# Patient Record
Sex: MALE | Race: WHITE | Employment: FULL TIME | URBAN - METROPOLITAN AREA
[De-identification: names, ages, dates, MRNs, and addresses within clinical notes are randomized per-mention and may not be internally consistent; named-entity substitution may affect disease eponyms.]

---

## 2017-01-25 ENCOUNTER — OFFICE VISIT (OUTPATIENT)
Dept: LAB | Facility: SCHOOL | Age: 47
End: 2017-01-25
Payer: COMMERCIAL

## 2017-01-25 VITALS
TEMPERATURE: 97.7 F | SYSTOLIC BLOOD PRESSURE: 122 MMHG | OXYGEN SATURATION: 98 % | HEIGHT: 71 IN | WEIGHT: 202.8 LBS | BODY MASS INDEX: 28.39 KG/M2 | RESPIRATION RATE: 16 BRPM | HEART RATE: 95 BPM | DIASTOLIC BLOOD PRESSURE: 86 MMHG

## 2017-01-25 DIAGNOSIS — J01.10 ACUTE FRONTAL SINUSITIS, RECURRENCE NOT SPECIFIED: Primary | ICD-10-CM

## 2017-01-25 PROCEDURE — 99213 OFFICE O/P EST LOW 20 MIN: CPT | Performed by: NURSE PRACTITIONER

## 2017-01-25 NOTE — MR AVS SNAPSHOT
After Visit Summary   1/25/2017    Emery Barkley    MRN: [id number]           Patient Information     Date Of Birth          1970        Visit Information        Provider Department      1/25/2017 10:00 AM Angelique Simmons APRN [facility]         Today's Diagnoses     Acute frontal sinusitis, recurrence not specified    -  1       Care Instructions      Sinusitis (Antibiotic Treatment)    The sinuses are air-filled spaces within the bones of the face. They connect to the inside of the nose. Sinusitis is an inflammation of the tissue lining the sinus cavity. Sinus inflammation can occur during a cold. It can also be due to allergies to pollens and other particles in the air. Sinusitis can cause symptoms of sinus congestion and fullness. A sinus infection causes fever, headache and facial pain. There is often green or yellow drainage from the nose or into the back of the throat (post-nasal drip). You have been given antibiotics to treat this condition.  Home care:    Take the full course of antibiotics as instructed. Do not stop taking them, even if you feel better.    Drink plenty of water, hot tea, and other liquids. This may help thin mucus. It also may promote sinus drainage.    Heat may help soothe painful areas of the face. Use a towel soaked in hot water. Or,  the shower and direct the hot spray onto your face. Using a vaporizer along with a menthol rub at night may also help.     An expectorant containing guaifenesin may help thin the mucus and promote drainage from the sinuses.    Over-the-counter decongestants may be used unless a similar medicine was prescribed. Nasal sprays work the fastest. Use one that contains phenylephrine or oxymetazoline. First blow the nose gently. Then use the spray. Do not use these medicines more often than directed on the label or symptoms may get worse. You may also use tablets containing pseudoephedrine. Avoid  products that combine ingredients, because side effects may be increased. Read labels. You can also ask the pharmacist for help. (NOTE: Persons with high blood pressure should not use decongestants. They can raise blood pressure.)    Over-the-counter antihistamines may help if allergies contributed to your sinusitis.      Do not use nasal rinses or irrigation during an acute sinus infection, unless told to by your health care provider. Rinsing may spread the infection to other sinuses.    Use acetaminophen or ibuprofen to control pain, unless another pain medicine was prescribed. (If you have chronic liver or kidney disease or ever had a stomach ulcer, talk with your doctor before using these medicines. Aspirin should never be used in anyone under 18 years of age who is ill with a fever. It may cause severe liver damage.)    Don't smoke. This can worsen symptoms.  Follow-up care  Follow up with your healthcare provider or our staff if you are not improving within the next week.  When to seek medical advice  Call your healthcare provider if any of these occur:    Facial pain or headache becoming more severe    Stiff neck    Unusual drowsiness or confusion    Swelling of the forehead or eyelids    Vision problems, including blurred or double vision    Fever of 100.4 F (38 C) or higher, or as directed by your healthcare provider    Seizure    Breathing problems    Symptoms not resolving within 10 days    2773-7314 The [employer]. [address] [place] [address]. All rights reserved. This information is not intended as a substitute for professional medical care. Always follow your healthcare professional's instructions.              Follow-ups after your visit        Your next 10 appointments already scheduled     Jan 25, 2017 10:00 AM   School Visit with MICHAEL Branch CNP   [facility]  ([facility] )    [address]  [address]  [facility] [id number] [phone]              Who to contact     If you have questions or need follow up information about today's clinic visit or your schedule please contact [facility]  directly at [phone].  Normal or non-critical lab and imaging results will be communicated to you by MyChart, letter or phone within 4 business days after the clinic has received the results. If you do not hear from us within 7 days, please contact the clinic through [employer] or phone. If you have a critical or abnormal lab result, we will notify you by phone as soon as possible.  Submit refill requests through [employer] or call your pharmacy and they will forward the refill request to us. Please allow 3 business days for your refill to be completed.          Additional Information About Your Visit        [employer] Information     [employer] gives you secure access to your electronic health record. If you see a primary care provider, you can also send messages to your care team and make appointments. If you have questions, please call your primary care clinic.  If you do not have a primary care provider, please call [phone] and they will assist you.        Care EveryWhere ID     This is your Care EveryWhere ID. This could be used by other organizations to access your [place] medical records  [id number]        Your Vitals Were     Pulse Temperature Respirations Height BMI (Body Mass Index) Pulse Oximetry    95 97.7  F (36.5  C) (Tympanic) 16 5' 10.5\" (1.791 m) 28.68 kg/m2 98%       Blood Pressure from Last 3 Encounters:   01/25/17 122/86   11/23/16 128/78   08/24/15 118/78    Weight from Last 3 Encounters:   01/25/17 202 lb 12.8 oz (91.989 kg)   11/23/16 203 lb (92.08 kg)   08/24/15 199 lb (90.266 kg)              Today, you had the following     No orders found for display         Today's Medication Changes          These changes are accurate as of: 1/25/17  9:35 AM.  If you have any " questions, ask your nurse or doctor.               Start taking these medicines.        Dose/Directions    amoxicillin-clavulanate 875-125 MG per tablet   Commonly known as:  AUGMENTIN   Used for:  Acute frontal sinusitis, recurrence not specified   Started by:  Angelique Simmons APRN CNP        Dose:  1 tablet   Take 1 tablet by mouth 2 times daily   Quantity:  20 tablet   Refills:  0            Where to get your medicines      Some of these will need a paper prescription and others can be bought over the counter.  Ask your nurse if you have questions.     Bring a paper prescription for each of these medications    - amoxicillin-clavulanate 875-125 MG per tablet             Primary Care Provider Office Phone # Fax #    Devyn Cox [phone]       [facility] [address] [facility] [facility] [address]        Thank you!     Thank you for choosing [place] [address]  for your care. Our goal is always to provide you with excellent care. Hearing back from our patients is one way we can continue to improve our services. Please take a few minutes to complete the written survey that you may receive in the mail after your visit with us. Thank you!             Your Updated Medication List - Protect others around you: Learn how to safely use, store and throw away your medicines at www.disposemymeds.org.          This list is accurate as of: 1/25/17  9:35 AM.  Always use your most recent med list.                   Brand Name Dispense Instructions for use    amoxicillin-clavulanate 875-125 MG per tablet    AUGMENTIN    20 tablet    Take 1 tablet by mouth 2 times daily       atorvastatin 20 MG tablet    LIPITOR    90 tablet    Take 1 tablet (20 mg) by mouth daily       loratadine 10 MG tablet    CLARITIN     Take 10 mg by mouth daily as needed for allergies

## 2017-01-25 NOTE — PROGRESS NOTES
SUBJECTIVE:                                                    Emery Barkley is a 46 year old male who presents to clinic today for the following health issues:      Acute Illness   Acute illness concerns: sinus infection  Onset: 6 days     Fever: no     Chills/Sweats: no     Headache (location?): no     Sinus Pressure:YES- post-nasal drainage and facial pain    Conjunctivitis:  YES: bilateral, redness and discharge    Ear Pain: YES: pressure when lying on ears. bilaterally    Rhinorrhea: YES- clear to yellow    Congestion: YES- nasal    Sore Throat: no      Cough: YES-once in awhile, non-productive    Wheeze: no     Decreased Appetite: YES    Nausea: no     Vomiting: no     Diarrhea:  no     Dysuria/Freq.: no     Fatigue/Achiness: no     Sick/Strep Exposure: YES- strep at school, coworker has same symptoms     Therapies Tried and outcome: sudafed, neti pot, nyquil-temporary relief, nyquil helps to sleep.    Problem list and histories reviewed & adjusted, as indicated.  Additional history: as documented    Patient Active Problem List   Diagnosis     Hyperlipidemia LDL goal <100     Family history of coronary artery disease     External hemorrhoid     Past Surgical History   Procedure Laterality Date     Vasectomy         Social History   Substance Use Topics     Smoking status: Never Smoker      Smokeless tobacco: Never Used     Alcohol Use: Yes      Comment: 2 drinks per week     Family History   Problem Relation Age of Onset     C.A.D. Father      MI, chf, first mi in 50s.     CANCER Sister      brain     Lipids Mother      Coronary Artery Disease Father      Hyperlipidemia Mother      Hyperlipidemia Father      Other Cancer Sister      Brain Cancer     Depression Mother            ROS:  Constitutional, HEENT, cardiovascular, pulmonary, gi and gu systems are negative, except as otherwise noted.    OBJECTIVE:                                                    /86 mmHg  Pulse 95  Temp(Src) 97.7  F (36.5  C)  "(Tympanic)  Resp 16  Ht 5' 10.5\" (1.791 m)  Wt 202 lb 12.8 oz (91.989 kg)  BMI 28.68 kg/m2  SpO2 98%  Body mass index is 28.68 kg/(m^2).  GENERAL: healthy, alert and no distress  EYES: Eyes grossly normal to inspection, PERRL and conjunctivae and sclerae normal  HENT: normal cephalic/atraumatic, both ears: mucopurulent effusion, nose and mouth without ulcers or lesions, nasal mucosa edematous , oropharynx clear, oral mucous membranes moist and sinuses: frontal tenderness on both  NECK: no adenopathy, no asymmetry, masses, or scars and thyroid normal to palpation  RESP: lungs clear to auscultation - no rales, rhonchi or wheezes  CV: regular rates and rhythm and normal S1 S2, no S3 or S4  SKIN: no suspicious lesions or rashes  NEURO: Normal strength and tone, mentation intact and speech normal    Diagnostic Test Results:  none      ASSESSMENT/PLAN:                                                        ICD-10-CM    1. Acute frontal sinusitis, recurrence not specified J01.10 amoxicillin-clavulanate (AUGMENTIN) 875-125 MG per tablet       FUTURE APPOINTMENTS:       - Follow-up visit in 1 week with PCP for unresolved sx, sooner for new or worsening sx.      Patient Instructions     Sinusitis (Antibiotic Treatment)    The sinuses are air-filled spaces within the bones of the face. They connect to the inside of the nose. Sinusitis is an inflammation of the tissue lining the sinus cavity. Sinus inflammation can occur during a cold. It can also be due to allergies to pollens and other particles in the air. Sinusitis can cause symptoms of sinus congestion and fullness. A sinus infection causes fever, headache and facial pain. There is often green or yellow drainage from the nose or into the back of the throat (post-nasal drip). You have been given antibiotics to treat this condition.  Home care:    Take the full course of antibiotics as instructed. Do not stop taking them, even if you feel better.    Drink plenty of " water, hot tea, and other liquids. This may help thin mucus. It also may promote sinus drainage.    Heat may help soothe painful areas of the face. Use a towel soaked in hot water. Or,  the shower and direct the hot spray onto your face. Using a vaporizer along with a menthol rub at night may also help.     An expectorant containing guaifenesin may help thin the mucus and promote drainage from the sinuses.    Over-the-counter decongestants may be used unless a similar medicine was prescribed. Nasal sprays work the fastest. Use one that contains phenylephrine or oxymetazoline. First blow the nose gently. Then use the spray. Do not use these medicines more often than directed on the label or symptoms may get worse. You may also use tablets containing pseudoephedrine. Avoid products that combine ingredients, because side effects may be increased. Read labels. You can also ask the pharmacist for help. (NOTE: Persons with high blood pressure should not use decongestants. They can raise blood pressure.)    Over-the-counter antihistamines may help if allergies contributed to your sinusitis.      Do not use nasal rinses or irrigation during an acute sinus infection, unless told to by your health care provider. Rinsing may spread the infection to other sinuses.    Use acetaminophen or ibuprofen to control pain, unless another pain medicine was prescribed. (If you have chronic liver or kidney disease or ever had a stomach ulcer, talk with your doctor before using these medicines. Aspirin should never be used in anyone under 18 years of age who is ill with a fever. It may cause severe liver damage.)    Don't smoke. This can worsen symptoms.  Follow-up care  Follow up with your healthcare provider or our staff if you are not improving within the next week.  When to seek medical advice  Call your healthcare provider if any of these occur:    Facial pain or headache becoming more severe    Stiff neck    Unusual drowsiness  or confusion    Swelling of the forehead or eyelids    Vision problems, including blurred or double vision    Fever of 100.4 F (38 C) or higher, or as directed by your healthcare provider    Seizure    Breathing problems    Symptoms not resolving within 10 days    2917-6661 [employer]. [address] [place] [address]. All rights reserved. This information is not intended as a substitute for professional medical care. Always follow your healthcare professional's instructions.              MICHAEL Branch [facility]

## 2017-01-25 NOTE — NURSING NOTE
"Chief Complaint   Patient presents with     Sinus Problem       Initial /86 mmHg  Pulse 95  Temp(Src) 97.7  F (36.5  C) (Tympanic)  Resp 16  Ht 5' 10.5\" (1.791 m)  Wt 202 lb 12.8 oz (91.989 kg)  BMI 28.68 kg/m2  SpO2 98% Estimated body mass index is 28.68 kg/(m^2) as calculated from the following:    Height as of this encounter: 5' 10.5\" (1.791 m).    Weight as of this encounter: 202 lb 12.8 oz (91.989 kg).  BP completed using cuff size: large  "

## 2017-01-25 NOTE — PATIENT INSTRUCTIONS
Sinusitis (Antibiotic Treatment)    The sinuses are air-filled spaces within the bones of the face. They connect to the inside of the nose. Sinusitis is an inflammation of the tissue lining the sinus cavity. Sinus inflammation can occur during a cold. It can also be due to allergies to pollens and other particles in the air. Sinusitis can cause symptoms of sinus congestion and fullness. A sinus infection causes fever, headache and facial pain. There is often green or yellow drainage from the nose or into the back of the throat (post-nasal drip). You have been given antibiotics to treat this condition.  Home care:    Take the full course of antibiotics as instructed. Do not stop taking them, even if you feel better.    Drink plenty of water, hot tea, and other liquids. This may help thin mucus. It also may promote sinus drainage.    Heat may help soothe painful areas of the face. Use a towel soaked in hot water. Or,  the shower and direct the hot spray onto your face. Using a vaporizer along with a menthol rub at night may also help.     An expectorant containing guaifenesin may help thin the mucus and promote drainage from the sinuses.    Over-the-counter decongestants may be used unless a similar medicine was prescribed. Nasal sprays work the fastest. Use one that contains phenylephrine or oxymetazoline. First blow the nose gently. Then use the spray. Do not use these medicines more often than directed on the label or symptoms may get worse. You may also use tablets containing pseudoephedrine. Avoid products that combine ingredients, because side effects may be increased. Read labels. You can also ask the pharmacist for help. (NOTE: Persons with high blood pressure should not use decongestants. They can raise blood pressure.)    Over-the-counter antihistamines may help if allergies contributed to your sinusitis.      Do not use nasal rinses or irrigation during an acute sinus infection, unless told to by  your health care provider. Rinsing may spread the infection to other sinuses.    Use acetaminophen or ibuprofen to control pain, unless another pain medicine was prescribed. (If you have chronic liver or kidney disease or ever had a stomach ulcer, talk with your doctor before using these medicines. Aspirin should never be used in anyone under 18 years of age who is ill with a fever. It may cause severe liver damage.)    Don't smoke. This can worsen symptoms.  Follow-up care  Follow up with your healthcare provider or our staff if you are not improving within the next week.  When to seek medical advice  Call your healthcare provider if any of these occur:    Facial pain or headache becoming more severe    Stiff neck    Unusual drowsiness or confusion    Swelling of the forehead or eyelids    Vision problems, including blurred or double vision    Fever of 100.4 F (38 C) or higher, or as directed by your healthcare provider    Seizure    Breathing problems    Symptoms not resolving within 10 days    8225-2406 [employer]. [address] [place] [address], [place] [address]. All rights reserved. This information is not intended as a substitute for professional medical care. Always follow your healthcare professional's instructions.

## 2017-02-20 DIAGNOSIS — E78.5 HYPERLIPIDEMIA LDL GOAL <100: ICD-10-CM

## 2017-02-20 LAB
CHOLEST SERPL-MCNC: 147 MG/DL
HDLC SERPL-MCNC: 39 MG/DL
LDLC SERPL CALC-MCNC: 70 MG/DL
NONHDLC SERPL-MCNC: 108 MG/DL
TRIGL SERPL-MCNC: 190 MG/DL

## 2017-02-20 PROCEDURE — 36415 COLL VENOUS BLD VENIPUNCTURE: CPT | Performed by: FAMILY MEDICINE

## 2017-02-20 PROCEDURE — 80061 LIPID PANEL: CPT | Performed by: FAMILY MEDICINE

## 2017-07-14 ENCOUNTER — OFFICE VISIT (OUTPATIENT)
Dept: FAMILY MEDICINE | Facility: CLINIC | Age: 47
End: 2017-07-14
Payer: COMMERCIAL

## 2017-07-14 VITALS
HEART RATE: 73 BPM | WEIGHT: 211 LBS | TEMPERATURE: 97.1 F | SYSTOLIC BLOOD PRESSURE: 127 MMHG | DIASTOLIC BLOOD PRESSURE: 80 MMHG | BODY MASS INDEX: 29.54 KG/M2 | HEIGHT: 71 IN

## 2017-07-14 DIAGNOSIS — L82.0 INFLAMED SEBORRHEIC KERATOSIS: Primary | ICD-10-CM

## 2017-07-14 DIAGNOSIS — L91.8 SKIN TAG: ICD-10-CM

## 2017-07-14 PROCEDURE — 11200 RMVL SKIN TAGS UP TO&INC 15: CPT | Mod: 59 | Performed by: FAMILY MEDICINE

## 2017-07-14 PROCEDURE — 17110 DESTRUCTION B9 LES UP TO 14: CPT | Performed by: FAMILY MEDICINE

## 2017-07-14 NOTE — NURSING NOTE
"No chief complaint on file.      Initial /80  Pulse 73  Temp 97.1  F (36.2  C) (Tympanic)  Ht 5' 10.5\" (1.791 m)  Wt 211 lb (95.7 kg)  BMI 29.85 kg/m2 Estimated body mass index is 29.85 kg/(m^2) as calculated from the following:    Height as of this encounter: 5' 10.5\" (1.791 m).    Weight as of this encounter: 211 lb (95.7 kg).  Medication Reconciliation: complete     Edwina Nagel MA       "

## 2017-07-14 NOTE — PROGRESS NOTES
"  SUBJECTIVE:                                                    Emery Barkley is a 46 year old male who presents to clinic today for the following health issues:    Patient would like to get his skin tag removed from under his chin and neck.      Patient has h/o SK and skin tags. Would like them treated.  About 8 on neck and one on right upper chest.         Problem list and histories reviewed & adjusted, as indicated.  Additional history: as documented        Reviewed and updated as needed this visit by clinical staff  Tobacco  Allergies  Meds  Med Hx  Surg Hx  Fam Hx  Soc Hx      Reviewed and updated as needed this visit by Provider         ROS:  CONSTITUTIONAL:NEGATIVE for fever, chills, change in weight  INTEGUMENTARY/SKIN: as above    OBJECTIVE:                                                    /80  Pulse 73  Temp 97.1  F (36.2  C) (Tympanic)  Ht 5' 10.5\" (1.791 m)  Wt 211 lb (95.7 kg)  BMI 29.85 kg/m2  Body mass index is 29.85 kg/(m^2).  GENERAL APPEARANCE: healthy, alert and no distress  SKIN: has one irritated SK on right upper chest, and 8 small skin tags on neck each about 0.5 mm diameter. 3 freeze thaw cycles done without complications.           ASSESSMENT/PLAN:                                                    1. Inflamed seborrheic keratosis  Treated due to irritation around neck line    2. Skin tag  Treated on his neck      See Patient Instructions    Devyn Cox MD  [facility]    "

## 2017-07-14 NOTE — PATIENT INSTRUCTIONS
I treated about 8 small skin tags on your neck and one seborrheic keratosis on your right chest.    If they are not gone in 4 weeks please return to clinic for re-evaluation.          Thank you for choosing [facility].  You may be receiving a survey in the mail from Neeraj Baez regarding your visit today.  Please take a few minutes to complete and return the survey to let us know how we are doing.      If you have questions or concerns, please contact us via [employer] or you can contact your care team at [phone].    Our Clinic hours are:  Monday 6:40 am  to 7:00 pm  Tuesday -Friday 6:40 am to 5:00 pm    The [place] outpatient lab hours are:  Monday - Friday 6:10 am to 4:45 pm  Saturdays 7:00 am to 11:00 am  Appointments are required, call [phone]    If you have clinical questions after hours or would like to schedule an appointment,  call the clinic at [phone].

## 2017-07-14 NOTE — MR AVS SNAPSHOT
After Visit Summary   7/14/2017    Emery Barkley    MRN: [id number]           Patient Information     Date Of Birth          1970        Visit Information        Provider Department      7/14/2017 9:20 AM Devyn Cox MD [facility]        Today's Diagnoses     Inflamed seborrheic keratosis    -  1    Skin tag          Care Instructions    I treated about 8 small skin tags on your neck and one seborrheic keratosis on your right chest.    If they are not gone in 4 weeks please return to clinic for re-evaluation.          Thank you for choosing [facility].  You may be receiving a survey in the mail from [employer] regarding your visit today.  Please take a few minutes to complete and return the survey to let us know how we are doing.      If you have questions or concerns, please contact us via [employer] or you can contact your care team at [phone].    Our Clinic hours are:  Monday 6:40 am  to 7:00 pm  Tuesday -Friday 6:40 am to 5:00 pm    The [place] outpatient lab hours are:  Monday - Friday 6:10 am to 4:45 pm  Saturdays 7:00 am to 11:00 am  Appointments are required, call [phone]    If you have clinical questions after hours or would like to schedule an appointment,  call the clinic at [phone].            Follow-ups after your visit        Who to contact     If you have questions or need follow up information about today's clinic visit or your schedule please contact [facility] directly at [phone].  Normal or non-critical lab and imaging results will be communicated to you by [employer], letter or phone within 4 business days after the clinic has received the results. If you do not hear from us within 7 days, please contact the clinic through [employer] or phone. If you have a critical or abnormal lab result, we will notify you by phone as soon as possible.  Submit refill requests through [employer] or call your pharmacy and they will forward the  "refill request to us. Please allow 3 business days for your refill to be completed.          Additional Information About Your Visit        [employer] Information     [employer] gives you secure access to your electronic health record. If you see a primary care provider, you can also send messages to your care team and make appointments. If you have questions, please call your primary care clinic.  If you do not have a primary care provider, please call [phone] and they will assist you.        Care EveryWhere ID     This is your Care EveryWhere ID. This could be used by other organizations to access your [place] medical records  [id number]        Your Vitals Were     Pulse Temperature Height BMI (Body Mass Index)          73 97.1  F (36.2  C) (Tympanic) 5' 10.5\" (1.791 m) 29.85 kg/m2         Blood Pressure from Last 3 Encounters:   07/14/17 127/80   01/25/17 122/86   11/23/16 128/78    Weight from Last 3 Encounters:   07/14/17 211 lb (95.7 kg)   01/25/17 202 lb 12.8 oz (92 kg)   11/23/16 203 lb (92.1 kg)              Today, you had the following     No orders found for display       Primary Care Provider Office Phone # Fax #    Devyn Cox [phone]       [facility] [address] [facility] [address]        Equal Access to Services     SHARIFA NIELSON : Hadii lissette ku hadasho Soomaali, waaxda luqadaha, qaybta kaalmada adeegyada, brandon oliva. So [facility] [phone].    ATENCIÓN: Si habla español, tiene a moctezuma disposición servicios gratuitos de asistencia lingüística. Rosibel ritchie [phone].    We comply with applicable federal civil rights laws and [place] laws. We do not discriminate on the basis of race, color, national origin, age, disability sex, sexual orientation or gender identity.            Thank you!     Thank you for choosing [facility]  for your care. Our goal is always to provide you with excellent care. Hearing back from our " patients is one way we can continue to improve our services. Please take a few minutes to complete the written survey that you may receive in the mail after your visit with us. Thank you!             Your Updated Medication List - Protect others around you: Learn how to safely use, store and throw away your medicines at www.disposemymeds.org.          This list is accurate as of: 7/14/17  9:51 AM.  Always use your most recent med list.                   Brand Name Dispense Instructions for use Diagnosis    atorvastatin 20 MG tablet    LIPITOR    90 tablet    Take 1 tablet (20 mg) by mouth daily    Hyperlipidemia LDL goal <100, Family history of coronary artery disease       loratadine 10 MG tablet    CLARITIN     Take 10 mg by mouth daily as needed for allergies

## 2017-10-30 ENCOUNTER — OFFICE VISIT (OUTPATIENT)
Dept: LAB | Facility: SCHOOL | Age: 47
End: 2017-10-30
Payer: COMMERCIAL

## 2017-10-30 VITALS
BODY MASS INDEX: 28.14 KG/M2 | HEART RATE: 80 BPM | DIASTOLIC BLOOD PRESSURE: 84 MMHG | WEIGHT: 201 LBS | HEIGHT: 71 IN | SYSTOLIC BLOOD PRESSURE: 126 MMHG | TEMPERATURE: 97 F

## 2017-10-30 DIAGNOSIS — J32.9 SINUSITIS, UNSPECIFIED CHRONICITY, UNSPECIFIED LOCATION: ICD-10-CM

## 2017-10-30 DIAGNOSIS — J01.90 ACUTE SINUSITIS WITH SYMPTOMS > 10 DAYS: Primary | ICD-10-CM

## 2017-10-30 PROCEDURE — 99213 OFFICE O/P EST LOW 20 MIN: CPT

## 2017-10-30 NOTE — MR AVS SNAPSHOT
After Visit Summary   10/30/2017    Emery Barkley    MRN: [id number]           Patient Information     Date Of Birth          1970        Visit Information        Provider Department      10/30/2017 12:30 PM Provider, [facility]        Today's Diagnoses     Sinusitis, unspecified chronicity, unspecified location    -  1      Care Instructions    I am putting you on an antibiotic call Augmentin (amoxicillin/clavulanate potassium 875/125)  Take one tab by mouth twice a day with a meal for 10 days.  Drink water/fluids.    Thank you for using the [place] [address] [facility].  If there is no improvement of your condition, please call and schedule an appointment with your primary care provider.      The medication (s), dosing, route and duration was discussed with the patient.  In addition the drug monograph was reviewed and given to the patient for the medication (s).             Follow-ups after your visit        Who to contact     If you have questions or need follow up information about today's clinic visit or your schedule please contact [place] [address] directly at [phone].  Normal or non-critical lab and imaging results will be communicated to you by [employer], letter or phone within 4 business days after the clinic has received the results. If you do not hear from us within 7 days, please contact the clinic through [employer] or phone. If you have a critical or abnormal lab result, we will notify you by phone as soon as possible.  Submit refill requests through [employer] or call your pharmacy and they will forward the refill request to us. Please allow 3 business days for your refill to be completed.          Additional Information About Your Visit        [employer] Information     [employer] gives you secure access to your electronic health record. If you see a primary care provider, you can also send messages to your care team and make  "appointments. If you have questions, please call your primary care clinic.  If you do not have a primary care provider, please call [phone] and they will assist you.        Care EveryWhere ID     This is your Care EveryWhere ID. This could be used by other organizations to access your [place] medical records  [id number]        Your Vitals Were     Pulse Temperature Height BMI (Body Mass Index)          80 97  F (36.1  C) (Tympanic) 5' 10.5\" (1.791 m) 28.43 kg/m2         Blood Pressure from Last 3 Encounters:   10/30/17 126/84   07/14/17 127/80   01/25/17 122/86    Weight from Last 3 Encounters:   10/30/17 201 lb (91.2 kg)   07/14/17 211 lb (95.7 kg)   01/25/17 202 lb 12.8 oz (92 kg)              Today, you had the following     No orders found for display         Today's Medication Changes          These changes are accurate as of: 10/30/17 12:52 PM.  If you have any questions, ask your nurse or doctor.               Start taking these medicines.        Dose/Directions    amoxicillin-clavulanate 875-125 MG per tablet   Commonly known as:  AUGMENTIN   Used for:  Sinusitis, unspecified chronicity, unspecified location   Started by:  Provider, [employer]        Dose:  1 tablet   Take 1 tablet by mouth 2 times daily Take with food   Quantity:  20 tablet   Refills:  0            Where to get your medicines      Some of these will need a paper prescription and others can be bought over the counter.  Ask your nurse if you have questions.     Bring a paper prescription for each of these medications     amoxicillin-clavulanate 875-125 MG per tablet                Primary Care Provider Office Phone # Fax #    Devyn Cox [phone] [address] [facility] [address]        Equal Access to Services     SHARIFA NIELSON AH: Izabela Espinal, dione valle, kina powell, brandon oliva. So [facility] [phone].    ATENCIÓN: Si kings knutson, " tiene a moctezuma disposición servicios gratuitos de asistencia lingüística. Rosibel ritchie [phone].    We comply with applicable federal civil rights laws and [place] laws. We do not discriminate on the basis of race, color, national origin, age, disability, sex, sexual orientation, or gender identity.            Thank you!     Thank you for choosing [place] [address]  for your care. Our goal is always to provide you with excellent care. Hearing back from our patients is one way we can continue to improve our services. Please take a few minutes to complete the written survey that you may receive in the mail after your visit with us. Thank you!             Your Updated Medication List - Protect others around you: Learn how to safely use, store and throw away your medicines at www.disposemymeds.org.          This list is accurate as of: 10/30/17 12:52 PM.  Always use your most recent med list.                   Brand Name Dispense Instructions for use Diagnosis    amoxicillin-clavulanate 875-125 MG per tablet    AUGMENTIN    20 tablet    Take 1 tablet by mouth 2 times daily Take with food    Sinusitis, unspecified chronicity, unspecified location       atorvastatin 20 MG tablet    LIPITOR    90 tablet    Take 1 tablet (20 mg) by mouth daily    Hyperlipidemia LDL goal <100, Family history of coronary artery disease       loratadine 10 MG tablet    CLARITIN     Take 10 mg by mouth daily as needed for allergies

## 2017-10-30 NOTE — PATIENT INSTRUCTIONS
I am putting you on an antibiotic call Augmentin (amoxicillin/clavulanate potassium 875/125)  Take one tab by mouth twice a day with a meal for 10 days.  Drink water/fluids.    Thank you for using the [place] [address] [facility].  If there is no improvement of your condition, please call and schedule an appointment with your primary care provider.      The medication (s), dosing, route and duration was discussed with the patient.  In addition the drug monograph was reviewed and given to the patient for the medication (s).

## 2017-10-30 NOTE — PROGRESS NOTES
"  SUBJECTIVE:   Emery Barkley is a 47 year old male who presents to clinic today for the following health issues:  Chief Complaint   Patient presents with     Sinus Problem     one week of cold symptoms. The past 2 days the left eye has been swollen and puffy upon waking. No fever. Cough with phlegm. Thick green nasal mucous. No ear pain. Has been using Sudafed and Nyquil           Patient has had symptoms for over 8 days or more.  Green yellow discharge, pnd, puffy eye lids.  Not getting better.  Drinking fluids.  No fever or chills.  No gi symptoms.    Problem list and histories reviewed & adjusted, as indicated.  Additional history: as documented        Reviewed and updated as needed this visit by clinical staff  Tobacco  Allergies  Med Hx  Surg Hx  Fam Hx  Soc Hx      Reviewed and updated as needed this visit by Provider         ROS:  CONSTITUTIONAL:NEGATIVE for fever, chills, change in weight  INTEGUMENTARY/SKIN: NEGATIVE for worrisome rashes, moles or lesions  ENT/MOUTH: as above  RESP:has some cough due to pnd  CV: NEGATIVE for chest pain, palpitations or peripheral edema    OBJECTIVE:                                                    /84 (Cuff Size: Adult Large)  Pulse 80  Temp 97  F (36.1  C) (Tympanic)  Ht 5' 10.5\" (1.791 m)  Wt 201 lb (91.2 kg)  BMI 28.43 kg/m2  Body mass index is 28.43 kg/(m^2).  Patient is pleasant, cooperative and in no acute distress.  HEENT: Head normocephalic, atraumatic. Ears clear bilaterally.  Eyes clear.  Nose clear coryza.  Oral pharynx:  moderate cobblestoning seen with yellow postnasal drip.    Neck: Supple, no adenopathy, no other masses or thyromegaly.  Lungs: CTA  Heart: Regular rate, rhythm without murmur, gallop or rub.  Skin: Warm and dry.           ASSESSMENT/PLAN:                                                    1. Acute sinusitis with symptoms > 10 days  Treat with abx, Symptomatic cares were discussed in details.      2. Sinusitis, unspecified " chronicity, unspecified location    - amoxicillin-clavulanate (AUGMENTIN) 875-125 MG per tablet; Take 1 tablet by mouth 2 times daily Take with food  Dispense: 20 tablet; Refill: 0      See Patient Instructions    [facility] PROVIDER  [facility]

## 2017-10-30 NOTE — NURSING NOTE
"Chief Complaint   Patient presents with     Sinus Problem     one week of cold symptoms. The past 2 days the left eye has been swollen and puffy upon waking. No fever. Cough with phlegm. Thick green nasal mucous.       Initial /84 (Cuff Size: Adult Large)  Pulse 80  Temp 97  F (36.1  C) (Tympanic)  Ht 5' 10.5\" (1.791 m)  Wt 201 lb (91.2 kg)  BMI 28.43 kg/m2 Estimated body mass index is 28.43 kg/(m^2) as calculated from the following:    Height as of this encounter: 5' 10.5\" (1.791 m).    Weight as of this encounter: 201 lb (91.2 kg).  Medication Reconciliation: complete  "

## 2017-11-17 ENCOUNTER — TELEPHONE (OUTPATIENT)
Dept: FAMILY MEDICINE | Facility: CLINIC | Age: 47
End: 2017-11-17

## 2017-11-17 DIAGNOSIS — E78.5 HYPERLIPIDEMIA LDL GOAL <100: Primary | ICD-10-CM

## 2017-11-17 DIAGNOSIS — Z13.1 SCREENING FOR DIABETES MELLITUS: ICD-10-CM

## 2017-11-18 DIAGNOSIS — Z13.1 SCREENING FOR DIABETES MELLITUS: ICD-10-CM

## 2017-11-18 DIAGNOSIS — E78.5 HYPERLIPIDEMIA LDL GOAL <100: ICD-10-CM

## 2017-11-18 LAB
CHOLEST SERPL-MCNC: 148 MG/DL
GLUCOSE SERPL-MCNC: 100 MG/DL (ref 70–99)
HDLC SERPL-MCNC: 47 MG/DL
LDLC SERPL CALC-MCNC: 68 MG/DL
NONHDLC SERPL-MCNC: 101 MG/DL
TRIGL SERPL-MCNC: 167 MG/DL

## 2017-11-18 PROCEDURE — 80061 LIPID PANEL: CPT | Performed by: FAMILY MEDICINE

## 2017-11-18 PROCEDURE — 82947 ASSAY GLUCOSE BLOOD QUANT: CPT | Performed by: FAMILY MEDICINE

## 2017-11-18 PROCEDURE — 36415 COLL VENOUS BLD VENIPUNCTURE: CPT | Performed by: FAMILY MEDICINE

## 2017-12-07 ENCOUNTER — MYC MEDICAL ADVICE (OUTPATIENT)
Dept: FAMILY MEDICINE | Facility: CLINIC | Age: 47
End: 2017-12-07

## 2017-12-07 DIAGNOSIS — E78.5 HYPERLIPIDEMIA LDL GOAL <100: ICD-10-CM

## 2017-12-07 DIAGNOSIS — Z82.49 FAMILY HISTORY OF CORONARY ARTERY DISEASE: ICD-10-CM

## 2017-12-08 RX ORDER — ATORVASTATIN CALCIUM 20 MG/1
20 TABLET, FILM COATED ORAL DAILY
Qty: 90 TABLET | Refills: 2 | Status: SHIPPED | OUTPATIENT
Start: 2017-12-08 | End: 2018-09-12

## 2017-12-08 NOTE — TELEPHONE ENCOUNTER
atorvastatin     Last Written Prescription Date: 12-1-16  Last Fill Quantity: 90, # refills: 3  Last Office Visit with [facility], [facility] or [facility] prescribing provider: 10-30-17       Lab Results   Component Value Date    CHOL 148 11/18/2017     Lab Results   Component Value Date    HDL 47 11/18/2017     Lab Results   Component Value Date    LDL 68 11/18/2017     Lab Results   Component Value Date    TRIG 167 11/18/2017     Lab Results   Component Value Date    CHOLHDLRATIO 3.3 08/08/2015     Prescription approved per [facility] Refill Protocol.  Julieta FRY RN

## 2018-04-24 ENCOUNTER — OFFICE VISIT (OUTPATIENT)
Dept: LAB | Facility: SCHOOL | Age: 48
End: 2018-04-24
Payer: COMMERCIAL

## 2018-04-24 VITALS
BODY MASS INDEX: 28.92 KG/M2 | DIASTOLIC BLOOD PRESSURE: 80 MMHG | HEART RATE: 83 BPM | HEIGHT: 70 IN | OXYGEN SATURATION: 97 % | SYSTOLIC BLOOD PRESSURE: 130 MMHG | WEIGHT: 202 LBS | TEMPERATURE: 97 F | RESPIRATION RATE: 16 BRPM

## 2018-04-24 DIAGNOSIS — Z00.00 ROUTINE GENERAL MEDICAL EXAMINATION AT A HEALTH CARE FACILITY: Primary | ICD-10-CM

## 2018-04-24 PROCEDURE — 99213 OFFICE O/P EST LOW 20 MIN: CPT | Performed by: NURSE PRACTITIONER

## 2018-04-24 NOTE — PATIENT INSTRUCTIONS
"                Emery Barkley has completed a Wellness Check at the [facility] on 4/24/2018.      ____________________________________________  Noreen Torres, APRN CNP                                                                               Wellness Visit Recommendations:      See your regular primary care health provider every year in order to help stay healthy.    Review health changes.     Review your medicines if your doctor has prescribed any.    Talk to your provider about how often to have your cholesterol checked.    If you are at risk for diabetes, you should have a diabetes test (fasting glucose).    Shots: Get a flu shot each year. Get a tetanus shot every 10 years.     Review with your primary care provider other immunizations that you may need based on your age and/or medical/surgical history    Nutrition:     Eat at least 5 servings of fruits and vegetables each day.    Eat whole-grain bread, whole-wheat pasta and brown rice instead of white grains and rice.    Preventive Care to be reviewed by your primary care provider:    Females:        Cervical Cancer Screening                          Breast Cancer Screening                          Colon Cancer Screening  Males:             Prostrate Cancer Screening                          Colon Cancer Screening      Lifestyle:    Exercise at least 150 minutes a week (30 minutes a day, 5 days of the week). This will help you control your weight and help prevent disease or manage disease.    Limit alcohol to one drink per day or less depending on your past medical history.    No smoking.     Wear sunscreen to prevent skin cancer.    See your dentist every six months for an exam and cleaning.    Today's Vital Signs:  /80 (BP Location: Right arm, Patient Position: Chair, Cuff Size: Adult Large)  Pulse 83  Temp 97  F (36.1  C) (Tympanic)  Resp 16  Ht 5' 9.5\" (1.765 m)  Wt 202 lb (91.6 kg)  SpO2 97%  BMI 29.4 kg/m2  "

## 2018-04-24 NOTE — PROGRESS NOTES
"  SUBJECTIVE:  CC: Emery Barkley is an 47 year old woman who presents for Wellness Check at [facility].     Review of Healthy Lifestyle:    Do you get at least three servings of calcium containing foods daily (dairy, green leafy vegetables, etc.)? yes     Do you have a high-fiber diet? maybe     Amount of exercise or daily activities, outside of work: 2 day(s) per week    Do you wear sunscreen on a regular basis? Yes . Not in the winter     Are you taking your medications regularly Yes     Have you had an eye exam in the past two years? no    Do you see a dentist twice per year? yes    Do you have sleep apnea, excessive snoring or excessive daytime drowsiness? no    Do you use tobacco in any form? no       OBJECTIVE:    Vitals: /80 (BP Location: Right arm, Patient Position: Chair, Cuff Size: Adult Large)  Pulse 83  Temp 97  F (36.1  C) (Tympanic)  Resp 16  Ht 5' 9.5\" (1.765 m)  Wt 202 lb (91.6 kg)  SpO2 97%  BMI 29.4 kg/m2  BMI= Body mass index is 29.4 kg/(m^2).    HEARING: Right Ear:        500Hz:  RESPONSE- on Level:   20 db    1000 Hz: RESPONSE- on Level:   20 db    2000 Hz: RESPONSE- on Level:   20 db    4000 Hz: RESPONSE- on Level:   20 db     Left Ear:       500Hz:  RESPONSE- on Level:   20 db    1000 Hz: RESPONSE- on Level:   20 db    2000 Hz: RESPONSE- on Level:   20 db    4000 Hz: RESPONSE- on Level:   20 db     VISION:  Right eye:  20/16  Left eye:     20/16  Both eyes: 20/16  Corrective lenses?  No    Medication Reconciliation: complete    ASSESSMENT/PLAN:    COUNSELING:      reports that he has never smoked. He has never used smokeless tobacco.    Estimated body mass index is 29.4 kg/(m^2) as calculated from the following:    Height as of this encounter: 5' 9.5\" (1.765 m).    Weight as of this encounter: 202 lb (91.6 kg).   Weight management plan: Discussed healthy diet and exercise guidelines and patient will follow up in 12 months in clinic to " re-evaluate.    Counseling Resources  [employer]  https://www.quitplan.com/    MICHAEL Gutierrez CNP  [facility]

## 2018-04-24 NOTE — MR AVS SNAPSHOT
After Visit Summary   4/24/2018    Emery Barkley    MRN: [id number]           Patient Information     Date Of Birth          1970        Visit Information        Provider Department      4/24/2018 3:00 PM Noreen Torres APRN CNP [place] [address]        Today's Diagnoses     Routine general medical examination at a health care facility    -  1      Care Instructions                    Emery Barkley has completed a Wellness Check at the [place] [address] [facility] on 4/24/2018.      ____________________________________________  MICHAEL Gutierrez CNP                                                                               Wellness Visit Recommendations:      See your regular primary care health provider every year in order to help stay healthy.    Review health changes.     Review your medicines if your doctor has prescribed any.    Talk to your provider about how often to have your cholesterol checked.    If you are at risk for diabetes, you should have a diabetes test (fasting glucose).    Shots: Get a flu shot each year. Get a tetanus shot every 10 years.     Review with your primary care provider other immunizations that you may need based on your age and/or medical/surgical history    Nutrition:     Eat at least 5 servings of fruits and vegetables each day.    Eat whole-grain bread, whole-wheat pasta and brown rice instead of white grains and rice.    Preventive Care to be reviewed by your primary care provider:    Females:        Cervical Cancer Screening                          Breast Cancer Screening                          Colon Cancer Screening  Males:             Prostrate Cancer Screening                          Colon Cancer Screening      Lifestyle:    Exercise at least 150 minutes a week (30 minutes a day, 5 days of the week). This will help you control your weight and help prevent disease or manage disease.    Limit alcohol to one drink  "per day or less depending on your past medical history.    No smoking.     Wear sunscreen to prevent skin cancer.    See your dentist every six months for an exam and cleaning.    Today's Vital Signs:  /80 (BP Location: Right arm, Patient Position: Chair, Cuff Size: Adult Large)  Pulse 83  Temp 97  F (36.1  C) (Tympanic)  Resp 16  Ht 5' 9.5\" (1.765 m)  Wt 202 lb (91.6 kg)  SpO2 97%  BMI 29.4 kg/m2          Follow-ups after your visit        Follow-up notes from your care team     Return if symptoms worsen or fail to improve.      Your next 10 appointments already scheduled     May 29, 2018  9:00 AM CDT   [employer] Skin Evaluation with Devyn Cox MD   [facility]    [address] [address] [phone]              Who to contact     If you have questions or need follow up information about today's clinic visit or your schedule please contact [facility] directly at [phone].  Normal or non-critical lab and imaging results will be communicated to you by SPOhart, letter or phone within 4 business days after the clinic has received the results. If you do not hear from us within 7 days, please contact the clinic through [email or web address] or phone. If you have a critical or abnormal lab result, we will notify you by phone as soon as possible.  Submit refill requests through [employer] or call your pharmacy and they will forward the refill request to us. Please allow 3 business days for your refill to be completed.          Additional Information About Your Visit        SPOhart Information     [employer] gives you secure access to your electronic health record. If you see a primary care provider, you can also send messages to your care team and make appointments. If you have questions, please call your primary care clinic.  If you do not have a primary care provider, please call [phone] and they will assist you.      " "  Care EveryWhere ID     This is your Care EveryWhere ID. This could be used by other organizations to access your [place] medical records  [id number]        Your Vitals Were     Pulse Temperature Respirations Height Pulse Oximetry BMI (Body Mass Index)    83 97  F (36.1  C) (Tympanic) 16 5' 9.5\" (1.765 m) 97% 29.4 kg/m2       Blood Pressure from Last 3 Encounters:   04/24/18 130/80   10/30/17 126/84   07/14/17 127/80    Weight from Last 3 Encounters:   04/24/18 202 lb (91.6 kg)   10/30/17 201 lb (91.2 kg)   07/14/17 211 lb (95.7 kg)              Today, you had the following     No orders found for display       Primary Care Provider Office Phone # Fax #    Devyn Cox [phone] [address] [address]        Equal Access to Services     SHARIFA NIELSON : Hadii aad ku hadasho Soomaali, waaxda luqadaha, qaybta kaalmada adeegyada, waxay alecin haymontserratn feliberto june . So [facility] [phone].    ATENCIÓN: Si habla español, tiene a moctezuma disposición servicios gratuitos de asistencia lingüística. Llame al [phone].    We comply with applicable federal civil rights laws and [place] laws. We do not discriminate on the basis of race, color, national origin, age, disability, sex, sexual orientation, or gender identity.            Thank you!     Thank you for choosing [place] [address]  for your care. Our goal is always to provide you with excellent care. Hearing back from our patients is one way we can continue to improve our services. Please take a few minutes to complete the written survey that you may receive in the mail after your visit with us. Thank you!             Your Updated Medication List - Protect others around you: Learn how to safely use, store and throw away your medicines at www.disposemymeds.org.          This list is accurate as of 4/24/18  3:04 PM.  Always use your most recent med list.                   Brand Name Dispense Instructions " for use Diagnosis    atorvastatin 20 MG tablet    LIPITOR    90 tablet    Take 1 tablet (20 mg) by mouth daily    Hyperlipidemia LDL goal <100, Family history of coronary artery disease       loratadine 10 MG tablet    CLARITIN     Take 10 mg by mouth daily as needed for allergies

## 2018-04-24 NOTE — NURSING NOTE
"Chief Complaint   Patient presents with     Wellness Visit       Initial /80 (BP Location: Right arm, Patient Position: Chair, Cuff Size: Adult Large)  Pulse 83  Temp 97  F (36.1  C) (Tympanic)  Resp 16  Ht 5' 9.5\" (1.765 m)  Wt 202 lb (91.6 kg)  SpO2 97%  BMI 29.4 kg/m2 Estimated body mass index is 29.4 kg/(m^2) as calculated from the following:    Height as of this encounter: 5' 9.5\" (1.765 m).    Weight as of this encounter: 202 lb (91.6 kg).  Medication Reconciliation: complete  "

## 2018-05-29 ENCOUNTER — OFFICE VISIT (OUTPATIENT)
Dept: FAMILY MEDICINE | Facility: CLINIC | Age: 48
End: 2018-05-29
Payer: COMMERCIAL

## 2018-05-29 VITALS
OXYGEN SATURATION: 95 % | SYSTOLIC BLOOD PRESSURE: 123 MMHG | DIASTOLIC BLOOD PRESSURE: 86 MMHG | TEMPERATURE: 97.4 F | WEIGHT: 203 LBS | HEART RATE: 82 BPM | BODY MASS INDEX: 29.55 KG/M2

## 2018-05-29 DIAGNOSIS — B07.8 COMMON WART: Primary | ICD-10-CM

## 2018-05-29 PROCEDURE — 17110 DESTRUCTION B9 LES UP TO 14: CPT | Performed by: FAMILY MEDICINE

## 2018-05-29 NOTE — PATIENT INSTRUCTIONS
Follow up in 3 weeks to recheck warts on neck.    Please do not use a razor at this time.    When you do start shaving please change your razor.    You can trim your beard at this time.          Thank you for choosing [facility].  You may be receiving a survey in the mail from [employer]ae[employer] regarding your visit today.  Please take a few minutes to complete and return the survey to let us know how we are doing.      If you have questions or concerns, please contact us via K[employer]ink or you can contact your care team at [phone].    Our Clinic hours are:  Monday 6:40 am  to 7:00 pm  Tuesday -Friday 6:40 am to 5:00 pm    The [place] outpatient lab hours are:  Monday - Friday 6:10 am to 4:45 pm  Saturdays 7:00 am to 11:00 am  Appointments are required, call [phone]    If you have clinical questions after hours or would like to schedule an appointment,  call the clinic at [phone].

## 2018-05-29 NOTE — PROGRESS NOTES
SUBJECTIVE:   Emery Barkley is a 47 year old male who presents to clinic today for the following health issues:      Concern - Derm problem  Onset: couple months ago    Description:   Skin tag removal on the neck. Pt verbalize that he has done this removal before with the PCP, but the tags came back couple months ago.  There are about 8 tags on the pt neck (frontal)    Intensity: moderate    Progression of Symptoms:  worsening and constant    Accompanying Signs & Symptoms:  no    Previous history of similar problem:   na    Precipitating factors:   Worsened by: na    Alleviating factors:  Improved by: na    Therapies Tried and outcome: na      The skin tags seem to be multiplying.  Started with one and getting more.  They are elevated.  He states they are spreading and also in a cluster where he shaves.  Never had warts before.  He has been shaving these and they have been bleeding at times.    Problem list and histories reviewed & adjusted, as indicated.  Additional history: as documented        Reviewed and updated as needed this visit by clinical staff       Reviewed and updated as needed this visit by Provider         ROS:  CONSTITUTIONAL:NEGATIVE for fever, chills, change in weight  INTEGUMENTARY/SKIN: h/o skin tags    OBJECTIVE:                                                    /86 (BP Location: Right arm, Patient Position: Sitting, Cuff Size: Adult Large)  Pulse 82  Temp 97.4  F (36.3  C) (Tympanic)  Wt 203 lb (92.1 kg)  SpO2 95%  BMI 29.55 kg/m2  Body mass index is 29.55 kg/(m^2).  GENERAL APPEARANCE: healthy, alert and no distress  SKIN: he has numerous skin lesions.  They are not skin tags.  They are common warts with a verrucous type of appearance.  There are 14 of them.  Also clustered at neck in his beard area where he normally clean shaves.      Informed of procedure with liquid nitrogen, had this before with no complications.  3 freeze thaw cycles done without complications.        ASSESSMENT/PLAN:                                                    1. Common wart  Follow up in 3 weeks, discussed red and skin damage.  Recommend no shaving at this time.  When he does start shaving use a new razor.  - DESTRUCT BENIGN LESION, UP TO 14      See Patient Instructions    Devyn Cox MD  [facility]

## 2018-05-29 NOTE — MR AVS SNAPSHOT
After Visit Summary   5/29/2018    Emery Barkley    MRN: [id number]           Patient Information     Date Of Birth          1970        Visit Information        Provider Department      5/29/2018 9:00 AM Devyn Cox MD [facility]        Today's Diagnoses     Common wart    -  1      Care Instructions    Follow up in 3 weeks to recheck warts on neck.    Please do not use a razor at this time.    When you do start shaving please change your razor.    You can trim your beard at this time.          Thank you for choosing [facility].  You may be receiving a survey in the mail from [facility] [facility] regarding your visit today.  Please take a few minutes to complete and return the survey to let us know how we are doing.      If you have questions or concerns, please contact us via HealthUnlocked or you can contact your care team at [phone].    Our Clinic hours are:  Monday 6:40 am  to 7:00 pm  Tuesday -Friday 6:40 am to 5:00 pm    The [place] outpatient lab hours are:  Monday - Friday 6:10 am to 4:45 pm  Saturdays 7:00 am to 11:00 am  Appointments are required, call [phone]    If you have clinical questions after hours or would like to schedule an appointment,  call the clinic at [phone].            Follow-ups after your visit        Follow-up notes from your care team     Return in about 3 weeks (around 6/19/2018).      Your next 10 appointments already scheduled     Jun 19, 2018  8:40 AM CDT   [facility]   [facility]    [address] [address] [phone]           Bring a current list of meds and any records pertaining to this visit. For Physicals, please bring immunization records and any forms needing to be filled out. Please arrive 10 minutes early to complete paperwork.              Who to contact     If you have questions or need follow up information about today's clinic  visit or your schedule please contact [facility] directly at [phone].  Normal or non-critical lab and imaging results will be communicated to you by MyChart, letter or phone within 4 business days after the clinic has received the results. If you do not hear from us within 7 days, please contact the clinic through HERCAMOSHOPhart or phone. If you have a critical or abnormal lab result, we will notify you by phone as soon as possible.  Submit refill requests through [employer] or call your pharmacy and they will forward the refill request to us. Please allow 3 business days for your refill to be completed.          Additional Information About Your Visit        HERCAMOSHOPharLyft Information     [employer] gives you secure access to your electronic health record. If you see a primary care provider, you can also send messages to your care team and make appointments. If you have questions, please call your primary care clinic.  If you do not have a primary care provider, please call [phone] and they will assist you.        Care EveryWhere ID     This is your Care EveryWhere ID. This could be used by other organizations to access your [facility] medical records  [id number]        Your Vitals Were     Pulse Temperature Pulse Oximetry BMI (Body Mass Index)          82 97.4  F (36.3  C) (Tympanic) 95% 29.55 kg/m2         Blood Pressure from Last 3 Encounters:   05/29/18 123/86   04/24/18 130/80   10/30/17 126/84    Weight from Last 3 Encounters:   05/29/18 203 lb (92.1 kg)   04/24/18 202 lb (91.6 kg)   10/30/17 201 lb (91.2 kg)              We Performed the Following     DESTRUCT BENIGN LESION, UP TO 14        Primary Care Provider Office Phone # Fax #    Devyn Cox [phone] [address] [address]        Equal Access to Services     SHARIFA NIELSON : zIabela Espinal, wajacquesda lukaliadaha, qaybta kaalmacamilo powell, brandon oliva. So [facility]  [phone].    ATENCIÓN: Si kings knutson, tiene a moctezuma disposición servicios gratuitos de asistencia lingüística. Rosibel ritchie [phone].    We comply with applicable federal civil rights laws and [place] laws. We do not discriminate on the basis of race, color, national origin, age, disability, sex, sexual orientation, or gender identity.            Thank you!     Thank you for choosing [facility]  for your care. Our goal is always to provide you with excellent care. Hearing back from our patients is one way we can continue to improve our services. Please take a few minutes to complete the written survey that you may receive in the mail after your visit with us. Thank you!             Your Updated Medication List - Protect others around you: Learn how to safely use, store and throw away your medicines at www.disposemymeds.org.          This list is accurate as of 5/29/18  9:17 AM.  Always use your most recent med list.                   Brand Name Dispense Instructions for use Diagnosis    atorvastatin 20 MG tablet    LIPITOR    90 tablet    Take 1 tablet (20 mg) by mouth daily    Hyperlipidemia LDL goal <100, Family history of coronary artery disease       loratadine 10 MG tablet    CLARITIN     Take 10 mg by mouth daily as needed for allergies

## 2018-06-19 ENCOUNTER — OFFICE VISIT (OUTPATIENT)
Dept: FAMILY MEDICINE | Facility: CLINIC | Age: 48
End: 2018-06-19
Payer: COMMERCIAL

## 2018-06-19 VITALS
WEIGHT: 206 LBS | BODY MASS INDEX: 29.49 KG/M2 | HEART RATE: 76 BPM | HEIGHT: 70 IN | TEMPERATURE: 97.3 F | SYSTOLIC BLOOD PRESSURE: 124 MMHG | DIASTOLIC BLOOD PRESSURE: 82 MMHG

## 2018-06-19 DIAGNOSIS — B07.8 COMMON WART: Primary | ICD-10-CM

## 2018-06-19 PROCEDURE — 17110 DESTRUCTION B9 LES UP TO 14: CPT | Performed by: FAMILY MEDICINE

## 2018-06-19 NOTE — MR AVS SNAPSHOT
After Visit Summary   6/19/2018    Emery Barkley    MRN: [id number]           Patient Information     Date Of Birth          1970        Visit Information        Provider Department      6/19/2018 8:40 AM Devyn Cox MD [facility]        Today's Diagnoses     Common wart    -  1      Care Instructions    Follow up in 3 weeks to retreat warts.          Thank you for choosing [facility].  You may be receiving a survey in the mail from [facility] regarding your visit today.  Please take a few minutes to complete and return the survey to let us know how we are doing.      If you have questions or concerns, please contact us via [employer] or you can contact your care team at [phone].    Our Clinic hours are:  Monday 6:40 am  to 7:00 pm  Tuesday -Friday 6:40 am to 5:00 pm    The [place] outpatient lab hours are:  Monday - Friday 6:10 am to 4:45 pm  Saturdays 7:00 am to 11:00 am  Appointments are required, call [phone]    If you have clinical questions after hours or would like to schedule an appointment,  call the clinic at [phone].            Follow-ups after your visit        Follow-up notes from your care team     Return in about 3 weeks (around 7/10/2018) for Routine Visit.      Your next 10 appointments already scheduled     Jul 10, 2018  8:40 AM CDT   SHORT with Devyn Cox MD   [facility]    [address] [id number] [phone]              Who to contact     If you have questions or need follow up information about today's clinic visit or your schedule please contact [facility] directly at [phone].  Normal or non-critical lab and imaging results will be communicated to you by MyChart, letter or phone within 4 business days after the clinic has received the results. If you do not hear from us within 7 days, please contact the clinic through Moathart or phone. If you  "have a critical or abnormal lab result, we will notify you by phone as soon as possible.  Submit refill requests through [employer] or call your pharmacy and they will forward the refill request to us. Please allow 3 business days for your refill to be completed.          Additional Information About Your Visit        Moobiahart Information     [employer] gives you secure access to your electronic health record. If you see a primary care provider, you can also send messages to your care team and make appointments. If you have questions, please call your primary care clinic.  If you do not have a primary care provider, please call [phone] and they will assist you.        Care EveryWhere ID     This is your Care EveryWhere ID. This could be used by other organizations to access your [place] medical records  [id number]        Your Vitals Were     Pulse Temperature Height BMI (Body Mass Index)          76 97.3  F (36.3  C) (Tympanic) 5' 9.5\" (1.765 m) 29.98 kg/m2         Blood Pressure from Last 3 Encounters:   06/19/18 124/82   05/29/18 123/86   04/24/18 130/80    Weight from Last 3 Encounters:   06/19/18 206 lb (93.4 kg)   05/29/18 203 lb (92.1 kg)   04/24/18 202 lb (91.6 kg)              We Performed the Following     DESTRUCT BENIGN LESION, UP TO 14        Primary Care Provider Office Phone # Fax #    Devyn Cox [phone] [address] [address]        Equal Access to Services     [facility]ON [facility] AH: Hadii aad ku hadasho Soomaali, waaxda luqadaha, qaybta kaalmada adeegyada, brandon june . So [facility] [phone].    ATENCIÓN: Si habla español, tiene a moctezuma disposición servicios gratuitos de asistencia lingüística. Llame al [phone].    We comply with applicable federal civil rights laws and [place] laws. We do not discriminate on the basis of race, color, national origin, age, disability, sex, sexual orientation, or gender identity.          "   Thank you!     Thank you for choosing [facility]  for your care. Our goal is always to provide you with excellent care. Hearing back from our patients is one way we can continue to improve our services. Please take a few minutes to complete the written survey that you may receive in the mail after your visit with us. Thank you!             Your Updated Medication List - Protect others around you: Learn how to safely use, store and throw away your medicines at www.disposemymeds.org.          This list is accurate as of 6/19/18  8:40 AM.  Always use your most recent med list.                   Brand Name Dispense Instructions for use Diagnosis    atorvastatin 20 MG tablet    LIPITOR    90 tablet    Take 1 tablet (20 mg) by mouth daily    Hyperlipidemia LDL goal <100, Family history of coronary artery disease       loratadine 10 MG tablet    CLARITIN     Take 10 mg by mouth daily as needed for allergies

## 2018-06-19 NOTE — PATIENT INSTRUCTIONS
Follow up in 3 weeks to retreat warts.          Thank you for choosing [facility].  You may be receiving a survey in the mail from [employer] regarding your visit today.  Please take a few minutes to complete and return the survey to let us know how we are doing.      If you have questions or concerns, please contact us via MapHazardly or you can contact your care team at [phone].    Our Clinic hours are:  Monday 6:40 am  to 7:00 pm  Tuesday -Friday 6:40 am to 5:00 pm    The [place] outpatient lab hours are:  Monday - Friday 6:10 am to 4:45 pm  Saturdays 7:00 am to 11:00 am  Appointments are required, call [phone]    If you have clinical questions after hours or would like to schedule an appointment,  call the clinic at [phone].

## 2018-07-10 ENCOUNTER — OFFICE VISIT (OUTPATIENT)
Dept: FAMILY MEDICINE | Facility: CLINIC | Age: 48
End: 2018-07-10
Payer: COMMERCIAL

## 2018-07-10 VITALS
BODY MASS INDEX: 29.49 KG/M2 | HEART RATE: 64 BPM | TEMPERATURE: 97.5 F | DIASTOLIC BLOOD PRESSURE: 84 MMHG | SYSTOLIC BLOOD PRESSURE: 110 MMHG | WEIGHT: 206 LBS | HEIGHT: 70 IN

## 2018-07-10 DIAGNOSIS — B07.8 COMMON WART: Primary | ICD-10-CM

## 2018-07-10 PROCEDURE — 17110 DESTRUCTION B9 LES UP TO 14: CPT | Performed by: FAMILY MEDICINE

## 2018-07-10 NOTE — PATIENT INSTRUCTIONS
I treated 6 warts today.    Follow up in 3 weeks.          Thank you for choosing [facility].  You may be receiving a survey in the mail from Neeraj Baez regarding your visit today.  Please take a few minutes to complete and return the survey to let us know how we are doing.      If you have questions or concerns, please contact us via [employer] or you can contact your care team at [phone].    Our Clinic hours are:  Monday 6:40 am  to 7:00 pm  Tuesday -Friday 6:40 am to 5:00 pm    The [place] outpatient lab hours are:  Monday - Friday 6:10 am to 4:45 pm  Saturdays 7:00 am to 11:00 am  Appointments are required, call [phone]    If you have clinical questions after hours or would like to schedule an appointment,  call the clinic at [phone].

## 2018-07-10 NOTE — MR AVS SNAPSHOT
After Visit Summary   7/10/2018    Emery Barkley    MRN: [id number]           Patient Information     Date Of Birth          1970        Visit Information        Provider Department      7/10/2018 8:40 AM Devyn Cox MD [facility]        Care Instructions    I treated 6 warts today.    Follow up in 3 weeks.          Thank you for choosing [facility].  You may be receiving a survey in the mail from [facility] regarding your visit today.  Please take a few minutes to complete and return the survey to let us know how we are doing.      If you have questions or concerns, please contact us via [employer] easily [employer] or you can contact your care team at [phone].    Our Clinic hours are:  Monday 6:40 am  to 7:00 pm  Tuesday -Friday 6:40 am to 5:00 pm    The [place] outpatient lab hours are:  Monday - Friday 6:10 am to 4:45 pm  Saturdays 7:00 am to 11:00 am  Appointments are required, call [phone]    If you have clinical questions after hours or would like to schedule an appointment,  call the clinic at [phone].            Follow-ups after your visit        Follow-up notes from your care team     Return in about 3 weeks (around 7/31/2018) for retreat warts on neck.      Your next 10 appointments already scheduled     Aug 01, 2018  2:40 PM CDT   Office Visit with Devyn Cox MD   [facility] ([facility]    [address] [id number] [phone]           Bring a current list of meds and any records pertaining to this visit. For Physicals, please bring immunization records and any forms needing to be filled out. Please arrive 10 minutes early to complete paperwork.              Who to contact     If you have questions or need follow up information about today's clinic visit or your schedule please contact [facility] directly at [phone].  Normal or non-critical lab and imaging results will be communicated  "to you by MyChart, letter or phone within 4 business days after the clinic has received the results. If you do not hear from us within 7 days, please contact the clinic through [email or web address] or phone. If you have a critical or abnormal lab result, we will notify you by phone as soon as possible.  Submit refill requests through [email or web address] or call your pharmacy and they will forward the refill request to us. Please allow 3 business days for your refill to be completed.          Additional Information About Your Visit        [employer] Information     [email or web address] gives you secure access to your electronic health record. If you see a primary care provider, you can also send messages to your care team and make appointments. If you have questions, please call your primary care clinic.  If you do not have a primary care provider, please call [phone] and they will assist you.        Care EveryWhere ID     This is your Care EveryWhere ID. This could be used by other organizations to access your [place] medical records  [id number]        Your Vitals Were     Pulse Temperature Height BMI (Body Mass Index)          64 97.5  F (36.4  C) (Tympanic) 5' 9.5\" (1.765 m) 29.98 kg/m2         Blood Pressure from Last 3 Encounters:   07/10/18 110/84   06/19/18 124/82   05/29/18 123/86    Weight from Last 3 Encounters:   07/10/18 206 lb (93.4 kg)   06/19/18 206 lb (93.4 kg)   05/29/18 203 lb (92.1 kg)              Today, you had the following     No orders found for display       Primary Care Provider Office Phone # Fax #    Devyn Cox [phone] [address] [facility] [address] [address]        Equal Access to Services     [facility] CenterERLINDA : Hadii lissette Espinal, wajacquesda luqadaha, qajaimeeta kaalmabrandon mora. So [facility] [phone].    ATENCIÓN: Si habla español, tiene a moctezuma disposición servicios gratuitos de asistencia lingüística. Llame al [phone].    We comply with " applicable federal civil rights laws and [place] laws. We do not discriminate on the basis of race, color, national origin, age, disability, sex, sexual orientation, or gender identity.            Thank you!     Thank you for choosing [facility]  for your care. Our goal is always to provide you with excellent care. Hearing back from our patients is one way we can continue to improve our services. Please take a few minutes to complete the written survey that you may receive in the mail after your visit with us. Thank you!             Your Updated Medication List - Protect others around you: Learn how to safely use, store and throw away your medicines at www.disposemymeds.org.          This list is accurate as of 7/10/18  9:15 AM.  Always use your most recent med list.                   Brand Name Dispense Instructions for use Diagnosis    atorvastatin 20 MG tablet    LIPITOR    90 tablet    Take 1 tablet (20 mg) by mouth daily    Hyperlipidemia LDL goal <100, Family history of coronary artery disease       loratadine 10 MG tablet    CLARITIN     Take 10 mg by mouth daily as needed for allergies

## 2018-07-10 NOTE — PROGRESS NOTES
"  SUBJECTIVE:   Emery Barkley is a 47 year old male who presents to clinic today for the following health issues:    Chief Complaint   Patient presents with     Wart     retreat, follow up of 6/9/2018 treatment of warts around his neck     Has warts on his neck.. They are decreasing in size and number.  No complications from the last treatment.             Problem list and histories reviewed & adjusted, as indicated.  Additional history: as documented        Reviewed and updated as needed this visit by clinical staff       Reviewed and updated as needed this visit by Provider         ROS:  CONSTITUTIONAL:NEGATIVE for fever, chills, change in weight  INTEGUMENTARY/SKIN: as above    OBJECTIVE:                                                    /84 (Cuff Size: Adult Large)  Pulse 64  Temp 97.5  F (36.4  C) (Tympanic)  Ht 5' 9.5\" (1.765 m)  Wt 206 lb (93.4 kg)  BMI 29.98 kg/m2  Body mass index is 29.98 kg/(m^2).  GENERAL APPEARANCE: healthy, alert and no distress  SKIN: has 6 warts on his neck size is from 1 mm to 3 mm.  Number of warts has diminished.  3 freeze thaw cycles done without complications.           ASSESSMENT/PLAN:                                                    (B07.8) Common wart  (primary encounter diagnosis)  Comment: treated with liquid nitrogen, no complications  Plan: improving and follow up in 3 weeks        See Patient Instructions    Devyn Cox MD  [facility]    "

## 2018-08-01 ENCOUNTER — OFFICE VISIT (OUTPATIENT)
Dept: FAMILY MEDICINE | Facility: CLINIC | Age: 48
End: 2018-08-01
Payer: COMMERCIAL

## 2018-08-01 VITALS
HEART RATE: 80 BPM | TEMPERATURE: 97.8 F | WEIGHT: 205 LBS | DIASTOLIC BLOOD PRESSURE: 82 MMHG | BODY MASS INDEX: 29.84 KG/M2 | SYSTOLIC BLOOD PRESSURE: 126 MMHG

## 2018-08-01 DIAGNOSIS — B07.8 COMMON WART: Primary | ICD-10-CM

## 2018-08-01 PROCEDURE — 17110 DESTRUCTION B9 LES UP TO 14: CPT | Performed by: FAMILY MEDICINE

## 2018-08-01 NOTE — PROGRESS NOTES
SUBJECTIVE:   Emery Barkley is a 47 year old male who presents to clinic today for the following health issues:  Chief Complaint   Patient presents with     Wart     follow up of wart treatment done 7/10/2018. Multiple warts around his neck.     Warts are going away.  One had a blister last time.         Problem list and histories reviewed & adjusted, as indicated.  Additional history: as documented        Reviewed and updated as needed this visit by clinical staff  Allergies       Reviewed and updated as needed this visit by Provider         ROS:  CONSTITUTIONAL:NEGATIVE for fever, chills, change in weight  INTEGUMENTARY/SKIN: warts on neck which are going away with treatments    OBJECTIVE:                                                    /82 (Cuff Size: Adult Large)  Pulse 80  Temp 97.8  F (36.6  C) (Tympanic)  Wt 205 lb (93 kg)  BMI 29.84 kg/m2  Body mass index is 29.84 kg/(m^2).  GENERAL APPEARANCE: healthy, alert and no distress  SKIN: noted one larger wart about 3 mm and other 8 warts 1-2 mm diameter    3 freeze thaw cycles done without complications.       ASSESSMENT/PLAN:                                                    1. Common wart  Treated the neck area again      See Patient Instructions    Devyn Cox MD  [facility]

## 2018-08-01 NOTE — MR AVS SNAPSHOT
After Visit Summary   8/1/2018    Emery Barkley    MRN: [id number]           Patient Information     Date Of Birth          1970        Visit Information        Provider Department      8/1/2018 2:40 PM Devyn Cox MD [facility]        Today's Diagnoses     Common wart    -  1      Care Instructions    I treated 1 larger wart and possible 8 smaller warts.    Follow up in 3 weeks.          Thank you for choosing [facility].  You may be receiving a survey in the mail from [facility] regarding your visit today.  Please take a few minutes to complete and return the survey to let us know how we are doing.      If you have questions or concerns, please contact us via [employer] or you can contact your care team at [phone].    Our Clinic hours are:  Monday 6:40 am  to 7:00 pm  Tuesday -Friday 6:40 am to 5:00 pm    The [place] outpatient lab hours are:  Monday - Friday 6:10 am to 4:45 pm  Saturdays 7:00 am to 11:00 am  Appointments are required, call [phone]    If you have clinical questions after hours or would like to schedule an appointment,  call the clinic at [phone].            Follow-ups after your visit        Follow-up notes from your care team     Return in about 3 weeks (around 8/22/2018) for retreat warts on neck.      Your next 10 appointments already scheduled     Aug 01, 2018  2:40 PM CDT   Office Visit with Devyn Cox MD   [facility] ([facility]    [address] [id number] [phone]           Bring a current list of meds and any records pertaining to this visit. For Physicals, please bring immunization records and any forms needing to be filled out. Please arrive 10 minutes early to complete paperwork.            Aug 22, 2018  9:00 AM CDT   SHORT with Deyvn Cox MD   [facility]    [address] [address]    [phone]              Who to contact     If you have questions or need follow up information about today's clinic visit or your schedule please contact [facility] directly at [phone].  Normal or non-critical lab and imaging results will be communicated to you by MyChart, letter or phone within 4 business days after the clinic has received the results. If you do not hear from us within 7 days, please contact the clinic through MyChart or phone. If you have a critical or abnormal lab result, we will notify you by phone as soon as possible.  Submit refill requests through [employer] or call your pharmacy and they will forward the refill request to us. Please allow 3 business days for your refill to be completed.          Additional Information About Your Visit        [employer] Information     [employer] gives you secure access to your electronic health record. If you see a primary care provider, you can also send messages to your care team and make appointments. If you have questions, please call your primary care clinic.  If you do not have a primary care provider, please call [phone] and they will assist you.        Care EveryWhere ID     This is your Care EveryWhere ID. This could be used by other organizations to access your [place] medical records  [id number]        Your Vitals Were     Pulse Temperature BMI (Body Mass Index)             80 97.8  F (36.6  C) (Tympanic) 29.84 kg/m2          Blood Pressure from Last 3 Encounters:   08/01/18 126/82   07/10/18 110/84   06/19/18 124/82    Weight from Last 3 Encounters:   08/01/18 205 lb (93 kg)   07/10/18 206 lb (93.4 kg)   06/19/18 206 lb (93.4 kg)              Today, you had the following     No orders found for display       Primary Care Provider Office Phone # Fax #    Devyn Cox [phone] [address] [facility] [facility] [address]        Equal Access to Services     SHARIFA NIELSON AH: Izabela Espinal  dione valle, qaybta kajacque emanibharati, brandon alecin hayaan emanisridhar nicholcollette laRossiaajanie ah. So [facility] [phone].    ATENCIÓN: Si kings knutson, tiene a moctezuma disposición servicios gratuitos de asistencia lingüística. Rosibel al [phone].    We comply with applicable federal civil rights laws and [place] laws. We do not discriminate on the basis of race, color, national origin, age, disability, sex, sexual orientation, or gender identity.            Thank you!     Thank you for choosing [facility]  for your care. Our goal is always to provide you with excellent care. Hearing back from our patients is one way we can continue to improve our services. Please take a few minutes to complete the written survey that you may receive in the mail after your visit with us. Thank you!             Your Updated Medication List - Protect others around you: Learn how to safely use, store and throw away your medicines at www.disposemymeds.org.          This list is accurate as of 8/1/18  2:21 PM.  Always use your most recent med list.                   Brand Name Dispense Instructions for use Diagnosis    atorvastatin 20 MG tablet    LIPITOR    90 tablet    Take 1 tablet (20 mg) by mouth daily    Hyperlipidemia LDL goal <100, Family history of coronary artery disease       loratadine 10 MG tablet    CLARITIN     Take 10 mg by mouth daily as needed for allergies

## 2018-08-01 NOTE — PATIENT INSTRUCTIONS
I treated 1 larger wart and possible 8 smaller warts.    Follow up in 3 weeks.          Thank you for choosing [facility].  You may be receiving a survey in the mail from [employer] regarding your visit today.  Please take a few minutes to complete and return the survey to let us know how we are doing.      If you have questions or concerns, please contact us via [employer] or you can contact your care team at [phone].    Our Clinic hours are:  Monday 6:40 am  to 7:00 pm  Tuesday -Friday 6:40 am to 5:00 pm    The [place] outpatient lab hours are:  Monday - Friday 6:10 am to 4:45 pm  Saturdays 7:00 am to 11:00 am  Appointments are required, call [phone]    If you have clinical questions after hours or would like to schedule an appointment,  call the clinic at [phone].

## 2018-08-22 ENCOUNTER — OFFICE VISIT (OUTPATIENT)
Dept: FAMILY MEDICINE | Facility: CLINIC | Age: 48
End: 2018-08-22
Payer: COMMERCIAL

## 2018-08-22 ENCOUNTER — OFFICE VISIT (OUTPATIENT)
Dept: LAB | Facility: SCHOOL | Age: 48
End: 2018-08-22
Payer: COMMERCIAL

## 2018-08-22 VITALS
DIASTOLIC BLOOD PRESSURE: 82 MMHG | HEIGHT: 70 IN | HEART RATE: 90 BPM | BODY MASS INDEX: 28.92 KG/M2 | SYSTOLIC BLOOD PRESSURE: 126 MMHG | TEMPERATURE: 97.5 F | WEIGHT: 202 LBS | OXYGEN SATURATION: 95 %

## 2018-08-22 VITALS
HEIGHT: 70 IN | DIASTOLIC BLOOD PRESSURE: 84 MMHG | SYSTOLIC BLOOD PRESSURE: 126 MMHG | BODY MASS INDEX: 28.92 KG/M2 | TEMPERATURE: 97.6 F | HEART RATE: 64 BPM | WEIGHT: 202 LBS

## 2018-08-22 DIAGNOSIS — Z00.00 ENCOUNTER FOR WELLNESS EXAMINATION IN ADULT: Primary | ICD-10-CM

## 2018-08-22 DIAGNOSIS — B07.8 COMMON WART: Primary | ICD-10-CM

## 2018-08-22 PROCEDURE — 17110 DESTRUCTION B9 LES UP TO 14: CPT | Performed by: FAMILY MEDICINE

## 2018-08-22 PROCEDURE — 99396 PREV VISIT EST AGE 40-64: CPT

## 2018-08-22 NOTE — NURSING NOTE
"Initial /82 (BP Location: Left arm, Patient Position: Chair, Cuff Size: Adult Regular)  Pulse 90  Temp 97.5  F (36.4  C) (Tympanic)  Ht 5' 9.5\" (1.765 m)  Wt 202 lb (91.6 kg)  SpO2 95%  BMI 29.4 kg/m2 Estimated body mass index is 29.4 kg/(m^2) as calculated from the following:    Height as of this encounter: 5' 9.5\" (1.765 m).    Weight as of this encounter: 202 lb (91.6 kg). .    Sarah Madrigal CMA ([facility])  "

## 2018-08-22 NOTE — PATIENT INSTRUCTIONS
"                Emery Barkley has completed a Wellness Check at the [facility] on 8/22/2018.      ____________________________________________  [place] [employer]CHO[employer] PROVIDER                                                                               Wellness Visit Recommendations:      See your regular primary care health provider every year in order to help stay healthy.    Review health changes.     Review your medicines if your doctor has prescribed any.    Talk to your provider about how often to have your cholesterol checked.    If you are at risk for diabetes, you should have a diabetes test (fasting glucose).    Shots: Get a flu shot each year. Get a tetanus shot every 10 years.     Review with your primary care provider other immunizations that you may need based on your age and/or medical/surgical history    Nutrition:     Eat at least 5 servings of fruits and vegetables each day.    Eat whole-grain bread, whole-wheat pasta and brown rice instead of white grains and rice.    Preventive Care to be reviewed by your primary care provider:    Females:        Cervical Cancer Screening                          Breast Cancer Screening                          Colon Cancer Screening  Males:             Prostrate Cancer Screening                          Colon Cancer Screening      Lifestyle:    Exercise at least 150 minutes a week (30 minutes a day, 5 days of the week). This will help you control your weight and help prevent disease or manage disease.    Limit alcohol to one drink per day or less depending on your past medical history.    No smoking.     Wear sunscreen to prevent skin cancer.    See your dentist every six months for an exam and cleaning.    Today's Vital Signs:  /82 (BP Location: Left arm, Patient Position: Chair, Cuff Size: Adult Regular)  Pulse 90  Temp 97.5  F (36.4  C) (Tympanic)  Ht 5' 9.5\" (1.765 m)  Wt 202 lb (91.6 kg)  SpO2 95%  BMI 29.4 kg/m2  "

## 2018-08-22 NOTE — PROGRESS NOTES
"    SUBJECTIVE:  CC: Emery Barkley is an 47 year old woman who presents for Wellness Check at [facility] [id number].     Review of Healthy Lifestyle:    Do you get at least three servings of calcium containing foods daily (dairy, green leafy vegetables, etc.)? yes     Do you have a high-fiber diet? yes     Amount of exercise or daily activities, outside of work: 2-3 day(s) per week    Do you wear sunscreen on a regular basis? Yes - if he is going to be outside for a long period of time.      Are you taking your medications regularly Yes     Have you had an eye exam in the past two years? yes    Do you see a dentist twice per year? yes    Do you have sleep apnea, excessive snoring or excessive daytime drowsiness? no    Do you use tobacco in any form? no       OBJECTIVE:    Vitals: /82 (BP Location: Left arm, Patient Position: Chair, Cuff Size: Adult Regular)  Pulse 90  Temp 97.5  F (36.4  C) (Tympanic)  Ht 5' 9.5\" (1.765 m)  Wt 202 lb (91.6 kg)  SpO2 95%  BMI 29.4 kg/m2  BMI= Body mass index is 29.4 kg/(m^2).    HEARING: Right Ear:        500Hz:  RESPONSE- on Level: 40 db   1000 Hz: RESPONSE- on Level: 40 db   2000 Hz: RESPONSE- on Level:   20 db    4000 Hz: RESPONSE- on Level:   20 db     Left Ear:       500Hz:  RESPONSE- on Level: 40 db   1000 Hz: RESPONSE- on Level: 40 db   2000 Hz: RESPONSE- on Level:   20 db    4000 Hz: RESPONSE- on Level:   20 db     VISION:  Right eye:  20/20  Left eye:     20/20  Both eyes: 20/20  Corrective lenses?  No    Medication Reconciliation: complete    ASSESSMENT/PLAN:     (Z00.00) Encounter for wellness examination in adult  (primary encounter diagnosis)  Comment:    Plan: health maintenance discussed        COUNSELING:      reports that he has never smoked. He has never used smokeless tobacco.    Estimated body mass index is 29.4 kg/(m^2) as calculated from the following:    Height as of this encounter: 5' 9.5\" (1.765 m).    Weight as of this " encounter: 202 lb (91.6 kg).   Weight management plan: Discussed healthy diet and exercise guidelines and patient will follow up in 12 months in clinic to re-evaluate.    Counseling Resources  [employer]  [email or web address]    Missy Cancino M.D.    [facility] PROVIDER  [facility]

## 2018-08-22 NOTE — MR AVS SNAPSHOT
After Visit Summary   8/22/2018    Emery Barkley    MRN: [id number]           Patient Information     Date Of Birth          1970        Visit Information        Provider Department      8/22/2018 10:30 AM Provider, [facility]        Care Instructions                    Emery Barkley has completed a Wellness Check at the [facility] on 8/22/2018.      ____________________________________________  [facility] PROVIDER                                                                               Wellness Visit Recommendations:      See your regular primary care health provider every year in order to help stay healthy.    Review health changes.     Review your medicines if your doctor has prescribed any.    Talk to your provider about how often to have your cholesterol checked.    If you are at risk for diabetes, you should have a diabetes test (fasting glucose).    Shots: Get a flu shot each year. Get a tetanus shot every 10 years.     Review with your primary care provider other immunizations that you may need based on your age and/or medical/surgical history    Nutrition:     Eat at least 5 servings of fruits and vegetables each day.    Eat whole-grain bread, whole-wheat pasta and brown rice instead of white grains and rice.    Preventive Care to be reviewed by your primary care provider:    Females:        Cervical Cancer Screening                          Breast Cancer Screening                          Colon Cancer Screening  Males:             Prostrate Cancer Screening                          Colon Cancer Screening      Lifestyle:    Exercise at least 150 minutes a week (30 minutes a day, 5 days of the week). This will help you control your weight and help prevent disease or manage disease.    Limit alcohol to one drink per day or less depending on your past medical history.    No smoking.     Wear sunscreen to prevent skin  "cancer.    See your dentist every six months for an exam and cleaning.    Today's Vital Signs:  /82 (BP Location: Left arm, Patient Position: Chair, Cuff Size: Adult Regular)  Pulse 90  Temp 97.5  F (36.4  C) (Tympanic)  Ht 5' 9.5\" (1.765 m)  Wt 202 lb (91.6 kg)  SpO2 95%  BMI 29.4 kg/m2          Follow-ups after your visit        Who to contact     If you have questions or need follow up information about today's clinic visit or your schedule please contact [facility] directly at [phone].  Normal or non-critical lab and imaging results will be communicated to you by Quviumhart, letter or phone within 4 business days after the clinic has received the results. If you do not hear from us within 7 days, please contact the clinic through [employer] or phone. If you have a critical or abnormal lab result, we will notify you by phone as soon as possible.  Submit refill requests through [employer] or call your pharmacy and they will forward the refill request to us. Please allow 3 business days for your refill to be completed.          Additional Information About Your Visit        MyChart Information     [employer] gives you secure access to your electronic health record. If you see a primary care provider, you can also send messages to your care team and make appointments. If you have questions, please call your primary care clinic.  If you do not have a primary care provider, please call [phone] and they will assist you.        Care EveryWhere ID     This is your Care EveryWhere ID. This could be used by other organizations to access your [place] medical records  [id number]        Your Vitals Were     Pulse Temperature Height Pulse Oximetry BMI (Body Mass Index)       90 97.5  F (36.4  C) (Tympanic) 5' 9.5\" (1.765 m) 95% 29.4 kg/m2        Blood Pressure from Last 3 Encounters:   08/22/18 126/82   08/22/18 126/84   08/01/18 126/82    Weight from Last 3 Encounters:   08/22/18 202 lb " (91.6 kg)   08/22/18 202 lb (91.6 kg)   08/01/18 205 lb (93 kg)              Today, you had the following     No orders found for display       Primary Care Provider Office Phone # Fax #    Devyn Cox [phone] [address] [facility] [address]        Equal Access to Services     [facility]: Hadii aad ku hadasho Soomaali, waaxda luqadaha, qaybta kaalmada adeegyada, waxay idiin hayaan adeeg kharash la'aan . So [facility] [phone].    ATENCIÓN: Si habla español, tiene a moctezuma disposición servicios gratuitos de asistencia lingüística. Llame al [phone].    We comply with applicable federal civil rights laws and [place] laws. We do not discriminate on the basis of race, color, national origin, age, disability, sex, sexual orientation, or gender identity.            Thank you!     Thank you for choosing [place] [address]  for your care. Our goal is always to provide you with excellent care. Hearing back from our patients is one way we can continue to improve our services. Please take a few minutes to complete the written survey that you may receive in the mail after your visit with us. Thank you!             Your Updated Medication List - Protect others around you: Learn how to safely use, store and throw away your medicines at www.disposemymeds.org.          This list is accurate as of 8/22/18 10:38 AM.  Always use your most recent med list.                   Brand Name Dispense Instructions for use Diagnosis    atorvastatin 20 MG tablet    LIPITOR    90 tablet    Take 1 tablet (20 mg) by mouth daily    Hyperlipidemia LDL goal <100, Family history of coronary artery disease       loratadine 10 MG tablet    CLARITIN     Take 10 mg by mouth daily as needed for allergies

## 2018-08-22 NOTE — PROGRESS NOTES
"  SUBJECTIVE:   Emery Barkley is a 47 year old male who presents to clinic today for the following health issues:  Chief Complaint   Patient presents with     Wart     retreat warts on neck       Improving and going away.  They did blister the last time.  He is feeling they are resolving.          Problem list and histories reviewed & adjusted, as indicated.  Additional history:         Reviewed and updated as needed this visit by clinical staff       Reviewed and updated as needed this visit by Provider         ROS:  CONSTITUTIONAL:NEGATIVE for fever, chills, change in weight  INTEGUMENTARY/SKIN: warts    OBJECTIVE:                                                    /84 (Cuff Size: Adult Large)  Pulse 64  Temp 97.6  F (36.4  C) (Tympanic)  Ht 5' 9.5\" (1.765 m)  Wt 202 lb (91.6 kg)  BMI 29.4 kg/m2  Body mass index is 29.4 kg/(m^2).  GENERAL APPEARANCE: healthy, alert and no distress  SKIN: three areas where the warts were. There may be small area of skin papules and will retreat one more time and then monitor.  3 freeze thaw cycles done without complications.  Each area on the neck was about 2-3 mm.          ASSESSMENT/PLAN:                                                    (B07.8) Common wart  (primary encounter diagnosis)  Comment: Symptomatic cares were discussed in details.    Plan:     \    See Patient Instructions    Devyn Cox MD  [facility]    "

## 2018-08-22 NOTE — MR AVS SNAPSHOT
After Visit Summary   8/22/2018    Emery Barkley    MRN: [id number]           Patient Information     Date Of Birth          1970        Visit Information        Provider Department      8/22/2018 9:00 AM Devyn Cox MD [facility]        Care Instructions    I retreated 3 areas where the warts were on your neck.    Follow up as needed.          Thank you for choosing [facility].  You may be receiving a survey in the mail from [facility] regarding your visit today.  Please take a few minutes to complete and return the survey to let us know how we are doing.      If you have questions or concerns, please contact us via myGreek or you can contact your care team at [phone].    Our Clinic hours are:  Monday 6:40 am  to 7:00 pm  Tuesday -Friday 6:40 am to 5:00 pm    The [place] outpatient lab hours are:  Monday - Friday 6:10 am to 4:45 pm  Saturdays 7:00 am to 11:00 am  Appointments are required, call [phone]    If you have clinical questions after hours or would like to schedule an appointment,  call the clinic at [phone].            Follow-ups after your visit        Who to contact     If you have questions or need follow up information about today's clinic visit or your schedule please contact [facility] directly at [phone].  Normal or non-critical lab and imaging results will be communicated to you by Counterceptshart, letter or phone within 4 business days after the clinic has received the results. If you do not hear from us within 7 days, please contact the clinic through [employer] or phone. If you have a critical or abnormal lab result, we will notify you by phone as soon as possible.  Submit refill requests through [employer]Greek or call your pharmacy and they will forward the refill request to us. Please allow 3 business days for your refill to be completed.          Additional Information About Your Visit        MyChart Information     [employer] gives  "you secure access to your electronic health record. If you see a primary care provider, you can also send messages to your care team and make appointments. If you have questions, please call your primary care clinic.  If you do not have a primary care provider, please call [phone] and they will assist you.        Care EveryWhere ID     This is your Care EveryWhere ID. This could be used by other organizations to access your [place] medical records  [id number]        Your Vitals Were     Pulse Temperature Height BMI (Body Mass Index)          64 97.6  F (36.4  C) (Tympanic) 5' 9.5\" (1.765 m) 29.4 kg/m2         Blood Pressure from Last 3 Encounters:   08/22/18 126/84   08/01/18 126/82   07/10/18 110/84    Weight from Last 3 Encounters:   08/22/18 202 lb (91.6 kg)   08/01/18 205 lb (93 kg)   07/10/18 206 lb (93.4 kg)              Today, you had the following     No orders found for display       Primary Care Provider Office Phone # Fax #    Devyn Cox [phone] [address] [facility] [address]        Equal Access to Services     [facility]ARIF[facility] NIELSON [facility]: Hadii lissette howe hadasho Soomaali, waaxda luqadaha, qaybta kaalmada adeegyada, brandon oliva. So [facility] [phone].    ATENCIÓN: Si habla español, tiene a moctezuma disposición servicios gratuitos de asistencia lingüística. Llame al [phone].    We comply with applicable federal civil rights laws and [place] laws. We do not discriminate on the basis of race, color, national origin, age, disability, sex, sexual orientation, or gender identity.            Thank you!     Thank you for choosing [facility]  for your care. Our goal is always to provide you with excellent care. Hearing back from our patients is one way we can continue to improve our services. Please take a few minutes to complete the written survey that you may receive in the mail after your visit with us. Thank you!             Your " Updated Medication List - Protect others around you: Learn how to safely use, store and throw away your medicines at www.disposemymeds.org.          This list is accurate as of 8/22/18  9:29 AM.  Always use your most recent med list.                   Brand Name Dispense Instructions for use Diagnosis    atorvastatin 20 MG tablet    LIPITOR    90 tablet    Take 1 tablet (20 mg) by mouth daily    Hyperlipidemia LDL goal <100, Family history of coronary artery disease       loratadine 10 MG tablet    CLARITIN     Take 10 mg by mouth daily as needed for allergies

## 2018-08-22 NOTE — PATIENT INSTRUCTIONS
I retreated 3 areas where the warts were on your neck.    Follow up as needed.          Thank you for choosing [facility].  You may be receiving a survey in the mail from Neer[employer] Baez regarding your visit today.  Please take a few minutes to complete and return the survey to let us know how we are doing.      If you have questions or concerns, please contact us via [employer] or you can contact your care team at [phone].    Our Clinic hours are:  Monday 6:40 am  to 7:00 pm  Tuesday -Friday 6:40 am to 5:00 pm    The [place] outpatient lab hours are:  Monday - Friday 6:10 am to 4:45 pm  Saturdays 7:00 am to 11:00 am  Appointments are required, call [phone]    If you have clinical questions after hours or would like to schedule an appointment,  call the clinic at [phone].

## 2018-09-12 DIAGNOSIS — E78.5 HYPERLIPIDEMIA LDL GOAL <100: ICD-10-CM

## 2018-09-12 DIAGNOSIS — Z82.49 FAMILY HISTORY OF CORONARY ARTERY DISEASE: ICD-10-CM

## 2018-09-13 RX ORDER — ATORVASTATIN CALCIUM 20 MG/1
TABLET, FILM COATED ORAL
Qty: 90 TABLET | Refills: 0 | Status: SHIPPED | OUTPATIENT
Start: 2018-09-13 | End: 2018-12-18

## 2018-09-13 NOTE — TELEPHONE ENCOUNTER
"Requested Prescriptions   Pending Prescriptions Disp Refills     atorvastatin (LIPITOR) 20 MG tablet [Pharmacy Med Name: ATORVASTATIN 20MG   TAB] 90 tablet 2     Sig: TAKE ONE TABLET BY MOUTH ONCE DAILY    Statins Protocol Passed    9/12/2018  6:11 PM       Passed - LDL on file in past 12 months    Recent Labs   Lab Test  11/18/17   0726   LDL  68            Passed - No abnormal creatine kinase in past 12 months    No lab results found.            Passed - Recent (12 mo) or future (30 days) visit within the authorizing provider's specialty    Patient had office visit in the last 12 months or has a visit in the next 30 days with authorizing provider or within the authorizing provider's specialty.  See \"Patient Info\" tab in inbasket, or \"Choose Columns\" in Meds & Orders section of the refill encounter.           Passed - Patient is age 18 or older          "

## 2018-09-13 NOTE — TELEPHONE ENCOUNTER
Patient will be due for annual physical with PCP and labs November 2018  Doses he have enough Atorvastatin until then?    Left message for patient to return call to clinic.    Yuni FRY Rn

## 2018-09-13 NOTE — TELEPHONE ENCOUNTER
Prescription approved per [facility] Refill Protocol.    Spoke with pt, says he only has a couple weeks left of atorvastatin, to last until the end of Sept.   He will schedule end of Oct or early Nov for a Px with blood work.  Megan HICKEY RN

## 2018-09-26 ENCOUNTER — OFFICE VISIT (OUTPATIENT)
Dept: FAMILY MEDICINE | Facility: CLINIC | Age: 48
End: 2018-09-26

## 2018-09-26 VITALS
SYSTOLIC BLOOD PRESSURE: 112 MMHG | WEIGHT: 207 LBS | TEMPERATURE: 97.9 F | HEIGHT: 70 IN | HEART RATE: 83 BPM | BODY MASS INDEX: 29.63 KG/M2 | OXYGEN SATURATION: 94 % | DIASTOLIC BLOOD PRESSURE: 76 MMHG | RESPIRATION RATE: 16 BRPM

## 2018-09-26 DIAGNOSIS — B07.8 COMMON WART: Primary | ICD-10-CM

## 2018-09-26 PROCEDURE — 17110 DESTRUCTION B9 LES UP TO 14: CPT | Performed by: FAMILY MEDICINE

## 2018-09-26 NOTE — PATIENT INSTRUCTIONS
I treated two small warts.    If you need to be seen and I am full, please have my team send me a message and I am sure we can find a spot to squeeze you in either that day or the next day.      Thank you for choosing [facility].  You may be receiving a survey in the mail from Neeraj Baez regarding your visit today.  Please take a few minutes to complete and return the survey to let us know how we are doing.      If you have questions or concerns, please contact us via [employer]Alley or you can contact your care team at [phone].    Our Clinic hours are:  Monday 6:40 am  to 7:00 pm  Tuesday -Friday 6:40 am to 5:00 pm    The [place] outpatient lab hours are:  Monday - Friday 6:10 am to 4:45 pm  Saturdays 7:00 am to 11:00 am  Appointments are required, call [phone]    If you have clinical questions after hours or would like to schedule an appointment,  call the clinic at [phone].

## 2018-09-26 NOTE — PROGRESS NOTES
"  SUBJECTIVE:   Emery Barkley is a 48 year old male who presents to clinic today for the following health issues:      Chief Complaint   Patient presents with     Wart     [place] warts     Noted two warts on neck coming back very small.   Wants to have them re treated.            Problem list and histories reviewed & adjusted, as indicated.  Additional history: as documented        Reviewed and updated as needed this visit by clinical staff  Tobacco  Allergies  Meds  Med Hx  Surg Hx  Fam Hx  Soc Hx      Reviewed and updated as needed this visit by Provider         ROS:  CONSTITUTIONAL:NEGATIVE for fever, chills, change in weight  INTEGUMENTARY/SKIN: as above    OBJECTIVE:                                                    /76  Pulse 83  Temp 97.9  F (36.6  C) (Tympanic)  Resp 16  Ht 5' 9.5\" (1.765 m)  Wt 207 lb (93.9 kg)  SpO2 94%  BMI 30.13 kg/m2  Body mass index is 30.13 kg/(m^2).  GENERAL APPEARANCE: healthy, alert and no distress  SKIN: two small warts noted each about 2 mm diameter on neck.  He verbally consented to the treatment.  3 freeze thaw cycles done without complications.           ASSESSMENT/PLAN:                                                    (B07.8) Common wart  (primary encounter diagnosis)  Comment: follow up if needed  Plan: DESTRUCT BENIGN LESION, UP TO 14                See Patient Instructions    Devyn Cox MD  [facility]    "

## 2018-09-26 NOTE — MR AVS SNAPSHOT
After Visit Summary   9/26/2018    Emery Barkley    MRN: [id number]           Patient Information     Date Of Birth          1970        Visit Information        Provider Department      9/26/2018 3:20 PM Devyn Cox MD [facility]        Care Instructions    I treated two small warts.    If you need to be seen and I am full, please have my team send me a message and I am sure we can find a spot to squeeze you in either that day or the next day.      Thank you for choosing [facility].  You may be receiving a survey in the mail from [facility] [facility] regarding your visit today.  Please take a few minutes to complete and return the survey to let us know how we are doing.      If you have questions or concerns, please contact us via [employer] or you can contact your care team at [phone].    Our Clinic hours are:  Monday 6:40 am  to 7:00 pm  Tuesday -Friday 6:40 am to 5:00 pm    The [place] outpatient lab hours are:  Monday - Friday 6:10 am to 4:45 pm  Saturdays 7:00 am to 11:00 am  Appointments are required, call [phone]    If you have clinical questions after hours or would like to schedule an appointment,  call the clinic at [phone].            Follow-ups after your visit        Who to contact     If you have questions or need follow up information about today's clinic visit or your schedule please contact [facility] directly at [phone].  Normal or non-critical lab and imaging results will be communicated to you by [employer], letter or phone within 4 business days after the clinic has received the results. If you do not hear from us within 7 days, please contact the clinic through [employer] or phone. If you have a critical or abnormal lab result, we will notify you by phone as soon as possible.  Submit refill requests through [employer] or call your pharmacy and they will forward the refill request to us. Please allow 3 business days for your refill to  "be completed.          Additional Information About Your Visit        saambaahart Information     [employer] gives you secure access to your electronic health record. If you see a primary care provider, you can also send messages to your care team and make appointments. If you have questions, please call your primary care clinic.  If you do not have a primary care provider, please call [phone] and they will assist you.        Care EveryWhere ID     This is your Care EveryWhere ID. This could be used by other organizations to access your [place] medical records  [id number]        Your Vitals Were     Pulse Temperature Respirations Height Pulse Oximetry BMI (Body Mass Index)    83 97.9  F (36.6  C) (Tympanic) 16 5' 9.5\" (1.765 m) 94% 30.13 kg/m2       Blood Pressure from Last 3 Encounters:   09/26/18 112/76   08/22/18 126/82   08/22/18 126/84    Weight from Last 3 Encounters:   09/26/18 207 lb (93.9 kg)   08/22/18 202 lb (91.6 kg)   08/22/18 202 lb (91.6 kg)              Today, you had the following     No orders found for display       Primary Care Provider Office Phone # Fax #    Devyn Cox [phone] [address] [facility] [address]        Equal Access to Services     SHARIFA NIELSON : Hadii lissette ku hadasho Soomaali, waaxda luqadaha, qaybta kaalmada adeegyada, waxay idiin hayaan feliberto june . So [facility] [phone].    ATENCIÓN: Si habla español, tiene a moctezuma disposición servicios gratuitos de asistencia lingüística. Llame al [phone].    We comply with applicable federal civil rights laws and [place] laws. We do not discriminate on the basis of race, color, national origin, age, disability, sex, sexual orientation, or gender identity.            Thank you!     Thank you for choosing [facility]  for your care. Our goal is always to provide you with excellent care. Hearing back from our patients is one way we can continue to improve our services. Please take " a few minutes to complete the written survey that you may receive in the mail after your visit with us. Thank you!             Your Updated Medication List - Protect others around you: Learn how to safely use, store and throw away your medicines at www.disposemymeds.org.          This list is accurate as of 9/26/18  3:30 PM.  Always use your most recent med list.                   Brand Name Dispense Instructions for use Diagnosis    atorvastatin 20 MG tablet    LIPITOR    90 tablet    TAKE ONE TABLET BY MOUTH ONCE DAILY    Hyperlipidemia LDL goal <100, Family history of coronary artery disease       loratadine 10 MG tablet    CLARITIN     Take 10 mg by mouth daily as needed for allergies

## 2018-10-31 ENCOUNTER — MYC MEDICAL ADVICE (OUTPATIENT)
Dept: FAMILY MEDICINE | Facility: CLINIC | Age: 48
End: 2018-10-31

## 2018-10-31 DIAGNOSIS — E78.5 HYPERLIPIDEMIA LDL GOAL <100: Primary | ICD-10-CM

## 2018-11-03 DIAGNOSIS — E78.5 HYPERLIPIDEMIA LDL GOAL <100: ICD-10-CM

## 2018-11-03 LAB
CHOLEST SERPL-MCNC: 153 MG/DL
HDLC SERPL-MCNC: 39 MG/DL
LDLC SERPL CALC-MCNC: 78 MG/DL
NONHDLC SERPL-MCNC: 114 MG/DL
TRIGL SERPL-MCNC: 181 MG/DL

## 2018-11-03 PROCEDURE — 80061 LIPID PANEL: CPT | Performed by: FAMILY MEDICINE

## 2018-11-03 PROCEDURE — 36415 COLL VENOUS BLD VENIPUNCTURE: CPT | Performed by: FAMILY MEDICINE

## 2018-11-11 ENCOUNTER — MYC MEDICAL ADVICE (OUTPATIENT)
Dept: FAMILY MEDICINE | Facility: CLINIC | Age: 48
End: 2018-11-11

## 2018-11-12 ENCOUNTER — MYC MEDICAL ADVICE (OUTPATIENT)
Dept: FAMILY MEDICINE | Facility: CLINIC | Age: 48
End: 2018-11-12

## 2018-11-12 NOTE — TELEPHONE ENCOUNTER
"Dr Cox, please see his mychart note re: warts/ being worked in this week,     Per your last note:   \"If you need to be seen and I am full, please have my team send me a message and I am sure we can find a spot to squeeze you in either that day or the next day.\"    Thanks,   Alejandra Collado RNC    "

## 2018-11-12 NOTE — TELEPHONE ENCOUNTER
Left message to call back. Does Wed at 4 work for him? Will my chart the patient to ask if as well. CSS if patient returns call please schedule the patient in on Wednesday if it works.    YANIQUE Davis

## 2018-11-14 ENCOUNTER — OFFICE VISIT (OUTPATIENT)
Dept: FAMILY MEDICINE | Facility: CLINIC | Age: 48
End: 2018-11-14
Payer: COMMERCIAL

## 2018-11-14 VITALS
SYSTOLIC BLOOD PRESSURE: 117 MMHG | HEART RATE: 90 BPM | TEMPERATURE: 97.8 F | RESPIRATION RATE: 16 BRPM | BODY MASS INDEX: 29.1 KG/M2 | WEIGHT: 203.25 LBS | DIASTOLIC BLOOD PRESSURE: 73 MMHG | OXYGEN SATURATION: 97 % | HEIGHT: 70 IN

## 2018-11-14 DIAGNOSIS — B07.8 COMMON WART: Primary | ICD-10-CM

## 2018-11-14 PROCEDURE — 17110 DESTRUCTION B9 LES UP TO 14: CPT | Performed by: FAMILY MEDICINE

## 2018-11-14 NOTE — PATIENT INSTRUCTIONS
I treated 5 warts on your neck.    If this does not work we can retreat again or consider ordering out WartPeel. You would need to call me and I would send a prescription to the company, they would call you for payment and then ship the medication out to you.          Thank you for choosing [facility].  You may be receiving a survey in the mail from [facility] [facility] regarding your visit today.  Please take a few minutes to complete and return the survey to let us know how we are doing.      If you have questions or concerns, please contact us via [employer] or you can contact your care team at [phone].    Our Clinic hours are:  Monday 6:40 am  to 7:00 pm  Tuesday -Friday 6:40 am to 5:00 pm    The [place] outpatient lab hours are:  Monday - Friday 6:10 am to 4:45 pm  Saturdays 7:00 am to 11:00 am  Appointments are required, call [phone]    If you have clinical questions after hours or would like to schedule an appointment,  call the clinic at [phone].

## 2018-11-14 NOTE — PROGRESS NOTES
"  SUBJECTIVE:   Emery Barkley is a 48 year old male who presents to clinic today for the following health issues:      Chief Complaint   Patient presents with     Wart     Here today for common wart treatment in neck area.  Patient states he sees an improvement.  Last treatment was 9/26/18.     Noted 5 small warts on neck.  Two clusters, one with 3 and one with 2.  Each 1 mm diameter.         Problem list and histories reviewed & adjusted, as indicated.  Additional history: as documented        Reviewed and updated as needed this visit by clinical staff  Tobacco  Allergies  Meds  Med Hx  Surg Hx  Fam Hx  Soc Hx      Reviewed and updated as needed this visit by Provider         ROS:  CONSTITUTIONAL:NEGATIVE for fever, chills, change in weight  INTEGUMENTARY/SKIN: as above    OBJECTIVE:                                                    /73  Pulse 90  Temp 97.8  F (36.6  C) (Tympanic)  Resp 16  Ht 5' 9.5\" (1.765 m)  Wt 203 lb 4 oz (92.2 kg)  SpO2 97%  BMI 29.58 kg/m2  Body mass index is 29.58 kg/(m^2).  GENERAL APPEARANCE: healthy, alert and no distress  SKIN: noted 5 small warts, base of neck, cluster of 3 and cluster of 2. He consented to treatment.  3 freeze thaw cycles done without complications.           ASSESSMENT/PLAN:                                                    (B07.8) Common wart  (primary encounter diagnosis)  Comment: treated and if they recur to call for Liquid Nitrogen Treatment, another option would be for me to send in a prescription for WartPeel to the company pharmacy.  He will think about this and he has the information from the website.  This treatment recommended by a [facility] Dermatologist.  Plan: DESTRUCT BENIGN LESION, UP TO 14                See Patient Instructions    Devyn Cox MD  [facility]    "

## 2018-11-14 NOTE — MR AVS SNAPSHOT
After Visit Summary   11/14/2018    Emery Barkley    MRN: [id number]           Patient Information     Date Of Birth          1970        Visit Information        Provider Department      11/14/2018 4:00 PM Devyn Cox MD [facility]        Care Instructions    I treated 5 warts on your neck.    If this does not work we can retreat again or consider ordering out WartPeel. You would need to call me and I would send a prescription to the company, they would call you for payment and then ship the medication out to you.          Thank you for choosing [facility].  You may be receiving a survey in the mail from [employer] regarding your visit today.  Please take a few minutes to complete and return the survey to let us know how we are doing.      If you have questions or concerns, please contact us via [employer] or you can contact your care team at [phone].    Our Clinic hours are:  Monday 6:40 am  to 7:00 pm  Tuesday -Friday 6:40 am to 5:00 pm    The [place] outpatient lab hours are:  Monday - Friday 6:10 am to 4:45 pm  Saturdays 7:00 am to 11:00 am  Appointments are required, call [phone]    If you have clinical questions after hours or would like to schedule an appointment,  call the clinic at [phone].            Follow-ups after your visit        Your next 10 appointments already scheduled     Nov 14, 2018  4:00 PM CST   SHORT with Devyn Cox MD   [facility]    [address] [id number] [phone]              Who to contact     If you have questions or need follow up information about today's clinic visit or your schedule please contact [facility] directly at [phone].  Normal or non-critical lab and imaging results will be communicated to you by MyChart, letter or phone within 4 business days after the clinic has received the results. If you do not hear from us  "within 7 days, please contact the clinic through [employer] or phone. If you have a critical or abnormal lab result, we will notify you by phone as soon as possible.  Submit refill requests through [employer] or call your pharmacy and they will forward the refill request to us. Please allow 3 business days for your refill to be completed.          Additional Information About Your Visit        [employer] Information     [employer] gives you secure access to your electronic health record. If you see a primary care provider, you can also send messages to your care team and make appointments. If you have questions, please call your primary care clinic.  If you do not have a primary care provider, please call [phone] and they will assist you.        Care EveryWhere ID     This is your Care EveryWhere ID. This could be used by other organizations to access your [facility] medical records  [id number]        Your Vitals Were     Pulse Temperature Respirations Height Pulse Oximetry BMI (Body Mass Index)    90 97.8  F (36.6  C) (Tympanic) 16 5' 9.5\" (1.765 m) 97% 29.58 kg/m2       Blood Pressure from Last 3 Encounters:   11/14/18 117/73   09/26/18 112/76   08/22/18 126/82    Weight from Last 3 Encounters:   11/14/18 203 lb 4 oz (92.2 kg)   09/26/18 207 lb (93.9 kg)   08/22/18 202 lb (91.6 kg)              Today, you had the following     No orders found for display       Primary Care Provider Office Phone # Fax #    Devyn Cox [phone] [address] [address]        Equal Access to Services     [facility] : Hadii lissette howe hadasho Soomaali, waaxda luqadaha, qaybta kaalmada adeeglauren, brandon oliva. So [facility] [place] [phone].    ATENCIÓN: Si habla español, tiene a moctezuma disposición servicios gratuitos de asistencia lingüística. Llame al [phone].    We comply with applicable federal civil rights laws and [place] laws. We do not discriminate on the basis of race, " color, national origin, age, disability, sex, sexual orientation, or gender identity.            Thank you!     Thank you for choosing [facility]  for your care. Our goal is always to provide you with excellent care. Hearing back from our patients is one way we can continue to improve our services. Please take a few minutes to complete the written survey that you may receive in the mail after your visit with us. Thank you!             Your Updated Medication List - Protect others around you: Learn how to safely use, store and throw away your medicines at www.disposemymeds.org.          This list is accurate as of 11/14/18  3:59 PM.  Always use your most recent med list.                   Brand Name Dispense Instructions for use Diagnosis    atorvastatin 20 MG tablet    LIPITOR    90 tablet    TAKE ONE TABLET BY MOUTH ONCE DAILY    Hyperlipidemia LDL goal <100, Family history of coronary artery disease       loratadine 10 MG tablet    CLARITIN     Take 10 mg by mouth daily as needed for allergies

## 2018-12-18 DIAGNOSIS — Z82.49 FAMILY HISTORY OF CORONARY ARTERY DISEASE: ICD-10-CM

## 2018-12-18 DIAGNOSIS — E78.5 HYPERLIPIDEMIA LDL GOAL <100: ICD-10-CM

## 2018-12-19 RX ORDER — ATORVASTATIN CALCIUM 20 MG/1
TABLET, FILM COATED ORAL
Qty: 90 TABLET | Refills: 2 | Status: SHIPPED | OUTPATIENT
Start: 2018-12-19 | End: 2019-09-29

## 2018-12-19 NOTE — TELEPHONE ENCOUNTER
"Requested Prescriptions   Pending Prescriptions Disp Refills     atorvastatin (LIPITOR) 20 MG tablet [Pharmacy Med Name: ATORVASTATIN 20MG   TAB]  Last Written Prescription Date:  09/13/2018  Last Fill Quantity: 90,  # refills: 0   Last office visit: 11/14/2018 with prescribing provider:  faiza   Future Office Visit:   Next 5 appointments (look out 90 days)    Jan 09, 2019  4:00 PM CST  Miley Ivey with Devyn Cox MD  [facility] [address] [address]  [phone]          90 tablet 0     Sig: TAKE 1 TABLET BY MOUTH ONCE DAILY    Statins Protocol Passed - 12/18/2018  4:57 PM       Passed - LDL on file in past 12 months    Recent Labs   Lab Test 11/03/18  0656   LDL 78            Passed - No abnormal creatine kinase in past 12 months    No lab results found.            Passed - Recent (12 mo) or future (30 days) visit within the authorizing provider's specialty    Patient had office visit in the last 12 months or has a visit in the next 30 days with authorizing provider or within the authorizing provider's specialty.  See \"Patient Info\" tab in inbasket, or \"Choose Columns\" in Meds & Orders section of the refill encounter.             Passed - Patient is age 18 or older        Fred ROCHA (R)    "

## 2018-12-19 NOTE — TELEPHONE ENCOUNTER
Prescription approved per [facility] Refill Protocol.    Megan HICKEY RN

## 2019-01-09 ENCOUNTER — OFFICE VISIT (OUTPATIENT)
Dept: FAMILY MEDICINE | Facility: CLINIC | Age: 49
End: 2019-01-09
Payer: COMMERCIAL

## 2019-01-09 VITALS
HEART RATE: 82 BPM | BODY MASS INDEX: 29.78 KG/M2 | OXYGEN SATURATION: 95 % | WEIGHT: 208 LBS | HEIGHT: 70 IN | SYSTOLIC BLOOD PRESSURE: 126 MMHG | DIASTOLIC BLOOD PRESSURE: 84 MMHG | TEMPERATURE: 98 F

## 2019-01-09 DIAGNOSIS — K42.9 UMBILICAL HERNIA WITHOUT OBSTRUCTION AND WITHOUT GANGRENE: Primary | ICD-10-CM

## 2019-01-09 PROCEDURE — 99213 OFFICE O/P EST LOW 20 MIN: CPT | Performed by: FAMILY MEDICINE

## 2019-01-09 ASSESSMENT — MIFFLIN-ST. JEOR: SCORE: 1816.79

## 2019-01-09 NOTE — PROGRESS NOTES
"  SUBJECTIVE:   Emery Barkley is a 48 year old male who presents to clinic today for the following health issues:  Chief Complaint   Patient presents with     Hernia     thinking he has an umbilical hernia. Occ pain.      Has noted a small grape mass that comes and goes in the umbilicus.  No known trauma.  No problems with stooling.  Some intermittent discomfort.          Problem list and histories reviewed & adjusted, as indicated.  Additional history: as documented        Reviewed and updated as needed this visit by clinical staff  Tobacco  Allergies  Meds  Med Hx  Surg Hx  Fam Hx  Soc Hx      Reviewed and updated as needed this visit by Provider         ROS:  CONSTITUTIONAL:NEGATIVE for fever, chills, change in weight  INTEGUMENTARY/SKIN: NEGATIVE for worrisome rashes, moles or lesions  GI: as above    OBJECTIVE:                                                    /84   Pulse 82   Temp 98  F (36.7  C) (Tympanic)   Ht 1.765 m (5' 9.5\")   Wt 94.3 kg (208 lb)   SpO2 95%   BMI 30.28 kg/m    Body mass index is 30.28 kg/m .  GENERAL APPEARANCE: healthy, alert and no distress  ABDOMEN: noted small umbilical hernia, reducable         ASSESSMENT/PLAN:                                                    1. Umbilical hernia without obstruction and without gangrene  Referral is done to get a consult, handout is printed for Emery.  - GENERAL SURG ADULT REFERRAL      See Patient Instructions    Devyn Cox MD  [facility]  "

## 2019-01-09 NOTE — PATIENT INSTRUCTIONS
Please see general surgery regarding the start of your umbilical hernia.          Thank you for choosing [facility].  You may be receiving a survey in the mail from Neer[employer] Baez regarding your visit today.  Please take a few minutes to complete and return the survey to let us know how we are doing.      If you have questions or concerns, please contact us via [employer] or you can contact your care team at [phone].    Our Clinic hours are:  Monday 6:40 am  to 7:00 pm  Tuesday -Friday 6:40 am to 5:00 pm    The [place] outpatient lab hours are:  Monday - Friday 6:10 am to 4:45 pm  Saturdays 7:00 am to 11:00 am  Appointments are required, call [phone]    If you have clinical questions after hours or would like to schedule an appointment,  call the clinic at [phone].

## 2019-01-15 ENCOUNTER — OFFICE VISIT (OUTPATIENT)
Dept: SURGERY | Facility: CLINIC | Age: 49
End: 2019-01-15
Payer: COMMERCIAL

## 2019-01-15 VITALS
DIASTOLIC BLOOD PRESSURE: 96 MMHG | BODY MASS INDEX: 29.78 KG/M2 | HEART RATE: 96 BPM | SYSTOLIC BLOOD PRESSURE: 136 MMHG | TEMPERATURE: 98.1 F | HEIGHT: 70 IN | WEIGHT: 208 LBS

## 2019-01-15 DIAGNOSIS — K42.0 INCARCERATED UMBILICAL HERNIA: Primary | ICD-10-CM

## 2019-01-15 PROCEDURE — 99204 OFFICE O/P NEW MOD 45 MIN: CPT | Performed by: SURGERY

## 2019-01-15 ASSESSMENT — MIFFLIN-ST. JEOR: SCORE: 1816.79

## 2019-01-15 NOTE — NURSING NOTE
"Initial BP (!) 136/96 (BP Location: Right arm, Patient Position: Sitting, Cuff Size: Adult Regular)   Pulse 96   Temp 98.1  F (36.7  C) (Oral)   Ht 1.765 m (5' 9.5\")   Wt 94.3 kg (208 lb)   BMI 30.28 kg/m   Estimated body mass index is 30.28 kg/m  as calculated from the following:    Height as of this encounter: 1.765 m (5' 9.5\").    Weight as of this encounter: 94.3 kg (208 lb). .    Missy Carrasco MA    "

## 2019-01-15 NOTE — PROGRESS NOTES
Surgical Consultation/History and Physical  [facility] General Surgery    Emery is seen in consultation for hernia, at the request of Devyn Cox MD.    Chief Complaint:  Hernia    History of Present Illness: Emery Barkley is a 48 year old male presents with umbilical hernia for 3 years duration.  It has gradually increased in size and is causing him discomfort.  Denies any changes in bowel habits, overlying skin changes.  Denies previous history of hernias.  No fevers or chills.  He is quite active as a .  He is presently getting over a cold with rhinorrhea, congestion, no cough.      Able to obtain 4 METS without difficulty.      Patient Active Problem List   Diagnosis     Hyperlipidemia LDL goal <100     Family history of coronary artery disease     External hemorrhoid     Common wart       Past Medical History:   Diagnosis Date     NO ACTIVE PROBLEMS (aka NONE)        Past Surgical History:   Procedure Laterality Date     VASECTOMY         Family History   Problem Relation Age of Onset     C.A.D. Father         MI, chf, first mi in 50s.     Coronary Artery Disease Father      Hyperlipidemia Father      Cancer Sister         brain     Other Cancer Sister         Brain Cancer     Lipids Mother      Hyperlipidemia Mother      Depression Mother        Social History     Tobacco Use     Smoking status: Never Smoker     Smokeless tobacco: Never Used   Substance Use Topics     Alcohol use: Yes     Comment: 2 drinks per week        History   Drug Use No       Current Outpatient Medications   Medication Sig Dispense Refill     atorvastatin (LIPITOR) 20 MG tablet TAKE 1 TABLET BY MOUTH ONCE DAILY 90 tablet 2     loratadine (CLARITIN) 10 MG tablet Take 10 mg by mouth daily as needed for allergies         No Known Allergies    Review of Systems:   Constitutional - Denies  weight loss, lethargy; + fevers, rhinorrhea, malaise  Neuro - Denies tremors or seizures  Pulmon - Denies SOB, dyspnea,  "hemoptysis, chronic cough or use of an inhaler  CV - Denies CP, SOB, lower extremity edema, difficulty w/ stairs, has never used NTG  GI - Denies hematemesis, BRBPR, melena, chronic diarrhea or epigastric pain; + hernia   - Denies hematuria, difficulty voiding  Hematology - Denies blood clotting disorders, chronic anemias  Dermatology - No melanomas or skin cancers  Rheumatology - No h/o RA  Pysch - Denies depression, bipolar d/o or schizophrenia    Physical Exam:  BP (!) 136/96 (BP Location: Right arm, Patient Position: Sitting, Cuff Size: Adult Regular)   Pulse 96   Temp 98.1  F (36.7  C) (Oral)   Ht 1.765 m (5' 9.5\")   Wt 94.3 kg (208 lb)   BMI 30.28 kg/m      Constitutional- No acute distress, well nourished, non-toxic  Eyes: Anicteric, no injection.    ENT:  Normocephalic, atraumatic, Nose midline, moist mucus membranes; rhinorrhea  Neck - supple, no LAD  Respiratory- Clear to auscultation bilaterally, good inspiratory effort  Cardiovascular - Heart RRR, no lift's, thrills, murmurs, rubs, or gallop.  No peripheral edema.  No clubbing.  Abdomen - Soft, non-tender, +BS, no hepatosplenomegaly, incarcerated umbilical hernia.  No overlying skin changes.  Neuro - No focal neuro deficits, Alert and oriented x 3  Psych: Appropriate mood and affect  Musculoskeletal: Normal gait, symmetric strength.  FROM upper and lower extremities.  Skin: Warm, Dry    Assessment:  1. Incarcerated umbilical hernia      Plan:   Emery Barkley presents with an incarcerated umbilical hernia. Symptoms are progressively worsening recently. The patient may benefit from hernia repair, and the indications, risks, benefits and alternatives to surgery were discussed in detail, and the patient understood the counseling offered and wishes to proceed as planned and outlined. Risks specifically discussed include bleeding, infection, seroma, need for additional treatment, nontherapeutic intervention, recurrent hernia, potential need for mesh, " potential need for temporary surgical drain, wound complication (such as dehiscence), and rare complications related to surgery and/or anesthesia such as venous thromboembolism and cardiorespiratory complications.    He is able to have 4 METs without difficulty, SOB or chest pain, cleared for surgery.  His surgery will be scheduled after his cold symptoms have resolved.  We discussed weight lifting restriction discussed of 6 weeks of 20 lbs.      Tej Beaulieu, DO on 1/15/2019 at 2:32 PM

## 2019-01-15 NOTE — H&P (VIEW-ONLY)
Surgical Consultation/History and Physical  [facility] General Surgery    Emery is seen in consultation for hernia, at the request of Devyn Cox MD.    Chief Complaint:  Hernia    History of Present Illness: Emery Barkley is a 48 year old male presents with umbilical hernia for 3 years duration.  It has gradually increased in size and is causing him discomfort.  Denies any changes in bowel habits, overlying skin changes.  Denies previous history of hernias.  No fevers or chills.  He is quite active as a .  He is presently getting over a cold with rhinorrhea, congestion, no cough.      Able to obtain 4 METS without difficulty.      Patient Active Problem List   Diagnosis     Hyperlipidemia LDL goal <100     Family history of coronary artery disease     External hemorrhoid     Common wart       Past Medical History:   Diagnosis Date     NO ACTIVE PROBLEMS (aka NONE)        Past Surgical History:   Procedure Laterality Date     VASECTOMY         Family History   Problem Relation Age of Onset     C.A.D. Father         MI, chf, first mi in 50s.     Coronary Artery Disease Father      Hyperlipidemia Father      Cancer Sister         brain     Other Cancer Sister         Brain Cancer     Lipids Mother      Hyperlipidemia Mother      Depression Mother        Social History     Tobacco Use     Smoking status: Never Smoker     Smokeless tobacco: Never Used   Substance Use Topics     Alcohol use: Yes     Comment: 2 drinks per week        History   Drug Use No       Current Outpatient Medications   Medication Sig Dispense Refill     atorvastatin (LIPITOR) 20 MG tablet TAKE 1 TABLET BY MOUTH ONCE DAILY 90 tablet 2     loratadine (CLARITIN) 10 MG tablet Take 10 mg by mouth daily as needed for allergies         No Known Allergies    Review of Systems:   Constitutional - Denies  weight loss, lethargy; + fevers, rhinorrhea, malaise  Neuro - Denies tremors or seizures  Pulmon - Denies SOB, dyspnea,  "hemoptysis, chronic cough or use of an inhaler  CV - Denies CP, SOB, lower extremity edema, difficulty w/ stairs, has never used NTG  GI - Denies hematemesis, BRBPR, melena, chronic diarrhea or epigastric pain; + hernia   - Denies hematuria, difficulty voiding  Hematology - Denies blood clotting disorders, chronic anemias  Dermatology - No melanomas or skin cancers  Rheumatology - No h/o RA  Pysch - Denies depression, bipolar d/o or schizophrenia    Physical Exam:  BP (!) 136/96 (BP Location: Right arm, Patient Position: Sitting, Cuff Size: Adult Regular)   Pulse 96   Temp 98.1  F (36.7  C) (Oral)   Ht 1.765 m (5' 9.5\")   Wt 94.3 kg (208 lb)   BMI 30.28 kg/m      Constitutional- No acute distress, well nourished, non-toxic  Eyes: Anicteric, no injection.    ENT:  Normocephalic, atraumatic, Nose midline, moist mucus membranes; rhinorrhea  Neck - supple, no LAD  Respiratory- Clear to auscultation bilaterally, good inspiratory effort  Cardiovascular - Heart RRR, no lift's, thrills, murmurs, rubs, or gallop.  No peripheral edema.  No clubbing.  Abdomen - Soft, non-tender, +BS, no hepatosplenomegaly, incarcerated umbilical hernia.  No overlying skin changes.  Neuro - No focal neuro deficits, Alert and oriented x 3  Psych: Appropriate mood and affect  Musculoskeletal: Normal gait, symmetric strength.  FROM upper and lower extremities.  Skin: Warm, Dry    Assessment:  1. Incarcerated umbilical hernia      Plan:   Emery Barkley presents with an incarcerated umbilical hernia. Symptoms are progressively worsening recently. The patient may benefit from hernia repair, and the indications, risks, benefits and alternatives to surgery were discussed in detail, and the patient understood the counseling offered and wishes to proceed as planned and outlined. Risks specifically discussed include bleeding, infection, seroma, need for additional treatment, nontherapeutic intervention, recurrent hernia, potential need for mesh, " potential need for temporary surgical drain, wound complication (such as dehiscence), and rare complications related to surgery and/or anesthesia such as venous thromboembolism and cardiorespiratory complications.    He is able to have 4 METs without difficulty, SOB or chest pain, cleared for surgery.  His surgery will be scheduled after his cold symptoms have resolved.  We discussed weight lifting restriction discussed of 6 weeks of 20 lbs.      Tej Beaulieu, DO on 1/15/2019 at 2:32 PM

## 2019-01-31 ENCOUNTER — ANESTHESIA EVENT (OUTPATIENT)
Dept: SURGERY | Facility: CLINIC | Age: 49
End: 2019-01-31
Payer: COMMERCIAL

## 2019-02-07 ENCOUNTER — HOSPITAL ENCOUNTER (OUTPATIENT)
Facility: CLINIC | Age: 49
Discharge: HOME | End: 2019-02-07
Attending: SURGERY | Admitting: SURGERY
Payer: COMMERCIAL

## 2019-02-07 ENCOUNTER — ANESTHESIA (OUTPATIENT)
Dept: SURGERY | Facility: CLINIC | Age: 49
End: 2019-02-07
Payer: COMMERCIAL

## 2019-02-07 VITALS
TEMPERATURE: 98.3 F | WEIGHT: 208.11 LBS | SYSTOLIC BLOOD PRESSURE: 124 MMHG | OXYGEN SATURATION: 96 % | DIASTOLIC BLOOD PRESSURE: 77 MMHG | HEIGHT: 69 IN | RESPIRATION RATE: 16 BRPM | BODY MASS INDEX: 30.82 KG/M2

## 2019-02-07 DIAGNOSIS — K42.9 UMBILICAL HERNIA WITHOUT OBSTRUCTION AND WITHOUT GANGRENE: Primary | ICD-10-CM

## 2019-02-07 PROCEDURE — 36000052 ZZH SURGERY LEVEL 2 EA 15 ADDTL MIN: Performed by: SURGERY

## 2019-02-07 PROCEDURE — 49585 ZZHC REPAIR UMBILICAL HERN,5+Y/O,REDUC: CPT | Mod: AS | Performed by: PHYSICIAN ASSISTANT

## 2019-02-07 PROCEDURE — 40000306 ZZH STATISTIC PRE PROC ASSESS II: Performed by: SURGERY

## 2019-02-07 PROCEDURE — 25000125 ZZHC RX 250

## 2019-02-07 PROCEDURE — 37000008 ZZH ANESTHESIA TECHNICAL FEE, 1ST 30 MIN: Performed by: SURGERY

## 2019-02-07 PROCEDURE — 25000128 H RX IP 250 OP 636: Performed by: SURGERY

## 2019-02-07 PROCEDURE — 25000125 ZZHC RX 250: Performed by: NURSE ANESTHETIST, CERTIFIED REGISTERED

## 2019-02-07 PROCEDURE — 36000050 ZZH SURGERY LEVEL 2 1ST 30 MIN: Performed by: SURGERY

## 2019-02-07 PROCEDURE — 25000128 H RX IP 250 OP 636

## 2019-02-07 PROCEDURE — 71000027 ZZH RECOVERY PHASE 2 EACH 15 MINS: Performed by: SURGERY

## 2019-02-07 PROCEDURE — 25000128 H RX IP 250 OP 636: Performed by: NURSE ANESTHETIST, CERTIFIED REGISTERED

## 2019-02-07 PROCEDURE — 27110028 ZZH OR GENERAL SUPPLY NON-STERILE: Performed by: SURGERY

## 2019-02-07 PROCEDURE — 49585 ZZHC REPAIR UMBILICAL HERN,5+Y/O,REDUC: CPT | Performed by: SURGERY

## 2019-02-07 PROCEDURE — 25000125 ZZHC RX 250: Performed by: SURGERY

## 2019-02-07 PROCEDURE — 27210794 ZZH OR GENERAL SUPPLY STERILE: Performed by: SURGERY

## 2019-02-07 PROCEDURE — 37000009 ZZH ANESTHESIA TECHNICAL FEE, EACH ADDTL 15 MIN: Performed by: SURGERY

## 2019-02-07 RX ORDER — FENTANYL CITRATE 50 UG/ML
25-50 INJECTION, SOLUTION INTRAMUSCULAR; INTRAVENOUS
Status: CANCELLED | OUTPATIENT
Start: 2019-02-07

## 2019-02-07 RX ORDER — LIDOCAINE 40 MG/G
CREAM TOPICAL
Status: DISCONTINUED | OUTPATIENT
Start: 2019-02-07 | End: 2019-02-07 | Stop reason: HOSPADM

## 2019-02-07 RX ORDER — ONDANSETRON 2 MG/ML
4 INJECTION INTRAMUSCULAR; INTRAVENOUS EVERY 30 MIN PRN
Status: DISCONTINUED | OUTPATIENT
Start: 2019-02-07 | End: 2019-02-07 | Stop reason: HOSPADM

## 2019-02-07 RX ORDER — PROPOFOL 10 MG/ML
INJECTION, EMULSION INTRAVENOUS CONTINUOUS PRN
Status: DISCONTINUED | OUTPATIENT
Start: 2019-02-07 | End: 2019-02-07

## 2019-02-07 RX ORDER — CEFAZOLIN SODIUM 2 G/100ML
2 INJECTION, SOLUTION INTRAVENOUS
Status: COMPLETED | OUTPATIENT
Start: 2019-02-07 | End: 2019-02-07

## 2019-02-07 RX ORDER — NALOXONE HYDROCHLORIDE 0.4 MG/ML
.1-.4 INJECTION, SOLUTION INTRAMUSCULAR; INTRAVENOUS; SUBCUTANEOUS
Status: DISCONTINUED | OUTPATIENT
Start: 2019-02-07 | End: 2019-02-07 | Stop reason: HOSPADM

## 2019-02-07 RX ORDER — PROPOFOL 10 MG/ML
INJECTION, EMULSION INTRAVENOUS PRN
Status: DISCONTINUED | OUTPATIENT
Start: 2019-02-07 | End: 2019-02-07

## 2019-02-07 RX ORDER — SODIUM CHLORIDE, SODIUM LACTATE, POTASSIUM CHLORIDE, CALCIUM CHLORIDE 600; 310; 30; 20 MG/100ML; MG/100ML; MG/100ML; MG/100ML
INJECTION, SOLUTION INTRAVENOUS CONTINUOUS
Status: DISCONTINUED | OUTPATIENT
Start: 2019-02-07 | End: 2019-02-07 | Stop reason: HOSPADM

## 2019-02-07 RX ORDER — HYDROMORPHONE HYDROCHLORIDE 1 MG/ML
.3-.5 INJECTION, SOLUTION INTRAMUSCULAR; INTRAVENOUS; SUBCUTANEOUS EVERY 10 MIN PRN
Status: DISCONTINUED | OUTPATIENT
Start: 2019-02-07 | End: 2019-02-07 | Stop reason: HOSPADM

## 2019-02-07 RX ORDER — DOCUSATE SODIUM 100 MG/1
100 CAPSULE, LIQUID FILLED ORAL 2 TIMES DAILY
Qty: 20 CAPSULE | Refills: 0 | Status: SHIPPED | OUTPATIENT
Start: 2019-02-07 | End: 2019-02-21

## 2019-02-07 RX ORDER — OXYCODONE HYDROCHLORIDE 5 MG/1
5 TABLET ORAL
Status: DISCONTINUED | OUTPATIENT
Start: 2019-02-07 | End: 2019-02-07 | Stop reason: HOSPADM

## 2019-02-07 RX ORDER — ACETAMINOPHEN 325 MG/1
650 TABLET ORAL
Status: DISCONTINUED | OUTPATIENT
Start: 2019-02-07 | End: 2019-02-07 | Stop reason: HOSPADM

## 2019-02-07 RX ORDER — MEPERIDINE HYDROCHLORIDE 25 MG/ML
12.5 INJECTION INTRAMUSCULAR; INTRAVENOUS; SUBCUTANEOUS
Status: DISCONTINUED | OUTPATIENT
Start: 2019-02-07 | End: 2019-02-07 | Stop reason: HOSPADM

## 2019-02-07 RX ORDER — BUPIVACAINE HYDROCHLORIDE 5 MG/ML
INJECTION, SOLUTION PERINEURAL PRN
Status: DISCONTINUED | OUTPATIENT
Start: 2019-02-07 | End: 2019-02-07 | Stop reason: HOSPADM

## 2019-02-07 RX ORDER — FENTANYL CITRATE 50 UG/ML
INJECTION, SOLUTION INTRAMUSCULAR; INTRAVENOUS PRN
Status: DISCONTINUED | OUTPATIENT
Start: 2019-02-07 | End: 2019-02-07

## 2019-02-07 RX ORDER — ONDANSETRON 2 MG/ML
INJECTION INTRAMUSCULAR; INTRAVENOUS PRN
Status: DISCONTINUED | OUTPATIENT
Start: 2019-02-07 | End: 2019-02-07

## 2019-02-07 RX ORDER — CEFAZOLIN SODIUM 1 G/50ML
1 INJECTION, SOLUTION INTRAVENOUS SEE ADMIN INSTRUCTIONS
Status: DISCONTINUED | OUTPATIENT
Start: 2019-02-07 | End: 2019-02-07 | Stop reason: HOSPADM

## 2019-02-07 RX ORDER — KETOROLAC TROMETHAMINE 30 MG/ML
INJECTION, SOLUTION INTRAMUSCULAR; INTRAVENOUS PRN
Status: DISCONTINUED | OUTPATIENT
Start: 2019-02-07 | End: 2019-02-07

## 2019-02-07 RX ORDER — HYDROCODONE BITARTRATE AND ACETAMINOPHEN 5; 325 MG/1; MG/1
1-2 TABLET ORAL EVERY 4 HOURS PRN
Qty: 20 TABLET | Refills: 0 | Status: SHIPPED | OUTPATIENT
Start: 2019-02-07 | End: 2019-02-21

## 2019-02-07 RX ORDER — ONDANSETRON 4 MG/1
4 TABLET, ORALLY DISINTEGRATING ORAL EVERY 30 MIN PRN
Status: DISCONTINUED | OUTPATIENT
Start: 2019-02-07 | End: 2019-02-07 | Stop reason: HOSPADM

## 2019-02-07 RX ORDER — KETAMINE HYDROCHLORIDE 10 MG/ML
INJECTION, SOLUTION INTRAMUSCULAR; INTRAVENOUS PRN
Status: DISCONTINUED | OUTPATIENT
Start: 2019-02-07 | End: 2019-02-07

## 2019-02-07 RX ORDER — DEXAMETHASONE SODIUM PHOSPHATE 4 MG/ML
INJECTION, SOLUTION INTRA-ARTICULAR; INTRALESIONAL; INTRAMUSCULAR; INTRAVENOUS; SOFT TISSUE PRN
Status: DISCONTINUED | OUTPATIENT
Start: 2019-02-07 | End: 2019-02-07

## 2019-02-07 RX ORDER — DIMENHYDRINATE 50 MG/ML
25 INJECTION, SOLUTION INTRAMUSCULAR; INTRAVENOUS
Status: DISCONTINUED | OUTPATIENT
Start: 2019-02-07 | End: 2019-02-07 | Stop reason: HOSPADM

## 2019-02-07 RX ORDER — DEXAMETHASONE SODIUM PHOSPHATE 4 MG/ML
4 INJECTION, SOLUTION INTRA-ARTICULAR; INTRALESIONAL; INTRAMUSCULAR; INTRAVENOUS; SOFT TISSUE EVERY 10 MIN PRN
Status: DISCONTINUED | OUTPATIENT
Start: 2019-02-07 | End: 2019-02-07 | Stop reason: HOSPADM

## 2019-02-07 RX ADMIN — FENTANYL CITRATE 25 MCG: 50 INJECTION, SOLUTION INTRAMUSCULAR; INTRAVENOUS at 07:34

## 2019-02-07 RX ADMIN — KETOROLAC TROMETHAMINE 30 MG: 30 INJECTION, SOLUTION INTRAMUSCULAR at 07:58

## 2019-02-07 RX ADMIN — PROPOFOL 40 MG: 10 INJECTION, EMULSION INTRAVENOUS at 07:28

## 2019-02-07 RX ADMIN — KETAMINE HYDROCHLORIDE 20 MG: 10 INJECTION INTRAMUSCULAR; INTRAVENOUS at 07:26

## 2019-02-07 RX ADMIN — DEXAMETHASONE SODIUM PHOSPHATE 4 MG: 4 INJECTION, SOLUTION INTRA-ARTICULAR; INTRALESIONAL; INTRAMUSCULAR; INTRAVENOUS; SOFT TISSUE at 07:40

## 2019-02-07 RX ADMIN — CEFAZOLIN SODIUM 2 G: 2 INJECTION, SOLUTION INTRAVENOUS at 07:26

## 2019-02-07 RX ADMIN — SODIUM CHLORIDE, POTASSIUM CHLORIDE, SODIUM LACTATE AND CALCIUM CHLORIDE: 600; 310; 30; 20 INJECTION, SOLUTION INTRAVENOUS at 06:43

## 2019-02-07 RX ADMIN — PROPOFOL 125 MCG/KG/MIN: 10 INJECTION, EMULSION INTRAVENOUS at 07:40

## 2019-02-07 RX ADMIN — MIDAZOLAM 2 MG: 1 INJECTION INTRAMUSCULAR; INTRAVENOUS at 07:26

## 2019-02-07 RX ADMIN — LIDOCAINE HYDROCHLORIDE 1 ML: 10 INJECTION, SOLUTION EPIDURAL; INFILTRATION; INTRACAUDAL; PERINEURAL at 06:43

## 2019-02-07 RX ADMIN — ONDANSETRON 4 MG: 2 INJECTION INTRAMUSCULAR; INTRAVENOUS at 07:56

## 2019-02-07 RX ADMIN — PROPOFOL 150 MCG/KG/MIN: 10 INJECTION, EMULSION INTRAVENOUS at 07:30

## 2019-02-07 RX ADMIN — LIDOCAINE HYDROCHLORIDE 50 MG: 10 INJECTION, SOLUTION EPIDURAL; INFILTRATION; INTRACAUDAL; PERINEURAL at 07:28

## 2019-02-07 ASSESSMENT — MIFFLIN-ST. JEOR: SCORE: 1817.12

## 2019-02-07 NOTE — DISCHARGE INSTRUCTIONS
Same Day Surgery Discharge Instructions  Special Precautions After Surgery - Adult    1. It is not unusual to feel lightheaded or faint, up to 24 hours after surgery or while taking pain medication.  If you have these symptoms; sit for a few minutes before standing and have someone assist you when getting up.  2. You should rest and relax for the next 24 hours and must have someone stay with you for at least 24 hours after your discharge.  3. DO NOT DRIVE any vehicle or operate mechanical equipment for 24 hours following the end of your surgery.  DO NOT DRIVE while taking narcotic pain medications that have been prescribed by your physician.  If you had a limb operated on, you must be able to use it fully to drive.  4. DO NOT drink alcoholic beverages for 24 hours following surgery or while taking prescription pain medication.  5. Drink clear liquids (apple juice, ginger ale, broth, 7-Up, etc.).  Progress to your regular diet as you feel able.  6. Any questions call your physician and do not make important decisions for 24 hours.     INCISIONAL CARE  ? Be alert for signs of infection:  redness, swelling, heat, drainage of pus, and/or elevated temperature.  Contact your doctor if these occur.        Medications:  ? Hydrocodone (Norco, Vicodin):  Next dose: ***.  ? Follow the instructions on the bottle.     Additional discharge instructions: If you are unable to urinate within 8 hours of your surgery come in to be seen in ER for retention.   __________________________________________________________________________________________________________________________________  IMPORTANT NUMBERS:    [facility] Main Number:  [phone], [phone]  Pharmacy:  [phone]  Same Day Surgery:  [phone], Monday - Friday until 8:30 p.m.  Urgent Care:  [phone]  Emergency Room:  [phone]      [facility]:  [phone]                                                                              [facility]:  [phone]         HOME CARE FOLLOWING Umbilical HERNIA REPAIR      DIET:  No restrictions.  Increased fluid intake is recommended. While taking pain medications, increase dietary fiber or add a fiber supplementation like Metamucil or Citrucel to help prevent constipation - a possible side effect of pain medications.    NAUSEA:  If nauseated from the anesthetic/pain meds; rest in bed, get up cautiously with assistance, and drink clear liquids (juice, tea, broth).    ACTIVITY:  Light Activity -- you may immediately be up and about as tolerated.  Driving -- you may drive when comfortable and off narcotic pain medications.  Light Work -- resume when comfortable off pain medications.  (If you can drive, you probably can work.)  Strenuous Work/Activity -- limit lifting to 20 pounds for 3 weeks.  Active Sports (running, biking, etc.) -- cautiously resume after 4 weeks.    INCISIONAL CARE:    If you have a dressing in place, keep clean and dry for 48 hours; you may replace the gauze if it becomes soiled.    After 48 hours you may remove the dressing and shower.  Do not submerse incision in water for 1 week.    If you have a Dermabond dressing (a type of skin glue), you may shower immediately.    Sutures will absorb and need not be removed.    If present, leave the steri-strips (white paper tapes) in place for 14 days after surgery.    If present, leave Dermabond glue in place until it wears/flakes off.    Expect a variable amount of swelling/black and blue discoloration that may involve the penis/scrotum or labia.    Some numbness around the incision is common.    A lump/ridge under the incision is normal and will gradually resolve.    DISCOMFORT:  Local anesthetic placed at surgery should provide relief for 4-8 hours.  Begin taking pain pills before discomfort is severe.  Take the pain medication with some food, when possible, to minimize side effects.  Intermittent use of ice packs to the  hernia repair site may help during the first 1-3 weeks after surgery.  Expect gradual improvement.    Over-the-counter anti-inflammatory medications (i.e. Ibuprofen/Advil/Motrin or Naprosyn/Aleve) may be used per package instructions in addition to or while tapering off the narcotic pain medications to decrease swelling and sensitivity at the repair site.  DO NOT TAKE these Anti-inflammatory medications if your primary physician has advised against doing so, or if you have acid reflux, ulcer, or bleeding disorder, or take blood-thinner medications.  Call your primary physician or the surgery office if you have medication questions.    RETURN APPOINTMENT:  Schedule a follow-up visit 2-3 weeks post-op if one has not been scheduled for you already.  Office Phone:  [phone]     CONTACT US IF THE FOLLOWING DEVELOPS:   1. A fever that is above 101     2. If there is a large amount of drainage, bleeding, or swelling.   3. Severe pain that is not relieved by your prescription.   4. Drainage that is thick, cloudy, yellow, green or white.   5. Any other questions not answered by  Frequently Asked Questions  sheet.      FREQUENTLY ASKED QUESTIONS:    Q:  How should my incision look?    A:  Normally your incision will appear slightly swollen with light redness directly along the incision itself as it heals.  It may feel like a bump or ridge as the healing/scarring happens, and over time (3-4 months) this bump or ridge feeling should slowly go away.  In general, clear or pink watery drainage can be normal at first as your incision heals, but should decrease over time.    Q:  How do I know if my incision is infected?  A:  Look at your incision for signs of infection, like redness around the incision spreading to surrounding skin, or drainage of cloudy or foul-smelling drainage.  If you feel warm, check your temperature to see if you are running a fever.    **If any of these things occur, please notify the nurse at our office.   We may need you to come into the office for an incision check.      Q:  How do I take care of my incision?  A:  If you have a dressing in place - Starting the day after surgery, replace the dressing 1-2 times a day until there is no further drainage from the incision.  At that time, a dressing is no longer needed.  Try to minimize tape on the skin if irritation is occurring at the tape sites.  If you have significant irritation from tape on the skin, please call the office to discuss other method of dressing your incision.    Small pieces of tape called  steri-strips  may be present directly overlying your incision; these may be removed 10 days after surgery unless otherwise specified by your surgeon.  If these tapes start to loosen at the ends, you may trim them back until they fall off or are removed.    A:  If you had  Dermabond  tissue glue used as a dressing (this causes your incision to look shiny with a clear covering over it) - This type of dressing wears off with time and does not require more dressings over the top unless it is draining around the glue as it wears off.  Do not apply ointments or lotions over the incisions until the glue has completely worn off.    Q:  There is a piece of tape or a sticky  lead  still on my skin.  Can I remove this?  A:  Sometimes the sticky  leads  used for monitoring during surgery or for evaluation in the emergency department are not all removed while you are in the hospital.  These sometimes have a tab or metal dot on them.  You can easily remove these on your own, like taking off a band-aid.  If there is a gel substance under the  lead , simply wipe/clean it off with a washcloth or paper towel.      Q:  What can I do to minimize constipation (very hard stools, or lack of stools)?  A:  Stay well hydrated.  Increase your dietary fiber intake or take a fiber supplement -with plenty of water.  Walk around frequently.  You may consider an over-the-counter stool-softener.   Your Pharmacist can assist you with choosing one that is stocked at your pharmacy.  Constipation is also one of the most common side effects of pain medication.  If you are using pain medication, be pro-active and try to PREVENT problems with constipation by taking the steps above BEFORE constipation becomes a problem.    Q:  What do I do if I need more pain medications?  A:  Call the office to receive refills.  Be aware that certain pain meds cannot be called into a pharmacy and actually require a paper prescription.  A change may be made in your pain med as you progress thru your recovery period or if you have side effects to certain meds.    --Pain meds are NOT refilled after 5pm on weekdays, and NOT AT ALL on the weekends, so please look ahead to prevent problems.      Q:  Why am I having a hard time sleeping now that I am at home?  A:  Many medications you receive while you are in the hospital can impact your sleep for a number of days after your surgery/hospitalization.  Decreased level of activity and naps during the day may also make sleeping at night difficult.  Try to minimize day-time naps, and get up frequently during the day to walk around your home during your recovery time.  Sleep aides may be of some help, but are not recommended for long-term use.      Q:  I am having some back discomfort.  What should I do?  A:  This may be related to certain positioning that was required for your surgery, extended periods of time in bed, or other changes in your overall activity level.  You may try ice, heat, acetaminophen, or ibuprofen to treat this temporarily.  Note that many pain medications have acetaminophen in them and would state this on the prescription bottle.  Be sure not to exceed the maximum of 4000mg per day of acetaminophen.     **If the pain you are having does not resolve, is severe, or is a flare of back pain you have had on other occasions prior to surgery, please contact your primary physician  for further recommendations or for an appointment to be examined at their office.    Q:  Why am I having headaches?  A:  Headaches can be caused by many things:  caffeine withdrawal, use of pain meds, dehydration, high blood pressure, lack of sleep, over-activity/exhaustion, flare-up of usual migraine headaches.  If you feel this is related to muscle tension (a band-like feeling around the head, or a pressure at the low-back of the head) you may try ice or heat to this area.  You may need to drink more fluids (try electrolyte drink like Gatorade), rest, or take your usual migraine medications.   **If your headaches do not resolve, worsen, are accompanied by other symptoms, or if your blood pressure is high, please call your primary physician for recommendation and/or examination.    Q:  I am unable to urinate.  What do I do?  A:  A small percentage of people can have difficulty urinating initially after surgery.  This includes being able to urinate only a very small amount at a time and feeling discomfort or pressure in the very low abdomen.  This is called  urinary retention , and is actually an urgent situation.  Proceed to your nearest Emergency department for evaluation (not an Urgent Care Center).  Sometimes the bladder does not work correctly after certain medications you receive during surgery, or related to certain procedures.  You may need to have a catheter placed until your bladder recovers.  When planning to go to an Emergency department, it may help to call the ER to let them know you are coming in for this problem after a surgery.  This may help you get in quicker to be evaluated.  **If you have symptoms of a urinary tract infection, please contact your primary physician for the proper evaluation and treatment.        If you have other questions, please call the office Monday thru Friday between 8am and 5pm to discuss with the nurse.  #[phone]    There is a surgeon ON CALL on weekday evenings and  over the weekend in case of urgent need only, and may be contacted at the same number.    If you are having an emergency, call 911 or proceed to your nearest emergency department.

## 2019-02-07 NOTE — OP NOTE
Procedure Date: February 7, 2019    PREOPERATIVE DIAGNOSIS: Umbilical Hernia  POSTOPERATIVE DIAGNOSIS: Same    PROCEDURE:  1.  Umbilical hernia repair  2.  Locoregional Field block    ATTENDING SURGEON and Assistants:   Surgeon(s):  Tej Beaulieu DO Kohlhase, Mark E, PA-C (needed for retraction and suction)    ESTIMATED BLOOD LOSS: 3 mL    FINDINGS: The patient had a hernia mass protruding through the fascia near the umbilicus.  There was a fascia defect approximately 0.5 cm in diameter.     INDICATIONS: Emery Barkley is a 48 year old  male who presented to surgery clinic with an umbilical hernia.  To prevent incarceration and to minimize symptoms from this hernia, repair of the hernia was advised.  After discussion was had regarding alternatives, benefits, and risks (including, but not limited to, bleeding, infection, MI, death, recurrence, need for bowel resection, abscess, hematoma, or seroma), the patient consented to the aforementioned procedure.      PROCEDURE: The patient was taken back to the operating room suite in the supine position.  Sequential venodyne boots were placed, and all bony promineces were protected with ABD pads and sponge foam.  A time out was performed to verify the patient and procedure.  After general anesthesia was induced, the abdomen was prepped and draped in the usual sterile, surgical fashion.      The skin was incised at the  inferior edge of the umbilicus. The subcutaneous tissue was dissected away from the hernia sac and from the fascia. The sac was opened to examine its contents which appeared to be preperitoneal fat.   The fascial defect was dissected free from the preperitoneal fat and from peritoneum circumferentially. Hemostasis was achieved throughout the procedure with electrocoagulation of small bleeding points.      The fascia was then approximated with interrupted sutures of 2-0 Ethibond, suturing the inferior edge of the defect under the superior.   The wound was irrigated.  The base of the umbilicus was secured to the fascia.  The deep surface of umbilical skin was repositioned anatomically against the fascial closure with interrupted Vicryl. The subcutaneous tissue was then reapproximated with interrupted 3-0 Vicryl.  The skin was then reapproximated with interrupted 4-0 monocryl suture, and then the closure was secured with Dermabond.   Compression dressing applied The patient tolerated the procedure without difficulty and was transported back to the recovery room in stable condition.      All needle, towel, and sponge counts were correct.      Tej Beaulieu DO on 2/7/2019 at 8:10 AM

## 2019-02-07 NOTE — INTERVAL H&P NOTE
Patient has no changes other than resolution of his cold in office.  Patient is otherwise healthy, medically acceptable risk for moderate risk procedure.    Tej Beaulieu, DO on 2/7/2019 at 7:17 AM

## 2019-02-07 NOTE — ANESTHESIA PREPROCEDURE EVALUATION
Anesthesia Pre-Procedure Evaluation    Patient: Emery Barkley   MRN: [id number] : 1970          Preoperative Diagnosis: Incarcerated umbilical    Procedure(s):  HERNIORRHAPHY UMBILICAL    Past Medical History:   Diagnosis Date     NO ACTIVE PROBLEMS (aka NONE)      Past Surgical History:   Procedure Laterality Date     VASECTOMY         Anesthesia Evaluation     . Pt has had prior anesthetic.     No history of anesthetic complications          ROS/MED HX    ENT/Pulmonary:  - neg pulmonary ROS     Neurologic:  - neg neurologic ROS     Cardiovascular:     (+) Dyslipidemia, ----. : . . . :. . No previous cardiac testing       METS/Exercise Tolerance:  >4 METS   Hematologic:  - neg hematologic  ROS       Musculoskeletal:  - neg musculoskeletal ROS       GI/Hepatic:  - neg GI/hepatic ROS       Renal/Genitourinary:  - ROS Renal section negative       Endo:  - neg endo ROS       Psychiatric:  - neg psychiatric ROS       Infectious Disease:  - neg infectious disease ROS       Malignancy:      - no malignancy   Other:    (+) No chance of pregnancy C-spine cleared: N/A, no H/O Chronic Pain,no other significant disability                         Physical Exam  Normal systems: cardiovascular, pulmonary and dental    Airway   Mallampati: II  TM distance: >3 FB  Neck ROM: full    Dental     Cardiovascular   Rhythm and rate: regular and normal      Pulmonary    breath sounds clear to auscultation            Lab Results   Component Value Date     2009    POTASSIUM 4.1 2009    CHLORIDE 105 2009    CO2 29 2009    BUN 12 2009    CR 0.96 2009     (H) 2017    LON 9.3 2009    ALT 54 2014       Preop Vitals  BP Readings from Last 3 Encounters:   01/15/19 (!) 136/96   19 126/84   18 117/73    Pulse Readings from Last 3 Encounters:   01/15/19 96   19 82   18 90      Resp Readings from Last 3 Encounters:   18 16   18 16   18 16  "   SpO2 Readings from Last 3 Encounters:   01/09/19 95%   11/14/18 97%   09/26/18 94%      Temp Readings from Last 1 Encounters:   01/15/19 36.7  C (98.1  F) (Oral)    Ht Readings from Last 1 Encounters:   02/07/19 1.765 m (5' 9.49\")      Wt Readings from Last 1 Encounters:   02/07/19 94.4 kg (208 lb 1.8 oz)    Estimated body mass index is 30.3 kg/m  as calculated from the following:    Height as of this encounter: 1.765 m (5' 9.49\").    Weight as of this encounter: 94.4 kg (208 lb 1.8 oz).       Anesthesia Plan      History & Physical Review  History and physical reviewed and following examination; no interval change.    ASA Status:  2 .    NPO Status:  > 8 hours    Plan for MAC Reason for MAC:  Deep or markedly invasive procedure (G8)  PONV prophylaxis:  Ondansetron (or other 5HT-3) and Dexamethasone or Solumedrol       Postoperative Care  Postoperative pain management:  IV analgesics and Oral pain medications.      Consents  Anesthetic plan, risks, benefits and alternatives discussed with:  Patient..                 Jairon Novak  "

## 2019-02-07 NOTE — ANESTHESIA CARE TRANSFER NOTE
Patient: Emery Barkley    Procedure(s):  HERNIORRHAPHY UMBILICAL    Diagnosis: Incarcerated umbilical  Diagnosis Additional Information: No value filed.    Anesthesia Type:   MAC     Note:  Airway :Face Mask  Patient transferred to:Phase II  Handoff Report: Identifed the Patient, Identified the Reponsible Provider, Reviewed the pertinent medical history, Discussed the surgical course, Reviewed Intra-OP anesthesia mangement and issues during anesthesia, Set expectations for post-procedure period and Allowed opportunity for questions and acknowledgement of understanding      Vitals: (Last set prior to Anesthesia Care Transfer)    CRNA VITALS  2/7/2019 0740 - 2/7/2019 0819      2/7/2019             Pulse:  74    SpO2:  99 %                Electronically Signed By: Jairon Novak  February 7, 2019  8:19 AM

## 2019-02-07 NOTE — ANESTHESIA POSTPROCEDURE EVALUATION
Patient: Emery Barkley    Procedure(s):  HERNIORRHAPHY UMBILICAL    Diagnosis:Incarcerated umbilical  Diagnosis Additional Information: No value filed.    Anesthesia Type:  MAC    Note:  Anesthesia Post Evaluation    Patient location during evaluation: Bedside  Patient participation: Able to fully participate in evaluation  Level of consciousness: awake and alert  Pain management: adequate  Airway patency: patent  Cardiovascular status: acceptable  Respiratory status: acceptable  Hydration status: acceptable  PONV: none     Anesthetic complications: None          Last vitals:  Vitals:    02/07/19 0810 02/07/19 0840 02/07/19 0908   BP: 103/66 116/76 124/77   Resp: 18 16 16   Temp: 36.8  C (98.3  F)     SpO2: 96% 96% 96%         Electronically Signed By: MICHAEL James CRNA  February 7, 2019  9:19 AM

## 2019-02-07 NOTE — ANESTHESIA POSTPROCEDURE EVALUATION
Patient: Emery Barkley    Procedure(s):  HERNIORRHAPHY UMBILICAL    Diagnosis:Incarcerated umbilical  Diagnosis Additional Information: No value filed.    Anesthesia Type:  MAC    Note:  Anesthesia Post Evaluation    Patient location during evaluation: Phase 2  Patient participation: Able to fully participate in evaluation  Level of consciousness: awake and alert  Pain management: adequate  Airway patency: patent  Cardiovascular status: acceptable  Respiratory status: acceptable  Hydration status: acceptable  PONV: none     Anesthetic complications: None          Last vitals:  Vitals:    02/07/19 0630   BP: 129/89   Resp: 18   Temp: 36.7  C (98  F)   SpO2: 97%         Electronically Signed By: Jairon Novak  February 7, 2019  8:19 AM

## 2019-02-21 ENCOUNTER — OFFICE VISIT (OUTPATIENT)
Dept: SURGERY | Facility: CLINIC | Age: 49
End: 2019-02-21
Payer: COMMERCIAL

## 2019-02-21 VITALS
SYSTOLIC BLOOD PRESSURE: 133 MMHG | BODY MASS INDEX: 29.78 KG/M2 | HEIGHT: 70 IN | RESPIRATION RATE: 14 BRPM | DIASTOLIC BLOOD PRESSURE: 88 MMHG | OXYGEN SATURATION: 97 % | HEART RATE: 91 BPM | WEIGHT: 208 LBS

## 2019-02-21 DIAGNOSIS — Z87.19 HISTORY OF UMBILICAL HERNIA REPAIR: Primary | ICD-10-CM

## 2019-02-21 DIAGNOSIS — Z98.890 HISTORY OF UMBILICAL HERNIA REPAIR: Primary | ICD-10-CM

## 2019-02-21 PROCEDURE — 99024 POSTOP FOLLOW-UP VISIT: CPT | Performed by: SURGERY

## 2019-02-21 ASSESSMENT — MIFFLIN-ST. JEOR: SCORE: 1816.79

## 2019-02-21 NOTE — NURSING NOTE
"Chief Complaint   Patient presents with     Surgical Followup     Umbilical hernia DOS 2/7/19       Initial /88 (BP Location: Right arm, Patient Position: Chair, Cuff Size: Adult Regular)   Pulse 91   Resp 14   Ht 1.765 m (5' 9.5\")   Wt 94.3 kg (208 lb)   SpO2 97%   BMI 30.28 kg/m   Estimated body mass index is 30.28 kg/m  as calculated from the following:    Height as of this encounter: 1.765 m (5' 9.5\").    Weight as of this encounter: 94.3 kg (208 lb).  BP completed using cuff size: regular  Medications and allergies reviewed.      Elsa CEDILLO MA    "

## 2019-02-21 NOTE — LETTER
"    2/21/2019         RE: Emery Barkley  [address] [facility] [address]        Dear Colleague,    Thank you for referring your patient, Emery Barkley, to the [facility]. Please see a copy of my visit note below.    General Surgery Post Op    Pt returns for follow up visit s/p umbilical hernia repair on 2/7/2019.    Patient has been doing well, tolerating diet. Bowels moving well. Pain controlled. No issues with wound healing/redness/drainage. No fevers.  He has not taken any pain medications.      Physical exam: Vitals: /88 (BP Location: Right arm, Patient Position: Chair, Cuff Size: Adult Regular)   Pulse 91   Resp 14   Ht 1.765 m (5' 9.5\")   Wt 94.3 kg (208 lb)   SpO2 97%   BMI 30.28 kg/m     BMI= Body mass index is 30.28 kg/m .    Exam:  Constitutional: healthy, alert and no distress  Gastrointestinal: Abdomen soft, non-tender. BS normal. No masses, organomegaly.  Incision C/D/I.  Wound well healing without erythema.  No evidence of hernia recurrence.      Assessment:     ICD-10-CM    1. History of umbilical hernia repair Z98.890     Z87.19      Plan: Mr. Barkley presents for follow-up of umbilical hernia.  He is recovering well.  He has no signs of recurrence or wound issues.  I discussed that he can gradually start increasing his activity and to use common sense when it comes to lifting.  He can now tub bath or use hot tubs.  I remain available should any questions or concerns arise.    Tej Beaulieu, DO on 2/21/2019 at 11:54 AM        Again, thank you for allowing me to participate in the care of your patient.        Sincerely,        Tej Beaulieu, DO    "

## 2019-02-21 NOTE — PROGRESS NOTES
"General Surgery Post Op    Pt returns for follow up visit s/p umbilical hernia repair on 2/7/2019.    Patient has been doing well, tolerating diet. Bowels moving well. Pain controlled. No issues with wound healing/redness/drainage. No fevers.  He has not taken any pain medications.      Physical exam: Vitals: /88 (BP Location: Right arm, Patient Position: Chair, Cuff Size: Adult Regular)   Pulse 91   Resp 14   Ht 1.765 m (5' 9.5\")   Wt 94.3 kg (208 lb)   SpO2 97%   BMI 30.28 kg/m    BMI= Body mass index is 30.28 kg/m .    Exam:  Constitutional: healthy, alert and no distress  Gastrointestinal: Abdomen soft, non-tender. BS normal. No masses, organomegaly.  Incision C/D/I.  Wound well healing without erythema.  No evidence of hernia recurrence.      Assessment:     ICD-10-CM    1. History of umbilical hernia repair Z98.890     Z87.19      Plan: Mr. Barkley presents for follow-up of umbilical hernia.  He is recovering well.  He has no signs of recurrence or wound issues.  I discussed that he can gradually start increasing his activity and to use common sense when it comes to lifting.  He can now tub bath or use hot tubs.  I remain available should any questions or concerns arise.    Tej Beaulieu, DO on 2/21/2019 at 11:54 AM      "

## 2019-03-26 ENCOUNTER — OFFICE VISIT (OUTPATIENT)
Dept: FAMILY MEDICINE | Facility: CLINIC | Age: 49
End: 2019-03-26
Payer: COMMERCIAL

## 2019-03-26 VITALS
DIASTOLIC BLOOD PRESSURE: 80 MMHG | TEMPERATURE: 97.3 F | WEIGHT: 208 LBS | SYSTOLIC BLOOD PRESSURE: 118 MMHG | HEIGHT: 70 IN | HEART RATE: 75 BPM | BODY MASS INDEX: 29.78 KG/M2 | OXYGEN SATURATION: 95 %

## 2019-03-26 DIAGNOSIS — B07.8 COMMON WART: Primary | ICD-10-CM

## 2019-03-26 PROCEDURE — 17110 DESTRUCTION B9 LES UP TO 14: CPT | Performed by: FAMILY MEDICINE

## 2019-03-26 ASSESSMENT — MIFFLIN-ST. JEOR: SCORE: 1816.79

## 2019-03-26 NOTE — PATIENT INSTRUCTIONS
I retreated five warts.    Call if they return and we get you in.    Give it 3 weeks.      I also will send an order in for wart peel.          Thank you for choosing [facility].  You may be receiving an email and/or telephone survey request from [facility] Health Customer Experience regarding your visit today.  Please take a few minutes to respond to the survey to let us know how we are doing.      If you have questions or concerns, please contact us via [email or web address] or you can contact your care team at [phone].    Our Clinic hours are:  Monday 6:40 am  to 7:00 pm  Tuesday -Friday 6:40 am to 5:00 pm    The [place] outpatient lab hours are:  Monday - Friday 6:10 am to 4:45 pm  Saturdays 7:00 am to 11:00 am  Appointments are required, call [phone]    If you have clinical questions after hours or would like to schedule an appointment,  call the clinic at [phone].

## 2019-03-26 NOTE — PROGRESS NOTES
"  SUBJECTIVE:   Emery Barkley is a 48 year old male who presents to clinic today for the following health issues:  Chief Complaint   Patient presents with     Wart     retreat warts on neck. less warts now.      He has noted 5 warts on his neck.  They were gone in the past for some time.  He would like treatment today and then order out the wart peel medication.    Instructions were given to the patient and how to use. Instructed how the compounding pharmacy will contact him.        Problem list and histories reviewed & adjusted, as indicated.  Additional history: as documented        Reviewed and updated as needed this visit by clinical staff  Tobacco  Allergies  Meds  Med Hx  Surg Hx  Fam Hx  Soc Hx      Reviewed and updated as needed this visit by Provider         ROS:  CONSTITUTIONAL:NEGATIVE for fever, chills, change in weight  INTEGUMENTARY/SKIN: as above    OBJECTIVE:                                                    /80 (Cuff Size: Adult Regular)   Pulse 75   Temp 97.3  F (36.3  C) (Tympanic)   Ht 1.765 m (5' 9.5\")   Wt 94.3 kg (208 lb)   SpO2 95%   BMI 30.28 kg/m    Body mass index is 30.28 kg/m .  GENERAL APPEARANCE: healthy, alert and no distress  SKIN: noted on neck just left of midline cluster of 3 small warts, on solitary one below and one to his right.  3 freeze thaw cycles done without complications.           ASSESSMENT/PLAN:                                                    (B07.8) Common wart  (primary encounter diagnosis)  Comment: treated today, also given prescription for WartPeel which the [facility] is using in their dermatology department.  Instructed on use.  Plan: DESTRUCT BENIGN LESION, UP TO 14                See Patient Instructions    Devyn Cox MD  [facility]    "

## 2019-09-16 ENCOUNTER — OFFICE VISIT (OUTPATIENT)
Dept: LAB | Facility: SCHOOL | Age: 49
End: 2019-09-16
Payer: COMMERCIAL

## 2019-09-16 VITALS
DIASTOLIC BLOOD PRESSURE: 86 MMHG | HEART RATE: 96 BPM | HEIGHT: 70 IN | BODY MASS INDEX: 29.35 KG/M2 | OXYGEN SATURATION: 95 % | TEMPERATURE: 98.4 F | WEIGHT: 205 LBS | SYSTOLIC BLOOD PRESSURE: 124 MMHG | RESPIRATION RATE: 15 BRPM

## 2019-09-16 DIAGNOSIS — Z00.00 WELLNESS EXAMINATION: Primary | ICD-10-CM

## 2019-09-16 DIAGNOSIS — Z23 NEED FOR PROPHYLACTIC VACCINATION AND INOCULATION AGAINST INFLUENZA: ICD-10-CM

## 2019-09-16 PROCEDURE — 90686 IIV4 VACC NO PRSV 0.5 ML IM: CPT

## 2019-09-16 PROCEDURE — 99213 OFFICE O/P EST LOW 20 MIN: CPT | Mod: 25

## 2019-09-16 PROCEDURE — 90471 IMMUNIZATION ADMIN: CPT

## 2019-09-16 ASSESSMENT — MIFFLIN-ST. JEOR: SCORE: 1815.09

## 2019-09-16 NOTE — PROGRESS NOTES
"SUBJECTIVE:  CC: Emery Barkley is an 48 year old woman who presents for Wellness Check at [facility] [id number].     Review of Healthy Lifestyle:    Do you get at least three servings of calcium containing foods daily (dairy, green leafy vegetables, etc.)? yes     Do you have a high-fiber diet? yes     Amount of exercise or daily activities, outside of work: 1 hour(s) per day    Do you wear sunscreen on a regular basis? Yes      Are you taking your medications regularly Yes     Have you had an eye exam in the past two years? yes    Do you see a dentist twice per year? yes    Do you have sleep apnea, excessive snoring or excessive daytime drowsiness? no    Do you use tobacco in any form? no       OBJECTIVE:    Vitals: /86   Pulse 96   Temp 98.4  F (36.9  C) (Tympanic)   Resp 15   Ht 1.784 m (5' 10.25\")   Wt 93 kg (205 lb)   SpO2 95%   BMI 29.21 kg/m    BMI= Body mass index is 29.21 kg/m .    HEARING: Right Ear:        500Hz:  RESPONSE- on Level:   20 db    1000 Hz: RESPONSE- on Level:   20 db    2000 Hz: RESPONSE- on Level:   20 db    4000 Hz: RESPONSE- on Level:   20 db     Left Ear:       500Hz:  RESPONSE- on Level:   20 db    1000 Hz: RESPONSE- on Level: 25 db   2000 Hz: RESPONSE- on Level:   20 db    4000 Hz: RESPONSE- on Level: 25 db    VISION:    Corrective lenses?  No, patient has had eye exam within the past year.  Wears readers!     Medication Reconciliation: complete    ASSESSMENT/PLAN:  Patient is here today for wellness examination.  Patient was given information on recommendations for health, preventative care, diet and lifestyle changes for her health.  No referrals today.  Flublok vaccination administered today by MA.    COUNSELING:      reports that he has never smoked. He has never used smokeless tobacco.    Estimated body mass index is 29.21 kg/m  as calculated from the following:    Height as of this encounter: 1.784 m (5' 10.25\").    Weight as of this encounter: " 93 kg (205 lb).   Weight management plan: Discussed healthy diet and exercise guidelines    Fe Stevens NP on 9/16/2019 at 8:56 AM  [facility]

## 2019-09-16 NOTE — PATIENT INSTRUCTIONS
"                Emery Barkley has completed a Wellness Check at [facility] on 9/16/2019.      ____________________________________________  [place] [employer] PROVIDER                                                                               Wellness Visit Recommendations:      See your regular primary care health provider every year in order to help stay healthy.    Review health changes.     Review your medicines if your doctor has prescribed any.    Talk to your provider about how often to have your cholesterol checked.    If you are at risk for diabetes, you should have a diabetes test (fasting glucose).    Shots: Get a flu shot each year. Get a tetanus shot every 10 years.     Review with your primary care provider other immunizations that you may need based on your age and/or medical/surgical history    Nutrition:     Eat at least 5 servings of fruits and vegetables each day.    Eat whole-grain bread, whole-wheat pasta and brown rice instead of white grains and rice.    Preventive Care to be reviewed by your primary care provider:    Females:        Cervical Cancer Screening                          Breast Cancer Screening                          Colon Cancer Screening  Males:             Prostrate Cancer Screening                          Colon Cancer Screening      Lifestyle:    Exercise at least 150 minutes a week (30 minutes a day, 5 days of the week). This will help you control your weight and help prevent disease or manage disease.    Limit alcohol to one drink per day or less depending on your past medical history.    No smoking.     Wear sunscreen to prevent skin cancer.    See your dentist every six months for an exam and cleaning.    Today's Vital Signs:  /86   Pulse 96   Temp 98.4  F (36.9  C) (Tympanic)   Resp 15   Ht 1.784 m (5' 10.25\")   Wt 93 kg (205 lb)   SpO2 95%   BMI 29.21 kg/m    "

## 2019-09-29 ENCOUNTER — MYC REFILL (OUTPATIENT)
Dept: FAMILY MEDICINE | Facility: CLINIC | Age: 49
End: 2019-09-29

## 2019-09-29 DIAGNOSIS — Z82.49 FAMILY HISTORY OF CORONARY ARTERY DISEASE: ICD-10-CM

## 2019-09-29 DIAGNOSIS — E78.5 HYPERLIPIDEMIA LDL GOAL <100: ICD-10-CM

## 2019-10-01 RX ORDER — ATORVASTATIN CALCIUM 20 MG/1
20 TABLET, FILM COATED ORAL DAILY
Qty: 30 TABLET | Refills: 1 | Status: SHIPPED | OUTPATIENT
Start: 2019-10-01 | End: 2019-10-31

## 2019-10-14 ENCOUNTER — MYC MEDICAL ADVICE (OUTPATIENT)
Dept: FAMILY MEDICINE | Facility: CLINIC | Age: 49
End: 2019-10-14

## 2019-10-14 DIAGNOSIS — E78.5 HYPERLIPIDEMIA LDL GOAL <100: Primary | ICD-10-CM

## 2019-10-17 DIAGNOSIS — E78.5 HYPERLIPIDEMIA LDL GOAL <100: ICD-10-CM

## 2019-10-17 LAB
CHOLEST SERPL-MCNC: 149 MG/DL
HDLC SERPL-MCNC: 43 MG/DL
LDLC SERPL CALC-MCNC: 67 MG/DL
NONHDLC SERPL-MCNC: 106 MG/DL
TRIGL SERPL-MCNC: 196 MG/DL

## 2019-10-17 PROCEDURE — 80061 LIPID PANEL: CPT | Performed by: FAMILY MEDICINE

## 2019-10-17 PROCEDURE — 36415 COLL VENOUS BLD VENIPUNCTURE: CPT | Performed by: FAMILY MEDICINE

## 2019-10-31 ENCOUNTER — MYC REFILL (OUTPATIENT)
Dept: FAMILY MEDICINE | Facility: CLINIC | Age: 49
End: 2019-10-31

## 2019-10-31 DIAGNOSIS — Z82.49 FAMILY HISTORY OF CORONARY ARTERY DISEASE: ICD-10-CM

## 2019-10-31 DIAGNOSIS — E78.5 HYPERLIPIDEMIA LDL GOAL <100: ICD-10-CM

## 2019-11-01 RX ORDER — ATORVASTATIN CALCIUM 20 MG/1
20 TABLET, FILM COATED ORAL DAILY
Qty: 90 TABLET | Refills: 2 | Status: SHIPPED | OUTPATIENT
Start: 2019-11-01 | End: 2019-12-09

## 2019-11-01 NOTE — TELEPHONE ENCOUNTER
"Prescription approved per [facility] Refill Protocol.    Requested Prescriptions   Pending Prescriptions Disp Refills     atorvastatin (LIPITOR) 20 MG tablet 30 tablet 1     Sig: Take 1 tablet (20 mg) by mouth daily       Statins Protocol Passed - 10/31/2019  4:44 PM        Passed - LDL on file in past 12 months     Recent Labs   Lab Test 10/17/19  0706   LDL 67             Passed - No abnormal creatine kinase in past 12 months     No lab results found.             Passed - Recent (12 mo) or future (30 days) visit within the authorizing provider's specialty     Patient has had an office visit with the authorizing provider or a provider within the authorizing providers department within the previous 12 mos or has a future within next 30 days. See \"Patient Info\" tab in inbasket, or \"Choose Columns\" in Meds & Orders section of the refill encounter.              Passed - Medication is active on med list        Passed - Patient is age 18 or older        Last Written Prescription Date:  10/1/19  Last Fill Quantity: 30,  # refills: 1   Last office visit: 9/16/2019 with FIORDALIZA Stevens  Future Office Visit:      Megan HICKEY RN      "

## 2019-11-07 ENCOUNTER — HEALTH MAINTENANCE LETTER (OUTPATIENT)
Age: 49
End: 2019-11-07

## 2019-12-09 ENCOUNTER — OFFICE VISIT (OUTPATIENT)
Dept: FAMILY MEDICINE | Facility: CLINIC | Age: 49
End: 2019-12-09
Payer: COMMERCIAL

## 2019-12-09 VITALS
TEMPERATURE: 97.8 F | HEART RATE: 89 BPM | DIASTOLIC BLOOD PRESSURE: 84 MMHG | OXYGEN SATURATION: 95 % | WEIGHT: 203 LBS | BODY MASS INDEX: 29.06 KG/M2 | SYSTOLIC BLOOD PRESSURE: 126 MMHG | HEIGHT: 70 IN

## 2019-12-09 DIAGNOSIS — Z82.49 FAMILY HISTORY OF CORONARY ARTERY DISEASE: ICD-10-CM

## 2019-12-09 DIAGNOSIS — B07.8 COMMON WART: ICD-10-CM

## 2019-12-09 DIAGNOSIS — E78.5 HYPERLIPIDEMIA LDL GOAL <100: ICD-10-CM

## 2019-12-09 DIAGNOSIS — Z00.00 ROUTINE GENERAL MEDICAL EXAMINATION AT A HEALTH CARE FACILITY: Primary | ICD-10-CM

## 2019-12-09 PROCEDURE — 17110 DESTRUCTION B9 LES UP TO 14: CPT | Performed by: FAMILY MEDICINE

## 2019-12-09 PROCEDURE — 99396 PREV VISIT EST AGE 40-64: CPT | Mod: 25 | Performed by: FAMILY MEDICINE

## 2019-12-09 RX ORDER — ATORVASTATIN CALCIUM 20 MG/1
20 TABLET, FILM COATED ORAL DAILY
Qty: 90 TABLET | Refills: 3 | Status: SHIPPED | OUTPATIENT
Start: 2019-12-09 | End: 2020-03-20

## 2019-12-09 ASSESSMENT — MIFFLIN-ST. JEOR: SCORE: 1793.08

## 2019-12-09 NOTE — PROGRESS NOTES
3  SUBJECTIVE:   CC: Emery Barkley is an 49 year old male who presents for preventive health visit.     Healthy Habits:    Do you get at least three servings of calcium containing foods daily (dairy, green leafy vegetables, etc.)? yes    Amount of exercise or daily activities, outside of work: 2-3 day(s) per week    Problems taking medications regularly No    Medication side effects: No    Have you had an eye exam in the past two years? yes    Do you see a dentist twice per year? yes    Do you have sleep apnea, excessive snoring or daytime drowsiness?no      He wants the warts to be treated on his chin again.  He has had then in the past and responded to liquid nitrogen.    Today's PHQ-2 Score:   PHQ-2 ( 1999 [employer]) 12/9/2019 8/22/2018   Q1: Little interest or pleasure in doing things 0 0   Q2: Feeling down, depressed or hopeless 0 0   PHQ-2 Score 0 0   Q1: Little interest or pleasure in doing things - -   Q2: Feeling down, depressed or hopeless - -   PHQ-2 Score - -       Abuse: Current or Past(Physical, Sexual or Emotional)- No  Do you feel safe in your environment? Yes        Social History     Tobacco Use     Smoking status: Never Smoker     Smokeless tobacco: Never Used   Substance Use Topics     Alcohol use: Yes     Comment: 2 drinks per week     If you drink alcohol do you typically have >3 drinks per day or >7 drinks per week? No                      Last PSA: No results found for: PSA    Reviewed orders with patient. Reviewed health maintenance and updated orders accordingly - Yes      Reviewed and updated as needed this visit by clinical staff  Tobacco  Allergies  Med Hx  Surg Hx  Fam Hx  Soc Hx        Reviewed and updated as needed this visit by Provider            ROS:  Review Of Systems  Skin: has warts on his chin than need to be treated again.   Eyes: negative  Ears/Nose/Throat: negative  Respiratory: No shortness of breath, dyspnea on exertion, cough, or hemoptysis  Cardiovascular:  "negative  Gastrointestinal: negative  Genitourinary: negative  Musculoskeletal: negative  Neurologic: negative  Psychiatric: negative  Hematologic/Lymphatic/Immunologic: negative  Endocrine: negative      OBJECTIVE:   /84 (Cuff Size: Adult Large)   Pulse 89   Temp 97.8  F (36.6  C) (Tympanic)   Ht 1.772 m (5' 9.75\")   Wt 92.1 kg (203 lb)   SpO2 95%   BMI 29.34 kg/m    EXAM:  GENERAL: healthy, alert and no distress  EYES: Eyes grossly normal to inspection, PERRL and conjunctivae and sclerae normal  HENT: ear canals and TM's normal, nose and mouth without ulcers or lesions  NECK: no adenopathy, no asymmetry, masses, or scars and thyroid normal to palpation  RESP: lungs clear to auscultation - no rales, rhonchi or wheezes  CV: regular rate and rhythm, normal S1 S2, no S3 or S4, no murmur, click or rub, no peripheral edema and peripheral pulses strong  ABDOMEN: soft, nontender, no hepatosplenomegaly, no masses and bowel sounds normal   (male): normal male genitalia without lesions or urethral discharge, no hernia  MS: no gross musculoskeletal defects noted, no edema  SKIN: no suspicious lesions or rashes  SKIN: noted a cluster of warts about 10 about 2 cm by 0.5cm, 3 freeze thaw cycles done without complications.    NEURO: Normal strength and tone, mentation intact and speech normal  PSYCH: mentation appears normal, affect normal/bright  LYMPH: anterior cervical: no adenopathy  posterior cervical: no adenopathy        ASSESSMENT/PLAN:   (Z00.00) Routine general medical examination at a health care facility  (primary encounter diagnosis)  Comment: Discussed healthy lifestyle and preventative cares.    Plan:     (B07.8) Common wart  Comment: treated and monitor, if still present in 3-4 weeks retreat  Plan: DESTRUCT BENIGN LESION, UP TO 14            (E78.5) Hyperlipidemia LDL goal <100  Comment: refilled med  Plan: atorvastatin (LIPITOR) 20 MG tablet            (Z82.49) Family history of coronary artery " "disease  Comment:   Plan: atorvastatin (LIPITOR) 20 MG tablet              COUNSELING:  Reviewed preventive health counseling, as reflected in patient instructions       Regular exercise       Healthy diet/nutrition       Vision screening       Hearing screening    Estimated body mass index is 29.34 kg/m  as calculated from the following:    Height as of this encounter: 1.772 m (5' 9.75\").    Weight as of this encounter: 92.1 kg (203 lb).    Weight management plan: diet and activity     reports that he has never smoked. He has never used smokeless tobacco.      Counseling Resources:  ATP IV Guidelines  Pooled Cohorts Equation Calculator  FRAX Risk Assessment  ICSI Preventive Guidelines  Dietary Guidelines for Americans, 2010  USDA's MyPlate  ASA Prophylaxis  Lung CA Screening    Devyn Cox MD  [facility]  "

## 2019-12-09 NOTE — PATIENT INSTRUCTIONS
I refilled your medications for the year.    Regarding the warts I treated them again.  If they are still present in 3-4 weeks please return to have them treated again.  There was a cluster of about 10 warts.          Thank you for choosing [facility].  You may be receiving an email and/or telephone survey request from [facility] Customer Experience regarding your visit today.  Please take a few minutes to respond to the survey to let us know how we are doing.      If you have questions or concerns, please contact us via [employer] or you can contact your care team at [phone].    Our Clinic hours are:  Monday 6:40 am  to 7:00 pm  Tuesday -Friday 6:40 am to 5:00 pm    The [place] outpatient lab hours are:  Monday - Friday 6:10 am to 4:45 pm  Saturdays 7:00 am to 11:00 am  Appointments are required, call [phone]    If you have clinical questions after hours or would like to schedule an appointment,  call the clinic at [phone].    Preventive Health Recommendations  Male Ages 40 to 49    Yearly exam:             See your health care provider every year in order to  o   Review health changes.   o   Discuss preventive care.    o   Review your medicines if your doctor has prescribed any.    You should be tested each year for STDs (sexually transmitted diseases) if you re at risk.     Have a cholesterol test every 5 years.     Have a colonoscopy (test for colon cancer) if someone in your family has had colon cancer or polyps before age 50.     After age 45, have a diabetes test (fasting glucose). If you are at risk for diabetes, you should have this test every 3 years.      Talk with your health care provider about whether or not a prostate cancer screening test (PSA) is right for you.    Shots: Get a flu shot each year. Get a tetanus shot every 10 years.     Nutrition:    Eat at least 5 servings of fruits and vegetables daily.     Eat whole-grain bread, whole-wheat pasta and brown rice instead of white  grains and rice.     Get adequate Calcium and Vitamin D.     Lifestyle    Exercise for at least 150 minutes a week (30 minutes a day, 5 days a week). This will help you control your weight and prevent disease.     Limit alcohol to one drink per day.     No smoking.     Wear sunscreen to prevent skin cancer.     See your dentist every six months for an exam and cleaning.

## 2020-01-08 ENCOUNTER — OFFICE VISIT (OUTPATIENT)
Dept: FAMILY MEDICINE | Facility: CLINIC | Age: 50
End: 2020-01-08
Payer: COMMERCIAL

## 2020-01-08 VITALS
BODY MASS INDEX: 29.92 KG/M2 | HEIGHT: 70 IN | SYSTOLIC BLOOD PRESSURE: 120 MMHG | WEIGHT: 209 LBS | DIASTOLIC BLOOD PRESSURE: 86 MMHG | OXYGEN SATURATION: 95 % | TEMPERATURE: 97.3 F | HEART RATE: 89 BPM

## 2020-01-08 DIAGNOSIS — B07.8 COMMON WART: Primary | ICD-10-CM

## 2020-01-08 PROCEDURE — 99207 ZZC DROP WITH A PROCEDURE: CPT | Performed by: FAMILY MEDICINE

## 2020-01-08 PROCEDURE — 17110 DESTRUCTION B9 LES UP TO 14: CPT | Performed by: FAMILY MEDICINE

## 2020-01-08 ASSESSMENT — MIFFLIN-ST. JEOR: SCORE: 1820.3

## 2020-01-08 NOTE — PROGRESS NOTES
"Subjective     Emery Barkley is a 49 year old male who presents to clinic today for the following health issues:  Chief Complaint   Patient presents with     Wart     retreatment of warts on his neck       HPI     He has warts on his neck.  They have been treated with liquid ntg in the past and they go away but continue to return.  He has noted the cluster got smaller then had a few show up near by. He is here for treatment again.          Reviewed and updated as needed this visit by Provider         Review of Systems         Objective    /86 (Cuff Size: Adult Large)   Pulse 89   Temp 97.3  F (36.3  C) (Tympanic)   Ht 1.772 m (5' 9.75\")   Wt 94.8 kg (209 lb)   SpO2 95%   BMI 30.20 kg/m    Body mass index is 30.2 kg/m .  Physical Exam   Skin; noted on his neck, a smaller cluster of warts about 3-4 mm diameter and verrucous in quality and 3 smaller ones near by each 1-2 mm diameter.    3 freeze thaw cycles done without complications.              Assessment & Plan     (B07.8) Common wart  (primary encounter diagnosis)  Comment:   Patient Instructions   I treated 4 warts.    See how they look in 4 weeks.    If present please call and come in for a treatment.          Thank you for choosing [facility].  You may be receiving an email and/or telephone survey request from [facility] Health Customer Experience regarding your visit today.  Please take a few minutes to respond to the survey to let us know how we are doing.      If you have questions or concerns, please contact us via [employer] or you can contact your care team at [phone].    Our Clinic hours are:  Monday 6:40 am  to 7:00 pm  Tuesday -Friday 6:40 am to 5:00 pm    The [place] outpatient lab hours are:  Monday - Friday 6:10 am to 4:45 pm  Saturdays 7:00 am to 11:00 am  Appointments are required, call [phone]    If you have clinical questions after hours or would like to schedule an appointment,  call the clinic at [phone].      Plan: " DESTRUCT BENIGN LESION, UP TO 14                 See Patient Instructions    Return in about 4 weeks (around 2/5/2020) for If not better.    Devyn Cox MD  [facility]

## 2020-01-08 NOTE — PATIENT INSTRUCTIONS
I treated 4 warts.    See how they look in 4 weeks.    If present please call and come in for a treatment.          Thank you for choosing [facility].  You may be receiving an email and/or telephone survey request from [facility] Customer Experience regarding your visit today.  Please take a few minutes to respond to the survey to let us know how we are doing.      If you have questions or concerns, please contact us via [email or web address] or you can contact your care team at [phone].    Our Clinic hours are:  Monday 6:40 am  to 7:00 pm  Tuesday -Friday 6:40 am to 5:00 pm    The [place] outpatient lab hours are:  Monday - Friday 6:10 am to 4:45 pm  Saturdays 7:00 am to 11:00 am  Appointments are required, call [phone]    If you have clinical questions after hours or would like to schedule an appointment,  call the clinic at [phone].

## 2020-03-19 DIAGNOSIS — Z82.49 FAMILY HISTORY OF CORONARY ARTERY DISEASE: ICD-10-CM

## 2020-03-19 DIAGNOSIS — E78.5 HYPERLIPIDEMIA LDL GOAL <100: ICD-10-CM

## 2020-03-20 RX ORDER — ATORVASTATIN CALCIUM 20 MG/1
20 TABLET, FILM COATED ORAL DAILY
Qty: 90 TABLET | Refills: 0 | Status: SHIPPED | OUTPATIENT
Start: 2020-03-20 | End: 2020-08-24

## 2020-03-20 NOTE — TELEPHONE ENCOUNTER
"Requested Prescriptions   Pending Prescriptions Disp Refills     atorvastatin (LIPITOR) 20 MG tablet 90 tablet 3     Sig: Take 1 tablet (20 mg) by mouth daily Hold on file until needed.       Statins Protocol Passed - 3/19/2020  6:53 PM        Passed - LDL on file in past 12 months     Recent Labs   Lab Test 10/17/19  0706   LDL 67             Passed - No abnormal creatine kinase in past 12 months     No lab results found.             Passed - Recent (12 mo) or future (30 days) visit within the authorizing provider's specialty     Patient has had an office visit with the authorizing provider or a provider within the authorizing providers department within the previous 12 mos or has a future within next 30 days. See \"Patient Info\" tab in inbasket, or \"Choose Columns\" in Meds & Orders section of the refill encounter.              Passed - Medication is active on med list        Passed - Patient is age 18 or older           Last Written Prescription Date:  12/9/19  Last Fill Quantity: 90,  # refills: 3   Last office visit: 1/8/2020 with prescribing provider:     Future Office Visit:      "

## 2020-04-27 ENCOUNTER — MYC MEDICAL ADVICE (OUTPATIENT)
Dept: FAMILY MEDICINE | Facility: CLINIC | Age: 50
End: 2020-04-27

## 2020-04-27 NOTE — TELEPHONE ENCOUNTER
Routed to Dr Cox.  Please see My Chart message from pt. He requests a refill for a compounded prescription to treat his warts.  WartPeel.  Pt last see for this 3/26/19.    Thank you.    Tanika Clements RN

## 2020-04-28 NOTE — TELEPHONE ENCOUNTER
I have printed the prescription and completed.    Please fax the prescription to the pharmacy.    Please give the patient the instruction sheet that I also printed.    Thank you.    Devyn Cox MD  Family Medicine

## 2020-04-29 NOTE — TELEPHONE ENCOUNTER
Faxed prescription to [facility] pharmacy, mailed patient informational instructions. Carol Valero on 4/29/2020 at 7:50 AM

## 2020-06-11 ENCOUNTER — OFFICE VISIT (OUTPATIENT)
Dept: FAMILY MEDICINE | Facility: CLINIC | Age: 50
End: 2020-06-11
Payer: COMMERCIAL

## 2020-06-11 VITALS
OXYGEN SATURATION: 94 % | SYSTOLIC BLOOD PRESSURE: 114 MMHG | HEIGHT: 70 IN | WEIGHT: 209 LBS | HEART RATE: 98 BPM | BODY MASS INDEX: 29.92 KG/M2 | DIASTOLIC BLOOD PRESSURE: 70 MMHG | TEMPERATURE: 98.1 F

## 2020-06-11 DIAGNOSIS — Z23 NEED FOR VACCINATION: ICD-10-CM

## 2020-06-11 DIAGNOSIS — B07.8 COMMON WART: Primary | ICD-10-CM

## 2020-06-11 PROCEDURE — 17111 DESTRUCTION B9 LESIONS 15/>: CPT | Performed by: FAMILY MEDICINE

## 2020-06-11 PROCEDURE — 90715 TDAP VACCINE 7 YRS/> IM: CPT | Performed by: FAMILY MEDICINE

## 2020-06-11 PROCEDURE — 90471 IMMUNIZATION ADMIN: CPT | Performed by: FAMILY MEDICINE

## 2020-06-11 ASSESSMENT — MIFFLIN-ST. JEOR: SCORE: 1820.3

## 2020-06-11 NOTE — NURSING NOTE
Prior to immunization administration, verified patients identity using patient s name and date of birth. Please see Immunization Activity for additional information.     Screening Questionnaire for Adult Immunization    Are you sick today?   No   Do you have allergies to medications, food, a vaccine component or latex?   No   Have you ever had a serious reaction after receiving a vaccination?   No   Do you have a long-term health problem with heart, lung, kidney, or metabolic disease (e.g., diabetes), asthma, a blood disorder, no spleen, complement component deficiency, a cochlear implant, or a spinal fluid leak?  Are you on long-term aspirin therapy?   No   Do you have cancer, leukemia, HIV/AIDS, or any other immune system problem?   No   Do you have a parent, brother, or sister with an immune system problem?   No   In the past 3 months, have you taken medications that affect  your immune system, such as prednisone, other steroids, or anticancer drugs; drugs for the treatment of rheumatoid arthritis, Crohn s disease, or psoriasis; or have you had radiation treatments?   No   Have you had a seizure, or a brain or other nervous system problem?   No   During the past year, have you received a transfusion of blood or blood    products, or been given immune (gamma) globulin or antiviral drug?   No   For women: Are you pregnant or is there a chance you could become       pregnant during the next month?   No   Have you received any vaccinations in the past 4 weeks?   No     Immunization questionnaire answers were all negative.         Patient instructed to remain in clinic for 15 minutes afterwards, and to report any adverse reaction to me immediately.       Screening performed by Zay Bateman CMA on 6/11/2020 at 8:48 AM.

## 2020-06-11 NOTE — PATIENT INSTRUCTIONS
I treated the warts on your neck.  There were over 15 warts.  If they are still there in 2-3 weeks please call and return to clinic for another treatment.          Thank you for choosing [facility].  You may be receiving an email and/or telephone survey request from [facility] Customer Experience regarding your visit today.  Please take a few minutes to respond to the survey to let us know how we are doing.      If you have questions or concerns, please contact us via [employer] or you can contact your care team at [phone].    Our Clinic hours are:  Monday 6:40 am  to 7:00 pm  Tuesday -Friday 6:40 am to 5:00 pm    The [place] outpatient lab hours are:  Monday - Friday 6:10 am to 4:45 pm  Saturdays 7:00 am to 11:00 am  Appointments are required, call [phone]    If you have clinical questions after hours or would like to schedule an appointment,  call the clinic at [phone].

## 2020-06-11 NOTE — PROGRESS NOTES
"Subjective     Emery Barkley is a 49 year old male who presents to clinic today for the following health issues:    HPI   Chief Complaint   Patient presents with     Wart     Imm/Inj     Tdap - due this year, wildl do today        WART(S)  Onset: x 2 yrs     Description:   Location: Neck/throat area   Number of warts: 6 +   Painful: no    Accompanying Signs & Symptoms:  Signs of infection: no    History:   History of trauma: no  Prior warts: YES- had plantar warts as a child     Therapies Tried and outcome: liquid nitrogen  He has tried the Nucara medication but it did not resolve the situation.  After the liquid ntg treatments they usually clear up for 3 months.            Reviewed and updated as needed this visit by Provider         Review of Systems   Skin as above      Objective    /70 (BP Location: Left arm, Patient Position: Chair, Cuff Size: Adult Large)   Pulse 98   Temp 98.1  F (36.7  C) (Tympanic)   Ht 1.772 m (5' 9.75\")   Wt 94.8 kg (209 lb)   SpO2 94%   BMI 30.20 kg/m    Body mass index is 30.2 kg/m .  Physical Exam   GENERAL APPEARANCE: healthy, alert and no distress  SKIN: noted over 15 warts.  There are 4 larger ones about 3 mm diameter and another at least 12 about 1-2 mm diameter surrounding the four main lesions.  3 freeze thaw cycles done without complications.    NEURO: Normal strength and tone, mentation intact and speech normal  PSYCH: mentation appears normal and affect normal/bright            Assessment & Plan     (B07.8) Common wart  (primary encounter diagnosis)  Comment: treated the warts, follow up if not resolved and given instructions  Plan: DESTRUCT BENIGN LESION, UP TO 14, DESTRUCT         BENIGN LESION, 15 OR MORE            (Z23) Need for vaccination  Comment:   Plan: TDAP VACCINE, ADMIN 1st VACCINE               BMI:   Estimated body mass index is 30.2 kg/m  as calculated from the following:    Height as of this encounter: 1.772 m (5' 9.75\").    Weight as of this " encounter: 94.8 kg (209 lb).           See Patient Instructions    Return in about 2 weeks (around 6/25/2020) for If not better.    Devyn Cox MD  [facility]

## 2020-06-29 ENCOUNTER — OFFICE VISIT (OUTPATIENT)
Dept: FAMILY MEDICINE | Facility: CLINIC | Age: 50
End: 2020-06-29
Payer: COMMERCIAL

## 2020-06-29 VITALS
HEART RATE: 71 BPM | DIASTOLIC BLOOD PRESSURE: 86 MMHG | RESPIRATION RATE: 12 BRPM | SYSTOLIC BLOOD PRESSURE: 134 MMHG | HEIGHT: 70 IN | OXYGEN SATURATION: 97 % | BODY MASS INDEX: 29.92 KG/M2 | TEMPERATURE: 97.8 F | WEIGHT: 209 LBS

## 2020-06-29 DIAGNOSIS — B07.8 COMMON WART: Primary | ICD-10-CM

## 2020-06-29 PROCEDURE — 17110 DESTRUCTION B9 LES UP TO 14: CPT | Performed by: FAMILY MEDICINE

## 2020-06-29 ASSESSMENT — MIFFLIN-ST. JEOR: SCORE: 1820.3

## 2020-06-29 NOTE — NURSING NOTE
"Initial /86   Pulse 71   Temp 97.8  F (36.6  C) (Tympanic)   Resp 12   Ht 1.772 m (5' 9.75\")   Wt 94.8 kg (209 lb)   SpO2 97%   BMI 30.20 kg/m   Estimated body mass index is 30.2 kg/m  as calculated from the following:    Height as of this encounter: 1.772 m (5' 9.75\").    Weight as of this encounter: 94.8 kg (209 lb). .      "

## 2020-06-29 NOTE — PROGRESS NOTES
SUBJECTIVE:                                                    Emery Barkley is 49 year old male   No chief complaint on file.    Derm Issue    Pt returns after 6/11/20 treatment for neck skin lesions.  Had lesions frozen off by Dr. Cox, was told to return if still present.      Problem list and histories reviewed & adjusted, as indicated.  Additional history:  Last treatment 2 weeks ago    Patient Active Problem List   Diagnosis     Hyperlipidemia LDL goal <100     Family history of coronary artery disease     External hemorrhoid     Common wart     Past Surgical History:   Procedure Laterality Date     HERNIORRHAPHY UMBILICAL N/A 2/7/2019    Procedure: HERNIORRHAPHY UMBILICAL;  Surgeon: Tej Beaulieu DO;  Location: [facility] OR     VASECTOMY         Social History     Tobacco Use     Smoking status: Never Smoker     Smokeless tobacco: Never Used   Substance Use Topics     Alcohol use: Yes     Comment: 2 drinks per week     Family History   Problem Relation Age of Onset     C.A.D. Father         MI, chf, first mi in 50s.     Coronary Artery Disease Father      Hyperlipidemia Father      Cancer Sister         brain     Other Cancer Sister         Brain Cancer     Lipids Mother      Hyperlipidemia Mother      Depression Mother          Current Outpatient Medications   Medication Sig Dispense Refill     atorvastatin (LIPITOR) 20 MG tablet Take 1 tablet (20 mg) by mouth daily Hold on file until needed. 90 tablet 0     loratadine (CLARITIN) 10 MG tablet Take 10 mg by mouth daily as needed for allergies       No Known Allergies  Recent Labs   Lab Test 10/17/19  0706 11/03/18  0656 11/18/17  0726  04/18/14  0826  10/18/12  0820 05/12/12  0850   LDL 67 78 68   < > 94   < > 83 75   HDL 43 39* 47   < > 53   < > 49 45   TRIG 196* 181* 167*   < > 101   < > 100 155*   ALT  --   --   --   --  54  --  42 22    < > = values in this interval not displayed.      BP Readings from Last 3 Encounters:   06/11/20 114/70    01/08/20 120/86   12/09/19 126/84    Wt Readings from Last 3 Encounters:   06/11/20 94.8 kg (209 lb)   01/08/20 94.8 kg (209 lb)   12/09/19 92.1 kg (203 lb)         ROS:  Constitutional, HEENT, cardiovascular, pulmonary, gi and gu systems are negative, except as otherwise noted.    OBJECTIVE:                                                    There were no vitals taken for this visit.  GENERAL APPEARANCE ADULT: Alert, no acute distress  SKIN: warts cluster in beard on left (10 lesions), 3 on right       ASSESSMENT/PLAN:                                                    1. Common wart  Repeat treatment in 3 weeks if not resolved.    PROCEDURE NOTE:  Informed consent was obtained.  Liquid nitrogen freeze was applied to above described lesions in 3 freeze thaw cycles (freeze, allowed to thaw and refreeze).   Expected discomfort and redness, possible blister were explained to patient.  The procedure was well tolerated without complications.    - DESTRUCT BENIGN LESION, UP TO 14    Meera Miles MD  [facility]

## 2020-07-23 ENCOUNTER — MYC MEDICAL ADVICE (OUTPATIENT)
Dept: FAMILY MEDICINE | Facility: CLINIC | Age: 50
End: 2020-07-23

## 2020-07-23 ENCOUNTER — OFFICE VISIT (OUTPATIENT)
Dept: FAMILY MEDICINE | Facility: CLINIC | Age: 50
End: 2020-07-23
Payer: COMMERCIAL

## 2020-07-23 VITALS
BODY MASS INDEX: 29.58 KG/M2 | HEART RATE: 70 BPM | TEMPERATURE: 97.7 F | HEIGHT: 70 IN | WEIGHT: 206.6 LBS | RESPIRATION RATE: 16 BRPM | OXYGEN SATURATION: 98 % | SYSTOLIC BLOOD PRESSURE: 126 MMHG | DIASTOLIC BLOOD PRESSURE: 84 MMHG

## 2020-07-23 DIAGNOSIS — B07.8 COMMON WART: Primary | ICD-10-CM

## 2020-07-23 PROCEDURE — 17110 DESTRUCTION B9 LES UP TO 14: CPT | Performed by: NURSE PRACTITIONER

## 2020-07-23 ASSESSMENT — MIFFLIN-ST. JEOR: SCORE: 1809.41

## 2020-07-23 NOTE — PROGRESS NOTES
"Subjective     Emery Barkley is a 49 year old male who presents to clinic today for the following health issues:    HPI       WART(S)  Onset: 2 years     Description:   Location: neck   Number of warts: 10   Painful: no    Accompanying Signs & Symptoms:  Signs of infection: no    History:   History of trauma: no  Prior warts: no    Therapies Tried and outcome: liquid nitrogen and wart peel       Patient Active Problem List   Diagnosis     Hyperlipidemia LDL goal <100     Family history of coronary artery disease     External hemorrhoid     Common wart     Past Surgical History:   Procedure Laterality Date     ABDOMEN SURGERY  2/7/19    umbilical hernia     HERNIORRHAPHY UMBILICAL N/A 2/7/2019    Procedure: HERNIORRHAPHY UMBILICAL;  Surgeon: Tej Beaulieu DO;  Location: [facility]     VASECTOMY         Social History     Tobacco Use     Smoking status: Never Smoker     Smokeless tobacco: Never Used   Substance Use Topics     Alcohol use: Yes     Comment: 2 drinks per week     Family History   Problem Relation Age of Onset     C.A.D. Father         MI, chf, first mi in 50s.     Coronary Artery Disease Father      Hyperlipidemia Father      Cancer Sister         brain     Other Cancer Sister         Brain Cancer     Lipids Mother      Hyperlipidemia Mother      Depression Mother            Reviewed and updated as needed this visit by Provider         Review of Systems   Constitutional, HEENT, cardiovascular, pulmonary, gi and gu systems are negative, except as otherwise noted.      Objective    /84 (BP Location: Right arm, Patient Position: Sitting, Cuff Size: Adult Regular)   Pulse 70   Temp 97.7  F (36.5  C) (Tympanic)   Resp 16   Ht 1.772 m (5' 9.75\")   Wt 93.7 kg (206 lb 9.6 oz)   SpO2 98%   BMI 29.86 kg/m    Body mass index is 29.86 kg/m .  Physical Exam   GENERAL: healthy, alert and no distress  SKIN: warts- anterior neck  NEURO: Normal strength and tone, mentation intact and speech " "normal    Discussed etiology and natural course of warts as well as risk and benefit of treatment of cutaneous warts and patient desires treatment.  Treated with liquid nitrogen with 4 freeze cycles.  Paring was not done prior to liquid nitrogen.  Patient tolerated procedure well.      Diagnostic Test Results:  none         Assessment & Plan       ICD-10-CM    1. Common wart  B07.8 DESTRUCT BENIGN LESION, UP TO 14        BMI:   Estimated body mass index is 29.86 kg/m  as calculated from the following:    Height as of this encounter: 1.772 m (5' 9.75\").    Weight as of this encounter: 93.7 kg (206 lb 9.6 oz).   Weight management plan: Patient was referred to their PCP to discuss a diet and exercise plan.        FUTURE APPOINTMENTS:       - Follow-up visit in 2-4 weeks for treatment of any remaining warts.    No follow-ups on file.    MICHAEL Branch [facility]  "

## 2020-08-22 DIAGNOSIS — E78.5 HYPERLIPIDEMIA LDL GOAL <100: ICD-10-CM

## 2020-08-22 DIAGNOSIS — Z82.49 FAMILY HISTORY OF CORONARY ARTERY DISEASE: ICD-10-CM

## 2020-08-24 RX ORDER — ATORVASTATIN CALCIUM 20 MG/1
TABLET, FILM COATED ORAL
Qty: 90 TABLET | Refills: 0 | Status: SHIPPED | OUTPATIENT
Start: 2020-08-24 | End: 2020-11-11

## 2020-08-24 NOTE — TELEPHONE ENCOUNTER
"Requested Prescriptions   Pending Prescriptions Disp Refills     atorvastatin (LIPITOR) 20 MG tablet [Pharmacy Med Name: ATORVASTATIN CALCIUM 20MG TABS] 90 tablet 0     Sig: TAKE ONE TABLET BY MOUTH ONCE DAILY       Statins Protocol Passed - 8/22/2020  3:05 PM        Passed - LDL on file in past 12 months     Recent Labs   Lab Test 10/17/19  0706   LDL 67             Passed - No abnormal creatine kinase in past 12 months     No lab results found.             Passed - Recent (12 mo) or future (30 days) visit within the authorizing provider's specialty     Patient has had an office visit with the authorizing provider or a provider within the authorizing providers department within the previous 12 mos or has a future within next 30 days. See \"Patient Info\" tab in inbasket, or \"Choose Columns\" in Meds & Orders section of the refill encounter.              Passed - Medication is active on med list        Passed - Patient is age 18 or older             "

## 2020-09-09 ENCOUNTER — OFFICE VISIT (OUTPATIENT)
Dept: LAB | Facility: SCHOOL | Age: 50
End: 2020-09-09
Payer: COMMERCIAL

## 2020-09-09 VITALS
BODY MASS INDEX: 29.06 KG/M2 | SYSTOLIC BLOOD PRESSURE: 126 MMHG | DIASTOLIC BLOOD PRESSURE: 86 MMHG | HEART RATE: 94 BPM | OXYGEN SATURATION: 96 % | WEIGHT: 203 LBS | RESPIRATION RATE: 16 BRPM | HEIGHT: 70 IN | TEMPERATURE: 99 F

## 2020-09-09 DIAGNOSIS — Z00.00 WELLNESS EXAMINATION: Primary | ICD-10-CM

## 2020-09-09 PROCEDURE — 99213 OFFICE O/P EST LOW 20 MIN: CPT

## 2020-09-09 ASSESSMENT — MIFFLIN-ST. JEOR: SCORE: 1793.08

## 2020-09-09 NOTE — PATIENT INSTRUCTIONS
"                Emery Barkley has completed a Wellness Check at the [facility] on 9/9/2020.      ____________________________________________  [place] [employer] PROVIDER                                                                               Wellness Visit Recommendations:      See your regular primary care health provider every year in order to help stay healthy.    Review health changes.     Review your medicines if your doctor has prescribed any.    Talk to your provider about how often to have your cholesterol checked.    If you are at risk for diabetes, you should have a diabetes test (fasting glucose).    Shots: Get a flu shot each year. Get a tetanus shot every 10 years.     Review with your primary care provider other immunizations that you may need based on your age and/or medical/surgical history    Nutrition:     Eat at least 5 servings of fruits and vegetables each day.    Eat whole-grain bread, whole-wheat pasta and brown rice instead of white grains and rice.    Preventive Care to be reviewed by your primary care provider:    Females:        Cervical Cancer Screening                          Breast Cancer Screening                          Colon Cancer Screening  Males:             Prostrate Cancer Screening                          Colon Cancer Screening      Lifestyle:    Exercise at least 150 minutes a week (30 minutes a day, 5 days of the week). This will help you control your weight and help prevent disease or manage disease.    Limit alcohol to one drink per day or less depending on your past medical history.    No smoking.     Wear sunscreen to prevent skin cancer.    See your dentist every six months for an exam and cleaning.    Today's Vital Signs:  /86 (BP Location: Right arm, Patient Position: Sitting, Cuff Size: Adult Large)   Pulse 94   Temp 99  F (37.2  C) (Tympanic)   Resp 16   Ht 1.772 m (5' 9.75\")   Wt 92.1 kg (203 lb)   SpO2 96%   BMI 29.34 kg/m    "

## 2020-09-09 NOTE — PROGRESS NOTES
"  SUBJECTIVE:  CC: Emery Barkley is an 49 year old woman who presents for Wellness Check at [facility] [id number].     Review of Healthy Lifestyle:    Do you get at least three servings of calcium containing foods daily (dairy, green leafy vegetables, etc.)? yes     Do you have a high-fiber diet? yes     Amount of exercise or daily activities, outside of work: 6-7 day(s) per week    Do you wear sunscreen on a regular basis? Yes 30-50     Are you taking your medications regularly No    Have you had an eye exam in the past two years? Yes - July 2020    Do you see a dentist twice per year? yes    Do you have sleep apnea, excessive snoring or excessive daytime drowsiness? no    Do you use tobacco in any form? no       OBJECTIVE:    Vitals: /86 (BP Location: Right arm, Patient Position: Sitting, Cuff Size: Adult Large)   Pulse 94   Temp 99  F (37.2  C) (Tympanic)   Resp 16   Ht 1.772 m (5' 9.75\")   Wt 92.1 kg (203 lb)   SpO2 96%   BMI 29.34 kg/m    BMI= Body mass index is 29.34 kg/m .    HEARING: Right Ear:        500Hz:  RESPONSE- on Level:   20 db    1000 Hz: RESPONSE- on Level:   20 db    2000 Hz: RESPONSE- on Level:   20 db    4000 Hz: RESPONSE- on Level:   20 db     Left Ear:       500Hz:  RESPONSE- on Level:   20 db    1000 Hz: RESPONSE- on Level:   20 db    2000 Hz: RESPONSE- on Level:   20 db    4000 Hz: RESPONSE- on Level:   20 db     VISION:  Patient has had eye exam in July 2020    Medication Reconciliation: complete    ASSESSMENT/PLAN:  (Z00.00) Wellness examination  (primary encounter diagnosis)  Comment: Doing well, return in one year.  Plan:       COUNSELING:      reports that he has never smoked. He has never used smokeless tobacco.    Estimated body mass index is 29.34 kg/m  as calculated from the following:    Height as of this encounter: 1.772 m (5' 9.75\").    Weight as of this encounter: 92.1 kg (203 lb).   Weight management plan: Discussed healthy diet and exercise " guidelines    Counseling Resources  USDA's MyPlate  https://www.quitplan.com/    [place] [employer] PROVIDER  [facility]

## 2020-10-14 ENCOUNTER — DOCUMENTATION ONLY (OUTPATIENT)
Dept: LAB | Facility: CLINIC | Age: 50
End: 2020-10-14

## 2020-10-14 DIAGNOSIS — E78.5 HYPERLIPIDEMIA LDL GOAL <100: Primary | ICD-10-CM

## 2020-10-14 DIAGNOSIS — Z13.1 SCREENING FOR DIABETES MELLITUS: ICD-10-CM

## 2020-10-14 NOTE — PROGRESS NOTES
FLP ready.  Any others?    Routing to provider.  Julieta FRY RN      
I have ordered his fasting labs.  Sincerely,  Devyn Cox MD    
Patient is coming for labs, please sign pended orders due per HM.  Thanks!    
IRBBB

## 2020-10-18 DIAGNOSIS — E78.5 HYPERLIPIDEMIA LDL GOAL <100: ICD-10-CM

## 2020-10-18 DIAGNOSIS — Z13.1 SCREENING FOR DIABETES MELLITUS: ICD-10-CM

## 2020-10-18 LAB
CHOLEST SERPL-MCNC: 157 MG/DL
GLUCOSE SERPL-MCNC: 108 MG/DL (ref 70–99)
HDLC SERPL-MCNC: 49 MG/DL
LDLC SERPL CALC-MCNC: 76 MG/DL
NONHDLC SERPL-MCNC: 108 MG/DL
TRIGL SERPL-MCNC: 160 MG/DL

## 2020-10-18 PROCEDURE — 36415 COLL VENOUS BLD VENIPUNCTURE: CPT | Performed by: FAMILY MEDICINE

## 2020-10-18 PROCEDURE — 82947 ASSAY GLUCOSE BLOOD QUANT: CPT | Performed by: FAMILY MEDICINE

## 2020-10-18 PROCEDURE — 80061 LIPID PANEL: CPT | Performed by: FAMILY MEDICINE

## 2020-11-09 DIAGNOSIS — E78.5 HYPERLIPIDEMIA LDL GOAL <100: ICD-10-CM

## 2020-11-09 DIAGNOSIS — Z82.49 FAMILY HISTORY OF CORONARY ARTERY DISEASE: ICD-10-CM

## 2020-11-10 NOTE — TELEPHONE ENCOUNTER
"Requested Prescriptions   Pending Prescriptions Disp Refills     atorvastatin (LIPITOR) 20 MG tablet [Pharmacy Med Name: ATORVASTATIN CALCIUM 20MG TABS] 90 tablet 0     Sig: TAKE ONE TABLET BY MOUTH ONCE DAILY       Statins Protocol Passed - 11/9/2020  6:10 PM        Passed - LDL on file in past 12 months     Recent Labs   Lab Test 10/18/20  0901   LDL 76             Passed - No abnormal creatine kinase in past 12 months     No lab results found.             Passed - Recent (12 mo) or future (30 days) visit within the authorizing provider's specialty     Patient has had an office visit with the authorizing provider or a provider within the authorizing providers department within the previous 12 mos or has a future within next 30 days. See \"Patient Info\" tab in inbasket, or \"Choose Columns\" in Meds & Orders section of the refill encounter.              Passed - Medication is active on med list        Passed - Patient is age 18 or older             "

## 2020-11-11 RX ORDER — ATORVASTATIN CALCIUM 20 MG/1
TABLET, FILM COATED ORAL
Qty: 90 TABLET | Refills: 2 | Status: SHIPPED | OUTPATIENT
Start: 2020-11-11 | End: 2021-09-01

## 2020-11-24 ENCOUNTER — OFFICE VISIT (OUTPATIENT)
Dept: FAMILY MEDICINE | Facility: CLINIC | Age: 50
End: 2020-11-24
Payer: COMMERCIAL

## 2020-11-24 VITALS
HEART RATE: 80 BPM | TEMPERATURE: 97.3 F | BODY MASS INDEX: 29.2 KG/M2 | HEIGHT: 70 IN | WEIGHT: 204 LBS | DIASTOLIC BLOOD PRESSURE: 80 MMHG | OXYGEN SATURATION: 97 % | RESPIRATION RATE: 18 BRPM | SYSTOLIC BLOOD PRESSURE: 110 MMHG

## 2020-11-24 DIAGNOSIS — B07.8 OTHER VIRAL WARTS: Primary | ICD-10-CM

## 2020-11-24 PROCEDURE — 17110 DESTRUCTION B9 LES UP TO 14: CPT | Performed by: FAMILY MEDICINE

## 2020-11-24 ASSESSMENT — MIFFLIN-ST. JEOR: SCORE: 1796.59

## 2020-11-24 NOTE — PROGRESS NOTES
"Subjective     Emery Barkley is a 50 year old male who presents to clinic today for the following health issues:    HPI         Warts  Onset/Duration: 2 yrs   Description (location/number): front of neck   Accompanying signs and symptoms (pain, redness): no  History: prior warts: on foot but yrs ago   Therapies tried and outcome: Patient has used a wart peel from the specialty pharmacy in the past from       -Patient has been dealing with warts for numerous years.  He has already underwent cryotherapy in the past.  He is requesting cryotherapy today in clinic for warts on his anterior neck.      Review of Systems   Constitutional, HEENT, cardiovascular, pulmonary, gi and gu systems are negative, except as otherwise noted.      Objective    /80   Pulse 80   Temp 97.3  F (36.3  C) (Tympanic)   Resp 18   Ht 1.778 m (5' 10\")   Wt 92.5 kg (204 lb)   SpO2 97%   BMI 29.27 kg/m    Body mass index is 29.27 kg/m .  Physical Exam   General: Alert, cooperative no acute distress  Skin: On the anterior aspect of the neck there is 10 small warts varying in size from 1mm to 3 mm in size.           Assessment & Plan     Emery was seen today for derm problem.    Diagnoses and all orders for this visit:    Other viral warts  Destruction benign lesions  -Cryotherapy applied to 10 small wart lesions.  Completed in 3 separate cycles.  Patient tolerated the procedure well.  Discussed possible scar, blister, and irritation where cryotherapy was applied.  Advised patient if he continues to have warts to return in 4 weeks for repeat cryotherapy as needed.        Follow up in 4 weeks as needed for continued issues with warts.       August Choi, [facility]    "

## 2020-11-24 NOTE — PATIENT INSTRUCTIONS
Warts with cryotherapy     Follow up in 4 weeks as needed.       Thank you for choosing [facility].  You may be receiving an email and/or telephone survey request from [facility] Customer Experience regarding your visit today.  Please take a few minutes to respond to the survey to let us know how we are doing.      If you have questions or concerns, please contact us via [email or web address] or you can contact your care team at [phone].    Our Clinic hours are:  Monday 6:40 am  to 7:00 pm  Tuesday -Friday 6:40 am to 5:00 pm    The [place] outpatient lab hours are:  Monday - Friday 6:10 am to 4:45 pm  Saturdays 7:00 am to 11:00 am  Appointments are required, call [phone]    If you have clinical questions after hours or would like to schedule an appointment,  call the clinic at [phone].

## 2020-12-14 ENCOUNTER — OFFICE VISIT (OUTPATIENT)
Dept: FAMILY MEDICINE | Facility: CLINIC | Age: 50
End: 2020-12-14
Payer: COMMERCIAL

## 2020-12-14 VITALS
WEIGHT: 205.2 LBS | RESPIRATION RATE: 12 BRPM | SYSTOLIC BLOOD PRESSURE: 126 MMHG | DIASTOLIC BLOOD PRESSURE: 84 MMHG | HEART RATE: 86 BPM | TEMPERATURE: 97.3 F | BODY MASS INDEX: 29.44 KG/M2 | OXYGEN SATURATION: 96 %

## 2020-12-14 DIAGNOSIS — B07.8 OTHER VIRAL WARTS: Primary | ICD-10-CM

## 2020-12-14 PROCEDURE — 17110 DESTRUCTION B9 LES UP TO 14: CPT | Performed by: NURSE PRACTITIONER

## 2020-12-14 NOTE — PATIENT INSTRUCTIONS
Following treatment with liquid nitrogen your skin may get a blister. Try to keep this intact and keep the area clean and dry.  If no blister occurs, it is ok to use a pumice stone or nail file to gently file down the thickened warty tissue (do not use the same nail file that you use on your finger nails or it could spread the virus).  You may need to return for additional treatment if the wart does not completely resolve.  Plan to return after roughly 3 weeks once the area has healed, if still showing signs of persistent warty tissue.      May start using OTC treatments (Mediplast or Dr. Geronimo) if treatment ineffective.

## 2020-12-14 NOTE — PROGRESS NOTES
Subjective     Emery Barkley is a 50 year old male who presents to clinic today for the following health issues:    HPI         Derm Issue    Symptoms ongoing.     States wart on medial neck.     Has tried  In office freezing. States not helpful.     Above HPI reviewed. Additionally, was seen in clinic for treatment of viral warts on 11/24, treated with cryo. Had multiple areas treated which are now resolved, but one area of warts to the submental area persists. Has had ongoing issues for years with warts.           Review of Systems   Constitutional, HEENT, cardiovascular, pulmonary, gi and gu systems are negative, except as otherwise noted.      Objective    There were no vitals taken for this visit.  There is no height or weight on file to calculate BMI.  Physical Exam  Vitals signs and nursing note reviewed.   Constitutional:       Appearance: Normal appearance.   HENT:      Head: Normocephalic and atraumatic.      Mouth/Throat:      Mouth: Mucous membranes are moist.   Neck:      Musculoskeletal: Neck supple.   Cardiovascular:      Rate and Rhythm: Normal rate.   Pulmonary:      Effort: Pulmonary effort is normal.   Skin:     General: Skin is warm and dry.      Comments: Cluster of 3 warts from 1mm to 3mm to the right submental area   Neurological:      General: No focal deficit present.      Mental Status: He is alert.   Psychiatric:         Mood and Affect: Mood normal.         Behavior: Behavior normal.                    Assessment & Plan     Other viral warts  Discussed that we can try cryo again today if he wishes, or given the ongoing nature we could refer him to derm for consideration of cantharidin or other treatments. He would like to proceed with cryo today, and will let me know if this does not improve.      After a complete discussion of the multiple treatment options for warts including the risks and bennefits of each, the patient has decided on treatment with Liquid nitrogen.  Each wart was  frozen easily three times with liquid nitrogen. A total of 3 warts are treated today.  The etiology of common warts were discussed.  Warm soapy water soaks and sanding also recommended.            Patient Instructions   Following treatment with liquid nitrogen your skin may get a blister. Try to keep this intact and keep the area clean and dry.  If no blister occurs, it is ok to use a pumice stone or nail file to gently file down the thickened warty tissue (do not use the same nail file that you use on your finger nails or it could spread the virus).  You may need to return for additional treatment if the wart does not completely resolve.  Plan to return after roughly 3 weeks once the area has healed, if still showing signs of persistent warty tissue.      May start using OTC treatments (Mediplast or Dr. Geronimo) if treatment ineffective.        Return in about 3 weeks (around 1/4/2021) for worsening or continued symptoms.    MICHAEL Garcia [facility]

## 2020-12-14 NOTE — NURSING NOTE
"Initial /84   Pulse 86   Temp 97.3  F (36.3  C) (Tympanic)   Resp 12   Wt 93.1 kg (205 lb 3.2 oz)   SpO2 96%   BMI 29.44 kg/m   Estimated body mass index is 29.44 kg/m  as calculated from the following:    Height as of 11/24/20: 1.778 m (5' 10\").    Weight as of this encounter: 93.1 kg (205 lb 3.2 oz). .      "

## 2021-01-15 ENCOUNTER — HEALTH MAINTENANCE LETTER (OUTPATIENT)
Age: 51
End: 2021-01-15

## 2021-01-31 ENCOUNTER — MYC MEDICAL ADVICE (OUTPATIENT)
Dept: FAMILY MEDICINE | Facility: CLINIC | Age: 51
End: 2021-01-31

## 2021-01-31 DIAGNOSIS — B07.9 VIRAL WARTS, UNSPECIFIED TYPE: Primary | ICD-10-CM

## 2021-02-01 NOTE — TELEPHONE ENCOUNTER
Routed to provider.  Pt is asking for referral to dermatology for warts?  You saw him in December.  Thank you.  Tanika Clements RN

## 2021-02-05 ENCOUNTER — OFFICE VISIT (OUTPATIENT)
Dept: DERMATOLOGY | Facility: CLINIC | Age: 51
End: 2021-02-05
Payer: COMMERCIAL

## 2021-02-05 VITALS — SYSTOLIC BLOOD PRESSURE: 123 MMHG | DIASTOLIC BLOOD PRESSURE: 82 MMHG | OXYGEN SATURATION: 95 % | HEART RATE: 86 BPM

## 2021-02-05 DIAGNOSIS — D48.5 NEOPLASM OF UNCERTAIN BEHAVIOR OF SKIN: Primary | ICD-10-CM

## 2021-02-05 PROCEDURE — 88305 TISSUE EXAM BY PATHOLOGIST: CPT | Performed by: PATHOLOGY

## 2021-02-05 PROCEDURE — 99213 OFFICE O/P EST LOW 20 MIN: CPT | Mod: 25 | Performed by: PHYSICIAN ASSISTANT

## 2021-02-05 PROCEDURE — 11102 TANGNTL BX SKIN SINGLE LES: CPT | Performed by: PHYSICIAN ASSISTANT

## 2021-02-05 NOTE — LETTER
2/5/2021         RE: Emery Barkley  [address] [facility] [address]        Dear Colleague,    Thank you for referring your patient, Emery Barkley, to the [facility]. Please see a copy of my visit note below.    Emery Barkley is an extremely pleasant 50 year old year old male patient here today for wart like spot on neck. Present for 3 years. Has had cryo multiple times but continues to return. Patient has no other skin complaints today.  Remainder of the HPI, Meds, PMH, Allergies, FH, and SH was reviewed in chart.    Pertinent Hx:  No personal history of skin cancer.  Past Medical History:   Diagnosis Date     Hyperlipidemia        Past Surgical History:   Procedure Laterality Date     ABDOMEN SURGERY  2/7/19    umbilical hernia     HERNIORRHAPHY UMBILICAL N/A 2/7/2019    Procedure: HERNIORRHAPHY UMBILICAL;  Surgeon: Tej Beaulieu DO;  Location: [facility] OR     VASECTOMY          Family History   Problem Relation Age of Onset     C.A.D. Father         MI, chf, first mi in 50s.     Coronary Artery Disease Father      Hyperlipidemia Father      Cancer Sister         brain     Other Cancer Sister         Brain Cancer     Lipids Mother      Hyperlipidemia Mother      Depression Mother        Social History     Socioeconomic History     Marital status:      Spouse name: Not on file     Number of children: Not on file     Years of education: Not on file     Highest education level: Not on file   Occupational History     Not on file   Social Needs     Financial resource strain: Not on file     Food insecurity     Worry: Not on file     Inability: Not on file     Transportation needs     Medical: Not on file     Non-medical: Not on file   Tobacco Use     Smoking status: Never Smoker     Smokeless tobacco: Never Used   Substance and Sexual Activity     Alcohol use: Yes     Comment: 2 drinks per week     Drug use: No     Sexual activity: Yes     Partners: Female     Birth  control/protection: Male Surgical   Lifestyle     Physical activity     Days per week: Not on file     Minutes per session: Not on file     Stress: Not on file   Relationships     Social connections     Talks on phone: Not on file     Gets together: Not on file     Attends [employer] service: Not on file     Active member of club or organization: Not on file     Attends meetings of clubs or organizations: Not on file     Relationship status: Not on file     Intimate partner violence     Fear of current or ex partner: Not on file     Emotionally abused: Not on file     Physically abused: Not on file     Forced sexual activity: Not on file   Other Topics Concern     Parent/sibling w/ CABG, MI or angioplasty before 65F 55M? Yes   Social History Narrative     Not on file       Outpatient Encounter Medications as of 2/5/2021   Medication Sig Dispense Refill     atorvastatin (LIPITOR) 20 MG tablet TAKE ONE TABLET BY MOUTH ONCE DAILY 90 tablet 2     loratadine (CLARITIN) 10 MG tablet Take 10 mg by mouth daily as needed for allergies       No facility-administered encounter medications on file as of 2/5/2021.              Review Of Systems  Skin: As above  Eyes: negative  Ears/Nose/Throat: negative  Respiratory: No shortness of breath, dyspnea on exertion, cough      O:   NAD, WDWN, Alert & Oriented, Mood & Affect wnl, Vitals stable   Here today alone   /82   Pulse 86   SpO2 95%    General appearance normal   Vitals stable   Alert, oriented and in no acute distress   1.4 cm pink scaly plaque on right anterior neck     Eyes: Conjunctivae/lids:Normal     ENT: Lips: normal    MSK:Normal    Pulm: Breathing Normal    Neuro/Psych: Orientation:Alert and Orientedx3 ; Mood/Affect:normal   A/P:  1. R/O verrucal keratosis vs scc vs wart on right anterior neck   TANGENTIAL BIOPSY SENT OUT:  After consent, anesthesia with LEC and prep, tangential excision performed and specimen sent out for permanent section histology.  No  complications and routine wound care. Patient told to call our office in 1-2 weeks for result.      BENIGN LESIONS DISCUSSED WITH PATIENT:  I discussed the specifics of tumor, prognosis, and genetics of benign lesions.  I explained that treatment of these lesions would be purely cosmetic and not medically neccessary.  I discussed with patient different removal options including excision, cautery and /or laser.      Nature and genetics of benign skin lesions dicussed with patient.  Signs and Symptoms of skin cancer discussed with patient.  ABCDEs of melanoma reviewed with patient.  Patient encouraged to perform monthly skin exams.  UV precautions reviewed with patient.  Risks of non-melanoma skin cancer discussed with patient   Return to clinic pending biopsy results.       Again, thank you for allowing me to participate in the care of your patient.        Sincerely,        Yuki King PA-C

## 2021-02-05 NOTE — PROGRESS NOTES
Emery Barkley is an extremely pleasant 50 year old year old male patient here today for wart like spot on neck. Present for 3 years. Has had cryo multiple times but continues to return. Patient has no other skin complaints today.  Remainder of the HPI, Meds, PMH, Allergies, FH, and SH was reviewed in chart.    Pertinent Hx:  No personal history of skin cancer.  Past Medical History:   Diagnosis Date     Hyperlipidemia        Past Surgical History:   Procedure Laterality Date     ABDOMEN SURGERY  2/7/19    umbilical hernia     HERNIORRHAPHY UMBILICAL N/A 2/7/2019    Procedure: HERNIORRHAPHY UMBILICAL;  Surgeon: Tej Beaulieu DO;  Location: [facility] OR     VASECTOMY          Family History   Problem Relation Age of Onset     C.A.D. Father         MI, chf, first mi in 50s.     Coronary Artery Disease Father      Hyperlipidemia Father      Cancer Sister         brain     Other Cancer Sister         Brain Cancer     Lipids Mother      Hyperlipidemia Mother      Depression Mother        Social History     Socioeconomic History     Marital status:      Spouse name: Not on file     Number of children: Not on file     Years of education: Not on file     Highest education level: Not on file   Occupational History     Not on file   Social Needs     Financial resource strain: Not on file     Food insecurity     Worry: Not on file     Inability: Not on file     Transportation needs     Medical: Not on file     Non-medical: Not on file   Tobacco Use     Smoking status: Never Smoker     Smokeless tobacco: Never Used   Substance and Sexual Activity     Alcohol use: Yes     Comment: 2 drinks per week     Drug use: No     Sexual activity: Yes     Partners: Female     Birth control/protection: Male Surgical   Lifestyle     Physical activity     Days per week: Not on file     Minutes per session: Not on file     Stress: Not on file   Relationships     Social connections     Talks on phone: Not on file     Gets together:  Not on file     Attends Quaker service: Not on file     Active member of club or organization: Not on file     Attends meetings of clubs or organizations: Not on file     Relationship status: Not on file     Intimate partner violence     Fear of current or ex partner: Not on file     Emotionally abused: Not on file     Physically abused: Not on file     Forced sexual activity: Not on file   Other Topics Concern     Parent/sibling w/ CABG, MI or angioplasty before 65F 55M? Yes   Social History Narrative     Not on file       Outpatient Encounter Medications as of 2/5/2021   Medication Sig Dispense Refill     atorvastatin (LIPITOR) 20 MG tablet TAKE ONE TABLET BY MOUTH ONCE DAILY 90 tablet 2     loratadine (CLARITIN) 10 MG tablet Take 10 mg by mouth daily as needed for allergies       No facility-administered encounter medications on file as of 2/5/2021.              Review Of Systems  Skin: As above  Eyes: negative  Ears/Nose/Throat: negative  Respiratory: No shortness of breath, dyspnea on exertion, cough      O:   NAD, WDWN, Alert & Oriented, Mood & Affect wnl, Vitals stable   Here today alone   /82   Pulse 86   SpO2 95%    General appearance normal   Vitals stable   Alert, oriented and in no acute distress   1.4 cm pink scaly plaque on right anterior neck     Eyes: Conjunctivae/lids:Normal     ENT: Lips: normal    MSK:Normal    Pulm: Breathing Normal    Neuro/Psych: Orientation:Alert and Orientedx3 ; Mood/Affect:normal   A/P:  1. R/O verrucal keratosis vs scc vs wart on right anterior neck   TANGENTIAL BIOPSY SENT OUT:  After consent, anesthesia with LEC and prep, tangential excision performed and specimen sent out for permanent section histology.  No complications and routine wound care. Patient told to call our office in 1-2 weeks for result.      BENIGN LESIONS DISCUSSED WITH PATIENT:  I discussed the specifics of tumor, prognosis, and genetics of benign lesions.  I explained that treatment of these  lesions would be purely cosmetic and not medically neccessary.  I discussed with patient different removal options including excision, cautery and /or laser.      Nature and genetics of benign skin lesions dicussed with patient.  Signs and Symptoms of skin cancer discussed with patient.  ABCDEs of melanoma reviewed with patient.  Patient encouraged to perform monthly skin exams.  UV precautions reviewed with patient.  Risks of non-melanoma skin cancer discussed with patient   Return to clinic pending biopsy results.

## 2021-02-05 NOTE — PATIENT INSTRUCTIONS
Wound Care Instructions     FOR SUPERFICIAL WOUNDS     [facility] [phone]    [facility] [phone]                       AFTER 24 HOURS YOU SHOULD REMOVE THE BANDAGE AND BEGIN DAILY DRESSING CHANGES AS FOLLOWS:     1) Remove Dressing.     2) Clean and dry the area with tap water using a Q-tip or sterile gauze pad.     3) Apply Vaseline, Aquaphor, Polysporin ointment or Bacitracin ointment over entire wound.  Do NOT use Neosporin ointment.     4) Cover the wound with a band-aid, or a sterile non-stick gauze pad and micropore paper tape      REPEAT THESE INSTRUCTIONS AT LEAST ONCE A DAY UNTIL THE WOUND HAS COMPLETELY HEALED.    It is an old wives tale that a wound heals better when it is exposed to air and allowed to dry out. The wound will heal faster with a better cosmetic result if it is kept moist with ointment and covered with a bandage.    **Do not let the wound dry out.**      Supplies Needed:      *Cotton tipped applicators (Q-tips)    *Polysporin Ointment or Bacitracin Ointment (NOT NEOSPORIN)    *Band-aids or non-stick gauze pads and micropore paper tape.      PATIENT INFORMATION:    During the healing process you will notice a number of changes. All wounds develop a small halo of redness surrounding the wound.  This means healing is occurring. Severe itching with extensive redness usually indicates sensitivity to the ointment or bandage tape used to dress the wound.  You should call our office if this develops.      Swelling  and/or discoloration around your surgical site is common, particularly when performed around the eye.    All wounds normally drain.  The larger the wound the more drainage there will be.  After 7-10 days, you will notice the wound beginning to shrink and new skin will begin to grow.  The wound is healed when you can see skin has formed over the entire area.  A healed wound has a healthy, shiny look to the surface and is red to dark pink in color  to normalize.  Wounds may take approximately 4-6 weeks to heal.  Larger wounds may take 6-8 weeks.  After the wound is healed you may discontinue dressing changes.    You may experience a sensation of tightness as your wound heals. This is normal and will gradually subside.    Your healed wound may be sensitive to temperature changes. This sensitivity improves with time, but if you re having a lot of discomfort, try to avoid temperature extremes.    Patients frequently experience itching after their wound appears to have healed because of the continue healing under the skin.  Plain Vaseline will help relieve the itching.        POSSIBLE COMPLICATIONS    BLEEDIN. Leave the bandage in place.  2. Use tightly rolled up gauze or a cloth to apply direct pressure over the bandage for 30  minutes.  3. Reapply pressure for an additional 30 minutes if necessary  4. Use additional gauze and tape to maintain pressure once the bleeding has stopped.

## 2021-02-09 LAB — COPATH REPORT: NORMAL

## 2021-07-15 DIAGNOSIS — B08.1 MOLLUSCUM CONTAGIOSUM: ICD-10-CM

## 2021-07-15 RX ORDER — IMIQUIMOD 12.5 MG/.25G
CREAM TOPICAL
Qty: 12 PACKET | Refills: 1 | Status: SHIPPED | OUTPATIENT
Start: 2021-07-15 | End: 2021-09-22

## 2021-07-15 NOTE — TELEPHONE ENCOUNTER
Requested Prescriptions   Pending Prescriptions Disp Refills     imiquimod (ALDARA) 5 % external cream 12 packet 3     Sig: Apply a small sized amount to warts or molluscum three times weekly at bedtime.   Wash off after 8 hours.   May use for up to 16 weeks.       There is no refill protocol information for this order        Last office visit: 2/5/2021 with prescribing provider:  MAI ESTEVEZ   Future Office Visit:   Next 5 appointments (look out 90 days)    Jul 27, 2021  8:15 AM  (Arrive by 8:00 AM)  Return Visit with Mai Estevez PA-C  [facility] ([facility] ) [address] [place] [id number]  [phone]   Sep 22, 2021  4:20 PM  PHYSICAL with Devyn Cox MD  [facility] ([facility] )  Arrive at: Clinic A [address] [place] [id number]  [phone]               [facility]  Specialty [facility]

## 2021-07-15 NOTE — TELEPHONE ENCOUNTER
See 6-28-21 My chart message.     He is scheduled to be seen for follow up on 7-27-21.     Amelia Goddard RN

## 2021-07-27 ENCOUNTER — OFFICE VISIT (OUTPATIENT)
Dept: DERMATOLOGY | Facility: CLINIC | Age: 51
End: 2021-07-27
Payer: COMMERCIAL

## 2021-07-27 VITALS — HEART RATE: 67 BPM | SYSTOLIC BLOOD PRESSURE: 124 MMHG | DIASTOLIC BLOOD PRESSURE: 87 MMHG | OXYGEN SATURATION: 97 %

## 2021-07-27 DIAGNOSIS — B07.8 COMMON WART: Primary | ICD-10-CM

## 2021-07-27 PROCEDURE — 17110 DESTRUCTION B9 LES UP TO 14: CPT | Performed by: PHYSICIAN ASSISTANT

## 2021-07-27 PROCEDURE — 99207 PR DROP WITH A PROCEDURE: CPT | Performed by: PHYSICIAN ASSISTANT

## 2021-07-27 NOTE — PROGRESS NOTES
Emery Barkley is an extremely pleasant 50 year old year old male patient here today for recheck warts. He had biopsy done LOV which returned as a wart. Patient has no other skin complaints today.  Remainder of the HPI, Meds, PMH, Allergies, FH, and SH was reviewed in chart.    Past Medical History:   Diagnosis Date     Hyperlipidemia        Past Surgical History:   Procedure Laterality Date     ABDOMEN SURGERY  2/7/19    umbilical hernia     HERNIORRHAPHY UMBILICAL N/A 2/7/2019    Procedure: HERNIORRHAPHY UMBILICAL;  Surgeon: Tej Beaulieu DO;  Location: [facility] OR     VASECTOMY          Family History   Problem Relation Age of Onset     C.A.D. Father         MI, chf, first mi in 50s.     Coronary Artery Disease Father      Hyperlipidemia Father      Cancer Sister         brain     Other Cancer Sister         Brain Cancer     Lipids Mother      Hyperlipidemia Mother      Depression Mother        Social History     Socioeconomic History     Marital status:      Spouse name: Not on file     Number of children: Not on file     Years of education: Not on file     Highest education level: Not on file   Occupational History     Not on file   Tobacco Use     Smoking status: Never Smoker     Smokeless tobacco: Never Used   Substance and Sexual Activity     Alcohol use: Yes     Comment: 2 drinks per week     Drug use: No     Sexual activity: Yes     Partners: Female     Birth control/protection: Male Surgical   Other Topics Concern     Parent/sibling w/ CABG, MI or angioplasty before 65F 55M? Yes   Social History Narrative     Not on file     Social Determinants of Health     Financial Resource Strain:      Difficulty of Paying Living Expenses:    Food Insecurity:      Worried About Running Out of Food in the Last Year:      Ran Out of Food in the Last Year:    Transportation Needs:      Lack of Transportation (Medical):      Lack of Transportation (Non-Medical):    Physical Activity:      Days of Exercise per  Week:      Minutes of Exercise per Session:    Stress:      Feeling of Stress :    Social Connections:      Frequency of Communication with Friends and Family:      Frequency of Social Gatherings with Friends and Family:      Attends Religion Services:      Active Member of Clubs or Organizations:      Attends Club or Organization Meetings:      Marital Status:    Intimate Partner Violence:      Fear of Current or Ex-Partner:      Emotionally Abused:      Physically Abused:      Sexually Abused:        Outpatient Encounter Medications as of 7/27/2021   Medication Sig Dispense Refill     atorvastatin (LIPITOR) 20 MG tablet TAKE ONE TABLET BY MOUTH ONCE DAILY 90 tablet 2     imiquimod (ALDARA) 5 % external cream Apply a small sized amount to warts or molluscum three times weekly at bedtime.   Wash off after 8 hours.   May use for up to 16 weeks. 12 packet 1     loratadine (CLARITIN) 10 MG tablet Take 10 mg by mouth daily as needed for allergies       No facility-administered encounter medications on file as of 7/27/2021.             O:   NAD, WDWN, Alert & Oriented, Mood & Affect wnl, Vitals stable   Here today alone   /87 (BP Location: Left arm, Patient Position: Sitting, Cuff Size: Adult Regular)   Pulse 67   SpO2 97%    General appearance normal   Vitals stable   Alert, oriented and in no acute distress     Verrucous papules on neck (2 larger, few smaller) and right hand       Eyes: Conjunctivae/lids:Normal     ENT: Lips: normal    MSK:Normal    Pulm: Breathing Normal    Neuro/Psych: Orientation:Alert and Orientedx3 ; Mood/Affect:normal     A/P:  1. Common warts on neck and right hand x 8  LN2:  Treated with LN2 for 5s for 1-2 cycles. Warned risks of blistering, pain, pigment change, scarring, and incomplete resolution.  Advised patient to return if lesions do not completely resolve.  Wound care sheet given.  IL Candin: PGACAC discussed.  Risks including but not limited to injection site reaction,  bruising, no resolution.  All questions answered and entertained to patient s satisfaction.  Informed consent obtained.  IL Candin in concentration of 1 unit/ 0.1 ml was injected ID to wart.  Total injected was  2 units.  Patient tolerated without complications and given wound care instructions, including not to move product around.  Return in 4 weeks for follow-up and possible additional IL Candin.    Lot   Exp: 4/16/2022

## 2021-07-27 NOTE — NURSING NOTE
Chief Complaint   Patient presents with     Wart     f/u        Vitals:    07/27/21 0735   BP: 124/87   BP Location: Left arm   Patient Position: Sitting   Cuff Size: Adult Regular   Pulse: 67   SpO2: 97%     Wt Readings from Last 1 Encounters:   12/14/20 93.1 kg (205 lb 3.2 oz)       Elsa Tan LPN .................7/27/2021

## 2021-07-27 NOTE — LETTER
7/27/2021         RE: Emery Barkley  [address] [facility]  [place] [address]        Dear Colleague,    Thank you for referring your patient, Emery Barkley, to the [facility]. Please see a copy of my visit note below.    Emery Barkley is an extremely pleasant 50 year old year old male patient here today for recheck warts. He had biopsy done LOV which returned as a wart. Patient has no other skin complaints today.  Remainder of the HPI, Meds, PMH, Allergies, FH, and SH was reviewed in chart.    Past Medical History:   Diagnosis Date     Hyperlipidemia        Past Surgical History:   Procedure Laterality Date     ABDOMEN SURGERY  2/7/19    umbilical hernia     HERNIORRHAPHY UMBILICAL N/A 2/7/2019    Procedure: HERNIORRHAPHY UMBILICAL;  Surgeon: Tej Beaulieu DO;  Location: [facility] OR     VASECTOMY          Family History   Problem Relation Age of Onset     C.A.D. Father         MI, chf, first mi in 50s.     Coronary Artery Disease Father      Hyperlipidemia Father      Cancer Sister         brain     Other Cancer Sister         Brain Cancer     Lipids Mother      Hyperlipidemia Mother      Depression Mother        Social History     Socioeconomic History     Marital status:      Spouse name: Not on file     Number of children: Not on file     Years of education: Not on file     Highest education level: Not on file   Occupational History     Not on file   Tobacco Use     Smoking status: Never Smoker     Smokeless tobacco: Never Used   Substance and Sexual Activity     Alcohol use: Yes     Comment: 2 drinks per week     Drug use: No     Sexual activity: Yes     Partners: Female     Birth control/protection: Male Surgical   Other Topics Concern     Parent/sibling w/ CABG, MI or angioplasty before 65F 55M? Yes   Social History Narrative     Not on file     Social Determinants of Health     Financial Resource Strain:      Difficulty of Paying Living Expenses:    Food Insecurity:       Worried About Running Out of Food in the Last Year:      Ran Out of Food in the Last Year:    Transportation Needs:      Lack of Transportation (Medical):      Lack of Transportation (Non-Medical):    Physical Activity:      Days of Exercise per Week:      Minutes of Exercise per Session:    Stress:      Feeling of Stress :    Social Connections:      Frequency of Communication with Friends and Family:      Frequency of Social Gatherings with Friends and Family:      Attends Catholic Services:      Active Member of Clubs or Organizations:      Attends Club or Organization Meetings:      Marital Status:    Intimate Partner Violence:      Fear of Current or Ex-Partner:      Emotionally Abused:      Physically Abused:      Sexually Abused:        Outpatient Encounter Medications as of 7/27/2021   Medication Sig Dispense Refill     atorvastatin (LIPITOR) 20 MG tablet TAKE ONE TABLET BY MOUTH ONCE DAILY 90 tablet 2     imiquimod (ALDARA) 5 % external cream Apply a small sized amount to warts or molluscum three times weekly at bedtime.   Wash off after 8 hours.   May use for up to 16 weeks. 12 packet 1     loratadine (CLARITIN) 10 MG tablet Take 10 mg by mouth daily as needed for allergies       No facility-administered encounter medications on file as of 7/27/2021.             O:   NAD, WDWN, Alert & Oriented, Mood & Affect wnl, Vitals stable   Here today alone   /87 (BP Location: Left arm, Patient Position: Sitting, Cuff Size: Adult Regular)   Pulse 67   SpO2 97%    General appearance normal   Vitals stable   Alert, oriented and in no acute distress     Verrucous papules on neck (2 larger, few smaller) and right hand       Eyes: Conjunctivae/lids:Normal     ENT: Lips: normal    MSK:Normal    Pulm: Breathing Normal    Neuro/Psych: Orientation:Alert and Orientedx3 ; Mood/Affect:normal     A/P:  1. Common warts on neck and right hand x 8  LN2:  Treated with LN2 for 5s for 1-2 cycles. Warned risks of  blistering, pain, pigment change, scarring, and incomplete resolution.  Advised patient to return if lesions do not completely resolve.  Wound care sheet given.  IL Candin: PGACAC discussed.  Risks including but not limited to injection site reaction, bruising, no resolution.  All questions answered and entertained to patient s satisfaction.  Informed consent obtained.  IL Candin in concentration of 1 unit/ 0.1 ml was injected ID to wart.  Total injected was  2 units.  Patient tolerated without complications and given wound care instructions, including not to move product around.  Return in 4 weeks for follow-up and possible additional [place] Candin.    Lot   Exp: 4/16/2022          Again, thank you for allowing me to participate in the care of your patient.        Sincerely,        Yuki King PA-C

## 2021-08-24 ENCOUNTER — OFFICE VISIT (OUTPATIENT)
Dept: DERMATOLOGY | Facility: CLINIC | Age: 51
End: 2021-08-24
Payer: COMMERCIAL

## 2021-08-24 VITALS — RESPIRATION RATE: 20 BRPM | SYSTOLIC BLOOD PRESSURE: 121 MMHG | HEART RATE: 70 BPM | DIASTOLIC BLOOD PRESSURE: 85 MMHG

## 2021-08-24 DIAGNOSIS — B07.8 COMMON WART: Primary | ICD-10-CM

## 2021-08-24 PROCEDURE — 17110 DESTRUCTION B9 LES UP TO 14: CPT | Performed by: PHYSICIAN ASSISTANT

## 2021-08-24 PROCEDURE — 99207 PR DROP WITH A PROCEDURE: CPT | Performed by: PHYSICIAN ASSISTANT

## 2021-08-24 NOTE — LETTER
8/24/2021         RE: Emery Barkley  [address]  [place] [address]        Dear Colleague,    Thank you for referring your patient, Emery Barkley, to the [facility]. Please see a copy of my visit note below.    Chief Complaint   Patient presents with     Wart       Vitals:    08/24/21 0829   BP: 121/85   BP Location: Left arm   Patient Position: Sitting   Cuff Size: Adult Regular   Pulse: 70   Resp: 20     Wt Readings from Last 1 Encounters:   12/14/20 93.1 kg (205 lb 3.2 oz)       8/24/2021    Karissa BLAKE RN   [facility]       Emery Barkley is an extremely pleasant 50 year old year old male patient here today for recheck warts. He notes that warts have been improving, resolving.  Patient has no other skin complaints today.  Remainder of the HPI, Meds, PMH, Allergies, FH, and SH was reviewed in chart.   Past Medical History:   Diagnosis Date     Hyperlipidemia        Past Surgical History:   Procedure Laterality Date     ABDOMEN SURGERY  2/7/19    umbilical hernia     HERNIORRHAPHY UMBILICAL N/A 2/7/2019    Procedure: HERNIORRHAPHY UMBILICAL;  Surgeon: Tej Beaulieu DO;  Location: [facility] OR     VASECTOMY          Family History   Problem Relation Age of Onset     C.A.D. Father         MI, chf, first mi in 50s.     Coronary Artery Disease Father      Hyperlipidemia Father      Cancer Sister         brain     Other Cancer Sister         Brain Cancer     Lipids Mother      Hyperlipidemia Mother      Depression Mother        Social History     Socioeconomic History     Marital status:      Spouse name: Not on file     Number of children: Not on file     Years of education: Not on file     Highest education level: Not on file   Occupational History     Not on file   Tobacco Use     Smoking status: Never Smoker     Smokeless tobacco: Never Used   Substance and Sexual Activity     Alcohol use: Yes     Comment: 2 drinks per week     Drug use: No     Sexual activity:  Yes     Partners: Female     Birth control/protection: Male Surgical   Other Topics Concern     Parent/sibling w/ CABG, MI or angioplasty before 65F 55M? Yes   Social History Narrative     Not on file     Social Determinants of Health     Financial Resource Strain:      Difficulty of Paying Living Expenses:    Food Insecurity:      Worried About Running Out of Food in the Last Year:      Ran Out of Food in the Last Year:    Transportation Needs:      Lack of Transportation (Medical):      Lack of Transportation (Non-Medical):    Physical Activity:      Days of Exercise per Week:      Minutes of Exercise per Session:    Stress:      Feeling of Stress :    Social Connections:      Frequency of Communication with Friends and Family:      Frequency of Social Gatherings with Friends and Family:      Attends Restorationist Services:      Active Member of Clubs or Organizations:      Attends Club or Organization Meetings:      Marital Status:    Intimate Partner Violence:      Fear of Current or Ex-Partner:      Emotionally Abused:      Physically Abused:      Sexually Abused:        Outpatient Encounter Medications as of 8/24/2021   Medication Sig Dispense Refill     atorvastatin (LIPITOR) 20 MG tablet TAKE ONE TABLET BY MOUTH ONCE DAILY 90 tablet 2     imiquimod (ALDARA) 5 % external cream Apply a small sized amount to warts or molluscum three times weekly at bedtime.   Wash off after 8 hours.   May use for up to 16 weeks. 12 packet 1     loratadine (CLARITIN) 10 MG tablet Take 10 mg by mouth daily as needed for allergies       No facility-administered encounter medications on file as of 8/24/2021.             O:   NAD, WDWN, Alert & Oriented, Mood & Affect wnl, Vitals stable   Here today alone   /85 (BP Location: Left arm, Patient Position: Sitting, Cuff Size: Adult Regular)   Pulse 70   Resp 20    General appearance normal   Vitals stable   Alert, oriented and in no acute distress   Verrucous papules on neck and  right hand flat topped papule       Eyes: Conjunctivae/lids:Normal     ENT: Lips: normal    MSK:Normal    Pulm: Breathing Normal    Neuro/Psych: Orientation:Alert and Orientedx3 ; Mood/Affect:normal   A/P:  1. Common warts x 2 on neck and right hand x 1  LN2:  Treated with LN2 for 5s for 1-2 cycles. Warned risks of blistering, pain, pigment change, scarring, and incomplete resolution.  Advised patient to return if lesions do not completely resolve.  Wound care sheet given.  IL Candin: PGACAC discussed.  Risks including but not limited to injection site reaction, bruising, no resolution.  All questions answered and entertained to patient s satisfaction.  Informed consent obtained.  IL Candin in concentration of 1 unit/ 0.1 ml was injected ID to wart.  Total injected was  2 units.  Patient tolerated without complications and given wound care instructions, including not to move product around.  Return in 4 weeks for follow-up and possible additional IL Candin.    Lot   Exp: 8/06/2022        Again, thank you for allowing me to participate in the care of your patient.        Sincerely,        Yuki King PA-C

## 2021-08-24 NOTE — PROGRESS NOTES
Emery Barkley is an extremely pleasant 50 year old year old male patient here today for recheck warts. He notes that warts have been improving, resolving.  Patient has no other skin complaints today.  Remainder of the HPI, Meds, PMH, Allergies, FH, and SH was reviewed in chart.   Past Medical History:   Diagnosis Date     Hyperlipidemia        Past Surgical History:   Procedure Laterality Date     ABDOMEN SURGERY  2/7/19    umbilical hernia     HERNIORRHAPHY UMBILICAL N/A 2/7/2019    Procedure: HERNIORRHAPHY UMBILICAL;  Surgeon: Tej Beaulieu DO;  Location: [facility] OR     VASECTOMY          Family History   Problem Relation Age of Onset     C.A.D. Father         MI, chf, first mi in 50s.     Coronary Artery Disease Father      Hyperlipidemia Father      Cancer Sister         brain     Other Cancer Sister         Brain Cancer     Lipids Mother      Hyperlipidemia Mother      Depression Mother        Social History     Socioeconomic History     Marital status:      Spouse name: Not on file     Number of children: Not on file     Years of education: Not on file     Highest education level: Not on file   Occupational History     Not on file   Tobacco Use     Smoking status: Never Smoker     Smokeless tobacco: Never Used   Substance and Sexual Activity     Alcohol use: Yes     Comment: 2 drinks per week     Drug use: No     Sexual activity: Yes     Partners: Female     Birth control/protection: Male Surgical   Other Topics Concern     Parent/sibling w/ CABG, MI or angioplasty before 65F 55M? Yes   Social History Narrative     Not on file     Social Determinants of Health     Financial Resource Strain:      Difficulty of Paying Living Expenses:    Food Insecurity:      Worried About Running Out of Food in the Last Year:      Ran Out of Food in the Last Year:    Transportation Needs:      Lack of Transportation (Medical):      Lack of Transportation (Non-Medical):    Physical Activity:      Days of Exercise  per Week:      Minutes of Exercise per Session:    Stress:      Feeling of Stress :    Social Connections:      Frequency of Communication with Friends and Family:      Frequency of Social Gatherings with Friends and Family:      Attends Orthodox Services:      Active Member of Clubs or Organizations:      Attends Club or Organization Meetings:      Marital Status:    Intimate Partner Violence:      Fear of Current or Ex-Partner:      Emotionally Abused:      Physically Abused:      Sexually Abused:        Outpatient Encounter Medications as of 8/24/2021   Medication Sig Dispense Refill     atorvastatin (LIPITOR) 20 MG tablet TAKE ONE TABLET BY MOUTH ONCE DAILY 90 tablet 2     imiquimod (ALDARA) 5 % external cream Apply a small sized amount to warts or molluscum three times weekly at bedtime.   Wash off after 8 hours.   May use for up to 16 weeks. 12 packet 1     loratadine (CLARITIN) 10 MG tablet Take 10 mg by mouth daily as needed for allergies       No facility-administered encounter medications on file as of 8/24/2021.             O:   NAD, WDWN, Alert & Oriented, Mood & Affect wnl, Vitals stable   Here today alone   /85 (BP Location: Left arm, Patient Position: Sitting, Cuff Size: Adult Regular)   Pulse 70   Resp 20    General appearance normal   Vitals stable   Alert, oriented and in no acute distress   Verrucous papules on neck and right hand flat topped papule       Eyes: Conjunctivae/lids:Normal     ENT: Lips: normal    MSK:Normal    Pulm: Breathing Normal    Neuro/Psych: Orientation:Alert and Orientedx3 ; Mood/Affect:normal   A/P:  1. Common warts x 2 on neck and right hand x 1  LN2:  Treated with LN2 for 5s for 1-2 cycles. Warned risks of blistering, pain, pigment change, scarring, and incomplete resolution.  Advised patient to return if lesions do not completely resolve.  Wound care sheet given.  IL Candin: PGACAC discussed.  Risks including but not limited to injection site reaction, bruising,  no resolution.  All questions answered and entertained to patient s satisfaction.  Informed consent obtained.  IL Candin in concentration of 1 unit/ 0.1 ml was injected ID to wart.  Total injected was  2 units.  Patient tolerated without complications and given wound care instructions, including not to move product around.  Return in 4 weeks for follow-up and possible additional IL Candin.    Lot   Exp: 8/06/2022

## 2021-08-24 NOTE — PROGRESS NOTES
Chief Complaint   Patient presents with     Wart       Vitals:    08/24/21 0829   BP: 121/85   BP Location: Left arm   Patient Position: Sitting   Cuff Size: Adult Regular   Pulse: 70   Resp: 20     Wt Readings from Last 1 Encounters:   12/14/20 93.1 kg (205 lb 3.2 oz)       8/24/2021    Karissa BLAKE RN   Specialty Clinics

## 2021-08-25 ENCOUNTER — DOCUMENTATION ONLY (OUTPATIENT)
Dept: LAB | Facility: CLINIC | Age: 51
End: 2021-08-25

## 2021-08-25 DIAGNOSIS — E78.5 HYPERLIPIDEMIA LDL GOAL <100: Primary | ICD-10-CM

## 2021-08-25 NOTE — PROGRESS NOTES
Patient has lab appointment on 09/03/2021 for fasting lipids. Please place orders for lab appointment.  Thank you lab

## 2021-09-01 DIAGNOSIS — E78.5 HYPERLIPIDEMIA LDL GOAL <100: ICD-10-CM

## 2021-09-01 DIAGNOSIS — Z82.49 FAMILY HISTORY OF CORONARY ARTERY DISEASE: ICD-10-CM

## 2021-09-01 RX ORDER — ATORVASTATIN CALCIUM 20 MG/1
TABLET, FILM COATED ORAL
Qty: 90 TABLET | Refills: 0 | Status: SHIPPED | OUTPATIENT
Start: 2021-09-01 | End: 2021-09-22

## 2021-09-03 ENCOUNTER — LAB (OUTPATIENT)
Dept: LAB | Facility: CLINIC | Age: 51
End: 2021-09-03
Payer: COMMERCIAL

## 2021-09-03 DIAGNOSIS — E78.5 HYPERLIPIDEMIA LDL GOAL <100: ICD-10-CM

## 2021-09-03 LAB
CHOLEST SERPL-MCNC: 110 MG/DL
FASTING STATUS PATIENT QL REPORTED: YES
HDLC SERPL-MCNC: 47 MG/DL
LDLC SERPL CALC-MCNC: 48 MG/DL
NONHDLC SERPL-MCNC: 63 MG/DL
TRIGL SERPL-MCNC: 73 MG/DL

## 2021-09-03 PROCEDURE — 36415 COLL VENOUS BLD VENIPUNCTURE: CPT

## 2021-09-03 PROCEDURE — 80061 LIPID PANEL: CPT

## 2021-09-19 ASSESSMENT — ENCOUNTER SYMPTOMS
PALPITATIONS: 0
PARESTHESIAS: 0
DIZZINESS: 0
SORE THROAT: 0
HEMATURIA: 0
CHILLS: 0
NERVOUS/ANXIOUS: 0
WEAKNESS: 0
HEARTBURN: 0
ABDOMINAL PAIN: 0
ARTHRALGIAS: 0
HEADACHES: 0
SHORTNESS OF BREATH: 0
DYSURIA: 0
FEVER: 0
FREQUENCY: 0
DIARRHEA: 0
MYALGIAS: 0
JOINT SWELLING: 0
COUGH: 0
CONSTIPATION: 0
HEMATOCHEZIA: 0
NAUSEA: 0
EYE PAIN: 0

## 2021-09-22 ENCOUNTER — TELEPHONE (OUTPATIENT)
Dept: FAMILY MEDICINE | Facility: CLINIC | Age: 51
End: 2021-09-22

## 2021-09-22 ENCOUNTER — OFFICE VISIT (OUTPATIENT)
Dept: FAMILY MEDICINE | Facility: CLINIC | Age: 51
End: 2021-09-22
Payer: COMMERCIAL

## 2021-09-22 VITALS
HEART RATE: 82 BPM | SYSTOLIC BLOOD PRESSURE: 124 MMHG | RESPIRATION RATE: 14 BRPM | WEIGHT: 182 LBS | DIASTOLIC BLOOD PRESSURE: 86 MMHG | BODY MASS INDEX: 26.05 KG/M2 | OXYGEN SATURATION: 98 % | HEIGHT: 70 IN | TEMPERATURE: 97.4 F

## 2021-09-22 DIAGNOSIS — Z12.5 SCREENING FOR PROSTATE CANCER: ICD-10-CM

## 2021-09-22 DIAGNOSIS — Z12.11 SCREEN FOR COLON CANCER: ICD-10-CM

## 2021-09-22 DIAGNOSIS — Z00.00 ROUTINE GENERAL MEDICAL EXAMINATION AT A HEALTH CARE FACILITY: Primary | ICD-10-CM

## 2021-09-22 DIAGNOSIS — E78.5 HYPERLIPIDEMIA LDL GOAL <100: ICD-10-CM

## 2021-09-22 DIAGNOSIS — Z82.49 FAMILY HISTORY OF CORONARY ARTERY DISEASE: ICD-10-CM

## 2021-09-22 DIAGNOSIS — Z13.1 SCREENING FOR DIABETES MELLITUS: ICD-10-CM

## 2021-09-22 LAB
FASTING STATUS PATIENT QL REPORTED: NO
GLUCOSE BLD-MCNC: 85 MG/DL (ref 70–99)
PSA SERPL-MCNC: 0.63 UG/L (ref 0–4)

## 2021-09-22 PROCEDURE — 36415 COLL VENOUS BLD VENIPUNCTURE: CPT | Performed by: FAMILY MEDICINE

## 2021-09-22 PROCEDURE — 82947 ASSAY GLUCOSE BLOOD QUANT: CPT | Performed by: FAMILY MEDICINE

## 2021-09-22 PROCEDURE — 99213 OFFICE O/P EST LOW 20 MIN: CPT | Mod: 25 | Performed by: FAMILY MEDICINE

## 2021-09-22 PROCEDURE — 99396 PREV VISIT EST AGE 40-64: CPT | Performed by: FAMILY MEDICINE

## 2021-09-22 PROCEDURE — G0103 PSA SCREENING: HCPCS | Performed by: FAMILY MEDICINE

## 2021-09-22 RX ORDER — ATORVASTATIN CALCIUM 20 MG/1
20 TABLET, FILM COATED ORAL DAILY
Qty: 90 TABLET | Refills: 3 | Status: SHIPPED | OUTPATIENT
Start: 2021-09-22 | End: 2021-12-06

## 2021-09-22 ASSESSMENT — ENCOUNTER SYMPTOMS
HEADACHES: 0
NAUSEA: 0
SORE THROAT: 0
HEMATURIA: 0
HEMATOCHEZIA: 0
CONSTIPATION: 0
PARESTHESIAS: 0
WEAKNESS: 0
COUGH: 0
MYALGIAS: 0
EYE PAIN: 0
JOINT SWELLING: 0
NERVOUS/ANXIOUS: 0
DIARRHEA: 0
CHILLS: 0
FREQUENCY: 0
ARTHRALGIAS: 0
DIZZINESS: 0
PALPITATIONS: 0
SHORTNESS OF BREATH: 0
ABDOMINAL PAIN: 0
FEVER: 0
HEARTBURN: 0
DYSURIA: 0

## 2021-09-22 ASSESSMENT — MIFFLIN-ST. JEOR: SCORE: 1691.8

## 2021-09-22 NOTE — PROGRESS NOTES
SUBJECTIVE:   CC: Emery Barkley is an 51 year old male who presents for preventative health visit.     Patient has been advised of split billing requirements and indicates understanding: Yes  Healthy Habits:     Getting at least 3 servings of Calcium per day:  Yes    Bi-annual eye exam:  Yes    Dental care twice a year:  Yes    Sleep apnea or symptoms of sleep apnea:  None    Diet:  Regular (no restrictions)    Frequency of exercise:  2-3 days/week    Duration of exercise:  15-30 minutes    Taking medications regularly:  No    Barriers to taking medications:  None    Medication side effects:  None    PHQ-2 Total Score: 0    Additional concerns today:  Yes (need form filled for biometric. Need atorvastatin refilled. )  Discuss colonoscopy.   He also will need to have psa checked.  Turned age 51.        Hyperlipidemia Follow-Up      Are you regularly taking any medication or supplement to lower your cholesterol?   Yes- atorvastatin     Are you having muscle aches or other side effects that you think could be caused by your cholesterol lowering medication?  No      Today's PHQ-2 Score:   PHQ-2 ( 1999 Pfizer) 9/19/2021   Q1: Little interest or pleasure in doing things 0   Q2: Feeling down, depressed or hopeless 0   PHQ-2 Score 0   Q1: Little interest or pleasure in doing things Not at all   Q2: Feeling down, depressed or hopeless Not at all   PHQ-2 Score 0       Abuse: Current or Past(Physical, Sexual or Emotional)- No  Do you feel safe in your environment? Yes    Have you ever done Advance Care Planning? (For example, a Health Directive, POLST, or a discussion with a medical provider or your loved ones about your wishes): No, advance care planning information given to patient to review.  Patient declined advance care planning discussion at this time.    Social History     Tobacco Use     Smoking status: Never Smoker     Smokeless tobacco: Never Used   Substance Use Topics     Alcohol use: Yes     Comment: 2 drinks per  "week     If you drink alcohol do you typically have >3 drinks per day or >7 drinks per week? No    Alcohol Use 9/22/2021   Prescreen: >3 drinks/day or >7 drinks/week? -   Prescreen: >3 drinks/day or >7 drinks/week? No       Last PSA: No results found for: PSA    Reviewed orders with patient. Reviewed health maintenance and updated orders accordingly - Yes      Reviewed and updated as needed this visit by clinical staff  Tobacco  Allergies  Meds   Med Hx  Surg Hx  Fam Hx  Soc Hx        Reviewed and updated as needed this visit by Provider                    Review of Systems   Constitutional: Negative for chills and fever.   HENT: Negative for congestion, ear pain, hearing loss and sore throat.    Eyes: Negative for pain and visual disturbance.   Respiratory: Negative for cough and shortness of breath.    Cardiovascular: Negative for chest pain, palpitations and peripheral edema.   Gastrointestinal: Negative for abdominal pain, constipation, diarrhea, heartburn, hematochezia and nausea.   Genitourinary: Negative for discharge, dysuria, frequency, genital sores, hematuria, impotence and urgency.   Musculoskeletal: Negative for arthralgias, joint swelling and myalgias.   Skin: Negative for rash.   Neurological: Negative for dizziness, weakness, headaches and paresthesias.   Psychiatric/Behavioral: Negative for mood changes. The patient is not nervous/anxious.          OBJECTIVE:   /86   Pulse 82   Temp 97.4  F (36.3  C) (Tympanic)   Resp 14   Ht 1.778 m (5' 10\")   Wt 82.6 kg (182 lb)   SpO2 98%   BMI 26.11 kg/m      Physical Exam  GENERAL: healthy, alert and no distress  EYES: Eyes grossly normal to inspection, PERRL and conjunctivae and sclerae normal  HENT: ear canals and TM's normal, nose and mouth without ulcers or lesions  NECK: no adenopathy, no asymmetry, masses, or scars and thyroid normal to palpation  RESP: lungs clear to auscultation - no rales, rhonchi or wheezes  CV: regular rate and " rhythm, normal S1 S2, no S3 or S4, no murmur, click or rub, no peripheral edema and peripheral pulses strong  ABDOMEN: soft, nontender, no hepatosplenomegaly, no masses and bowel sounds normal   (male): normal male genitalia without lesions or urethral discharge, no hernia  MS: no gross musculoskeletal defects noted, no edema  SKIN: no suspicious lesions or rashes  NEURO: Normal strength and tone, mentation intact and speech normal  PSYCH: mentation appears normal, affect normal/bright        ASSESSMENT/PLAN:       ICD-10-CM    1. Routine general medical examination at a health care facility  Z00.00    2. Hyperlipidemia LDL goal <100  E78.5 atorvastatin (LIPITOR) 20 MG tablet   3. Screen for colon cancer  Z12.11 Adult Gastro Ref - Procedure Only   4. Family history of coronary artery disease  Z82.49 atorvastatin (LIPITOR) 20 MG tablet   5. Screening for prostate cancer  Z12.5 Prostate Specific Antigen Screen     Prostate Specific Antigen Screen   6. Screening for diabetes mellitus  Z13.1 Glucose     Glucose     (Z00.00) Routine general medical examination at a health care facility  (primary encounter diagnosis)  Comment: Discussed healthy lifestyle and preventative cares.    Plan:     (E78.5) Hyperlipidemia LDL goal <100  Comment: controlled, reviewed lab, no side effects, refilled med  Plan: atorvastatin (LIPITOR) 20 MG tablet,         OFFICE/OUTPT VISIT,ESTLEVL III            (Z12.11) Screen for colon cancer  Comment:   Plan: Adult Gastro Ref - Procedure Only            (Z82.49) Family history of coronary artery disease  Comment:   Plan: atorvastatin (LIPITOR) 20 MG tablet            (Z12.5) Screening for prostate cancer  Comment:   Plan: Prostate Specific Antigen Screen            (Z13.1) Screening for diabetes mellitus  Comment:   Plan: Glucose              Patient has been advised of split billing requirements and indicates understanding: written in avs  COUNSELING:   Reviewed preventive health counseling,  "as reflected in patient instructions       Regular exercise       Healthy diet/nutrition       Colon cancer screening       Prostate cancer screening    Estimated body mass index is 26.11 kg/m  as calculated from the following:    Height as of this encounter: 1.778 m (5' 10\").    Weight as of this encounter: 82.6 kg (182 lb).     Weight management plan: diet    He reports that he has never smoked. He has never used smokeless tobacco.      Counseling Resources:  ATP IV Guidelines  Pooled Cohorts Equation Calculator  FRAX Risk Assessment  ICSI Preventive Guidelines  Dietary Guidelines for Americans, 2010  USDA's MyPlate  ASA Prophylaxis  Lung CA Screening    Devyn Cox MD  [facility]  "

## 2021-09-22 NOTE — PATIENT INSTRUCTIONS
Please go to lab.    I refilled your medication for the year.    I will complete the form and mail it back to you.    Please be aware that there will be an additional charge during your preventative visit due to either a new diagnosis and/or chronic disease management.    Preventative visits screen for diseases prior to they occur.  They do not cover for any new diagnosis or chronic disease management which would include medication refills, labs etc.    If you have questions regarding your coverage please check with your insurance provider.  At [facility] we need to code correctly to be in compliance with all insurance companies.          Thank you for choosing [facility].  You may be receiving an email and/or telephone survey request from [facility] Customer Experience regarding your visit today.  Please take a few minutes to respond to the survey to let us know how we are doing.      If you have questions or concerns, please contact us via [employer] or you can contact your care team at [phone] option [phone].    Our Clinic hours are:  Monday - Thursday 7am-6pm  Friday 7am-5pm    The [place] outpatient lab hours are:  Monday - Friday 7am-4:30pm    Appointments are required, call [phone]    If you have clinical questions after hours or would like to schedule an appointment,  call the clinic at [phone].    Preventive Health Recommendations  Male Ages 50 - 64    Yearly exam:             See your health care provider every year in order to  o   Review health changes.   o   Discuss preventive care.    o   Review your medicines if your doctor has prescribed any.     Have a cholesterol test every 5 years, or more frequently if you are at risk for high cholesterol/heart disease.     Have a diabetes test (fasting glucose) every three years. If you are at risk for diabetes, you should have this test more often.     Have a colonoscopy at age 50, or have a yearly FIT test (stool test). These exams will check for  colon cancer.      Talk with your health care provider about whether or not a prostate cancer screening test (PSA) is right for you.    You should be tested each year for STDs (sexually transmitted diseases), if you re at risk.     Shots: Get a flu shot each year. Get a tetanus shot every 10 years.     Nutrition:    Eat at least 5 servings of fruits and vegetables daily.     Eat whole-grain bread, whole-wheat pasta and brown rice instead of white grains and rice.     Get adequate Calcium and Vitamin D.     Lifestyle    Exercise for at least 150 minutes a week (30 minutes a day, 5 days a week). This will help you control your weight and prevent disease.     Limit alcohol to one drink per day.     No smoking.     Wear sunscreen to prevent skin cancer.     See your dentist every six months for an exam and cleaning.     See your eye doctor every 1 to 2 years.

## 2021-09-23 NOTE — TELEPHONE ENCOUNTER
[employer] biometric form completed and signed at appointment.  Original mailed to patient. Copy sent to scan and copy placed in basket.

## 2021-09-28 ENCOUNTER — ALLIED HEALTH/NURSE VISIT (OUTPATIENT)
Dept: FAMILY MEDICINE | Facility: CLINIC | Age: 51
End: 2021-09-28
Payer: COMMERCIAL

## 2021-09-28 DIAGNOSIS — Z23 ENCOUNTER FOR IMMUNIZATION: Primary | ICD-10-CM

## 2021-09-28 PROCEDURE — 99207 PR NO CHARGE NURSE ONLY: CPT

## 2021-09-28 PROCEDURE — 90471 IMMUNIZATION ADMIN: CPT

## 2021-09-28 PROCEDURE — 90750 HZV VACC RECOMBINANT IM: CPT

## 2021-09-28 NOTE — PROGRESS NOTES
Prior to immunization administration, verified patients identity using patient s name and date of birth. Please see Immunization Activity for additional information.     Screening Questionnaire for Adult Immunization    Are you sick today?   No   Do you have allergies to medications, food, a vaccine component or latex?   No   Have you ever had a serious reaction after receiving a vaccination?   No   Do you have a long-term health problem with heart, lung, kidney, or metabolic disease (e.g., diabetes), asthma, a blood disorder, no spleen, complement component deficiency, a cochlear implant, or a spinal fluid leak?  Are you on long-term aspirin therapy?   No   Do you have cancer, leukemia, HIV/AIDS, or any other immune system problem?   No   Do you have a parent, brother, or sister with an immune system problem?   No   In the past 3 months, have you taken medications that affect  your immune system, such as prednisone, other steroids, or anticancer drugs; drugs for the treatment of rheumatoid arthritis, Crohn s disease, or psoriasis; or have you had radiation treatments?   No   Have you had a seizure, or a brain or other nervous system problem?   No   During the past year, have you received a transfusion of blood or blood    products, or been given immune (gamma) globulin or antiviral drug?   No   For women: Are you pregnant or is there a chance you could become       pregnant during the next month?   No   Have you received any vaccinations in the past 4 weeks?   No     Immunization questionnaire answers were all negative.        Per orders of Dr. Choi, injection of Shingrix given by Mila Sinha CMA. Patient instructed to remain in clinic for 15 minutes afterwards, and to report any adverse reaction to me immediately.       Screening performed by Mila Sinha CMA on 9/28/2021 at 3:37 PM.

## 2021-11-18 ENCOUNTER — NURSE TRIAGE (OUTPATIENT)
Dept: NURSING | Facility: CLINIC | Age: 51
End: 2021-11-18
Payer: COMMERCIAL

## 2021-11-18 NOTE — TELEPHONE ENCOUNTER
Patient is calling with questions on covid vaccine and Shingles vaccine.    COVID 19 Nurse Triage Plan/Patient Instructions    Please be aware that novel coronavirus (COVID-19) may be circulating in the community. If you develop symptoms such as fever, cough, or SOB or if you have concerns about the presence of another infection including coronavirus (COVID-19), please contact your health care provider or visit [email or web address].     Disposition/Instructions    Home care recommended. Follow home care protocol based instructions.    Thank you for taking steps to prevent the spread of this virus.  o Limit your contact with others.  o Wear a simple mask to cover your cough.  o Wash your hands well and often.    Resources    [facility]: About COVID-19: [email or web address]    CDC: What to Do If You're Sick: www.cdc.gov/coronavirus/2019-ncov/about/steps-when-sick.html    CDC: Ending Home Isolation: www.cdc.gov/coronavirus/2019-ncov/hcp/disposition-in-home-patients.html     CDC: Caring for Someone: www.cdc.gov/coronavirus/2019-ncov/if-you-are-sick/care-for-someone.html     [facility]: Interim Guidance for Hospital Discharge to Home: www.[facility].[place]/diseases/coronavirus/hcp/hospdischarge.pdf    [facility] clinical trials (COVID-19 research studies): [email or web address].[facility]-clinical-trials     Below are the COVID-19 hotlines at the [facility] ([facility]). Interpreters are available.   o For health questions: Call 740-107-6270 or 1-608.173.5111 (7 a.m. to 7 p.m.)  o For questions about schools and childcare: Call 032-067-0787 or 1-296.624.1531 (7 a.m. to 7 p.m.)

## 2021-11-22 ENCOUNTER — MYC MEDICAL ADVICE (OUTPATIENT)
Dept: FAMILY MEDICINE | Facility: CLINIC | Age: 51
End: 2021-11-22
Payer: COMMERCIAL

## 2021-11-29 ENCOUNTER — ALLIED HEALTH/NURSE VISIT (OUTPATIENT)
Dept: FAMILY MEDICINE | Facility: CLINIC | Age: 51
End: 2021-11-29
Payer: COMMERCIAL

## 2021-11-29 DIAGNOSIS — Z23 NEED FOR VACCINATION: Primary | ICD-10-CM

## 2021-11-29 PROCEDURE — 99207 PR NO CHARGE NURSE ONLY: CPT

## 2021-11-29 PROCEDURE — 90471 IMMUNIZATION ADMIN: CPT

## 2021-11-29 PROCEDURE — 90750 HZV VACC RECOMBINANT IM: CPT

## 2021-11-29 NOTE — NURSING NOTE
Chief Complaint   Patient presents with     Imm/Inj     shingles - patient stated insurance states it is covered in clinic .      Prior to immunization administration, verified patients identity using patient s name and date of birth. Please see Immunization Activity for additional information.     Screening Questionnaire for Adult Immunization    Are you sick today?   No   Do you have allergies to medications, food, a vaccine component or latex?   No   Have you ever had a serious reaction after receiving a vaccination?   No   Do you have a long-term health problem with heart, lung, kidney, or metabolic disease (e.g., diabetes), asthma, a blood disorder, no spleen, complement component deficiency, a cochlear implant, or a spinal fluid leak?  Are you on long-term aspirin therapy?   No   Do you have cancer, leukemia, HIV/AIDS, or any other immune system problem?   No   Do you have a parent, brother, or sister with an immune system problem?   No   In the past 3 months, have you taken medications that affect  your immune system, such as prednisone, other steroids, or anticancer drugs; drugs for the treatment of rheumatoid arthritis, Crohn s disease, or psoriasis; or have you had radiation treatments?   No   Have you had a seizure, or a brain or other nervous system problem?   No   During the past year, have you received a transfusion of blood or blood    products, or been given immune (gamma) globulin or antiviral drug?   No   For women: Are you pregnant or is there a chance you could become       pregnant during the next month?   No   Have you received any vaccinations in the past 4 weeks?   Yes     Immunization questionnaire was positive for at least one answer.  Ok per guidelines for shingrix .         Patient instructed to remain in clinic for 15 minutes afterwards, and to report any adverse reaction to me immediately.       Screening performed by Zay Bateman CMA on 11/29/2021 at 3:28 PM.

## 2021-12-01 DIAGNOSIS — Z82.49 FAMILY HISTORY OF CORONARY ARTERY DISEASE: ICD-10-CM

## 2021-12-01 DIAGNOSIS — E78.5 HYPERLIPIDEMIA LDL GOAL <100: ICD-10-CM

## 2021-12-06 RX ORDER — ATORVASTATIN CALCIUM 20 MG/1
TABLET, FILM COATED ORAL
Qty: 90 TABLET | Refills: 2 | Status: SHIPPED | OUTPATIENT
Start: 2021-12-06 | End: 2022-09-02

## 2021-12-17 ENCOUNTER — TRANSFERRED RECORDS (OUTPATIENT)
Dept: FAMILY MEDICINE | Facility: CLINIC | Age: 51
End: 2021-12-17

## 2021-12-17 PROCEDURE — 88305 TISSUE EXAM BY PATHOLOGIST: CPT | Performed by: PATHOLOGY

## 2021-12-20 ENCOUNTER — LAB REQUISITION (OUTPATIENT)
Dept: LAB | Facility: CLINIC | Age: 51
End: 2021-12-20

## 2021-12-20 DIAGNOSIS — K57.30 DIVERTICULOSIS OF LARGE INTESTINE WITHOUT PERFORATION OR ABSCESS WITHOUT BLEEDING: ICD-10-CM

## 2021-12-20 DIAGNOSIS — Z12.11 ENCOUNTER FOR SCREENING FOR MALIGNANT NEOPLASM OF COLON: ICD-10-CM

## 2021-12-20 DIAGNOSIS — K62.1 RECTAL POLYP: ICD-10-CM

## 2021-12-21 LAB
PATH REPORT.COMMENTS IMP SPEC: NORMAL
PATH REPORT.COMMENTS IMP SPEC: NORMAL
PATH REPORT.FINAL DX SPEC: NORMAL
PATH REPORT.GROSS SPEC: NORMAL
PATH REPORT.MICROSCOPIC SPEC OTHER STN: NORMAL
PATH REPORT.RELEVANT HX SPEC: NORMAL
PHOTO IMAGE: NORMAL

## 2021-12-23 ENCOUNTER — OFFICE VISIT (OUTPATIENT)
Dept: DERMATOLOGY | Facility: CLINIC | Age: 51
End: 2021-12-23
Payer: COMMERCIAL

## 2021-12-23 VITALS — SYSTOLIC BLOOD PRESSURE: 153 MMHG | HEART RATE: 72 BPM | OXYGEN SATURATION: 99 % | DIASTOLIC BLOOD PRESSURE: 91 MMHG

## 2021-12-23 DIAGNOSIS — B07.8 COMMON WART: Primary | ICD-10-CM

## 2021-12-23 PROCEDURE — 17110 DESTRUCTION B9 LES UP TO 14: CPT | Performed by: PHYSICIAN ASSISTANT

## 2021-12-23 PROCEDURE — 99207 PR DROP WITH A PROCEDURE: CPT | Performed by: PHYSICIAN ASSISTANT

## 2021-12-23 NOTE — LETTER
12/23/2021         RE: Emery Barkley  [address]  [place] [address]        Dear Colleague,    Thank you for referring your patient, Emery Barkley, to the [facility]. Please see a copy of my visit note below.    Emery Barkley is an extremely pleasant 51 year old year old male patient here today for recheck warts. He notes it was clear for a while but then returned. He would like to do another treatment.  Patient has no other skin complaints today.  Remainder of the HPI, Meds, PMH, Allergies, FH, and SH was reviewed in chart.    Past Medical History:   Diagnosis Date     Hyperlipidemia        Past Surgical History:   Procedure Laterality Date     ABDOMEN SURGERY  2/7/19    umbilical hernia     HERNIORRHAPHY UMBILICAL N/A 2/7/2019    Procedure: HERNIORRHAPHY UMBILICAL;  Surgeon: Tej Beaulieu DO;  Location: [facility]     VASECTOMY          Family History   Problem Relation Age of Onset     C.A.D. Father         MI, chf, first mi in 50s.     Coronary Artery Disease Father      Hyperlipidemia Father      Cancer Sister         brain     Other Cancer Sister         Brain Cancer     Lipids Mother      Hyperlipidemia Mother      Depression Mother        Social History     Socioeconomic History     Marital status:      Spouse name: Not on file     Number of children: Not on file     Years of education: Not on file     Highest education level: Not on file   Occupational History     Not on file   Tobacco Use     Smoking status: Never Smoker     Smokeless tobacco: Never Used   Vaping Use     Vaping Use: Never used   Substance and Sexual Activity     Alcohol use: Yes     Comment: 2 drinks per week     Drug use: No     Sexual activity: Yes     Partners: Female     Birth control/protection: Male Surgical   Other Topics Concern     Parent/sibling w/ CABG, MI or angioplasty before 65F 55M? Yes   Social History Narrative     Not on file     Social Determinants of Health      Financial Resource Strain: Not on file   Food Insecurity: Not on file   Transportation Needs: Not on file   Physical Activity: Not on file   Stress: Not on file   Social Connections: Not on file   Intimate Partner Violence: Not on file   Housing Stability: Not on file       Outpatient Encounter Medications as of 12/23/2021   Medication Sig Dispense Refill     atorvastatin (LIPITOR) 20 MG tablet TAKE ONE TABLET BY MOUTH ONCE DAILY 90 tablet 2     loratadine (CLARITIN) 10 MG tablet Take 10 mg by mouth daily as needed for allergies       No facility-administered encounter medications on file as of 12/23/2021.             O:   NAD, WDWN, Alert & Oriented, Mood & Affect wnl, Vitals stable   Here today alone   BP (!) 153/91 (BP Location: Right arm, Patient Position: Sitting, Cuff Size: Adult Regular)   Pulse 72   SpO2 99%    General appearance normal   Vitals stable   Alert, oriented and in no acute distress     Verrucous papules on neck and left nostril     Eyes: Conjunctivae/lids:Normal     ENT: Lips: normal    MSK:Normal    Pulm: Breathing Normal     Neuro/Psych: Orientation:Alert and Orientedx3 ; Mood/Affect:normal     A/P:  1. Common warts x 10 on neck and left nostril x 2   LN2:  Treated with LN2 for 5s for 1-2 cycles. Warned risks of blistering, pain, pigment change, scarring, and incomplete resolution.  Advised patient to return if lesions do not completely resolve.  Wound care sheet given.  IL Candin: PGACAC discussed.  Risks including but not limited to injection site reaction, bruising, no resolution.  All questions answered and entertained to patient s satisfaction.  Informed consent obtained.  IL Candin in concentration of 1 unit/ 0.1 ml was injected ID to wart.  Total injected was  4 units.  Patient tolerated without complications and given wound care instructions, including not to move product around.  Return in 4 weeks for follow-up and possible additional IL Candin.    Lot   Exp:  5/13/2024  Emery to follow up with Primary Care provider regarding elevated blood pressure.        Again, thank you for allowing me to participate in the care of your patient.        Sincerely,        Yuki King PA-C

## 2021-12-23 NOTE — NURSING NOTE
Chief Complaint   Patient presents with     Wart     f/u        Vitals:    12/23/21 1432   BP: (!) 153/91   BP Location: Right arm   Patient Position: Sitting   Cuff Size: Adult Regular   Pulse: 72   SpO2: 99%     Wt Readings from Last 1 Encounters:   09/22/21 82.6 kg (182 lb)       Elsa Tan LPN .................12/23/2021

## 2021-12-24 NOTE — PROGRESS NOTES
Emery Barkley is an extremely pleasant 51 year old year old male patient here today for recheck warts. He notes it was clear for a while but then returned. He would like to do another treatment.  Patient has no other skin complaints today.  Remainder of the HPI, Meds, PMH, Allergies, FH, and SH was reviewed in chart.    Past Medical History:   Diagnosis Date     Hyperlipidemia        Past Surgical History:   Procedure Laterality Date     ABDOMEN SURGERY  2/7/19    umbilical hernia     HERNIORRHAPHY UMBILICAL N/A 2/7/2019    Procedure: HERNIORRHAPHY UMBILICAL;  Surgeon: Tej Beaulieu DO;  Location: [facility]     VASECTOMY          Family History   Problem Relation Age of Onset     C.A.D. Father         MI, chf, first mi in 50s.     Coronary Artery Disease Father      Hyperlipidemia Father      Cancer Sister         brain     Other Cancer Sister         Brain Cancer     Lipids Mother      Hyperlipidemia Mother      Depression Mother        Social History     Socioeconomic History     Marital status:      Spouse name: Not on file     Number of children: Not on file     Years of education: Not on file     Highest education level: Not on file   Occupational History     Not on file   Tobacco Use     Smoking status: Never Smoker     Smokeless tobacco: Never Used   Vaping Use     Vaping Use: Never used   Substance and Sexual Activity     Alcohol use: Yes     Comment: 2 drinks per week     Drug use: No     Sexual activity: Yes     Partners: Female     Birth control/protection: Male Surgical   Other Topics Concern     Parent/sibling w/ CABG, MI or angioplasty before 65F 55M? Yes   Social History Narrative     Not on file     Social Determinants of Health     Financial Resource Strain: Not on file   Food Insecurity: Not on file   Transportation Needs: Not on file   Physical Activity: Not on file   Stress: Not on file   Social Connections: Not on file   Intimate Partner Violence: Not on file   Housing Stability:  Not on file       Outpatient Encounter Medications as of 12/23/2021   Medication Sig Dispense Refill     atorvastatin (LIPITOR) 20 MG tablet TAKE ONE TABLET BY MOUTH ONCE DAILY 90 tablet 2     loratadine (CLARITIN) 10 MG tablet Take 10 mg by mouth daily as needed for allergies       No facility-administered encounter medications on file as of 12/23/2021.             O:   NAD, WDWN, Alert & Oriented, Mood & Affect wnl, Vitals stable   Here today alone   BP (!) 153/91 (BP Location: Right arm, Patient Position: Sitting, Cuff Size: Adult Regular)   Pulse 72   SpO2 99%    General appearance normal   Vitals stable   Alert, oriented and in no acute distress     Verrucous papules on neck and left nostril     Eyes: Conjunctivae/lids:Normal     ENT: Lips: normal    MSK:Normal    Pulm: Breathing Normal     Neuro/Psych: Orientation:Alert and Orientedx3 ; Mood/Affect:normal     A/P:  1. Common warts x 10 on neck and left nostril x 2   LN2:  Treated with LN2 for 5s for 1-2 cycles. Warned risks of blistering, pain, pigment change, scarring, and incomplete resolution.  Advised patient to return if lesions do not completely resolve.  Wound care sheet given.  IL Candin: PGACAC discussed.  Risks including but not limited to injection site reaction, bruising, no resolution.  All questions answered and entertained to patient s satisfaction.  Informed consent obtained.  IL Candin in concentration of 1 unit/ 0.1 ml was injected ID to wart.  Total injected was  4 units.  Patient tolerated without complications and given wound care instructions, including not to move product around.  Return in 4 weeks for follow-up and possible additional IL Candin.    Lot   Exp: 5/13/2024  Emery to follow up with Primary Care provider regarding elevated blood pressure.

## 2022-01-20 ENCOUNTER — OFFICE VISIT (OUTPATIENT)
Dept: DERMATOLOGY | Facility: CLINIC | Age: 52
End: 2022-01-20
Payer: COMMERCIAL

## 2022-01-20 VITALS — DIASTOLIC BLOOD PRESSURE: 90 MMHG | OXYGEN SATURATION: 99 % | HEART RATE: 89 BPM | SYSTOLIC BLOOD PRESSURE: 134 MMHG

## 2022-01-20 DIAGNOSIS — B07.8 COMMON WART: Primary | ICD-10-CM

## 2022-01-20 PROCEDURE — 17110 DESTRUCTION B9 LES UP TO 14: CPT | Performed by: PHYSICIAN ASSISTANT

## 2022-01-20 PROCEDURE — 99207 PR DROP WITH A PROCEDURE: CPT | Performed by: PHYSICIAN ASSISTANT

## 2022-01-20 NOTE — PROGRESS NOTES
Emery Barkley is an extremely pleasant 51 year old year old male patient here today for recheck warts. He notes warts are slowly improving. No pain or bleeding.  Patient has no other skin complaints today.  Remainder of the HPI, Meds, PMH, Allergies, FH, and SH was reviewed in chart.    Past Medical History:   Diagnosis Date     Hyperlipidemia        Past Surgical History:   Procedure Laterality Date     ABDOMEN SURGERY  2/7/19    umbilical hernia     HERNIORRHAPHY UMBILICAL N/A 2/7/2019    Procedure: HERNIORRHAPHY UMBILICAL;  Surgeon: Tej Beaulieu DO;  Location: [facility]     VASECTOMY          Family History   Problem Relation Age of Onset     C.A.D. Father         MI, chf, first mi in 50s.     Coronary Artery Disease Father      Hyperlipidemia Father      Cancer Sister         brain     Other Cancer Sister         Brain Cancer     Lipids Mother      Hyperlipidemia Mother      Depression Mother        Social History     Socioeconomic History     Marital status:      Spouse name: Not on file     Number of children: Not on file     Years of education: Not on file     Highest education level: Not on file   Occupational History     Not on file   Tobacco Use     Smoking status: Never Smoker     Smokeless tobacco: Never Used   Vaping Use     Vaping Use: Never used   Substance and Sexual Activity     Alcohol use: Yes     Comment: 2 drinks per week     Drug use: No     Sexual activity: Yes     Partners: Female     Birth control/protection: Male Surgical   Other Topics Concern     Parent/sibling w/ CABG, MI or angioplasty before 65F 55M? Yes   Social History Narrative     Not on file     Social Determinants of Health     Financial Resource Strain: Not on file   Food Insecurity: Not on file   Transportation Needs: Not on file   Physical Activity: Not on file   Stress: Not on file   Social Connections: Not on file   Intimate Partner Violence: Not on file   Housing Stability: Not on file       Outpatient  Encounter Medications as of 1/20/2022   Medication Sig Dispense Refill     atorvastatin (LIPITOR) 20 MG tablet TAKE ONE TABLET BY MOUTH ONCE DAILY 90 tablet 2     loratadine (CLARITIN) 10 MG tablet Take 10 mg by mouth daily as needed for allergies       No facility-administered encounter medications on file as of 1/20/2022.             O:   NAD, WDWN, Alert & Oriented, Mood & Affect wnl, Vitals stable   Here today alone   BP (!) 134/90   Pulse 89   SpO2 99%    General appearance normal   Vitals stable   Alert, oriented and in no acute distress    Verrucous papules on neck and left nostril       Eyes: Conjunctivae/lids:Normal     ENT: Lips: normal    MSK:Normal    Pulm: Breathing Normal    Neuro/Psych: Orientation:Alert and Orientedx3 ; Mood/Affect:normal   A/P:  1. Common warts x 10 on neck and left nostril x 2   LN2:  Treated with LN2 for 5s for 1-2 cycles. Warned risks of blistering, pain, pigment change, scarring, and incomplete resolution.  Advised patient to return if lesions do not completely resolve.  Wound care sheet given.  IL Candin: PGACAC discussed.  Risks including but not limited to injection site reaction, bruising, no resolution.  All questions answered and entertained to patient s satisfaction.  Informed consent obtained.  IL Candin in concentration of 1 unit/ 0.1 ml was injected ID to wart.  Total injected was  4 units.  Patient tolerated without complications and given wound care instructions, including not to move product around.  Return in 4 weeks for follow-up and possible additional IL Candin.    Lot   Exp: 5/13/2024

## 2022-01-20 NOTE — LETTER
1/20/2022         RE: Emery Barkley  [address]  [place] [address]        Dear Colleague,    Thank you for referring your patient, Emery Barkley, to the [facility]. Please see a copy of my visit note below.    Emery Barkley is an extremely pleasant 51 year old year old male patient here today for recheck warts. He notes warts are slowly improving. No pain or bleeding.  Patient has no other skin complaints today.  Remainder of the HPI, Meds, PMH, Allergies, FH, and SH was reviewed in chart.    Past Medical History:   Diagnosis Date     Hyperlipidemia        Past Surgical History:   Procedure Laterality Date     ABDOMEN SURGERY  2/7/19    umbilical hernia     HERNIORRHAPHY UMBILICAL N/A 2/7/2019    Procedure: HERNIORRHAPHY UMBILICAL;  Surgeon: Tej Beaulieu DO;  Location: [facility]     VASECTOMY          Family History   Problem Relation Age of Onset     C.A.D. Father         MI, chf, first mi in 50s.     Coronary Artery Disease Father      Hyperlipidemia Father      Cancer Sister         brain     Other Cancer Sister         Brain Cancer     Lipids Mother      Hyperlipidemia Mother      Depression Mother        Social History     Socioeconomic History     Marital status:      Spouse name: Not on file     Number of children: Not on file     Years of education: Not on file     Highest education level: Not on file   Occupational History     Not on file   Tobacco Use     Smoking status: Never Smoker     Smokeless tobacco: Never Used   Vaping Use     Vaping Use: Never used   Substance and Sexual Activity     Alcohol use: Yes     Comment: 2 drinks per week     Drug use: No     Sexual activity: Yes     Partners: Female     Birth control/protection: Male Surgical   Other Topics Concern     Parent/sibling w/ CABG, MI or angioplasty before 65F 55M? Yes   Social History Narrative     Not on file     Social Determinants of Health     Financial Resource Strain: Not on file    Food Insecurity: Not on file   Transportation Needs: Not on file   Physical Activity: Not on file   Stress: Not on file   Social Connections: Not on file   Intimate Partner Violence: Not on file   Housing Stability: Not on file       Outpatient Encounter Medications as of 1/20/2022   Medication Sig Dispense Refill     atorvastatin (LIPITOR) 20 MG tablet TAKE ONE TABLET BY MOUTH ONCE DAILY 90 tablet 2     loratadine (CLARITIN) 10 MG tablet Take 10 mg by mouth daily as needed for allergies       No facility-administered encounter medications on file as of 1/20/2022.             O:   NAD, WDWN, Alert & Oriented, Mood & Affect wnl, Vitals stable   Here today alone   BP (!) 134/90   Pulse 89   SpO2 99%    General appearance normal   Vitals stable   Alert, oriented and in no acute distress    Verrucous papules on neck and left nostril       Eyes: Conjunctivae/lids:Normal     ENT: Lips: normal    MSK:Normal    Pulm: Breathing Normal    Neuro/Psych: Orientation:Alert and Orientedx3 ; Mood/Affect:normal   A/P:  1. Common warts x 10 on neck and left nostril x 2   LN2:  Treated with LN2 for 5s for 1-2 cycles. Warned risks of blistering, pain, pigment change, scarring, and incomplete resolution.  Advised patient to return if lesions do not completely resolve.  Wound care sheet given.  IL Candin: PGACAC discussed.  Risks including but not limited to injection site reaction, bruising, no resolution.  All questions answered and entertained to patient s satisfaction.  Informed consent obtained.  IL Candin in concentration of 1 unit/ 0.1 ml was injected ID to wart.  Total injected was  4 units.  Patient tolerated without complications and given wound care instructions, including not to move product around.  Return in 4 weeks for follow-up and possible additional IL Candin.    Lot   Exp: 5/13/2024      Again, thank you for allowing me to participate in the care of your patient.        Sincerely,        Yuki King,  NABIL

## 2022-01-20 NOTE — NURSING NOTE
"Initial BP (!) 134/90   Pulse 89   SpO2 99%  Estimated body mass index is 26.11 kg/m  as calculated from the following:    Height as of 9/22/21: 1.778 m (5' 10\").    Weight as of 9/22/21: 82.6 kg (182 lb). .      "

## 2022-02-17 ENCOUNTER — OFFICE VISIT (OUTPATIENT)
Dept: DERMATOLOGY | Facility: CLINIC | Age: 52
End: 2022-02-17
Payer: COMMERCIAL

## 2022-02-17 VITALS — HEART RATE: 72 BPM | SYSTOLIC BLOOD PRESSURE: 146 MMHG | DIASTOLIC BLOOD PRESSURE: 92 MMHG | OXYGEN SATURATION: 99 %

## 2022-02-17 DIAGNOSIS — B07.8 COMMON WART: Primary | ICD-10-CM

## 2022-02-17 PROCEDURE — 99207 PR DROP WITH A PROCEDURE: CPT | Performed by: PHYSICIAN ASSISTANT

## 2022-02-17 PROCEDURE — 17110 DESTRUCTION B9 LES UP TO 14: CPT | Performed by: PHYSICIAN ASSISTANT

## 2022-02-17 NOTE — NURSING NOTE
"Initial BP (!) 146/92   Pulse 72   SpO2 99%  Estimated body mass index is 26.11 kg/m  as calculated from the following:    Height as of 9/22/21: 1.778 m (5' 10\").    Weight as of 9/22/21: 82.6 kg (182 lb). .      "

## 2022-02-18 NOTE — PROGRESS NOTES
Emery Barkley is an extremely pleasant 51 year old year old male patient here today for recheck warts. He notes wart still present by nose but believes ones on neck are fully resolved.  Patient has no other skin complaints today.  Remainder of the HPI, Meds, PMH, Allergies, FH, and SH was reviewed in chart.    Past Medical History:   Diagnosis Date     Hyperlipidemia        Past Surgical History:   Procedure Laterality Date     ABDOMEN SURGERY  2/7/19    umbilical hernia     HERNIORRHAPHY UMBILICAL N/A 2/7/2019    Procedure: HERNIORRHAPHY UMBILICAL;  Surgeon: Tej Beaulieu DO;  Location: [facility]     VASECTOMY          Family History   Problem Relation Age of Onset     C.A.D. Father         MI, chf, first mi in 50s.     Coronary Artery Disease Father      Hyperlipidemia Father      Cancer Sister         brain     Other Cancer Sister         Brain Cancer     Lipids Mother      Hyperlipidemia Mother      Depression Mother        Social History     Socioeconomic History     Marital status:      Spouse name: Not on file     Number of children: Not on file     Years of education: Not on file     Highest education level: Not on file   Occupational History     Not on file   Tobacco Use     Smoking status: Never Smoker     Smokeless tobacco: Never Used   Vaping Use     Vaping Use: Never used   Substance and Sexual Activity     Alcohol use: Yes     Comment: 2 drinks per week     Drug use: No     Sexual activity: Yes     Partners: Female     Birth control/protection: Male Surgical   Other Topics Concern     Parent/sibling w/ CABG, MI or angioplasty before 65F 55M? Yes   Social History Narrative     Not on file     Social Determinants of Health     Financial Resource Strain: Not on file   Food Insecurity: Not on file   Transportation Needs: Not on file   Physical Activity: Not on file   Stress: Not on file   Social Connections: Not on file   Intimate Partner Violence: Not on file   Housing Stability: Not on  file       Outpatient Encounter Medications as of 2/17/2022   Medication Sig Dispense Refill     atorvastatin (LIPITOR) 20 MG tablet TAKE ONE TABLET BY MOUTH ONCE DAILY 90 tablet 2     loratadine (CLARITIN) 10 MG tablet Take 10 mg by mouth daily as needed for allergies       No facility-administered encounter medications on file as of 2/17/2022.             O:   NAD, WDWN, Alert & Oriented, Mood & Affect wnl, Vitals stable   Here today alone   BP (!) 146/92   Pulse 72   SpO2 99%    General appearance normal   Vitals stable   Alert, oriented and in no acute distress     Verrucous papule on left upper cutaneous lip near entrance of nare and one on mucosa surface of ala.     Eyes: Conjunctivae/lids:Normal     ENT: Lips: normal    MSK:Normal    Pulm: Breathing Normal    Neuro/Psych: Orientation:Alert and Orientedx3 ; Mood/Affect:normal   A/P:  1. Common warts x 2  LN2:  Treated with LN2 for 5s for 1-2 cycles. Warned risks of blistering, pain, pigment change, scarring, and incomplete resolution.  Advised patient to return if lesions do not completely resolve.  Wound care sheet given.  IL Candin: PGACAC discussed.  Risks including but not limited to injection site reaction, bruising, no resolution.  All questions answered and entertained to patient s satisfaction.  Informed consent obtained.  IL Candin in concentration of 1 unit/ 0.1 ml was injected ID to both warts.  Total injected was  2 units.  Patient tolerated without complications and given wound care instructions, including not to move product around.  Return in 4 weeks for follow-up and possible additional IL Candin.

## 2022-03-15 ENCOUNTER — OFFICE VISIT (OUTPATIENT)
Dept: DERMATOLOGY | Facility: CLINIC | Age: 52
End: 2022-03-15
Payer: COMMERCIAL

## 2022-03-15 VITALS — DIASTOLIC BLOOD PRESSURE: 87 MMHG | OXYGEN SATURATION: 98 % | SYSTOLIC BLOOD PRESSURE: 122 MMHG | HEART RATE: 99 BPM

## 2022-03-15 DIAGNOSIS — B07.8 COMMON WART: Primary | ICD-10-CM

## 2022-03-15 PROCEDURE — 99207 PR DROP WITH A PROCEDURE: CPT | Performed by: PHYSICIAN ASSISTANT

## 2022-03-15 PROCEDURE — 17110 DESTRUCTION B9 LES UP TO 14: CPT | Performed by: PHYSICIAN ASSISTANT

## 2022-03-15 NOTE — NURSING NOTE
Chief Complaint   Patient presents with     Wart     f/u        Vitals:    03/15/22 1458   BP: 122/87   BP Location: Left arm   Patient Position: Sitting   Cuff Size: Adult Large   Pulse: 99   SpO2: 98%     Wt Readings from Last 1 Encounters:   09/22/21 82.6 kg (182 lb)       Elsa Tan LPN .................3/15/2022

## 2022-03-15 NOTE — LETTER
3/15/2022         RE: Emery Barkley  [id number] [facility]  [place] [address]        Dear Colleague,    Thank you for referring your patient, Emery Barkley, to the [facility]. Please see a copy of my visit note below.    Emery Barkley is an extremely pleasant 51 year old year old male patient here today for recheck warts. Wart is still present inside nare. He believes others have resolved. Patient has no other skin complaints today.  Remainder of the HPI, Meds, PMH, Allergies, FH, and SH was reviewed in chart.   Past Medical History:   Diagnosis Date     Hyperlipidemia        Past Surgical History:   Procedure Laterality Date     ABDOMEN SURGERY  2/7/19    umbilical hernia     HERNIORRHAPHY UMBILICAL N/A 2/7/2019    Procedure: HERNIORRHAPHY UMBILICAL;  Surgeon: Tej Beaulieu DO;  Location: [facility] OR     VASECTOMY          Family History   Problem Relation Age of Onset     C.A.D. Father         MI, chf, first mi in 50s.     Coronary Artery Disease Father      Hyperlipidemia Father      Cancer Sister         brain     Other Cancer Sister         Brain Cancer     Lipids Mother      Hyperlipidemia Mother      Depression Mother        Social History     Socioeconomic History     Marital status:      Spouse name: Not on file     Number of children: Not on file     Years of education: Not on file     Highest education level: Not on file   Occupational History     Not on file   Tobacco Use     Smoking status: Never Smoker     Smokeless tobacco: Never Used   Vaping Use     Vaping Use: Never used   Substance and Sexual Activity     Alcohol use: Yes     Comment: 2 drinks per week     Drug use: No     Sexual activity: Yes     Partners: Female     Birth control/protection: Male Surgical   Other Topics Concern     Parent/sibling w/ CABG, MI or angioplasty before 65F 55M? Yes   Social History Narrative     Not on file     Social Determinants of Health     Financial Resource Strain: Not on  file   Food Insecurity: Not on file   Transportation Needs: Not on file   Physical Activity: Not on file   Stress: Not on file   Social Connections: Not on file   Intimate Partner Violence: Not on file   Housing Stability: Not on file       Outpatient Encounter Medications as of 3/15/2022   Medication Sig Dispense Refill     atorvastatin (LIPITOR) 20 MG tablet TAKE ONE TABLET BY MOUTH ONCE DAILY 90 tablet 2     loratadine (CLARITIN) 10 MG tablet Take 10 mg by mouth daily as needed for allergies       No facility-administered encounter medications on file as of 3/15/2022.             O:   NAD, WDWN, Alert & Oriented, Mood & Affect wnl, Vitals stable   Here today alone   /87 (BP Location: Left arm, Patient Position: Sitting, Cuff Size: Adult Large)   Pulse 99   SpO2 98%    General appearance normal   Vitals stable   Alert, oriented and in no acute distress     1mm verrucous papule on right side of neck   Small verrucous papule on left nare       Eyes: Conjunctivae/lids:Normal     ENT: Lips: normal    MSK:Normal    Pulm: Breathing Normal    Neuro/Psych: Orientation:Alert and Orientedx3 ; Mood/Affect:normal   A/P:  1. Common warts on neck x 8 and left nare x 1  LN2:  Treated with LN2 for 5s for 1-2 cycles. Warned risks of blistering, pain, pigment change, scarring, and incomplete resolution.  Advised patient to return if lesions do not completely resolve.  Wound care sheet given.  IL Candin: PGACAC discussed.  Risks including but not limited to injection site reaction, bruising, no resolution.  All questions answered and entertained to patient s satisfaction.  Informed consent obtained.  IL Candin in concentration of 1 unit/ 0.1 ml was injected ID to wart on left nare.  Total injected was  1 units.  Patient tolerated without complications and given wound care instructions, including not to move product around.  Return in 4 weeks for follow-up and possible additional IL Candin.          Again, thank you for  allowing me to participate in the care of your patient.        Sincerely,        Yuki King PA-C

## 2022-03-17 NOTE — PROGRESS NOTES
Emery Barkley is an extremely pleasant 51 year old year old male patient here today for recheck warts. Wart is still present inside nare. He believes others have resolved. Patient has no other skin complaints today.  Remainder of the HPI, Meds, PMH, Allergies, FH, and SH was reviewed in chart.   Past Medical History:   Diagnosis Date     Hyperlipidemia        Past Surgical History:   Procedure Laterality Date     ABDOMEN SURGERY  2/7/19    umbilical hernia     HERNIORRHAPHY UMBILICAL N/A 2/7/2019    Procedure: HERNIORRHAPHY UMBILICAL;  Surgeon: Tej Beaulieu DO;  Location: [facility]     VASECTOMY          Family History   Problem Relation Age of Onset     C.A.D. Father         MI, chf, first mi in 50s.     Coronary Artery Disease Father      Hyperlipidemia Father      Cancer Sister         brain     Other Cancer Sister         Brain Cancer     Lipids Mother      Hyperlipidemia Mother      Depression Mother        Social History     Socioeconomic History     Marital status:      Spouse name: Not on file     Number of children: Not on file     Years of education: Not on file     Highest education level: Not on file   Occupational History     Not on file   Tobacco Use     Smoking status: Never Smoker     Smokeless tobacco: Never Used   Vaping Use     Vaping Use: Never used   Substance and Sexual Activity     Alcohol use: Yes     Comment: 2 drinks per week     Drug use: No     Sexual activity: Yes     Partners: Female     Birth control/protection: Male Surgical   Other Topics Concern     Parent/sibling w/ CABG, MI or angioplasty before 65F 55M? Yes   Social History Narrative     Not on file     Social Determinants of Health     Financial Resource Strain: Not on file   Food Insecurity: Not on file   Transportation Needs: Not on file   Physical Activity: Not on file   Stress: Not on file   Social Connections: Not on file   Intimate Partner Violence: Not on file   Housing Stability: Not on file        Outpatient Encounter Medications as of 3/15/2022   Medication Sig Dispense Refill     atorvastatin (LIPITOR) 20 MG tablet TAKE ONE TABLET BY MOUTH ONCE DAILY 90 tablet 2     loratadine (CLARITIN) 10 MG tablet Take 10 mg by mouth daily as needed for allergies       No facility-administered encounter medications on file as of 3/15/2022.             O:   NAD, WDWN, Alert & Oriented, Mood & Affect wnl, Vitals stable   Here today alone   /87 (BP Location: Left arm, Patient Position: Sitting, Cuff Size: Adult Large)   Pulse 99   SpO2 98%    General appearance normal   Vitals stable   Alert, oriented and in no acute distress     1mm verrucous papule on right side of neck   Small verrucous papule on left nare       Eyes: Conjunctivae/lids:Normal     ENT: Lips: normal    MSK:Normal    Pulm: Breathing Normal    Neuro/Psych: Orientation:Alert and Orientedx3 ; Mood/Affect:normal   A/P:  1. Common warts on neck x 8 and left nare x 1  LN2:  Treated with LN2 for 5s for 1-2 cycles. Warned risks of blistering, pain, pigment change, scarring, and incomplete resolution.  Advised patient to return if lesions do not completely resolve.  Wound care sheet given.  IL Candin: PGACAC discussed.  Risks including but not limited to injection site reaction, bruising, no resolution.  All questions answered and entertained to patient s satisfaction.  Informed consent obtained.  IL Candin in concentration of 1 unit/ 0.1 ml was injected ID to wart on left nare.  Total injected was  1 units.  Patient tolerated without complications and given wound care instructions, including not to move product around.  Return in 4 weeks for follow-up and possible additional IL Candin.

## 2022-04-28 ENCOUNTER — OFFICE VISIT (OUTPATIENT)
Dept: DERMATOLOGY | Facility: CLINIC | Age: 52
End: 2022-04-28
Payer: COMMERCIAL

## 2022-04-28 VITALS — HEART RATE: 93 BPM | DIASTOLIC BLOOD PRESSURE: 87 MMHG | OXYGEN SATURATION: 94 % | SYSTOLIC BLOOD PRESSURE: 123 MMHG

## 2022-04-28 DIAGNOSIS — B07.8 COMMON WART: Primary | ICD-10-CM

## 2022-04-28 PROCEDURE — 99207 PR DROP WITH A PROCEDURE: CPT | Performed by: PHYSICIAN ASSISTANT

## 2022-04-28 PROCEDURE — 17110 DESTRUCTION B9 LES UP TO 14: CPT | Performed by: PHYSICIAN ASSISTANT

## 2022-04-28 RX ORDER — CIMETIDINE 800 MG
800 TABLET ORAL AT BEDTIME
Qty: 30 TABLET | Refills: 4 | Status: SHIPPED | OUTPATIENT
Start: 2022-04-28 | End: 2022-09-27

## 2022-04-28 RX ORDER — CANDIDA ALBICANS 1000 [PNU]/ML
0.3 INJECTION, SOLUTION INTRADERMAL ONCE
Status: COMPLETED | OUTPATIENT
Start: 2022-04-28 | End: 2022-04-28

## 2022-04-28 RX ADMIN — CANDIDA ALBICANS 0.3 ML: 1000 INJECTION, SOLUTION INTRADERMAL at 15:22

## 2022-04-28 NOTE — NURSING NOTE
"Initial /87   Pulse 93   SpO2 94%  Estimated body mass index is 26.11 kg/m  as calculated from the following:    Height as of 9/22/21: 1.778 m (5' 10\").    Weight as of 9/22/21: 82.6 kg (182 lb). .      "

## 2022-04-29 NOTE — PROGRESS NOTES
Emery Barkley is an extremely pleasant 51 year old year old male patient here today for recheck wart. He notes improved but continuing to get some new warts. He notes a few more on neck and hand.  Patient has no other skin complaints today.  Remainder of the HPI, Meds, PMH, Allergies, FH, and SH was reviewed in chart.   Past Medical History:   Diagnosis Date     Hyperlipidemia        Past Surgical History:   Procedure Laterality Date     ABDOMEN SURGERY  2/7/19    umbilical hernia     HERNIORRHAPHY UMBILICAL N/A 2/7/2019    Procedure: HERNIORRHAPHY UMBILICAL;  Surgeon: Tej Beaulieu DO;  Location: [facility] OR     VASECTOMY          Family History   Problem Relation Age of Onset     C.A.D. Father         MI, chf, first mi in 50s.     Coronary Artery Disease Father      Hyperlipidemia Father      Cancer Sister         brain     Other Cancer Sister         Brain Cancer     Lipids Mother      Hyperlipidemia Mother      Depression Mother        Social History     Socioeconomic History     Marital status:      Spouse name: Not on file     Number of children: Not on file     Years of education: Not on file     Highest education level: Not on file   Occupational History     Not on file   Tobacco Use     Smoking status: Never Smoker     Smokeless tobacco: Never Used   Vaping Use     Vaping Use: Never used   Substance and Sexual Activity     Alcohol use: Yes     Comment: 2 drinks per week     Drug use: No     Sexual activity: Yes     Partners: Female     Birth control/protection: Male Surgical   Other Topics Concern     Parent/sibling w/ CABG, MI or angioplasty before 65F 55M? Yes   Social History Narrative     Not on file     Social Determinants of Health     Financial Resource Strain: Not on file   Food Insecurity: Not on file   Transportation Needs: Not on file   Physical Activity: Not on file   Stress: Not on file   Social Connections: Not on file   Intimate Partner Violence: Not on file   Housing Stability:  Not on file       Outpatient Encounter Medications as of 4/28/2022   Medication Sig Dispense Refill     atorvastatin (LIPITOR) 20 MG tablet TAKE ONE TABLET BY MOUTH ONCE DAILY 90 tablet 2     cimetidine (TAGAMET) 800 MG tablet Take 1 tablet (800 mg) by mouth At Bedtime 30 tablet 4     loratadine (CLARITIN) 10 MG tablet Take 10 mg by mouth daily as needed for allergies       Facility-Administered Encounter Medications as of 4/28/2022   Medication Dose Route Frequency Provider Last Rate Last Admin     [COMPLETED] candida albicans skin test injection 0.3 mL  0.3 mL Intradermal Once Yuki Estevez PA-C   0.3 mL at 04/28/22 1522             O:   NAD, WDWN, Alert & Oriented, Mood & Affect wnl, Vitals stable   Here today alone   /87   Pulse 93   SpO2 94%    General appearance normal   Vitals stable   Alert, oriented and in no acute distress     1mm verrucous papule on left  side of neck   Small verrucous papule on left nare    Verrucous papule on right hand     Eyes: Conjunctivae/lids:Normal     ENT: Lips normal    MSK:Normal    Pulm: Breathing Normal    Neuro/Psych: Orientation:Alert and Orientedx3 ; Mood/Affect:normal     A/P:  1. Common warts on neck x 8 and left nare x 1  LN2:  Treated with LN2 for 5s for 1-2 cycles. Warned risks of blistering, pain, pigment change, scarring, and incomplete resolution.  Advised patient to return if lesions do not completely resolve.  Wound care sheet given.  IL Candin: PGACAC discussed.  Risks including but not limited to injection site reaction, bruising, no resolution.  All questions answered and entertained to patient s satisfaction.  Informed consent obtained.  IL Candin in concentration of 1 unit/ 0.1 ml was injected ID to wart on left nare, right hand and left neck .  Total injected was  3 units.  Patient tolerated without complications and given wound care instructions, including not to move product around.  Return in 4 weeks for follow-up and possible  additional IL Candin.       Start cimetidine 800 mg at bedtime.

## 2022-05-27 ENCOUNTER — OFFICE VISIT (OUTPATIENT)
Dept: DERMATOLOGY | Facility: CLINIC | Age: 52
End: 2022-05-27
Payer: COMMERCIAL

## 2022-05-27 VITALS — HEART RATE: 76 BPM | SYSTOLIC BLOOD PRESSURE: 139 MMHG | DIASTOLIC BLOOD PRESSURE: 92 MMHG | OXYGEN SATURATION: 98 %

## 2022-05-27 DIAGNOSIS — B07.8 COMMON WART: Primary | ICD-10-CM

## 2022-05-27 PROCEDURE — 17110 DESTRUCTION B9 LES UP TO 14: CPT | Performed by: PHYSICIAN ASSISTANT

## 2022-05-27 RX ORDER — CANDIDA ALBICANS 1000 [PNU]/ML
0.3 INJECTION, SOLUTION INTRADERMAL ONCE
Status: COMPLETED | OUTPATIENT
Start: 2022-05-27 | End: 2022-05-27

## 2022-05-27 RX ADMIN — CANDIDA ALBICANS 0.3 ML: 1000 INJECTION, SOLUTION INTRADERMAL at 07:44

## 2022-05-27 NOTE — LETTER
5/27/2022         RE: Emery Barkley  [address] [facility] [address]        Dear Colleague,    Thank you for referring your patient, Emery Barkley, to the [facility]. Please see a copy of my visit note below.    Emery Barkley is an extremely pleasant 51 year old year old male patient here today for recheck warts. Improving, but developed a few new spots. .  Patient has no other skin complaints today.  Remainder of the HPI, Meds, PMH, Allergies, FH, and SH was reviewed in chart.  Past Medical History:   Diagnosis Date     Hyperlipidemia        Past Surgical History:   Procedure Laterality Date     ABDOMEN SURGERY  2/7/19    umbilical hernia     HERNIORRHAPHY UMBILICAL N/A 2/7/2019    Procedure: HERNIORRHAPHY UMBILICAL;  Surgeon: Tej Beaulieu DO;  Location: [facility] OR     VASECTOMY          Family History   Problem Relation Age of Onset     C.A.D. Father         MI, chf, first mi in 50s.     Coronary Artery Disease Father      Hyperlipidemia Father      Cancer Sister         brain     Other Cancer Sister         Brain Cancer     Lipids Mother      Hyperlipidemia Mother      Depression Mother        Social History     Socioeconomic History     Marital status:      Spouse name: Not on file     Number of children: Not on file     Years of education: Not on file     Highest education level: Not on file   Occupational History     Not on file   Tobacco Use     Smoking status: Never Smoker     Smokeless tobacco: Never Used   Vaping Use     Vaping Use: Never used   Substance and Sexual Activity     Alcohol use: Yes     Comment: 2 drinks per week     Drug use: No     Sexual activity: Yes     Partners: Female     Birth control/protection: Male Surgical   Other Topics Concern     Parent/sibling w/ CABG, MI or angioplasty before 65F 55M? Yes   Social History Narrative     Not on file     Social Determinants of Health     Financial Resource Strain: Not on file   Food Insecurity:  Not on file   Transportation Needs: Not on file   Physical Activity: Not on file   Stress: Not on file   Social Connections: Not on file   Intimate Partner Violence: Not on file   Housing Stability: Not on file       Outpatient Encounter Medications as of 2022   Medication Sig Dispense Refill     atorvastatin (LIPITOR) 20 MG tablet TAKE ONE TABLET BY MOUTH ONCE DAILY 90 tablet 2     cimetidine (TAGAMET) 800 MG tablet Take 1 tablet (800 mg) by mouth At Bedtime 30 tablet 4     loratadine (CLARITIN) 10 MG tablet Take 10 mg by mouth daily as needed for allergies       [] candida albicans skin test injection 0.3 mL        No facility-administered encounter medications on file as of 2022.             O:   NAD, WDWN, Alert & Oriented, Mood & Affect wnl, Vitals stable   Here today alone   BP (!) 139/92 (BP Location: Right arm, Patient Position: Sitting, Cuff Size: Adult Regular)   Pulse 76   SpO2 98%    General appearance normal   Vitals stable   Alert, oriented and in no acute distress     Small 1 mm verrucous papules on neck, left nostril and right hand     Eyes: Conjunctivae/lids:Normal     ENT: Lips: normal    MSK:Normal    Pulm: Breathing Normal    Neuro/Psych: Orientation:Alert and Orientedx3 ; Mood/Affect:normal   A/P:  1. Common wart on right hand, neck, and left nostril  LN2:  Treated with LN2 for 5s for 1-2 cycles. Warned risks of blistering, pain, pigment change, scarring, and incomplete resolution.  Advised patient to return if lesions do not completely resolve.  Wound care sheet given.  IL Candin: PGACAC discussed.  Risks including but not limited to injection site reaction, bruising, no resolution.  All questions answered and entertained to patient s satisfaction.  Informed consent obtained.  IL Candin in concentration of 1 unit/ 0.1 ml was injected ID to 3 warts, neck, and right hand .  Total injected was  3 units.  Patient tolerated without complications and given wound care instructions,  including not to move product around.  Return in 4 weeks for follow-up and possible additional IL Candin.          Again, thank you for allowing me to participate in the care of your patient.        Sincerely,        Yuki King PA-C

## 2022-05-27 NOTE — NURSING NOTE
Chief Complaint   Patient presents with     Wart     Follow up,        Vitals:    05/27/22 0725   BP: (!) 139/92   BP Location: Right arm   Patient Position: Sitting   Cuff Size: Adult Regular   Pulse: 76   SpO2: 98%     Wt Readings from Last 1 Encounters:   09/22/21 82.6 kg (182 lb)       Elsa Tan LPN .................5/27/2022

## 2022-05-30 NOTE — PROGRESS NOTES
Emery Barkley is an extremely pleasant 51 year old year old male patient here today for recheck warts. Improving, but developed a few new spots. .  Patient has no other skin complaints today.  Remainder of the HPI, Meds, PMH, Allergies, FH, and SH was reviewed in chart.  Past Medical History:   Diagnosis Date     Hyperlipidemia        Past Surgical History:   Procedure Laterality Date     ABDOMEN SURGERY  2/7/19    umbilical hernia     HERNIORRHAPHY UMBILICAL N/A 2/7/2019    Procedure: HERNIORRHAPHY UMBILICAL;  Surgeon: Tej Beaulieu DO;  Location: [facility]     VASECTOMY          Family History   Problem Relation Age of Onset     C.A.D. Father         MI, chf, first mi in 50s.     Coronary Artery Disease Father      Hyperlipidemia Father      Cancer Sister         brain     Other Cancer Sister         Brain Cancer     Lipids Mother      Hyperlipidemia Mother      Depression Mother        Social History     Socioeconomic History     Marital status:      Spouse name: Not on file     Number of children: Not on file     Years of education: Not on file     Highest education level: Not on file   Occupational History     Not on file   Tobacco Use     Smoking status: Never Smoker     Smokeless tobacco: Never Used   Vaping Use     Vaping Use: Never used   Substance and Sexual Activity     Alcohol use: Yes     Comment: 2 drinks per week     Drug use: No     Sexual activity: Yes     Partners: Female     Birth control/protection: Male Surgical   Other Topics Concern     Parent/sibling w/ CABG, MI or angioplasty before 65F 55M? Yes   Social History Narrative     Not on file     Social Determinants of Health     Financial Resource Strain: Not on file   Food Insecurity: Not on file   Transportation Needs: Not on file   Physical Activity: Not on file   Stress: Not on file   Social Connections: Not on file   Intimate Partner Violence: Not on file   Housing Stability: Not on file       Outpatient Encounter Medications  as of 2022   Medication Sig Dispense Refill     atorvastatin (LIPITOR) 20 MG tablet TAKE ONE TABLET BY MOUTH ONCE DAILY 90 tablet 2     cimetidine (TAGAMET) 800 MG tablet Take 1 tablet (800 mg) by mouth At Bedtime 30 tablet 4     loratadine (CLARITIN) 10 MG tablet Take 10 mg by mouth daily as needed for allergies       [] candida albicans skin test injection 0.3 mL        No facility-administered encounter medications on file as of 2022.             O:   NAD, WDWN, Alert & Oriented, Mood & Affect wnl, Vitals stable   Here today alone   BP (!) 139/92 (BP Location: Right arm, Patient Position: Sitting, Cuff Size: Adult Regular)   Pulse 76   SpO2 98%    General appearance normal   Vitals stable   Alert, oriented and in no acute distress     Small 1 mm verrucous papules on neck, left nostril and right hand     Eyes: Conjunctivae/lids:Normal     ENT: Lips: normal    MSK:Normal    Pulm: Breathing Normal    Neuro/Psych: Orientation:Alert and Orientedx3 ; Mood/Affect:normal   A/P:  1. Common wart on right hand, neck, and left nostril  LN2:  Treated with LN2 for 5s for 1-2 cycles. Warned risks of blistering, pain, pigment change, scarring, and incomplete resolution.  Advised patient to return if lesions do not completely resolve.  Wound care sheet given.  IL Candin: PGACAC discussed.  Risks including but not limited to injection site reaction, bruising, no resolution.  All questions answered and entertained to patient s satisfaction.  Informed consent obtained.  IL Candin in concentration of 1 unit/ 0.1 ml was injected ID to 3 warts, neck, and right hand .  Total injected was  3 units.  Patient tolerated without complications and given wound care instructions, including not to move product around.  Return in 4 weeks for follow-up and possible additional IL Candin.

## 2022-06-24 ENCOUNTER — OFFICE VISIT (OUTPATIENT)
Dept: DERMATOLOGY | Facility: CLINIC | Age: 52
End: 2022-06-24
Payer: COMMERCIAL

## 2022-06-24 DIAGNOSIS — B07.8 COMMON WART: Primary | ICD-10-CM

## 2022-06-24 PROCEDURE — 99207 PR DROP WITH A PROCEDURE: CPT | Performed by: PHYSICIAN ASSISTANT

## 2022-06-24 PROCEDURE — 17110 DESTRUCTION B9 LES UP TO 14: CPT | Performed by: PHYSICIAN ASSISTANT

## 2022-06-24 RX ORDER — CANDIDA ALBICANS 1000 [PNU]/ML
0.3 INJECTION, SOLUTION INTRADERMAL ONCE
Status: COMPLETED | OUTPATIENT
Start: 2022-06-24 | End: 2022-06-24

## 2022-06-24 RX ADMIN — CANDIDA ALBICANS 0.3 ML: 1000 INJECTION, SOLUTION INTRADERMAL at 07:57

## 2022-06-24 ASSESSMENT — PAIN SCALES - GENERAL: PAINLEVEL: NO PAIN (0)

## 2022-06-24 NOTE — NURSING NOTE
Chief Complaint   Patient presents with     Wart     Follow up        There were no vitals filed for this visit.  Wt Readings from Last 1 Encounters:   09/22/21 82.6 kg (182 lb)       Elsa Tan LPN .................6/24/2022

## 2022-06-24 NOTE — LETTER
6/24/2022         RE: Emery Barkley  [address]  [place] [address]        Dear Colleague,    Thank you for referring your patient, Emery Barkley, to the [facility]. Please see a copy of my visit note below.    Emery Barkley is an extremely pleasant 51 year old year old male patient here today for warts neck and left nare. He notes improving but not resolved. We have been treated with cryo and candin.   Patient has no other skin complaints today.  Remainder of the HPI, Meds, PMH, Allergies, FH, and SH was reviewed in chart.    Past Medical History:   Diagnosis Date     Hyperlipidemia        Past Surgical History:   Procedure Laterality Date     ABDOMEN SURGERY  2/7/19    umbilical hernia     HERNIORRHAPHY UMBILICAL N/A 2/7/2019    Procedure: HERNIORRHAPHY UMBILICAL;  Surgeon: Tej Beaulieu DO;  Location: [facility]     VASECTOMY          Family History   Problem Relation Age of Onset     C.A.D. Father         MI, chf, first mi in 50s.     Coronary Artery Disease Father      Hyperlipidemia Father      Cancer Sister         brain     Other Cancer Sister         Brain Cancer     Lipids Mother      Hyperlipidemia Mother      Depression Mother        Social History     Socioeconomic History     Marital status:      Spouse name: Not on file     Number of children: Not on file     Years of education: Not on file     Highest education level: Not on file   Occupational History     Not on file   Tobacco Use     Smoking status: Never Smoker     Smokeless tobacco: Never Used   Vaping Use     Vaping Use: Never used   Substance and Sexual Activity     Alcohol use: Yes     Comment: 2 drinks per week     Drug use: No     Sexual activity: Yes     Partners: Female     Birth control/protection: Male Surgical   Other Topics Concern     Parent/sibling w/ CABG, MI or angioplasty before 65F 55M? Yes   Social History Narrative     Not on file     Social Determinants of Health     Financial  Resource Strain: Not on file   Food Insecurity: Not on file   Transportation Needs: Not on file   Physical Activity: Not on file   Stress: Not on file   Social Connections: Not on file   Intimate Partner Violence: Not on file   Housing Stability: Not on file       Outpatient Encounter Medications as of 6/24/2022   Medication Sig Dispense Refill     atorvastatin (LIPITOR) 20 MG tablet TAKE ONE TABLET BY MOUTH ONCE DAILY 90 tablet 2     cimetidine (TAGAMET) 800 MG tablet Take 1 tablet (800 mg) by mouth At Bedtime 30 tablet 4     loratadine (CLARITIN) 10 MG tablet Take 10 mg by mouth daily as needed for allergies       No facility-administered encounter medications on file as of 6/24/2022.             O:   NAD, WDWN, Alert & Oriented, Mood & Affect wnl, Vitals stable   Here today alone   There were no vitals taken for this visit.   General appearance normal   Vitals stable   Alert, oriented and in no acute distress       Small 1 mm verrucous papules on neck, left nostril      Eyes: Conjunctivae/lids:Normal     ENT: Lips: normal    MSK:Normal    Pulm: Breathing Normal    Neuro/Psych: Orientation:Alert and Orientedx3 ; Mood/Affect:normal   A/P:  1. Common wart on neck, and left nostril  LN2:  Treated with LN2 for 5s for 1-2 cycles. Warned risks of blistering, pain, pigment change, scarring, and incomplete resolution.  Advised patient to return if lesions do not completely resolve.  Wound care sheet given.  IL Candin: PGACAC discussed.  Risks including but not limited to injection site reaction, bruising, no resolution.  All questions answered and entertained to patient s satisfaction.  Informed consent obtained.  IL Candin in concentration of 1 unit/ 0.1 ml was injected ID to 3 warts, neck, and right hand .  Total injected was  3 units.  Patient tolerated without complications and given wound care instructions, including not to move product around.  Return in 4 weeks for follow-up and possible additional IL Candin.            Again, thank you for allowing me to participate in the care of your patient.        Sincerely,        Yuki King PA-C

## 2022-06-24 NOTE — PROGRESS NOTES
Emery Barkley is an extremely pleasant 51 year old year old male patient here today for warts neck and left nare. He notes improving but not resolved. We have been treated with cryo and candin.   Patient has no other skin complaints today.  Remainder of the HPI, Meds, PMH, Allergies, FH, and SH was reviewed in chart.    Past Medical History:   Diagnosis Date     Hyperlipidemia        Past Surgical History:   Procedure Laterality Date     ABDOMEN SURGERY  2/7/19    umbilical hernia     HERNIORRHAPHY UMBILICAL N/A 2/7/2019    Procedure: HERNIORRHAPHY UMBILICAL;  Surgeon: Tej Beaulieu DO;  Location: [facility]     VASECTOMY          Family History   Problem Relation Age of Onset     C.A.D. Father         MI, chf, first mi in 50s.     Coronary Artery Disease Father      Hyperlipidemia Father      Cancer Sister         brain     Other Cancer Sister         Brain Cancer     Lipids Mother      Hyperlipidemia Mother      Depression Mother        Social History     Socioeconomic History     Marital status:      Spouse name: Not on file     Number of children: Not on file     Years of education: Not on file     Highest education level: Not on file   Occupational History     Not on file   Tobacco Use     Smoking status: Never Smoker     Smokeless tobacco: Never Used   Vaping Use     Vaping Use: Never used   Substance and Sexual Activity     Alcohol use: Yes     Comment: 2 drinks per week     Drug use: No     Sexual activity: Yes     Partners: Female     Birth control/protection: Male Surgical   Other Topics Concern     Parent/sibling w/ CABG, MI or angioplasty before 65F 55M? Yes   Social History Narrative     Not on file     Social Determinants of Health     Financial Resource Strain: Not on file   Food Insecurity: Not on file   Transportation Needs: Not on file   Physical Activity: Not on file   Stress: Not on file   Social Connections: Not on file   Intimate Partner Violence: Not on file   Housing Stability:  Not on file       Outpatient Encounter Medications as of 6/24/2022   Medication Sig Dispense Refill     atorvastatin (LIPITOR) 20 MG tablet TAKE ONE TABLET BY MOUTH ONCE DAILY 90 tablet 2     cimetidine (TAGAMET) 800 MG tablet Take 1 tablet (800 mg) by mouth At Bedtime 30 tablet 4     loratadine (CLARITIN) 10 MG tablet Take 10 mg by mouth daily as needed for allergies       No facility-administered encounter medications on file as of 6/24/2022.             O:   NAD, WDWN, Alert & Oriented, Mood & Affect wnl, Vitals stable   Here today alone   There were no vitals taken for this visit.   General appearance normal   Vitals stable   Alert, oriented and in no acute distress       Small 1 mm verrucous papules on neck, left nostril      Eyes: Conjunctivae/lids:Normal     ENT: Lips: normal    MSK:Normal    Pulm: Breathing Normal    Neuro/Psych: Orientation:Alert and Orientedx3 ; Mood/Affect:normal   A/P:  1. Common wart on neck, and left nostril  LN2:  Treated with LN2 for 5s for 1-2 cycles. Warned risks of blistering, pain, pigment change, scarring, and incomplete resolution.  Advised patient to return if lesions do not completely resolve.  Wound care sheet given.  IL Candin: PGACAC discussed.  Risks including but not limited to injection site reaction, bruising, no resolution.  All questions answered and entertained to patient s satisfaction.  Informed consent obtained.  IL Candin in concentration of 1 unit/ 0.1 ml was injected ID to 3 warts, neck, and right hand .  Total injected was  3 units.  Patient tolerated without complications and given wound care instructions, including not to move product around.  Return in 4 weeks for follow-up and possible additional IL Candin.

## 2022-07-22 ENCOUNTER — OFFICE VISIT (OUTPATIENT)
Dept: DERMATOLOGY | Facility: CLINIC | Age: 52
End: 2022-07-22
Payer: COMMERCIAL

## 2022-07-22 DIAGNOSIS — B07.8 COMMON WART: Primary | ICD-10-CM

## 2022-07-22 PROCEDURE — 17110 DESTRUCTION B9 LES UP TO 14: CPT | Performed by: PHYSICIAN ASSISTANT

## 2022-07-22 PROCEDURE — 99207 PR DROP WITH A PROCEDURE: CPT | Performed by: PHYSICIAN ASSISTANT

## 2022-07-22 RX ORDER — CANDIDA ALBICANS 1000 [PNU]/ML
0.4 INJECTION, SOLUTION INTRADERMAL ONCE
Status: COMPLETED | OUTPATIENT
Start: 2022-07-22 | End: 2022-07-22

## 2022-07-22 RX ADMIN — CANDIDA ALBICANS 0.4 ML: 1000 INJECTION, SOLUTION INTRADERMAL at 07:28

## 2022-07-22 ASSESSMENT — PAIN SCALES - GENERAL: PAINLEVEL: NO PAIN (0)

## 2022-07-22 NOTE — NURSING NOTE
Chief Complaint   Patient presents with     Derm Problem     Wart follow up        There were no vitals filed for this visit.  Wt Readings from Last 1 Encounters:   09/22/21 82.6 kg (182 lb)       Elsa Tan LPN .................7/22/2022

## 2022-07-22 NOTE — LETTER
7/22/2022         RE: Emery Barkley  [address]  [place] [address]        Dear Colleague,    Thank you for referring your patient, Emery Barkley, to the [facility]. Please see a copy of my visit note below.    Emery Barkley is an extremely pleasant 51 year old year old male patient here today for recheck warts. He notes wart returned on hand and still some small areas on neck.   Patient has no other skin complaints today.  Remainder of the HPI, Meds, PMH, Allergies, FH, and SH was reviewed in chart.    Past Medical History:   Diagnosis Date     Hyperlipidemia        Past Surgical History:   Procedure Laterality Date     ABDOMEN SURGERY  2/7/19    umbilical hernia     HERNIORRHAPHY UMBILICAL N/A 2/7/2019    Procedure: HERNIORRHAPHY UMBILICAL;  Surgeon: Tej Beaulieu DO;  Location: [facility]     VASECTOMY          Family History   Problem Relation Age of Onset     C.A.D. Father         MI, chf, first mi in 50s.     Coronary Artery Disease Father      Hyperlipidemia Father      Cancer Sister         brain     Other Cancer Sister         Brain Cancer     Lipids Mother      Hyperlipidemia Mother      Depression Mother        Social History     Socioeconomic History     Marital status:      Spouse name: Not on file     Number of children: Not on file     Years of education: Not on file     Highest education level: Not on file   Occupational History     Not on file   Tobacco Use     Smoking status: Never Smoker     Smokeless tobacco: Never Used   Vaping Use     Vaping Use: Never used   Substance and Sexual Activity     Alcohol use: Yes     Comment: 2 drinks per week     Drug use: No     Sexual activity: Yes     Partners: Female     Birth control/protection: Male Surgical   Other Topics Concern     Parent/sibling w/ CABG, MI or angioplasty before 65F 55M? Yes   Social History Narrative     Not on file     Social Determinants of Health     Financial Resource Strain: Not on  file   Food Insecurity: Not on file   Transportation Needs: Not on file   Physical Activity: Not on file   Stress: Not on file   Social Connections: Not on file   Intimate Partner Violence: Not on file   Housing Stability: Not on file       Outpatient Encounter Medications as of 2022   Medication Sig Dispense Refill     atorvastatin (LIPITOR) 20 MG tablet TAKE ONE TABLET BY MOUTH ONCE DAILY 90 tablet 2     cimetidine (TAGAMET) 800 MG tablet Take 1 tablet (800 mg) by mouth At Bedtime 30 tablet 4     loratadine (CLARITIN) 10 MG tablet Take 10 mg by mouth daily as needed for allergies       [] candida albicans skin test injection 0.4 mL        No facility-administered encounter medications on file as of 2022.             O:   NAD, WDWN, Alert & Oriented, Mood & Affect wnl, Vitals stable   Here today alone   There were no vitals taken for this visit.   General appearance normal   Vitals stable   Alert, oriented and in no acute distress     Small verrucous papule papules 1 mm in anterior neck x 10  Small verrucous papule on right hand  1 mm verrucous papule in left nostril    Eyes: Conjunctivae/lids:Normal     ENT: Lips: normal    MSK:Normal    Pulm: Breathing Normal    Neuro/Psych: Orientation:Alert and Orientedx3 ; Mood/Affect:normal   A/P:  1. Common wart on neck, right hand and left nostril x 12  LN2:  Treated with LN2 for 5s for 1-2 cycles. Warned risks of blistering, pain, pigment change, scarring, and incomplete resolution.  Advised patient to return if lesions do not completely resolve.  Wound care sheet given.  IL Candin: PGACAC discussed.  Risks including but not limited to injection site reaction, bruising, no resolution.  All questions answered and entertained to patient s satisfaction.  Informed consent obtained.  IL Candin in concentration of 1 unit/ 0.1 ml was injected ID to 3 warts, neck, and right hand .  Total injected was  3 units.  Patient tolerated without complications and given  wound care instructions, including not to move product around.  Return in 4 weeks for follow-up and possible additional IL Candin.       Again, thank you for allowing me to participate in the care of your patient.        Sincerely,        Yuki King PA-C

## 2022-07-24 NOTE — PROGRESS NOTES
Emery Barkley is an extremely pleasant 51 year old year old male patient here today for recheck warts. He notes wart returned on hand and still some small areas on neck.   Patient has no other skin complaints today.  Remainder of the HPI, Meds, PMH, Allergies, FH, and SH was reviewed in chart.    Past Medical History:   Diagnosis Date     Hyperlipidemia        Past Surgical History:   Procedure Laterality Date     ABDOMEN SURGERY  2/7/19    umbilical hernia     HERNIORRHAPHY UMBILICAL N/A 2/7/2019    Procedure: HERNIORRHAPHY UMBILICAL;  Surgeon: Tej Beaulieu DO;  Location: [facility]     VASECTOMY          Family History   Problem Relation Age of Onset     C.A.D. Father         MI, chf, first mi in 50s.     Coronary Artery Disease Father      Hyperlipidemia Father      Cancer Sister         brain     Other Cancer Sister         Brain Cancer     Lipids Mother      Hyperlipidemia Mother      Depression Mother        Social History     Socioeconomic History     Marital status:      Spouse name: Not on file     Number of children: Not on file     Years of education: Not on file     Highest education level: Not on file   Occupational History     Not on file   Tobacco Use     Smoking status: Never Smoker     Smokeless tobacco: Never Used   Vaping Use     Vaping Use: Never used   Substance and Sexual Activity     Alcohol use: Yes     Comment: 2 drinks per week     Drug use: No     Sexual activity: Yes     Partners: Female     Birth control/protection: Male Surgical   Other Topics Concern     Parent/sibling w/ CABG, MI or angioplasty before 65F 55M? Yes   Social History Narrative     Not on file     Social Determinants of Health     Financial Resource Strain: Not on file   Food Insecurity: Not on file   Transportation Needs: Not on file   Physical Activity: Not on file   Stress: Not on file   Social Connections: Not on file   Intimate Partner Violence: Not on file   Housing Stability: Not on file        Outpatient Encounter Medications as of 2022   Medication Sig Dispense Refill     atorvastatin (LIPITOR) 20 MG tablet TAKE ONE TABLET BY MOUTH ONCE DAILY 90 tablet 2     cimetidine (TAGAMET) 800 MG tablet Take 1 tablet (800 mg) by mouth At Bedtime 30 tablet 4     loratadine (CLARITIN) 10 MG tablet Take 10 mg by mouth daily as needed for allergies       [] candida albicans skin test injection 0.4 mL        No facility-administered encounter medications on file as of 2022.             O:   NAD, WDWN, Alert & Oriented, Mood & Affect wnl, Vitals stable   Here today alone   There were no vitals taken for this visit.   General appearance normal   Vitals stable   Alert, oriented and in no acute distress     Small verrucous papule papules 1 mm in anterior neck x 10  Small verrucous papule on right hand  1 mm verrucous papule in left nostril    Eyes: Conjunctivae/lids:Normal     ENT: Lips: normal    MSK:Normal    Pulm: Breathing Normal    Neuro/Psych: Orientation:Alert and Orientedx3 ; Mood/Affect:normal   A/P:  1. Common wart on neck, right hand and left nostril x 12  LN2:  Treated with LN2 for 5s for 1-2 cycles. Warned risks of blistering, pain, pigment change, scarring, and incomplete resolution.  Advised patient to return if lesions do not completely resolve.  Wound care sheet given.  IL Candin: PGACAC discussed.  Risks including but not limited to injection site reaction, bruising, no resolution.  All questions answered and entertained to patient s satisfaction.  Informed consent obtained.  IL Candin in concentration of 1 unit/ 0.1 ml was injected ID to 3 warts, neck, and right hand .  Total injected was  3 units.  Patient tolerated without complications and given wound care instructions, including not to move product around.  Return in 4 weeks for follow-up and possible additional IL Candin.

## 2022-07-25 ENCOUNTER — TELEPHONE (OUTPATIENT)
Dept: FAMILY MEDICINE | Facility: CLINIC | Age: 52
End: 2022-07-25

## 2022-07-25 NOTE — TELEPHONE ENCOUNTER
Patient Quality Outreach    Patient is due for the following:   Colon Cancer Screening -  FIT, Colonoscopy, and Cologuard    NEXT STEPS:   No follow up needed at this time.    Type of outreach:    Sent [employer] message.      Questions for provider review:    None     DELORES Gonzales LPN

## 2022-08-19 ENCOUNTER — LAB (OUTPATIENT)
Dept: LAB | Facility: CLINIC | Age: 52
End: 2022-08-19
Payer: COMMERCIAL

## 2022-08-19 ENCOUNTER — OFFICE VISIT (OUTPATIENT)
Dept: DERMATOLOGY | Facility: CLINIC | Age: 52
End: 2022-08-19
Payer: COMMERCIAL

## 2022-08-19 DIAGNOSIS — B07.8 COMMON WART: Primary | ICD-10-CM

## 2022-08-19 DIAGNOSIS — Z13.1 SCREENING FOR DIABETES MELLITUS: ICD-10-CM

## 2022-08-19 DIAGNOSIS — Z12.5 SCREENING FOR PROSTATE CANCER: ICD-10-CM

## 2022-08-19 DIAGNOSIS — E78.5 HYPERLIPIDEMIA LDL GOAL <100: ICD-10-CM

## 2022-08-19 LAB
CHOLEST SERPL-MCNC: 204 MG/DL
FASTING STATUS PATIENT QL REPORTED: YES
FASTING STATUS PATIENT QL REPORTED: YES
GLUCOSE BLD-MCNC: 104 MG/DL (ref 70–99)
HDLC SERPL-MCNC: 50 MG/DL
LDLC SERPL CALC-MCNC: 119 MG/DL
NONHDLC SERPL-MCNC: 154 MG/DL
PSA SERPL-MCNC: 0.78 UG/L (ref 0–4)
TRIGL SERPL-MCNC: 173 MG/DL

## 2022-08-19 PROCEDURE — G0103 PSA SCREENING: HCPCS

## 2022-08-19 PROCEDURE — 36415 COLL VENOUS BLD VENIPUNCTURE: CPT

## 2022-08-19 PROCEDURE — 17110 DESTRUCTION B9 LES UP TO 14: CPT | Performed by: PHYSICIAN ASSISTANT

## 2022-08-19 PROCEDURE — 82947 ASSAY GLUCOSE BLOOD QUANT: CPT

## 2022-08-19 PROCEDURE — 80061 LIPID PANEL: CPT

## 2022-08-19 RX ORDER — CANDIDA ALBICANS 1000 [PNU]/ML
0.1 INJECTION, SOLUTION INTRADERMAL ONCE
Status: COMPLETED | OUTPATIENT
Start: 2022-08-19 | End: 2022-08-19

## 2022-08-19 RX ADMIN — CANDIDA ALBICANS 0.1 ML: 1000 INJECTION, SOLUTION INTRADERMAL at 10:54

## 2022-08-19 ASSESSMENT — PAIN SCALES - GENERAL: PAINLEVEL: NO PAIN (0)

## 2022-08-19 NOTE — LETTER
8/19/2022         RE: Emery Barkley  [address]  [place] [address]        Dear Colleague,    Thank you for referring your patient, Emery Barkley, to the [facility]. Please see a copy of my visit note below.    Emery Barkley is an extremely pleasant 51 year old year old male patient here today for recheck warts. He notes a few on neck and still one present on pointer finger on right hand .  Patient has no other skin complaints today.  Remainder of the HPI, Meds, PMH, Allergies, FH, and SH was reviewed in chart.   Past Medical History:   Diagnosis Date     Hyperlipidemia        Past Surgical History:   Procedure Laterality Date     ABDOMEN SURGERY  2/7/19    umbilical hernia     HERNIORRHAPHY UMBILICAL N/A 2/7/2019    Procedure: HERNIORRHAPHY UMBILICAL;  Surgeon: Tej Beaulieu DO;  Location: [facility]     VASECTOMY          Family History   Problem Relation Age of Onset     C.A.D. Father         MI, chf, first mi in 50s.     Coronary Artery Disease Father      Hyperlipidemia Father      Cancer Sister         brain     Other Cancer Sister         Brain Cancer     Lipids Mother      Hyperlipidemia Mother      Depression Mother        Social History     Socioeconomic History     Marital status:      Spouse name: Not on file     Number of children: Not on file     Years of education: Not on file     Highest education level: Not on file   Occupational History     Not on file   Tobacco Use     Smoking status: Never Smoker     Smokeless tobacco: Never Used   Vaping Use     Vaping Use: Never used   Substance and Sexual Activity     Alcohol use: Yes     Comment: 2 drinks per week     Drug use: No     Sexual activity: Yes     Partners: Female     Birth control/protection: Male Surgical   Other Topics Concern     Parent/sibling w/ CABG, MI or angioplasty before 65F 55M? Yes   Social History Narrative     Not on file     Social Determinants of Health     Financial Resource  Strain: Not on file   Food Insecurity: Not on file   Transportation Needs: Not on file   Physical Activity: Not on file   Stress: Not on file   Social Connections: Not on file   Intimate Partner Violence: Not on file   Housing Stability: Not on file       Outpatient Encounter Medications as of 8/19/2022   Medication Sig Dispense Refill     atorvastatin (LIPITOR) 20 MG tablet TAKE ONE TABLET BY MOUTH ONCE DAILY 90 tablet 2     cimetidine (TAGAMET) 800 MG tablet Take 1 tablet (800 mg) by mouth At Bedtime 30 tablet 4     loratadine (CLARITIN) 10 MG tablet Take 10 mg by mouth daily as needed for allergies       Facility-Administered Encounter Medications as of 8/19/2022   Medication Dose Route Frequency Provider Last Rate Last Admin     [COMPLETED] candida albicans skin test injection 0.1 mL  0.1 mL Intradermal Once Yuki Estevez PA-C   0.1 mL at 08/19/22 1054             O:   NAD, WDWN, Alert & Oriented, Mood & Affect wnl, Vitals stable   Here today alone   There were no vitals taken for this visit.   General appearance normal   Vitals stable   Alert, oriented and in no acute distress     Small verrucous papule on neck  Small verrucous papule on right index finger      Eyes: Conjunctivae/lids:Normal     ENT: Lips,: normal    MSK:Normal    Pulm: Breathing Normal    Neuro/Psych: Orientation:Alert and Orientedx3 ; Mood/Affect:normal   A/P:  1. Common warts on neck x 10 and right pointer finger x 1  LN2:  Treated with LN2 for 5s for 1-2 cycles. Warned risks of blistering, pain, pigment change, scarring, and incomplete resolution.  Advised patient to return if lesions do not completely resolve.  Wound care sheet given.  IL Candin: PGACAC discussed.  Risks including but not limited to injection site reaction, bruising, no resolution.  All questions answered and entertained to patient s satisfaction.  Informed consent obtained.  IL Candin in concentration of 1 unit/ 0.1 ml was injected ID to warts on neck and index  finger.  Total injected was  4 units.  Patient tolerated without complications and given wound care instructions, including not to move product around.  Return in 4 weeks for follow-up and possible additional IL Candin.          Again, thank you for allowing me to participate in the care of your patient.        Sincerely,        Yuki King PA-C

## 2022-08-19 NOTE — PROGRESS NOTES
Emery Barkley is an extremely pleasant 51 year old year old male patient here today for recheck warts. He notes a few on neck and still one present on pointer finger on right hand .  Patient has no other skin complaints today.  Remainder of the HPI, Meds, PMH, Allergies, FH, and SH was reviewed in chart.   Past Medical History:   Diagnosis Date     Hyperlipidemia        Past Surgical History:   Procedure Laterality Date     ABDOMEN SURGERY  2/7/19    umbilical hernia     HERNIORRHAPHY UMBILICAL N/A 2/7/2019    Procedure: HERNIORRHAPHY UMBILICAL;  Surgeon: Tej Beaulieu DO;  Location: [facility]     VASECTOMY          Family History   Problem Relation Age of Onset     C.A.D. Father         MI, chf, first mi in 50s.     Coronary Artery Disease Father      Hyperlipidemia Father      Cancer Sister         brain     Other Cancer Sister         Brain Cancer     Lipids Mother      Hyperlipidemia Mother      Depression Mother        Social History     Socioeconomic History     Marital status:      Spouse name: Not on file     Number of children: Not on file     Years of education: Not on file     Highest education level: Not on file   Occupational History     Not on file   Tobacco Use     Smoking status: Never Smoker     Smokeless tobacco: Never Used   Vaping Use     Vaping Use: Never used   Substance and Sexual Activity     Alcohol use: Yes     Comment: 2 drinks per week     Drug use: No     Sexual activity: Yes     Partners: Female     Birth control/protection: Male Surgical   Other Topics Concern     Parent/sibling w/ CABG, MI or angioplasty before 65F 55M? Yes   Social History Narrative     Not on file     Social Determinants of Health     Financial Resource Strain: Not on file   Food Insecurity: Not on file   Transportation Needs: Not on file   Physical Activity: Not on file   Stress: Not on file   Social Connections: Not on file   Intimate Partner Violence: Not on file   Housing Stability: Not on file        Outpatient Encounter Medications as of 8/19/2022   Medication Sig Dispense Refill     atorvastatin (LIPITOR) 20 MG tablet TAKE ONE TABLET BY MOUTH ONCE DAILY 90 tablet 2     cimetidine (TAGAMET) 800 MG tablet Take 1 tablet (800 mg) by mouth At Bedtime 30 tablet 4     loratadine (CLARITIN) 10 MG tablet Take 10 mg by mouth daily as needed for allergies       Facility-Administered Encounter Medications as of 8/19/2022   Medication Dose Route Frequency Provider Last Rate Last Admin     [COMPLETED] candida albicans skin test injection 0.1 mL  0.1 mL Intradermal Once Yuki Estevez PA-C   0.1 mL at 08/19/22 1054             O:   NAD, WDWN, Alert & Oriented, Mood & Affect wnl, Vitals stable   Here today alone   There were no vitals taken for this visit.   General appearance normal   Vitals stable   Alert, oriented and in no acute distress     Small verrucous papule on neck  Small verrucous papule on right index finger      Eyes: Conjunctivae/lids:Normal     ENT: Lips,: normal    MSK:Normal    Pulm: Breathing Normal    Neuro/Psych: Orientation:Alert and Orientedx3 ; Mood/Affect:normal   A/P:  1. Common warts on neck x 10 and right pointer finger x 1  LN2:  Treated with LN2 for 5s for 1-2 cycles. Warned risks of blistering, pain, pigment change, scarring, and incomplete resolution.  Advised patient to return if lesions do not completely resolve.  Wound care sheet given.  IL Candin: PGACAC discussed.  Risks including but not limited to injection site reaction, bruising, no resolution.  All questions answered and entertained to patient s satisfaction.  Informed consent obtained.  IL Candin in concentration of 1 unit/ 0.1 ml was injected ID to warts on neck and index finger.  Total injected was  4 units.  Patient tolerated without complications and given wound care instructions, including not to move product around.  Return in 4 weeks for follow-up and possible additional IL Candin.

## 2022-08-28 ASSESSMENT — ENCOUNTER SYMPTOMS
HEADACHES: 0
MYALGIAS: 0
NERVOUS/ANXIOUS: 0
WEAKNESS: 0
EYE PAIN: 0
DYSURIA: 0
CONSTIPATION: 0
JOINT SWELLING: 0
HEMATURIA: 0
CHILLS: 0
NAUSEA: 0
FREQUENCY: 0
SORE THROAT: 0
HEMATOCHEZIA: 0
DIARRHEA: 0
SHORTNESS OF BREATH: 0
COUGH: 0
PALPITATIONS: 0
PARESTHESIAS: 0
DIZZINESS: 0
ARTHRALGIAS: 0
FEVER: 0
HEARTBURN: 0
ABDOMINAL PAIN: 0

## 2022-09-02 ENCOUNTER — OFFICE VISIT (OUTPATIENT)
Dept: FAMILY MEDICINE | Facility: CLINIC | Age: 52
End: 2022-09-02
Payer: COMMERCIAL

## 2022-09-02 VITALS
TEMPERATURE: 97 F | RESPIRATION RATE: 20 BRPM | OXYGEN SATURATION: 96 % | HEART RATE: 84 BPM | WEIGHT: 193.2 LBS | BODY MASS INDEX: 27.66 KG/M2 | DIASTOLIC BLOOD PRESSURE: 80 MMHG | SYSTOLIC BLOOD PRESSURE: 136 MMHG | HEIGHT: 70 IN

## 2022-09-02 DIAGNOSIS — Z13.1 SCREENING FOR DIABETES MELLITUS: ICD-10-CM

## 2022-09-02 DIAGNOSIS — Z00.00 ROUTINE GENERAL MEDICAL EXAMINATION AT A HEALTH CARE FACILITY: Primary | ICD-10-CM

## 2022-09-02 DIAGNOSIS — E78.5 HYPERLIPIDEMIA LDL GOAL <100: ICD-10-CM

## 2022-09-02 DIAGNOSIS — Z82.49 FAMILY HISTORY OF CORONARY ARTERY DISEASE: ICD-10-CM

## 2022-09-02 DIAGNOSIS — Z12.5 SCREENING FOR PROSTATE CANCER: ICD-10-CM

## 2022-09-02 PROCEDURE — 99213 OFFICE O/P EST LOW 20 MIN: CPT | Mod: 25 | Performed by: FAMILY MEDICINE

## 2022-09-02 PROCEDURE — 99396 PREV VISIT EST AGE 40-64: CPT | Mod: 25 | Performed by: FAMILY MEDICINE

## 2022-09-02 PROCEDURE — 90682 RIV4 VACC RECOMBINANT DNA IM: CPT | Performed by: FAMILY MEDICINE

## 2022-09-02 PROCEDURE — 90471 IMMUNIZATION ADMIN: CPT | Performed by: FAMILY MEDICINE

## 2022-09-02 RX ORDER — ATORVASTATIN CALCIUM 20 MG/1
20 TABLET, FILM COATED ORAL DAILY
Qty: 90 TABLET | Refills: 3 | Status: SHIPPED | OUTPATIENT
Start: 2022-09-02 | End: 2023-07-14

## 2022-09-02 ASSESSMENT — ENCOUNTER SYMPTOMS
HEARTBURN: 0
PARESTHESIAS: 0
COUGH: 0
WEAKNESS: 0
MYALGIAS: 0
HEMATURIA: 0
FREQUENCY: 0
HEMATOCHEZIA: 0
SORE THROAT: 0
EYE PAIN: 0
DIZZINESS: 0
CONSTIPATION: 0
JOINT SWELLING: 0
ARTHRALGIAS: 0
NAUSEA: 0
DIARRHEA: 0
PALPITATIONS: 0
SHORTNESS OF BREATH: 0
CHILLS: 0
FEVER: 0
HEADACHES: 0
NERVOUS/ANXIOUS: 0
DYSURIA: 0
ABDOMINAL PAIN: 0

## 2022-09-02 ASSESSMENT — PAIN SCALES - GENERAL: PAINLEVEL: NO PAIN (0)

## 2022-09-02 NOTE — PATIENT INSTRUCTIONS
We reviewed your labs.    Please be aware that there will be an additional charge during your preventative visit due to either a new diagnosis and/or chronic disease management.    Preventative visits screen for diseases prior to they occur.  They do not cover for any new diagnosis or chronic disease management which would include medication refills, labs etc.    If you have questions regarding your coverage please check with your insurance provider.  At [facility] we need to code correctly to be in compliance with all insurance companies.        Thank you for choosing [facility].  You may be receiving an email and/or telephone survey request from [facility] regarding your visit today.  Please take a few minutes to respond to the survey to let us know how we are doing.      If you have questions or concerns, please contact us via [employer] or you can contact your care team at [phone] option [phone].    Our Clinic hours are:  Monday - Thursday 7am-6pm  Friday 7am-5pm    The [place] outpatient lab hours are:  Monday - Friday 7am-4:30pm    Appointments are required, call [phone]    If you have clinical questions after hours or would like to schedule an appointment,  call the clinic at [phone].    Preventive Health Recommendations  Male Ages 50 - 64    Yearly exam:             See your health care provider every year in order to  o   Review health changes.   o   Discuss preventive care.    o   Review your medicines if your doctor has prescribed any.   Have a cholesterol test every 5 years, or more frequently if you are at risk for high cholesterol/heart disease.   Have a diabetes test (fasting glucose) every three years. If you are at risk for diabetes, you should have this test more often.   Have a colonoscopy at age 50, or have a yearly FIT test (stool test). These exams will check for colon cancer.    Talk with your health care provider about whether or not a prostate cancer screening  test (PSA) is right for you.  You should be tested each year for STDs (sexually transmitted diseases), if you re at risk.     Shots: Get a flu shot each year. Get a tetanus shot every 10 years.     Nutrition:  Eat at least 5 servings of fruits and vegetables daily.   Eat whole-grain bread, whole-wheat pasta and brown rice instead of white grains and rice.   Get adequate Calcium and Vitamin D.     Lifestyle  Exercise for at least 150 minutes a week (30 minutes a day, 5 days a week). This will help you control your weight and prevent disease.   Limit alcohol to one drink per day.   No smoking.   Wear sunscreen to prevent skin cancer.   See your dentist every six months for an exam and cleaning.   See your eye doctor every 1 to 2 years.

## 2022-09-02 NOTE — PROGRESS NOTES
SUBJECTIVE:   CC: Emery Barkley is an 51 year old male who presents for preventative health visit.         Healthy Habits:     Getting at least 3 servings of Calcium per day:  Yes    Bi-annual eye exam:  Yes    Dental care twice a year:  Yes    Sleep apnea or symptoms of sleep apnea:  None    Diet:  Regular (no restrictions)    Frequency of exercise:  1 day/week    Duration of exercise:  15-30 minutes    Taking medications regularly:  Yes    Medication side effects:  None    PHQ-2 Total Score: 0    Additional concerns today:  Yes              Today's PHQ-2 Score:   PHQ-2 ( 1999 [employer]) 8/28/2022   Q1: Little interest or pleasure in doing things 0   Q2: Feeling down, depressed or hopeless 0   PHQ-2 Score 0   PHQ-2 Total Score (12-17 Years)- Positive if 3 or more points; Administer PHQ-A if positive -   Q1: Little interest or pleasure in doing things Not at all   Q2: Feeling down, depressed or hopeless Not at all   PHQ-2 Score 0       Abuse: Current or Past(Physical, Sexual or Emotional)- No  Do you feel safe in your environment? Yes        Social History     Tobacco Use     Smoking status: Never Smoker     Smokeless tobacco: Never Used   Substance Use Topics     Alcohol use: Yes     Comment: 2 drinks per week     If you drink alcohol do you typically have >3 drinks per day or >7 drinks per week? No    Alcohol Use 9/2/2022   Prescreen: >3 drinks/day or >7 drinks/week? -   Prescreen: >3 drinks/day or >7 drinks/week? No   No flowsheet data found.    Last PSA:   Prostate Specific Antigen Screen   Date Value Ref Range Status   08/19/2022 0.78 0.00 - 4.00 ug/L Final       Reviewed orders with patient. Reviewed health maintenance and updated orders accordingly - Yes      Reviewed and updated as needed this visit by clinical staff   Tobacco  Allergies  Meds   Med Hx  Surg Hx  Fam Hx  Soc Hx          Reviewed and updated as needed this visit by Provider                       Review of Systems   Constitutional: Negative  "for chills and fever.   HENT: Negative for congestion, ear pain, hearing loss and sore throat.    Eyes: Negative for pain and visual disturbance.   Respiratory: Negative for cough and shortness of breath.    Cardiovascular: Negative for chest pain, palpitations and peripheral edema.   Gastrointestinal: Negative for abdominal pain, constipation, diarrhea, heartburn, hematochezia and nausea.   Genitourinary: Negative for dysuria, frequency, genital sores, hematuria, impotence, penile discharge and urgency.   Musculoskeletal: Negative for arthralgias, joint swelling and myalgias.   Skin: Negative for rash.   Neurological: Negative for dizziness, weakness, headaches and paresthesias.   Psychiatric/Behavioral: Negative for mood changes. The patient is not nervous/anxious.          OBJECTIVE:   /80 (BP Location: Left arm, Patient Position: Sitting, Cuff Size: Adult Large)   Pulse 84   Temp 97  F (36.1  C) (Tympanic)   Resp 20   Ht 1.771 m (5' 9.72\")   Wt 87.6 kg (193 lb 3.2 oz)   SpO2 96%   BMI 27.94 kg/m      Physical Exam  GENERAL: healthy, alert and no distress  EYES: Eyes grossly normal to inspection, PERRL and conjunctivae and sclerae normal  HENT: ear canals and TM's normal, nose and mouth without ulcers or lesions  NECK: no adenopathy, no asymmetry, masses, or scars and thyroid normal to palpation  RESP: lungs clear to auscultation - no rales, rhonchi or wheezes  CV: regular rate and rhythm, normal S1 S2, no S3 or S4, no murmur, click or rub, no peripheral edema and peripheral pulses strong  ABDOMEN: soft, nontender, no hepatosplenomegaly, no masses and bowel sounds normal   (male): normal male genitalia without lesions or urethral discharge, no hernia  MS: no gross musculoskeletal defects noted, no edema  SKIN: no suspicious lesions or rashes  SKIN: has one sebaceous cyst on left hip, small  NEURO: Normal strength and tone, mentation intact and speech normal  PSYCH: mentation appears normal, affect " "normal/bright  LYMPH: no cervical adenopathy        ASSESSMENT/PLAN:   (Z00.00) Routine general medical examination at a health care facility  (primary encounter diagnosis)  Comment: Discussed healthy lifestyle and preventative cares.    Plan: INFLUENZA QUAD, RECOMBINANT, P-FREE (RIV4)         (FLUBLOK)            (E78.5) Hyperlipidemia LDL goal <100  Comment: refilled med, discussed diet and activity, no side effect his medication.    Plan: atorvastatin (LIPITOR) 20 MG tablet, CANCELED:         Lipid panel reflex to direct LDL Fasting            (Z82.49) Family history of coronary artery disease  Comment:   Plan: atorvastatin (LIPITOR) 20 MG tablet            (Z12.5) Screening for prostate cancer  Comment:   Plan: CANCELED: Prostate Specific Antigen Screen            (Z13.1) Screening for diabetes mellitus  Comment:   Plan: CANCELED: Glucose              Patient has been advised of split billing requirements and indicates understanding: Yes    COUNSELING:   Reviewed preventive health counseling, as reflected in patient instructions       Regular exercise       Healthy diet/nutrition       Colorectal cancer screening       Prostate cancer screening    Estimated body mass index is 27.94 kg/m  as calculated from the following:    Height as of this encounter: 1.771 m (5' 9.72\").    Weight as of this encounter: 87.6 kg (193 lb 3.2 oz).     Weight management plan: diet    He reports that he has never smoked. He has never used smokeless tobacco.      Counseling Resources:  ATP IV Guidelines  Pooled Cohorts Equation Calculator  FRAX Risk Assessment  ICSI Preventive Guidelines  Dietary Guidelines for Americans, 2010  USDA's MyPlate  ASA Prophylaxis  Lung CA Screening    Devyn Cox MD  [facility]  "

## 2022-09-16 ENCOUNTER — OFFICE VISIT (OUTPATIENT)
Dept: DERMATOLOGY | Facility: CLINIC | Age: 52
End: 2022-09-16
Payer: COMMERCIAL

## 2022-09-16 DIAGNOSIS — B07.8 COMMON WART: Primary | ICD-10-CM

## 2022-09-16 PROCEDURE — 99207 PR DROP WITH A PROCEDURE: CPT | Performed by: PHYSICIAN ASSISTANT

## 2022-09-16 PROCEDURE — 17110 DESTRUCTION B9 LES UP TO 14: CPT | Performed by: PHYSICIAN ASSISTANT

## 2022-09-16 RX ORDER — CANDIDA ALBICANS 1000 [PNU]/ML
0.1 INJECTION, SOLUTION INTRADERMAL ONCE
Status: COMPLETED | OUTPATIENT
Start: 2022-09-16 | End: 2022-09-16

## 2022-09-16 RX ADMIN — CANDIDA ALBICANS 0.1 ML: 1000 INJECTION, SOLUTION INTRADERMAL at 07:59

## 2022-09-16 ASSESSMENT — PAIN SCALES - GENERAL: PAINLEVEL: NO PAIN (0)

## 2022-09-16 NOTE — LETTER
9/16/2022         RE: Emery Barkley  [address] [place] [address]        Dear Colleague,    Thank you for referring your patient, Emery Barkley, to the [facility]. Please see a copy of my visit note below.    Emery Barkley is an extremely pleasant 52 year old year old male patient here today for recheck warts. He notes wart on finger mostly resolved just a little still remains. His neck feels better per patient.  Patient has no other skin complaints today.  Remainder of the HPI, Meds, PMH, Allergies, FH, and SH was reviewed in chart.   Past Medical History:   Diagnosis Date     Hyperlipidemia        Past Surgical History:   Procedure Laterality Date     ABDOMEN SURGERY  2/7/19    umbilical hernia     HERNIORRHAPHY UMBILICAL N/A 2/7/2019    Procedure: HERNIORRHAPHY UMBILICAL;  Surgeon: Tej Beaulieu DO;  Location: [facility] OR     VASECTOMY          Family History   Problem Relation Age of Onset     C.A.D. Father         MI, chf, first mi in 50s.     Coronary Artery Disease Father      Hyperlipidemia Father      Cancer Sister         brain     Other Cancer Sister         Brain Cancer     Lipids Mother      Hyperlipidemia Mother      Depression Mother        Social History     Socioeconomic History     Marital status:      Spouse name: Not on file     Number of children: Not on file     Years of education: Not on file     Highest education level: Not on file   Occupational History     Not on file   Tobacco Use     Smoking status: Never Smoker     Smokeless tobacco: Never Used   Vaping Use     Vaping Use: Never used   Substance and Sexual Activity     Alcohol use: Yes     Comment: 2 drinks per week     Drug use: No     Sexual activity: Yes     Partners: Female     Birth control/protection: Male Surgical   Other Topics Concern     Parent/sibling w/ CABG, MI or angioplasty before 65F 55M? Yes   Social History Narrative     Not on file     Social Determinants of Health      Financial Resource Strain: Not on file   Food Insecurity: Not on file   Transportation Needs: Not on file   Physical Activity: Not on file   Stress: Not on file   Social Connections: Not on file   Intimate Partner Violence: Not on file   Housing Stability: Not on file       Outpatient Encounter Medications as of 2022   Medication Sig Dispense Refill     atorvastatin (LIPITOR) 20 MG tablet Take 1 tablet (20 mg) by mouth daily 90 tablet 3     cimetidine (TAGAMET) 800 MG tablet Take 1 tablet (800 mg) by mouth At Bedtime 30 tablet 4     loratadine (CLARITIN) 10 MG tablet Take 10 mg by mouth daily as needed for allergies       [] candida albicans skin test injection 0.1 mL        No facility-administered encounter medications on file as of 2022.             O:   NAD, WDWN, Alert & Oriented, Mood & Affect wnl, Vitals stable   Here today alone   There were no vitals taken for this visit.   General appearance normal   Vitals stable   Alert, oriented and in no acute distress     1 mm papules on neck x 10  1 mm verrucous papule on left nare x 1  Verrucous papule on right hand x 1    Eyes: Conjunctivae/lids:Normal     ENT: Lips: normal    MSK:Normal    Pulm: Breathing Normal    Neuro/Psych: Orientation:Alert and Orientedx3 ; Mood/Affect:normal   A/P:  1. Common warts x 12  LN2:  Treated with LN2 for 5s for 1-2 cycles. Warned risks of blistering, pain, pigment change, scarring, and incomplete resolution.  Advised patient to return if lesions do not completely resolve.  Wound care sheet given.  IL Candin: PGACAC discussed.  Risks including but not limited to injection site reaction, bruising, no resolution.  All questions answered and entertained to patient s satisfaction.  Informed consent obtained.  IL Candin in concentration of 1 unit/ 0.1 ml was injected ID to warts.  Total injected was 4 units.  Patient tolerated without complications and given wound care instructions, including not to move product  around.  Return in 4 weeks for follow-up and possible additional IL Candin.          Again, thank you for allowing me to participate in the care of your patient.        Sincerely,        Yuki King PA-C

## 2022-09-18 NOTE — PROGRESS NOTES
Emery Barkley is an extremely pleasant 52 year old year old male patient here today for recheck warts. He notes wart on finger mostly resolved just a little still remains. His neck feels better per patient.  Patient has no other skin complaints today.  Remainder of the HPI, Meds, PMH, Allergies, FH, and SH was reviewed in chart.   Past Medical History:   Diagnosis Date     Hyperlipidemia        Past Surgical History:   Procedure Laterality Date     ABDOMEN SURGERY  2/7/19    umbilical hernia     HERNIORRHAPHY UMBILICAL N/A 2/7/2019    Procedure: HERNIORRHAPHY UMBILICAL;  Surgeon: Tej Beaulieu DO;  Location: [facility]     VASECTOMY          Family History   Problem Relation Age of Onset     C.A.D. Father         MI, chf, first mi in 50s.     Coronary Artery Disease Father      Hyperlipidemia Father      Cancer Sister         brain     Other Cancer Sister         Brain Cancer     Lipids Mother      Hyperlipidemia Mother      Depression Mother        Social History     Socioeconomic History     Marital status:      Spouse name: Not on file     Number of children: Not on file     Years of education: Not on file     Highest education level: Not on file   Occupational History     Not on file   Tobacco Use     Smoking status: Never Smoker     Smokeless tobacco: Never Used   Vaping Use     Vaping Use: Never used   Substance and Sexual Activity     Alcohol use: Yes     Comment: 2 drinks per week     Drug use: No     Sexual activity: Yes     Partners: Female     Birth control/protection: Male Surgical   Other Topics Concern     Parent/sibling w/ CABG, MI or angioplasty before 65F 55M? Yes   Social History Narrative     Not on file     Social Determinants of Health     Financial Resource Strain: Not on file   Food Insecurity: Not on file   Transportation Needs: Not on file   Physical Activity: Not on file   Stress: Not on file   Social Connections: Not on file   Intimate Partner Violence: Not on file   Housing  Stability: Not on file       Outpatient Encounter Medications as of 2022   Medication Sig Dispense Refill     atorvastatin (LIPITOR) 20 MG tablet Take 1 tablet (20 mg) by mouth daily 90 tablet 3     cimetidine (TAGAMET) 800 MG tablet Take 1 tablet (800 mg) by mouth At Bedtime 30 tablet 4     loratadine (CLARITIN) 10 MG tablet Take 10 mg by mouth daily as needed for allergies       [] candida albicans skin test injection 0.1 mL        No facility-administered encounter medications on file as of 2022.             O:   NAD, WDWN, Alert & Oriented, Mood & Affect wnl, Vitals stable   Here today alone   There were no vitals taken for this visit.   General appearance normal   Vitals stable   Alert, oriented and in no acute distress     1 mm papules on neck x 10  1 mm verrucous papule on left nare x 1  Verrucous papule on right hand x 1    Eyes: Conjunctivae/lids:Normal     ENT: Lips: normal    MSK:Normal    Pulm: Breathing Normal    Neuro/Psych: Orientation:Alert and Orientedx3 ; Mood/Affect:normal   A/P:  1. Common warts x 12  LN2:  Treated with LN2 for 5s for 1-2 cycles. Warned risks of blistering, pain, pigment change, scarring, and incomplete resolution.  Advised patient to return if lesions do not completely resolve.  Wound care sheet given.  IL Candin: PGACAC discussed.  Risks including but not limited to injection site reaction, bruising, no resolution.  All questions answered and entertained to patient s satisfaction.  Informed consent obtained.  IL Candin in concentration of 1 unit/ 0.1 ml was injected ID to warts.  Total injected was 4 units.  Patient tolerated without complications and given wound care instructions, including not to move product around.  Return in 4 weeks for follow-up and possible additional IL Candin.

## 2022-10-14 ENCOUNTER — OFFICE VISIT (OUTPATIENT)
Dept: DERMATOLOGY | Facility: CLINIC | Age: 52
End: 2022-10-14
Payer: COMMERCIAL

## 2022-10-14 DIAGNOSIS — B07.8 COMMON WART: Primary | ICD-10-CM

## 2022-10-14 PROCEDURE — 17110 DESTRUCTION B9 LES UP TO 14: CPT | Performed by: PHYSICIAN ASSISTANT

## 2022-10-14 PROCEDURE — 99207 PR DROP WITH A PROCEDURE: CPT | Performed by: PHYSICIAN ASSISTANT

## 2022-10-14 RX ORDER — CANDIDA ALBICANS 1000 [PNU]/ML
0.3 INJECTION, SOLUTION INTRADERMAL ONCE
Status: COMPLETED | OUTPATIENT
Start: 2022-10-14 | End: 2022-10-14

## 2022-10-14 RX ADMIN — CANDIDA ALBICANS 0.3 ML: 1000 INJECTION, SOLUTION INTRADERMAL at 13:08

## 2022-10-14 ASSESSMENT — PAIN SCALES - GENERAL: PAINLEVEL: NO PAIN (0)

## 2022-10-14 NOTE — NURSING NOTE
Chief Complaint   Patient presents with     Wart     Follow up         There were no vitals filed for this visit.  Wt Readings from Last 1 Encounters:   09/02/22 87.6 kg (193 lb 3.2 oz)       Elsa Tan LPN .................10/14/2022

## 2022-10-14 NOTE — LETTER
10/14/2022         RE: Emery Barkley  [address] [facility] [address]        Dear Colleague,    Thank you for referring your patient, Emery Barkley, to the [facility]. Please see a copy of my visit note below.    Emery Barkley is an extremely pleasant 52 year old year old male patient here today for recheck warts. Most resolving but some are recurring.  Patient has no other skin complaints today.  Remainder of the HPI, Meds, PMH, Allergies, FH, and SH was reviewed in chart.    Past Medical History:   Diagnosis Date     Hyperlipidemia        Past Surgical History:   Procedure Laterality Date     ABDOMEN SURGERY  2/7/19    umbilical hernia     HERNIORRHAPHY UMBILICAL N/A 2/7/2019    Procedure: HERNIORRHAPHY UMBILICAL;  Surgeon: Tej Beaulieu DO;  Location: [facility] OR     VASECTOMY          Family History   Problem Relation Age of Onset     C.A.D. Father         MI, chf, first mi in 50s.     Coronary Artery Disease Father      Hyperlipidemia Father      Cancer Sister         brain     Other Cancer Sister         Brain Cancer     Lipids Mother      Hyperlipidemia Mother      Depression Mother        Social History     Socioeconomic History     Marital status:      Spouse name: Not on file     Number of children: Not on file     Years of education: Not on file     Highest education level: Not on file   Occupational History     Not on file   Tobacco Use     Smoking status: Never     Smokeless tobacco: Never   Vaping Use     Vaping Use: Never used   Substance and Sexual Activity     Alcohol use: Yes     Comment: 2 drinks per week     Drug use: No     Sexual activity: Yes     Partners: Female     Birth control/protection: Male Surgical   Other Topics Concern     Parent/sibling w/ CABG, MI or angioplasty before 65F 55M? Yes   Social History Narrative     Not on file     Social Determinants of Health     Financial Resource Strain: Not on file   Food Insecurity: Not on file    Transportation Needs: Not on file   Physical Activity: Not on file   Stress: Not on file   Social Connections: Not on file   Intimate Partner Violence: Not on file   Housing Stability: Not on file       Outpatient Encounter Medications as of 10/14/2022   Medication Sig Dispense Refill     atorvastatin (LIPITOR) 20 MG tablet Take 1 tablet (20 mg) by mouth daily 90 tablet 3     cimetidine (TAGAMET) 800 MG tablet Take 1 tablet (800 mg) by mouth At Bedtime 30 tablet 0     loratadine (CLARITIN) 10 MG tablet Take 10 mg by mouth daily as needed for allergies       Facility-Administered Encounter Medications as of 10/14/2022   Medication Dose Route Frequency Provider Last Rate Last Admin     [COMPLETED] candida albicans skin test injection 0.3 mL  0.3 mL Intradermal Once Yuki Estevez PA-C   0.3 mL at 10/14/22 1308             O:   NAD, WDWN, Alert & Oriented, Mood & Affect wnl, Vitals stable   Here today alone   There were no vitals taken for this visit.   General appearance normal   Vitals stable   Alert, oriented and in no acute distress    Verrucous papules on upper lip, nare, right hand and right neck       Eyes: Conjunctivae/lids:Normal     ENT: Lips normal    MSK:Normal    Pulm: Breathing Normal    Neuro/Psych: Orientation:Alert and Orientedx3 ; Mood/Affect:normal  A/P:  1. Common warts x 6  LN2:  Treated with LN2 for 5s for 1-2 cycles. Warned risks of blistering, pain, pigment change, scarring, and incomplete resolution.  Advised patient to return if lesions do not completely resolve.  Wound care sheet given.  IL Candin: PGACAC discussed.  Risks including but not limited to injection site reaction, bruising, no resolution.  All questions answered and entertained to patient s satisfaction.  Informed consent obtained.  IL Candin in concentration of 1 unit/ 0.1 ml was injected ID to warts.  Total injected was 3 units.  Patient tolerated without complications and given wound care instructions, including not  to move product around.  Return in 4 weeks for follow-up and possible additional IL Candin.             Again, thank you for allowing me to participate in the care of your patient.        Sincerely,        Yuki King PA-C

## 2022-10-15 NOTE — PROGRESS NOTES
Emery Barkley is an extremely pleasant 52 year old year old male patient here today for recheck warts. Most resolving but some are recurring.  Patient has no other skin complaints today.  Remainder of the HPI, Meds, PMH, Allergies, FH, and SH was reviewed in chart.    Past Medical History:   Diagnosis Date     Hyperlipidemia        Past Surgical History:   Procedure Laterality Date     ABDOMEN SURGERY  2/7/19    umbilical hernia     HERNIORRHAPHY UMBILICAL N/A 2/7/2019    Procedure: HERNIORRHAPHY UMBILICAL;  Surgeon: Tej Beaulieu DO;  Location: [facility]     VASECTOMY          Family History   Problem Relation Age of Onset     C.A.D. Father         MI, chf, first mi in 50s.     Coronary Artery Disease Father      Hyperlipidemia Father      Cancer Sister         brain     Other Cancer Sister         Brain Cancer     Lipids Mother      Hyperlipidemia Mother      Depression Mother        Social History     Socioeconomic History     Marital status:      Spouse name: Not on file     Number of children: Not on file     Years of education: Not on file     Highest education level: Not on file   Occupational History     Not on file   Tobacco Use     Smoking status: Never     Smokeless tobacco: Never   Vaping Use     Vaping Use: Never used   Substance and Sexual Activity     Alcohol use: Yes     Comment: 2 drinks per week     Drug use: No     Sexual activity: Yes     Partners: Female     Birth control/protection: Male Surgical   Other Topics Concern     Parent/sibling w/ CABG, MI or angioplasty before 65F 55M? Yes   Social History Narrative     Not on file     Social Determinants of Health     Financial Resource Strain: Not on file   Food Insecurity: Not on file   Transportation Needs: Not on file   Physical Activity: Not on file   Stress: Not on file   Social Connections: Not on file   Intimate Partner Violence: Not on file   Housing Stability: Not on file       Outpatient Encounter Medications as of  10/14/2022   Medication Sig Dispense Refill     atorvastatin (LIPITOR) 20 MG tablet Take 1 tablet (20 mg) by mouth daily 90 tablet 3     cimetidine (TAGAMET) 800 MG tablet Take 1 tablet (800 mg) by mouth At Bedtime 30 tablet 0     loratadine (CLARITIN) 10 MG tablet Take 10 mg by mouth daily as needed for allergies       Facility-Administered Encounter Medications as of 10/14/2022   Medication Dose Route Frequency Provider Last Rate Last Admin     [COMPLETED] candida albicans skin test injection 0.3 mL  0.3 mL Intradermal Once Yuki Estevez PA-C   0.3 mL at 10/14/22 1308             O:   NAD, WDWN, Alert & Oriented, Mood & Affect wnl, Vitals stable   Here today alone   There were no vitals taken for this visit.   General appearance normal   Vitals stable   Alert, oriented and in no acute distress    Verrucous papules on upper lip, nare, right hand and right neck       Eyes: Conjunctivae/lids:Normal     ENT: Lips normal    MSK:Normal    Pulm: Breathing Normal    Neuro/Psych: Orientation:Alert and Orientedx3 ; Mood/Affect:normal  A/P:  1. Common warts x 6  LN2:  Treated with LN2 for 5s for 1-2 cycles. Warned risks of blistering, pain, pigment change, scarring, and incomplete resolution.  Advised patient to return if lesions do not completely resolve.  Wound care sheet given.  IL Candin: PGACAC discussed.  Risks including but not limited to injection site reaction, bruising, no resolution.  All questions answered and entertained to patient s satisfaction.  Informed consent obtained.  IL Candin in concentration of 1 unit/ 0.1 ml was injected ID to warts.  Total injected was 3 units.  Patient tolerated without complications and given wound care instructions, including not to move product around.  Return in 4 weeks for follow-up and possible additional IL Candin.

## 2022-11-11 ENCOUNTER — OFFICE VISIT (OUTPATIENT)
Dept: DERMATOLOGY | Facility: CLINIC | Age: 52
End: 2022-11-11
Payer: COMMERCIAL

## 2022-11-11 DIAGNOSIS — D48.5 NEOPLASM OF UNCERTAIN BEHAVIOR OF SKIN: ICD-10-CM

## 2022-11-11 DIAGNOSIS — B07.8 COMMON WART: Primary | ICD-10-CM

## 2022-11-11 PROCEDURE — 11102 TANGNTL BX SKIN SINGLE LES: CPT | Mod: 59 | Performed by: PHYSICIAN ASSISTANT

## 2022-11-11 PROCEDURE — 99207 PR DROP WITH A PROCEDURE: CPT | Performed by: PHYSICIAN ASSISTANT

## 2022-11-11 PROCEDURE — 17110 DESTRUCTION B9 LES UP TO 14: CPT | Performed by: PHYSICIAN ASSISTANT

## 2022-11-11 PROCEDURE — 88305 TISSUE EXAM BY PATHOLOGIST: CPT | Performed by: PATHOLOGY

## 2022-11-11 ASSESSMENT — PAIN SCALES - GENERAL: PAINLEVEL: NO PAIN (0)

## 2022-11-11 NOTE — LETTER
11/11/2022         RE: Emery Barkley  [address]  [place] [address]        Dear Colleague,    Thank you for referring your patient, Emery Barkley, to the [facility]. Please see a copy of my visit note below.    Emery Barkley is an extremely pleasant 52 year old year old male patient here today to recheck warts. He notes wart recurrent on right index finger, has been treated a few times with liquid nitrogen and candin. He notes notes wart recurrent on left nare, he reports neck has been clear.   Patient has no other skin complaints today.  Remainder of the HPI, Meds, PMH, Allergies, FH, and SH was reviewed in chart.   Past Medical History:   Diagnosis Date     Hyperlipidemia        Past Surgical History:   Procedure Laterality Date     ABDOMEN SURGERY  2/7/19    umbilical hernia     HERNIORRHAPHY UMBILICAL N/A 2/7/2019    Procedure: HERNIORRHAPHY UMBILICAL;  Surgeon: Tej Beaulieu DO;  Location: [facility]     VASECTOMY          Family History   Problem Relation Age of Onset     C.A.D. Father         MI, chf, first mi in 50s.     Coronary Artery Disease Father      Hyperlipidemia Father      Cancer Sister         brain     Other Cancer Sister         Brain Cancer     Lipids Mother      Hyperlipidemia Mother      Depression Mother        Social History     Socioeconomic History     Marital status:      Spouse name: Not on file     Number of children: Not on file     Years of education: Not on file     Highest education level: Not on file   Occupational History     Not on file   Tobacco Use     Smoking status: Never     Smokeless tobacco: Never   Vaping Use     Vaping Use: Never used   Substance and Sexual Activity     Alcohol use: Yes     Comment: 2 drinks per week     Drug use: No     Sexual activity: Yes     Partners: Female     Birth control/protection: Male Surgical   Other Topics Concern     Parent/sibling w/ CABG, MI or angioplasty before 65F 55M? Yes   Social  History Narrative     Not on file     Social Determinants of Health     Financial Resource Strain: Not on file   Food Insecurity: Not on file   Transportation Needs: Not on file   Physical Activity: Not on file   Stress: Not on file   Social Connections: Not on file   Intimate Partner Violence: Not on file   Housing Stability: Not on file       Outpatient Encounter Medications as of 11/11/2022   Medication Sig Dispense Refill     atorvastatin (LIPITOR) 20 MG tablet Take 1 tablet (20 mg) by mouth daily 90 tablet 3     cimetidine (TAGAMET) 800 MG tablet Take 1 tablet (800 mg) by mouth At Bedtime 30 tablet 0     loratadine (CLARITIN) 10 MG tablet Take 10 mg by mouth daily as needed for allergies       No facility-administered encounter medications on file as of 11/11/2022.             O:   NAD, WDWN, Alert & Oriented, Mood & Affect wnl, Vitals stable   Here today alone   There were no vitals taken for this visit.   General appearance normal   Vitals stable   Alert, oriented and in no acute distress    Verrucous papule on right index finger  Verrucous papule on left nare    Eyes: Conjunctivae/lids:Normal     ENT: Lips normal    MSK:Normal    Cardiovascular: peripheral edema none    Pulm: Breathing Normal    Neuro/Psych: Orientation:Alert and Orientedx3 ; Mood/Affect:normal   A/P:  1. R/O recurrent wart vs scar on right index finger  TANGENTIAL BIOPSY SENT OUT:  After consent, anesthesia with LEC and prep, tangential excision performed and specimen sent out for permanent section histology.  No complications and routine wound care. Patient told to call our office in 1-2 weeks for result.      Treated base with LN2  LN2:  Treated with LN2 for 5s for 1-2 cycles. Warned risks of blistering, pain, pigment change, scarring, and incomplete resolution.  Advised patient to return if lesions do not completely resolve.  Wound care sheet given.  2. Common wart on left nare   LN2:  Treated with LN2 for 5s for 1-2 cycles. Warned risks  of blistering, pain, pigment change, scarring, and incomplete resolution.  Advised patient to return if lesions do not completely resolve.  Wound care sheet given.        Again, thank you for allowing me to participate in the care of your patient.        Sincerely,        Yuki King PA-C

## 2022-11-11 NOTE — NURSING NOTE
Chief Complaint   Patient presents with     Wart     Right finger and left nostril     Derm Problem     Right shoulder lump        There were no vitals filed for this visit.  Wt Readings from Last 1 Encounters:   09/02/22 87.6 kg (193 lb 3.2 oz)       Elsa Tan LPN .................11/11/2022

## 2022-11-12 NOTE — PROGRESS NOTES
Emery Barkley is an extremely pleasant 52 year old year old male patient here today to recheck warts. He notes wart recurrent on right index finger, has been treated a few times with liquid nitrogen and candin. He notes notes wart recurrent on left nare, he reports neck has been clear.   Patient has no other skin complaints today.  Remainder of the HPI, Meds, PMH, Allergies, FH, and SH was reviewed in chart.   Past Medical History:   Diagnosis Date     Hyperlipidemia        Past Surgical History:   Procedure Laterality Date     ABDOMEN SURGERY  2/7/19    umbilical hernia     HERNIORRHAPHY UMBILICAL N/A 2/7/2019    Procedure: HERNIORRHAPHY UMBILICAL;  Surgeon: Tej Beaulieu DO;  Location: [facility] OR     VASECTOMY          Family History   Problem Relation Age of Onset     C.A.D. Father         MI, chf, first mi in 50s.     Coronary Artery Disease Father      Hyperlipidemia Father      Cancer Sister         brain     Other Cancer Sister         Brain Cancer     Lipids Mother      Hyperlipidemia Mother      Depression Mother        Social History     Socioeconomic History     Marital status:      Spouse name: Not on file     Number of children: Not on file     Years of education: Not on file     Highest education level: Not on file   Occupational History     Not on file   Tobacco Use     Smoking status: Never     Smokeless tobacco: Never   Vaping Use     Vaping Use: Never used   Substance and Sexual Activity     Alcohol use: Yes     Comment: 2 drinks per week     Drug use: No     Sexual activity: Yes     Partners: Female     Birth control/protection: Male Surgical   Other Topics Concern     Parent/sibling w/ CABG, MI or angioplasty before 65F 55M? Yes   Social History Narrative     Not on file     Social Determinants of Health     Financial Resource Strain: Not on file   Food Insecurity: Not on file   Transportation Needs: Not on file   Physical Activity: Not on file   Stress: Not on file   Social  Connections: Not on file   Intimate Partner Violence: Not on file   Housing Stability: Not on file       Outpatient Encounter Medications as of 11/11/2022   Medication Sig Dispense Refill     atorvastatin (LIPITOR) 20 MG tablet Take 1 tablet (20 mg) by mouth daily 90 tablet 3     cimetidine (TAGAMET) 800 MG tablet Take 1 tablet (800 mg) by mouth At Bedtime 30 tablet 0     loratadine (CLARITIN) 10 MG tablet Take 10 mg by mouth daily as needed for allergies       No facility-administered encounter medications on file as of 11/11/2022.             O:   NAD, WDWN, Alert & Oriented, Mood & Affect wnl, Vitals stable   Here today alone   There were no vitals taken for this visit.   General appearance normal   Vitals stable   Alert, oriented and in no acute distress    Verrucous papule on right index finger  Verrucous papule on left nare    Eyes: Conjunctivae/lids:Normal     ENT: Lips normal    MSK:Normal    Cardiovascular: peripheral edema none    Pulm: Breathing Normal    Neuro/Psych: Orientation:Alert and Orientedx3 ; Mood/Affect:normal   A/P:  1. R/O recurrent wart vs scar on right index finger  TANGENTIAL BIOPSY SENT OUT:  After consent, anesthesia with LEC and prep, tangential excision performed and specimen sent out for permanent section histology.  No complications and routine wound care. Patient told to call our office in 1-2 weeks for result.      Treated base with LN2  LN2:  Treated with LN2 for 5s for 1-2 cycles. Warned risks of blistering, pain, pigment change, scarring, and incomplete resolution.  Advised patient to return if lesions do not completely resolve.  Wound care sheet given.  2. Common wart on left nare   LN2:  Treated with LN2 for 5s for 1-2 cycles. Warned risks of blistering, pain, pigment change, scarring, and incomplete resolution.  Advised patient to return if lesions do not completely resolve.  Wound care sheet given.

## 2022-11-15 LAB
PATH REPORT.COMMENTS IMP SPEC: NORMAL
PATH REPORT.COMMENTS IMP SPEC: NORMAL
PATH REPORT.FINAL DX SPEC: NORMAL
PATH REPORT.GROSS SPEC: NORMAL
PATH REPORT.MICROSCOPIC SPEC OTHER STN: NORMAL
PATH REPORT.RELEVANT HX SPEC: NORMAL

## 2022-12-09 ENCOUNTER — OFFICE VISIT (OUTPATIENT)
Dept: DERMATOLOGY | Facility: CLINIC | Age: 52
End: 2022-12-09
Payer: COMMERCIAL

## 2022-12-09 DIAGNOSIS — B07.8 COMMON WART: Primary | ICD-10-CM

## 2022-12-09 PROCEDURE — 17110 DESTRUCTION B9 LES UP TO 14: CPT | Performed by: PHYSICIAN ASSISTANT

## 2022-12-09 ASSESSMENT — PAIN SCALES - GENERAL: PAINLEVEL: NO PAIN (0)

## 2022-12-09 NOTE — LETTER
12/9/2022         RE: Emery Barkley  [address]  [place] [address]        Dear Colleague,    Thank you for referring your patient, Emery Barkley, to the [facility]. Please see a copy of my visit note below.    Emery Barkley is an extremely pleasant 52 year old year old male patient here today to recheck warts. He notes wart recurrent on right index finger, has been treated a few times with liquid nitrogen and candin. He notes notes wart recurrent on left nare, he reports neck has been clear.   Patient has no other skin complaints today.  Remainder of the HPI, Meds, PMH, Allergies, FH, and SH was reviewed in chart.   Past Medical History:   Diagnosis Date     Hyperlipidemia        Past Surgical History:   Procedure Laterality Date     ABDOMEN SURGERY  2/7/19    umbilical hernia     HERNIORRHAPHY UMBILICAL N/A 2/7/2019    Procedure: HERNIORRHAPHY UMBILICAL;  Surgeon: Tej Beaulieu DO;  Location: [facility] OR     VASECTOMY          Family History   Problem Relation Age of Onset     C.A.D. Father         MI, chf, first mi in 50s.     Coronary Artery Disease Father      Hyperlipidemia Father      Cancer Sister         brain     Other Cancer Sister         Brain Cancer     Lipids Mother      Hyperlipidemia Mother      Depression Mother        Social History     Socioeconomic History     Marital status:      Spouse name: Not on file     Number of children: Not on file     Years of education: Not on file     Highest education level: Not on file   Occupational History     Not on file   Tobacco Use     Smoking status: Never     Smokeless tobacco: Never   Vaping Use     Vaping Use: Never used   Substance and Sexual Activity     Alcohol use: Yes     Comment: 2 drinks per week     Drug use: No     Sexual activity: Yes     Partners: Female     Birth control/protection: Male Surgical   Other Topics Concern     Parent/sibling w/ CABG, MI or angioplasty before 65F 55M? Yes   Social  History Narrative     Not on file     Social Determinants of Health     Financial Resource Strain: Not on file   Food Insecurity: Not on file   Transportation Needs: Not on file   Physical Activity: Not on file   Stress: Not on file   Social Connections: Not on file   Intimate Partner Violence: Not on file   Housing Stability: Not on file       Outpatient Encounter Medications as of 12/9/2022   Medication Sig Dispense Refill     atorvastatin (LIPITOR) 20 MG tablet Take 1 tablet (20 mg) by mouth daily 90 tablet 3     cimetidine (TAGAMET) 800 MG tablet Take 1 tablet (800 mg) by mouth At Bedtime 30 tablet 0     loratadine (CLARITIN) 10 MG tablet Take 10 mg by mouth daily as needed for allergies       No facility-administered encounter medications on file as of 12/9/2022.             O:   NAD, WDWN, Alert & Oriented, Mood & Affect wnl, Vitals stable   Here today alone   There were no vitals taken for this visit.   General appearance normal   Vitals stable   Alert, oriented and in no acute distress    Verrucous papule on right index finger  Verrucous papule on left nare    Eyes: Conjunctivae/lids:Normal     ENT: Lips normal    MSK:Normal    Cardiovascular: peripheral edema none    Pulm: Breathing Normal    Neuro/Psych: Orientation:Alert and Orientedx3 ; Mood/Affect:normal   A/P:  1. Wart on right index finger   Resolved.   2. Common wart on left nare x 2  LN2:  Treated with LN2 for 5s for 1-2 cycles. Warned risks of blistering, pain, pigment change, scarring, and incomplete resolution.  Advised patient to return if lesions do not completely resolve.  Wound care sheet given.        Again, thank you for allowing me to participate in the care of your patient.        Sincerely,        Yuki King PA-C

## 2023-01-06 ENCOUNTER — OFFICE VISIT (OUTPATIENT)
Dept: DERMATOLOGY | Facility: CLINIC | Age: 53
End: 2023-01-06
Payer: COMMERCIAL

## 2023-01-06 DIAGNOSIS — B07.8 COMMON WART: Primary | ICD-10-CM

## 2023-01-06 PROCEDURE — 17110 DESTRUCTION B9 LES UP TO 14: CPT | Performed by: PHYSICIAN ASSISTANT

## 2023-01-06 RX ORDER — CANDIDA ALBICANS 1000 [PNU]/ML
0.1 INJECTION, SOLUTION INTRADERMAL ONCE
Status: COMPLETED | OUTPATIENT
Start: 2023-01-06 | End: 2023-01-06

## 2023-01-06 RX ADMIN — CANDIDA ALBICANS 0.1 ML: 1000 INJECTION, SOLUTION INTRADERMAL at 07:59

## 2023-01-06 ASSESSMENT — PAIN SCALES - GENERAL: PAINLEVEL: NO PAIN (0)

## 2023-01-06 NOTE — NURSING NOTE
Chief Complaint   Patient presents with     Wart     Follow up        There were no vitals filed for this visit.  Wt Readings from Last 1 Encounters:   09/02/22 87.6 kg (193 lb 3.2 oz)       Elsa Tan LPN .................1/6/2023

## 2023-01-06 NOTE — LETTER
1/6/2023         RE: Emery Barkley  [address]  [place] [address]        Dear Colleague,    Thank you for referring your patient, Emery Barkley, to the [facility]. Please see a copy of my visit note below.    Emery Barkley is an extremely pleasant 52 year old year old male patient here today to recheck warts. He notes wart on right index finger is just starting to return, ,wart in left nare is growing to its original size. It was improved with treatment but now returning.  Patient has no other skin complaints today.  Remainder of the HPI, Meds, PMH, Allergies, FH, and SH was reviewed in chart.   Past Medical History:   Diagnosis Date     Hyperlipidemia        Past Surgical History:   Procedure Laterality Date     ABDOMEN SURGERY  2/7/19    umbilical hernia     HERNIORRHAPHY UMBILICAL N/A 2/7/2019    Procedure: HERNIORRHAPHY UMBILICAL;  Surgeon: Tej Beaulieu DO;  Location: [facility] OR     VASECTOMY          Family History   Problem Relation Age of Onset     C.A.D. Father         MI, chf, first mi in 50s.     Coronary Artery Disease Father      Hyperlipidemia Father      Cancer Sister         brain     Other Cancer Sister         Brain Cancer     Lipids Mother      Hyperlipidemia Mother      Depression Mother        Social History     Socioeconomic History     Marital status:      Spouse name: Not on file     Number of children: Not on file     Years of education: Not on file     Highest education level: Not on file   Occupational History     Not on file   Tobacco Use     Smoking status: Never     Smokeless tobacco: Never   Vaping Use     Vaping Use: Never used   Substance and Sexual Activity     Alcohol use: Yes     Comment: 2 drinks per week     Drug use: No     Sexual activity: Yes     Partners: Female     Birth control/protection: Male Surgical   Other Topics Concern     Parent/sibling w/ CABG, MI or angioplasty before 65F 55M? Yes   Social History Narrative      Not on file     Social Determinants of Health     Financial Resource Strain: Not on file   Food Insecurity: Not on file   Transportation Needs: Not on file   Physical Activity: Not on file   Stress: Not on file   Social Connections: Not on file   Intimate Partner Violence: Not on file   Housing Stability: Not on file       Outpatient Encounter Medications as of 1/6/2023   Medication Sig Dispense Refill     atorvastatin (LIPITOR) 20 MG tablet Take 1 tablet (20 mg) by mouth daily 90 tablet 3     cimetidine (TAGAMET) 800 MG tablet Take 1 tablet (800 mg) by mouth At Bedtime 30 tablet 0     loratadine (CLARITIN) 10 MG tablet Take 10 mg by mouth daily as needed for allergies       Facility-Administered Encounter Medications as of 1/6/2023   Medication Dose Route Frequency Provider Last Rate Last Admin     [COMPLETED] candida albicans skin test injection 0.1 mL  0.1 mL Intradermal Once Yuki Estevez PA-C   0.1 mL at 01/06/23 0759             O:   NAD, WDWN, Alert & Oriented, Mood & Affect wnl, Vitals stable   Here today alone   There were no vitals taken for this visit.   General appearance normal   Vitals stable   Alert, oriented and in no acute distress    Verrucous papule on right index finger  Verrucous papule on left nare    Eyes: Conjunctivae/lids:Normal     ENT: Lips normal    MSK:Normal    Cardiovascular: peripheral edema none    Pulm: Breathing Normal    Neuro/Psych: Orientation:Alert and Orientedx3 ; Mood/Affect:normal   A/P:  1. Wart on right index finger   Slight recurrence.  LN2:  Treated with LN2 for 5s for 1-2 cycles. Warned risks of blistering, pain, pigment change, scarring, and incomplete resolution.  Advised patient to return if lesions do not completely resolve.  Wound care sheet given.  2. Common wart inside left nare and left nasal sill   LN2:  Treated with LN2 for 5s for 1-2 cycles. Warned risks of blistering, pain, pigment change, scarring, and incomplete resolution.  Advised patient to  return if lesions do not completely resolve.  Wound care sheet given.  IL Candin: PGACAC discussed.  Risks including but not limited to injection site reaction, bruising, no resolution.  All questions answered and entertained to patient s satisfaction.  Informed consent obtained.  IL Candin in concentration of 1 unit/ 0.1 ml was injected ID to wart.  Total injected was  2 units.  Patient tolerated without complications and given wound care instructions, including not to move product around.  Return in 4 weeks for follow-up and possible additional IL Candin.      Referred to ENT to see if they could do shave removal of wart inside nare.           Again, thank you for allowing me to participate in the care of your patient.        Sincerely,        Yuki King PA-C

## 2023-01-06 NOTE — PROGRESS NOTES
Emery Barkley is an extremely pleasant 52 year old year old male patient here today to recheck warts. He notes wart on right index finger is just starting to return, ,wart in left nare is growing to its original size. It was improved with treatment but now returning.  Patient has no other skin complaints today.  Remainder of the HPI, Meds, PMH, Allergies, FH, and SH was reviewed in chart.   Past Medical History:   Diagnosis Date     Hyperlipidemia        Past Surgical History:   Procedure Laterality Date     ABDOMEN SURGERY  2/7/19    umbilical hernia     HERNIORRHAPHY UMBILICAL N/A 2/7/2019    Procedure: HERNIORRHAPHY UMBILICAL;  Surgeon: Tej Beaulieu DO;  Location: [facility] OR     VASECTOMY          Family History   Problem Relation Age of Onset     C.A.D. Father         MI, chf, first mi in 50s.     Coronary Artery Disease Father      Hyperlipidemia Father      Cancer Sister         brain     Other Cancer Sister         Brain Cancer     Lipids Mother      Hyperlipidemia Mother      Depression Mother        Social History     Socioeconomic History     Marital status:      Spouse name: Not on file     Number of children: Not on file     Years of education: Not on file     Highest education level: Not on file   Occupational History     Not on file   Tobacco Use     Smoking status: Never     Smokeless tobacco: Never   Vaping Use     Vaping Use: Never used   Substance and Sexual Activity     Alcohol use: Yes     Comment: 2 drinks per week     Drug use: No     Sexual activity: Yes     Partners: Female     Birth control/protection: Male Surgical   Other Topics Concern     Parent/sibling w/ CABG, MI or angioplasty before 65F 55M? Yes   Social History Narrative     Not on file     Social Determinants of Health     Financial Resource Strain: Not on file   Food Insecurity: Not on file   Transportation Needs: Not on file   Physical Activity: Not on file   Stress: Not on file   Social Connections: Not on file    Intimate Partner Violence: Not on file   Housing Stability: Not on file       Outpatient Encounter Medications as of 1/6/2023   Medication Sig Dispense Refill     atorvastatin (LIPITOR) 20 MG tablet Take 1 tablet (20 mg) by mouth daily 90 tablet 3     cimetidine (TAGAMET) 800 MG tablet Take 1 tablet (800 mg) by mouth At Bedtime 30 tablet 0     loratadine (CLARITIN) 10 MG tablet Take 10 mg by mouth daily as needed for allergies       Facility-Administered Encounter Medications as of 1/6/2023   Medication Dose Route Frequency Provider Last Rate Last Admin     [COMPLETED] candida albicans skin test injection 0.1 mL  0.1 mL Intradermal Once Yuki Estevez PA-C   0.1 mL at 01/06/23 0759             O:   NAD, WDWN, Alert & Oriented, Mood & Affect wnl, Vitals stable   Here today alone   There were no vitals taken for this visit.   General appearance normal   Vitals stable   Alert, oriented and in no acute distress    Verrucous papule on right index finger  Verrucous papule on left nare    Eyes: Conjunctivae/lids:Normal     ENT: Lips normal    MSK:Normal    Cardiovascular: peripheral edema none    Pulm: Breathing Normal    Neuro/Psych: Orientation:Alert and Orientedx3 ; Mood/Affect:normal   A/P:  1. Wart on right index finger   Slight recurrence.  LN2:  Treated with LN2 for 5s for 1-2 cycles. Warned risks of blistering, pain, pigment change, scarring, and incomplete resolution.  Advised patient to return if lesions do not completely resolve.  Wound care sheet given.  2. Common wart inside left nare and left nasal sill   LN2:  Treated with LN2 for 5s for 1-2 cycles. Warned risks of blistering, pain, pigment change, scarring, and incomplete resolution.  Advised patient to return if lesions do not completely resolve.  Wound care sheet given.  IL Candin: PGACAC discussed.  Risks including but not limited to injection site reaction, bruising, no resolution.  All questions answered and entertained to patient s  satisfaction.  Informed consent obtained.  IL Candin in concentration of 1 unit/ 0.1 ml was injected ID to wart.  Total injected was  2 units.  Patient tolerated without complications and given wound care instructions, including not to move product around.  Return in 4 weeks for follow-up and possible additional IL Candin.      Referred to ENT to see if they could do shave removal of wart inside nare.

## 2023-02-10 ENCOUNTER — OFFICE VISIT (OUTPATIENT)
Dept: DERMATOLOGY | Facility: CLINIC | Age: 53
End: 2023-02-10
Payer: COMMERCIAL

## 2023-02-10 DIAGNOSIS — B07.8 COMMON WART: Primary | ICD-10-CM

## 2023-02-10 PROCEDURE — 17110 DESTRUCTION B9 LES UP TO 14: CPT | Performed by: PHYSICIAN ASSISTANT

## 2023-02-10 ASSESSMENT — PAIN SCALES - GENERAL: PAINLEVEL: NO PAIN (0)

## 2023-02-10 NOTE — NURSING NOTE
Chief Complaint   Patient presents with     Wart     Follow, wart in nose still present       There were no vitals filed for this visit.  Wt Readings from Last 1 Encounters:   09/02/22 87.6 kg (193 lb 3.2 oz)       Elsa Tan LPN .................2/10/2023

## 2023-02-10 NOTE — LETTER
2/10/2023         RE: Emery Barkley  [address] [facility] [address]        Dear Colleague,    Thank you for referring your patient, Emery Barkley, to the [facility]. Please see a copy of my visit note below.    Emery Barkley is an extremely pleasant 52 year old year old male patient here today to recheck wart in nostril. He notes still present after treatment. He has appt to seen ENT for removal, would like it treatment one more time with liquid nitrogen.   Patient has no other skin complaints today.  Remainder of the HPI, Meds, PMH, Allergies, FH, and SH was reviewed in chart.   Past Medical History:   Diagnosis Date     Hyperlipidemia        Past Surgical History:   Procedure Laterality Date     ABDOMEN SURGERY  2/7/19    umbilical hernia     HERNIORRHAPHY UMBILICAL N/A 2/7/2019    Procedure: HERNIORRHAPHY UMBILICAL;  Surgeon: Tej Beaulieu DO;  Location: [facility] OR     VASECTOMY          Family History   Problem Relation Age of Onset     C.A.D. Father         MI, chf, first mi in 50s.     Coronary Artery Disease Father      Hyperlipidemia Father      Cancer Sister         brain     Other Cancer Sister         Brain Cancer     Lipids Mother      Hyperlipidemia Mother      Depression Mother        Social History     Socioeconomic History     Marital status:      Spouse name: Not on file     Number of children: Not on file     Years of education: Not on file     Highest education level: Not on file   Occupational History     Not on file   Tobacco Use     Smoking status: Never     Smokeless tobacco: Never   Vaping Use     Vaping Use: Never used   Substance and Sexual Activity     Alcohol use: Yes     Comment: 2 drinks per week     Drug use: No     Sexual activity: Yes     Partners: Female     Birth control/protection: Male Surgical   Other Topics Concern     Parent/sibling w/ CABG, MI or angioplasty before 65F 55M? Yes   Social History Narrative     Not on file      Social Determinants of Health     Financial Resource Strain: Not on file   Food Insecurity: Not on file   Transportation Needs: Not on file   Physical Activity: Not on file   Stress: Not on file   Social Connections: Not on file   Intimate Partner Violence: Not on file   Housing Stability: Not on file       Outpatient Encounter Medications as of 2/10/2023   Medication Sig Dispense Refill     atorvastatin (LIPITOR) 20 MG tablet Take 1 tablet (20 mg) by mouth daily 90 tablet 3     cimetidine (TAGAMET) 800 MG tablet Take 1 tablet (800 mg) by mouth At Bedtime 30 tablet 0     loratadine (CLARITIN) 10 MG tablet Take 10 mg by mouth daily as needed for allergies       No facility-administered encounter medications on file as of 2/10/2023.             O:   NAD, WDWN, Alert & Oriented, Mood & Affect wnl, Vitals stable   Here today alone   There were no vitals taken for this visit.   General appearance normal   Vitals stable   Alert, oriented and in no acute distress    Verrucous papule on right index finger  Verrucous papule on left nare    Eyes: Conjunctivae/lids:Normal     ENT: Lips normal    MSK:Normal    Cardiovascular: peripheral edema none    Pulm: Breathing Normal    Neuro/Psych: Orientation:Alert and Orientedx3 ; Mood/Affect:normal   A/P:  1. Common wart inside left nare    LN2:  Treated with LN2 for 5s for 1-2 cycles. Warned risks of blistering, pain, pigment change, scarring, and incomplete resolution.  Advised patient to return if lesions do not completely resolve.  Wound care sheet given.        Again, thank you for allowing me to participate in the care of your patient.        Sincerely,        Yuki King PA-C

## 2023-02-11 NOTE — PROGRESS NOTES
Emery Barkley is an extremely pleasant 52 year old year old male patient here today to recheck wart in nostril. He notes still present after treatment. He has appt to seen ENT for removal, would like it treatment one more time with liquid nitrogen.   Patient has no other skin complaints today.  Remainder of the HPI, Meds, PMH, Allergies, FH, and SH was reviewed in chart.   Past Medical History:   Diagnosis Date     Hyperlipidemia        Past Surgical History:   Procedure Laterality Date     ABDOMEN SURGERY  2/7/19    umbilical hernia     HERNIORRHAPHY UMBILICAL N/A 2/7/2019    Procedure: HERNIORRHAPHY UMBILICAL;  Surgeon: Tej Beaulieu DO;  Location: [facility]     VASECTOMY          Family History   Problem Relation Age of Onset     C.A.D. Father         MI, chf, first mi in 50s.     Coronary Artery Disease Father      Hyperlipidemia Father      Cancer Sister         brain     Other Cancer Sister         Brain Cancer     Lipids Mother      Hyperlipidemia Mother      Depression Mother        Social History     Socioeconomic History     Marital status:      Spouse name: Not on file     Number of children: Not on file     Years of education: Not on file     Highest education level: Not on file   Occupational History     Not on file   Tobacco Use     Smoking status: Never     Smokeless tobacco: Never   Vaping Use     Vaping Use: Never used   Substance and Sexual Activity     Alcohol use: Yes     Comment: 2 drinks per week     Drug use: No     Sexual activity: Yes     Partners: Female     Birth control/protection: Male Surgical   Other Topics Concern     Parent/sibling w/ CABG, MI or angioplasty before 65F 55M? Yes   Social History Narrative     Not on file     Social Determinants of Health     Financial Resource Strain: Not on file   Food Insecurity: Not on file   Transportation Needs: Not on file   Physical Activity: Not on file   Stress: Not on file   Social Connections: Not on file   Intimate Partner  Violence: Not on file   Housing Stability: Not on file       Outpatient Encounter Medications as of 2/10/2023   Medication Sig Dispense Refill     atorvastatin (LIPITOR) 20 MG tablet Take 1 tablet (20 mg) by mouth daily 90 tablet 3     cimetidine (TAGAMET) 800 MG tablet Take 1 tablet (800 mg) by mouth At Bedtime 30 tablet 0     loratadine (CLARITIN) 10 MG tablet Take 10 mg by mouth daily as needed for allergies       No facility-administered encounter medications on file as of 2/10/2023.             O:   NAD, WDWN, Alert & Oriented, Mood & Affect wnl, Vitals stable   Here today alone   There were no vitals taken for this visit.   General appearance normal   Vitals stable   Alert, oriented and in no acute distress    Verrucous papule on right index finger  Verrucous papule on left nare    Eyes: Conjunctivae/lids:Normal     ENT: Lips normal    MSK:Normal    Cardiovascular: peripheral edema none    Pulm: Breathing Normal    Neuro/Psych: Orientation:Alert and Orientedx3 ; Mood/Affect:normal   A/P:  1. Common wart inside left nare    LN2:  Treated with LN2 for 5s for 1-2 cycles. Warned risks of blistering, pain, pigment change, scarring, and incomplete resolution.  Advised patient to return if lesions do not completely resolve.  Wound care sheet given.

## 2023-03-06 NOTE — PROGRESS NOTES
Chief Complaint   Patient presents with     Ent Problem     Check wart in nostril - left/ has been frozen a few times but still persistent      History of Present Illness   Emery Barkley is a 52 year old male who presents today for evaluation.  I am seeing this patient in consultation for common wart at the request of the provider Анна Estevez PA-C. The patient has a lesion inside the left nostril.  This has been diagnosed as a common wart and been treated with liquid nitrogen.  The lesion did get smaller for a while but then now has recurred.  The lesion is not painful. No bleeding or scabbing. The patient has had similar lesions in other areas of his body.  No history of skin cancer.  No history of radiation exposure in the area.  The patient is a lifetime none smoker.      Past Medical History  Patient Active Problem List   Diagnosis     Hyperlipidemia LDL goal <100     Family history of coronary artery disease     External hemorrhoid     Common wart     Current Medications     Current Outpatient Medications:      atorvastatin (LIPITOR) 20 MG tablet, Take 1 tablet (20 mg) by mouth daily, Disp: 90 tablet, Rfl: 3     loratadine (CLARITIN) 10 MG tablet, Take 10 mg by mouth daily as needed for allergies, Disp: , Rfl:     Allergies  No Known Allergies    Social History   Social History     Socioeconomic History     Marital status:    Tobacco Use     Smoking status: Never     Smokeless tobacco: Never   Vaping Use     Vaping Use: Never used   Substance and Sexual Activity     Alcohol use: Yes     Comment: 2 drinks per week     Drug use: No     Sexual activity: Yes     Partners: Female     Birth control/protection: Male Surgical   Other Topics Concern     Parent/sibling w/ CABG, MI or angioplasty before 65F 55M? Yes       Family History  Family History   Problem Relation Age of Onset     C.A.D. Father         MI, chf, first mi in 50s.     Coronary Artery Disease Father      Hyperlipidemia Father      Cancer  "Sister         brain     Other Cancer Sister         Brain Cancer     Lipids Mother      Hyperlipidemia Mother      Depression Mother        Review of Systems  As per HPI and PMHx, otherwise 10+ comprehensive system review is negative.    Physical Exam  /86 (BP Location: Right arm, Patient Position: Sitting, Cuff Size: Adult Regular)   Pulse 83   Temp 98.2  F (36.8  C) (Tympanic)   Ht 1.77 m (5' 9.7\")   Wt 86.2 kg (190 lb)   BMI 27.50 kg/m    GENERAL: Patient is a pleasant, cooperative 52 year old male in no acute distress.  HEAD: Normocephalic, atraumatic.  Hair and scalp are normal.  EYES: Pupils are equal, round, reactive to light and accommodation.  Extraocular movements are intact.  The sclera nonicteric without injection.  The extraocular structures are normal.  EARS: Normal shape and symmetry.  No tenderness when palpating the mastoid or tragal areas bilaterally.    NOSE: Nares are patent.  Nasal mucosa is pink and moist.  Anterior rhinoscopy reveals a roughly 8 mm exophytic lesion in the nasal vestibule.  The lesion is fairly broad-based.  There is no ulceration.  The lesion a clinical appearance of either squamous papilloma or a verruca vulgaris.  No additional nasal cavity masses, polyps, or mucopurulence on anterior rhinoscopy  NEUROLOGIC: Cranial nerves II through XII are grossly intact.  Voice is strong.  Patient is House-Brackmann I/VI bilaterally.  CARDIOVASCULAR: Extremities are warm and well-perfused.  No significant peripheral edema.  RESPIRATORY: Patient has nonlabored breathing without cough, wheeze, stridor.  PSYCHIATRIC: Patient is alert and oriented.  Mood and affect appear normal.  SKIN: Warm and dry. No other scalp, face, or neck lesions noted.    Procedure: Excisional biopsy of left nasal vestibule skin lesion   Indication: Obtain diagnosis, treat symptoms    We discussed the risk, benefits, alteratives, options of left nasal vestibule skin lesion biopsy including, but not " limited to: risk of bleeding, risk of  infection, possible nondiagnostic biopsy, potential need for additional procedures, risk of scaring, risk of temporary or permanent numbness.  The patient voiced understanding and was willing to proceed.  Informed consent was signed.  The area was injected with 1% lidocaine with 1:100,000 epinephrine.  A 15 blade was used to make an elliptical incision around the lesion.  The lesion was grasped with a toothed forceps and then iris scissors was used to dissect in the subcutaneous/submucosal plane.  The lesion was circumferentially dissected and removed and was placed in formalin. Hemostasis was assured.  Final resection dimensions was roughly 1 cm in greatest dimension.  I then closed the incision with a single simple interrupted 5-0 fast-absorbing plain gut suture.  Patient tolerated the procedure well without complication.    Assessment and Plan     ICD-10-CM    1. Internal nasal lesion  J34.89 Surgical Pathology Exam     EXC SKIN BENIG 0.6-1CM FACE,     CANCELED: EXC SKIN BENIG 0.5CM OR LESS FACE,      2. Common wart  B07.8 Surgical Pathology Exam     Adult ENT  Referral     EXC SKIN BENIG 0.6-1CM FACE,     CANCELED: EXC SKIN BENIG 0.5CM OR LESS FACE,        It was my pleasure seeing Emery Barkley today in clinic.  The patient presented with a skin lesion inside the left nasal ala.  Based on history and physical exam, this is likely a papilloma or verruca vulgaris.  This was excisionally biopsied this today in clinic.  I will contact the patient with the pathology results.  The patient should put Vaseline or Aquaphor ointment on the lesion 1-2 times a day until the lesion heals.  We discussed wound care in detail.  The suture that I placed in the nose was dissolvable will not require removal.    Ryan Vega MD  Department of Otolaryngology-Head and Neck Surgery  [facility]

## 2023-03-08 ENCOUNTER — OFFICE VISIT (OUTPATIENT)
Dept: DERMATOLOGY | Facility: CLINIC | Age: 53
End: 2023-03-08
Payer: COMMERCIAL

## 2023-03-08 ENCOUNTER — OFFICE VISIT (OUTPATIENT)
Dept: OTOLARYNGOLOGY | Facility: CLINIC | Age: 53
End: 2023-03-08
Payer: COMMERCIAL

## 2023-03-08 VITALS
TEMPERATURE: 98.2 F | SYSTOLIC BLOOD PRESSURE: 132 MMHG | DIASTOLIC BLOOD PRESSURE: 86 MMHG | WEIGHT: 190 LBS | BODY MASS INDEX: 27.2 KG/M2 | HEIGHT: 70 IN | HEART RATE: 83 BPM

## 2023-03-08 DIAGNOSIS — B07.8 COMMON WART: ICD-10-CM

## 2023-03-08 DIAGNOSIS — D17.21 LIPOMA OF RIGHT SHOULDER: Primary | ICD-10-CM

## 2023-03-08 DIAGNOSIS — J34.89 INTERNAL NASAL LESION: Primary | ICD-10-CM

## 2023-03-08 PROCEDURE — 11441 EXC FACE-MM B9+MARG 0.6-1 CM: CPT | Performed by: OTOLARYNGOLOGY

## 2023-03-08 PROCEDURE — 88304 TISSUE EXAM BY PATHOLOGIST: CPT | Performed by: DERMATOLOGY

## 2023-03-08 PROCEDURE — 11404 EXC TR-EXT B9+MARG 3.1-4 CM: CPT | Mod: 51 | Performed by: DERMATOLOGY

## 2023-03-08 PROCEDURE — 88305 TISSUE EXAM BY PATHOLOGIST: CPT | Performed by: PATHOLOGY

## 2023-03-08 PROCEDURE — 99203 OFFICE O/P NEW LOW 30 MIN: CPT | Mod: 25 | Performed by: OTOLARYNGOLOGY

## 2023-03-08 PROCEDURE — 13121 CMPLX RPR S/A/L 2.6-7.5 CM: CPT | Performed by: DERMATOLOGY

## 2023-03-08 ASSESSMENT — PAIN SCALES - GENERAL: PAINLEVEL: NO PAIN (0)

## 2023-03-08 NOTE — PROGRESS NOTES
Emery Barkley is an extremely pleasant 52 year old year old male patient here today for evaluation and managment of lipoma on right shoulder.  Patient has no other skin complaints today.  Remainder of the HPI, Meds, PMH, Allergies, FH, and SH was reviewed in chart.      Past Medical History:   Diagnosis Date     Hyperlipidemia        Past Surgical History:   Procedure Laterality Date     ABDOMEN SURGERY  2/7/19    umbilical hernia     HERNIORRHAPHY UMBILICAL N/A 2/7/2019    Procedure: HERNIORRHAPHY UMBILICAL;  Surgeon: Tej Beaulieu DO;  Location: [facility] OR     VASECTOMY          Family History   Problem Relation Age of Onset     C.A.D. Father         MI, chf, first mi in 50s.     Coronary Artery Disease Father      Hyperlipidemia Father      Cancer Sister         brain     Other Cancer Sister         Brain Cancer     Lipids Mother      Hyperlipidemia Mother      Depression Mother        Social History     Socioeconomic History     Marital status:      Spouse name: Not on file     Number of children: Not on file     Years of education: Not on file     Highest education level: Not on file   Occupational History     Not on file   Tobacco Use     Smoking status: Never     Smokeless tobacco: Never   Vaping Use     Vaping Use: Never used   Substance and Sexual Activity     Alcohol use: Yes     Comment: 2 drinks per week     Drug use: No     Sexual activity: Yes     Partners: Female     Birth control/protection: Male Surgical   Other Topics Concern     Parent/sibling w/ CABG, MI or angioplasty before 65F 55M? Yes   Social History Narrative     Not on file     Social Determinants of Health     Financial Resource Strain: Not on file   Food Insecurity: Not on file   Transportation Needs: Not on file   Physical Activity: Not on file   Stress: Not on file   Social Connections: Not on file   Intimate Partner Violence: Not on file   Housing Stability: Not on file       Outpatient Encounter Medications as of  3/8/2023   Medication Sig Dispense Refill     atorvastatin (LIPITOR) 20 MG tablet Take 1 tablet (20 mg) by mouth daily 90 tablet 3     cimetidine (TAGAMET) 800 MG tablet Take 1 tablet (800 mg) by mouth At Bedtime 30 tablet 0     loratadine (CLARITIN) 10 MG tablet Take 10 mg by mouth daily as needed for allergies       No facility-administered encounter medications on file as of 3/8/2023.             O:   NAD, WDWN, Alert & Oriented, Mood & Affect wnl, Vitals stable   Here today alone    General appearance normal   Vitals stable   Alert, oriented and in no acute distress     R shoulder movable 3.5cm nodule       Eyes: Conjunctivae/lids:Normal     ENT: Lips, buccal mucosa, tongue: normal    MSK:Normal    Cardiovascular: peripheral edema none    Pulm: Breathing Normal    Neuro/Psych: Orientation:Alert and Orientedx3 ; Mood/Affect:normal       A/P:  1. Lipoma  Scar, recurrence, nerve damage discussed with patient   He would like excised   EXCISION OF BENIGN LESION AND COMPLEX: After thorough discussion of PGACAC, consent obtained, anesthesia and prep, the margins of the lesion were identified and an incision was made encompassing the lesion. The incisions were made through the skin and down to and including the subcutaneous tissue. The lesion was blunted dissected off of fascia, and was removed en bloc and submitted for perm  pathologic review. The wound edges were widely undermined wider than depth of defect, until adequate tissue mobility was obtained. hemostasis was achieved. Tension reduction 3 vicryl sutures were placed.  The wound edges were then closed in a layered fashion with 3 Vicryl and 5.0 Fast gut , being careful not to leave any dead space. Postoperative length was 3.5 cm.   EBL minimal; complications none; wound care routine. The patient was discharged in good condition and will return in one week for wound evaluation.  It was a pleasure speaking to Emery Barkley today.

## 2023-03-08 NOTE — LETTER
3/8/2023         RE: Emery Barkley  [address] [facility] [address]        Dear Colleague,    Thank you for referring your patient, Emery Barkley, to the [facility]. Please see a copy of my visit note below.    Chief Complaint   Patient presents with     Ent Problem     Check wart in nostril - left/ has been frozen a few times but still persistent      History of Present Illness   Emery Barkley is a 52 year old male who presents today for evaluation.  I am seeing this patient in consultation for common wart at the request of the provider Анна Estevez PA-C. The patient has a lesion inside the left nostril.  This has been diagnosed as a common wart and been treated with liquid nitrogen.  The lesion did get smaller for a while but then now has recurred.  The lesion is not painful. No bleeding or scabbing. The patient has had similar lesions in other areas of his body.  No history of skin cancer.  No history of radiation exposure in the area.  The patient is a lifetime none smoker.      Past Medical History  Patient Active Problem List   Diagnosis     Hyperlipidemia LDL goal <100     Family history of coronary artery disease     External hemorrhoid     Common wart     Current Medications     Current Outpatient Medications:      atorvastatin (LIPITOR) 20 MG tablet, Take 1 tablet (20 mg) by mouth daily, Disp: 90 tablet, Rfl: 3     loratadine (CLARITIN) 10 MG tablet, Take 10 mg by mouth daily as needed for allergies, Disp: , Rfl:     Allergies  No Known Allergies    Social History   Social History     Socioeconomic History     Marital status:    Tobacco Use     Smoking status: Never     Smokeless tobacco: Never   Vaping Use     Vaping Use: Never used   Substance and Sexual Activity     Alcohol use: Yes     Comment: 2 drinks per week     Drug use: No     Sexual activity: Yes     Partners: Female     Birth control/protection: Male Surgical   Other Topics Concern      "Parent/sibling w/ CABG, MI or angioplasty before 65F 55M? Yes       Family History  Family History   Problem Relation Age of Onset     C.A.D. Father         MI, chf, first mi in 50s.     Coronary Artery Disease Father      Hyperlipidemia Father      Cancer Sister         brain     Other Cancer Sister         Brain Cancer     Lipids Mother      Hyperlipidemia Mother      Depression Mother        Review of Systems  As per HPI and PMHx, otherwise 10+ comprehensive system review is negative.    Physical Exam  /86 (BP Location: Right arm, Patient Position: Sitting, Cuff Size: Adult Regular)   Pulse 83   Temp 98.2  F (36.8  C) (Tympanic)   Ht 1.77 m (5' 9.7\")   Wt 86.2 kg (190 lb)   BMI 27.50 kg/m    GENERAL: Patient is a pleasant, cooperative 52 year old male in no acute distress.  HEAD: Normocephalic, atraumatic.  Hair and scalp are normal.  EYES: Pupils are equal, round, reactive to light and accommodation.  Extraocular movements are intact.  The sclera nonicteric without injection.  The extraocular structures are normal.  EARS: Normal shape and symmetry.  No tenderness when palpating the mastoid or tragal areas bilaterally.    NOSE: Nares are patent.  Nasal mucosa is pink and moist.  Anterior rhinoscopy reveals a roughly 8 mm exophytic lesion in the nasal vestibule.  The lesion is fairly broad-based.  There is no ulceration.  The lesion a clinical appearance of either squamous papilloma or a verruca vulgaris.  No additional nasal cavity masses, polyps, or mucopurulence on anterior rhinoscopy  NEUROLOGIC: Cranial nerves II through XII are grossly intact.  Voice is strong.  Patient is House-Brackmann I/VI bilaterally.  CARDIOVASCULAR: Extremities are warm and well-perfused.  No significant peripheral edema.  RESPIRATORY: Patient has nonlabored breathing without cough, wheeze, stridor.  PSYCHIATRIC: Patient is alert and oriented.  Mood and affect appear normal.  SKIN: Warm and dry. No other scalp, face, or " neck lesions noted.    Procedure: Excisional biopsy of left nasal vestibule skin lesion   Indication: Obtain diagnosis, treat symptoms    We discussed the risk, benefits, alteratives, options of left nasal vestibule skin lesion biopsy including, but not limited to: risk of bleeding, risk of  infection, possible nondiagnostic biopsy, potential need for additional procedures, risk of scaring, risk of temporary or permanent numbness.  The patient voiced understanding and was willing to proceed.  Informed consent was signed.  The area was injected with 1% lidocaine with 1:100,000 epinephrine.  A 15 blade was used to make an elliptical incision around the lesion.  The lesion was grasped with a toothed forceps and then iris scissors was used to dissect in the subcutaneous/submucosal plane.  The lesion was circumferentially dissected and removed and was placed in formalin. Hemostasis was assured.  Final resection dimensions was roughly 1 cm in greatest dimension.  I then closed the incision with a single simple interrupted 5-0 fast-absorbing plain gut suture.  Patient tolerated the procedure well without complication.    Assessment and Plan     ICD-10-CM    1. Internal nasal lesion  J34.89 Surgical Pathology Exam     EXC SKIN BENIG 0.6-1CM FACE,     CANCELED: EXC SKIN BENIG 0.5CM OR LESS FACE,      2. Common wart  B07.8 Surgical Pathology Exam     Adult ENT  Referral     EXC SKIN BENIG 0.6-1CM FACE,     CANCELED: EXC SKIN BENIG 0.5CM OR LESS FACE,        It was my pleasure seeing Emery Barkley today in clinic.  The patient presented with a skin lesion inside the left nasal ala.  Based on history and physical exam, this is likely a papilloma or verruca vulgaris.  This was excisionally biopsied this today in clinic.  I will contact the patient with the pathology results.  The patient should put Vaseline or Aquaphor ointment on the lesion 1-2 times a day until the lesion heals.  We discussed wound care in detail.   The suture that I placed in the nose was dissolvable will not require removal.    Ryan Vega MD  Department of Otolaryngology-Head and Neck Surgery  [facility]       Again, thank you for allowing me to participate in the care of your patient.        Sincerely,        Ryan Vega MD

## 2023-03-08 NOTE — NURSING NOTE
"Initial /86 (BP Location: Right arm, Patient Position: Sitting, Cuff Size: Adult Regular)   Pulse 83   Temp 98.2  F (36.8  C) (Tympanic)   Ht 1.77 m (5' 9.7\")   Wt 86.2 kg (190 lb)   BMI 27.50 kg/m   Estimated body mass index is 27.5 kg/m  as calculated from the following:    Height as of this encounter: 1.77 m (5' 9.7\").    Weight as of this encounter: 86.2 kg (190 lb). .    Krystal Trevino CMA    "

## 2023-03-08 NOTE — LETTER
3/8/2023         RE: Emery Barkley  [address] [facility] [address]        Dear Colleague,    Thank you for referring your patient, Emery Barkley, to the [facility]. Please see a copy of my visit note below.    Emery Barkley is an extremely pleasant 52 year old year old male patient here today for evaluation and managment of lipoma on right shoulder.  Patient has no other skin complaints today.  Remainder of the HPI, Meds, PMH, Allergies, FH, and SH was reviewed in chart.      Past Medical History:   Diagnosis Date     Hyperlipidemia        Past Surgical History:   Procedure Laterality Date     ABDOMEN SURGERY  2/7/19    umbilical hernia     HERNIORRHAPHY UMBILICAL N/A 2/7/2019    Procedure: HERNIORRHAPHY UMBILICAL;  Surgeon: Tej Beaulieu DO;  Location: [facility] OR     VASECTOMY          Family History   Problem Relation Age of Onset     C.A.D. Father         MI, chf, first mi in 50s.     Coronary Artery Disease Father      Hyperlipidemia Father      Cancer Sister         brain     Other Cancer Sister         Brain Cancer     Lipids Mother      Hyperlipidemia Mother      Depression Mother        Social History     Socioeconomic History     Marital status:      Spouse name: Not on file     Number of children: Not on file     Years of education: Not on file     Highest education level: Not on file   Occupational History     Not on file   Tobacco Use     Smoking status: Never     Smokeless tobacco: Never   Vaping Use     Vaping Use: Never used   Substance and Sexual Activity     Alcohol use: Yes     Comment: 2 drinks per week     Drug use: No     Sexual activity: Yes     Partners: Female     Birth control/protection: Male Surgical   Other Topics Concern     Parent/sibling w/ CABG, MI or angioplasty before 65F 55M? Yes   Social History Narrative     Not on file     Social Determinants of Health     Financial Resource Strain: Not on file   Food Insecurity: Not on file    Transportation Needs: Not on file   Physical Activity: Not on file   Stress: Not on file   Social Connections: Not on file   Intimate Partner Violence: Not on file   Housing Stability: Not on file       Outpatient Encounter Medications as of 3/8/2023   Medication Sig Dispense Refill     atorvastatin (LIPITOR) 20 MG tablet Take 1 tablet (20 mg) by mouth daily 90 tablet 3     cimetidine (TAGAMET) 800 MG tablet Take 1 tablet (800 mg) by mouth At Bedtime 30 tablet 0     loratadine (CLARITIN) 10 MG tablet Take 10 mg by mouth daily as needed for allergies       No facility-administered encounter medications on file as of 3/8/2023.             O:   NAD, WDWN, Alert & Oriented, Mood & Affect wnl, Vitals stable   Here today alone    General appearance normal   Vitals stable   Alert, oriented and in no acute distress     R shoulder movable 3.5cm nodule       Eyes: Conjunctivae/lids:Normal     ENT: Lips, buccal mucosa, tongue: normal    MSK:Normal    Cardiovascular: peripheral edema none    Pulm: Breathing Normal    Neuro/Psych: Orientation:Alert and Orientedx3 ; Mood/Affect:normal       A/P:  1. Lipoma  Scar, recurrence, nerve damage discussed with patient   He would like excised   EXCISION OF BENIGN LESION AND COMPLEX: After thorough discussion of PGACAC, consent obtained, anesthesia and prep, the margins of the lesion were identified and an incision was made encompassing the lesion. The incisions were made through the skin and down to and including the subcutaneous tissue. The lesion was blunted dissected off of fascia, and was removed en bloc and submitted for perm  pathologic review. The wound edges were widely undermined wider than depth of defect, until adequate tissue mobility was obtained. hemostasis was achieved. Tension reduction 3 vicryl sutures were placed.  The wound edges were then closed in a layered fashion with 3 Vicryl and 5.0 Fast gut , being careful not to leave any dead space. Postoperative length was  3.5 cm.   EBL minimal; complications none; wound care routine. The patient was discharged in good condition and will return in one week for wound evaluation.  It was a pleasure speaking to Emery Barkley today.        Again, thank you for allowing me to participate in the care of your patient.        Sincerely,        Nelson Nicholas MD

## 2023-03-08 NOTE — PATIENT INSTRUCTIONS
Sutured Wound Care     [facility]: [phone]    [facility]: [phone]    Right shoulder    No strenuous activity for 48 hours. Resume moderate activity in 48 hours. No heavy exercising until you are seen for follow up in one week.     Take Tylenol as needed for discomfort.                         Do not drink alcoholic beverages for 48 hours.     Keep the pressure bandage in place for 24 hours. If the bandage becomes blood tinged or loose, reinforce it with gauze and tape.        (Refer to the reverse side of this page for management of bleeding).    Remove pressure bandage in 24 hours (white gauze and tape)    Leave the flat bandage in place until your follow up appointment. (Shiny)    Keep the bandage dry. Wash around it carefully.    If the tape becomes soiled or starts to come off, reinforce it with additional paper tape.    Do not smoke for 3 weeks; smoking is detrimental to wound healing.    It is normal to have swelling and bruising around the surgical site. The bruising will fade in approximately 10-14 days. Elevate the area to reduce swelling.    Numbness, itchiness and sensitivity to temperature changes can occur after surgery and may take up to 18 months to normalize.      POSSIBLE COMPLICATIONS    BLEEDING:    Leave the bandage in place.  Use tightly rolled up gauze or a cloth to apply direct pressure over the bandage for 20   minutes.  Reapply pressure for an additional 20 minutes if necessary  Call the office or go to the nearest emergency room if pressure fails to stop the bleeding.  Use additional gauze and tape to maintain pressure once the bleeding has stopped.    PAIN:    Post operative pain should slowly get better, never worse.  A severe increase in pain may indicate a problem. Call the office if this occurs.    ONE WEEK AFTER SURGERY:  -Remove bandage 3/15/23  -Clean and dry the area with plain tap water using a Q-tip or sterile gauze pad  -Reapply steri strips down  incision and cover with paper tape  -Leave bandage in place for 1  week  -Remove bandage on 3/22/23    WOUND CARE INSTRUCTIONS  for  ONE WEEK AFTER SURGERY    Leave flat bandage on your skin for one week after today s bandage change.  In one week when you remove the bandage, you may resume your regular skin care routine, including washing with mild soap and water, applying moisturizer, make-up and sunscreen.    If there are any open or bleeding areas at the incision/graft site you should begin to cover the area with a bandage daily as follows:    Clean and dry the area with plain tap water using a Q-tip or sterile gauze pad.  Apply Polysporin or Bacitracin ointment to the open area.  Cover the wound with a band-aid or a sterile non-stick gauze pad and micropore paper tape.     SIGNS OF INFECTION  - If you notice any of these signs of infection, call your doctor right away: expanding redness around the wound.  - Yellow or greenish-colored pus or cloudy wound drainage.    - Red streaking spreading from the wound.  - Increased swelling, tenderness, or pain around the wound.   - Fever.    Please remember that yellow and clear drainage from a wound can be normal and related to normal wound healing.  Isolated drainage from a wound without a combination of the above features does not indicate infection.       *Once the bandages are removed, the scar will be red and firm (especially in the lip/chin area). This is normal and will fade in time. It might take 6-12 months for this to happen.     *Massaging the area will help the scar soften and fade quicker. Begin to massage the area one month after the bandages have been removed. To massage apply pressure directly and firmly over the scar with the fingertips and move in a circular motion. Massage the area for a few minutes several times a day. Continue to massage the site for several months.    *Approximately 6-8 weeks after surgery it is not uncommon to see the formation of   tender pimple-like  bump along the scar. This is normal. As the scar continues to mature and the stitches underneath the skin begin to dissolve, this might occur. Do not pick or squeeze, this will resolve on it s own. Should one break open producing a small amount of drainage, apply Polysporin or Bacitracin ointment a few times a day until the wound is completely healed.    *Numbness in the surgical area is expected. It might take 12-18 months for the feeling to return to normal. During this time sensations of itchiness, tingling and occasional sharp pains might be noted. These feelings are normal and will subside once the nerves have completely healed.     IN CASE OF EMERGENCY: Dr Nicholas [phone]     If you were seen in [place] call: [phone]    If you were seen in [place] call: [phone]

## 2023-03-08 NOTE — PATIENT INSTRUCTIONS
Per physician instructions      If you have questions or concerns on any instructions given to you by your provider today or if you need to schedule an appointment, you can reach us at [phone].

## 2023-03-24 ENCOUNTER — HOSPITAL ENCOUNTER (EMERGENCY)
Facility: CLINIC | Age: 53
Discharge: HOME | End: 2023-03-24
Attending: EMERGENCY MEDICINE | Admitting: EMERGENCY MEDICINE
Payer: COMMERCIAL

## 2023-03-24 VITALS
WEIGHT: 190 LBS | TEMPERATURE: 99.7 F | BODY MASS INDEX: 27.5 KG/M2 | DIASTOLIC BLOOD PRESSURE: 98 MMHG | SYSTOLIC BLOOD PRESSURE: 150 MMHG | RESPIRATION RATE: 20 BRPM | HEART RATE: 119 BPM | OXYGEN SATURATION: 96 %

## 2023-03-24 DIAGNOSIS — H65.93 OME (OTITIS MEDIA WITH EFFUSION), BILATERAL: ICD-10-CM

## 2023-03-24 PROCEDURE — 99213 OFFICE O/P EST LOW 20 MIN: CPT | Performed by: EMERGENCY MEDICINE

## 2023-03-24 PROCEDURE — G0463 HOSPITAL OUTPT CLINIC VISIT: HCPCS | Performed by: EMERGENCY MEDICINE

## 2023-03-24 RX ORDER — METHYLPREDNISOLONE 4 MG
TABLET, DOSE PACK ORAL
COMMUNITY
Start: 2023-03-24 | End: 2023-05-02

## 2023-03-24 RX ORDER — AZELASTINE 1 MG/ML
SPRAY, METERED NASAL
COMMUNITY
Start: 2023-03-24 | End: 2023-07-14

## 2023-03-24 RX ORDER — FLUTICASONE PROPIONATE 50 MCG
SPRAY, SUSPENSION (ML) NASAL
COMMUNITY
Start: 2023-03-24 | End: 2023-07-14

## 2023-03-24 NOTE — DISCHARGE INSTRUCTIONS
Take antibiotics as prescribed.  You can still take the steroids as prescribed by the other provider.  Nasal sprays would be more optional.  Recommend antihistamine such as Claritin, Allegra, Zyrtec.  You can also take Benadryl, which may make you sleepy at night.

## 2023-03-24 NOTE — ED PROVIDER NOTES
History     Chief Complaint   Patient presents with     Sinusitis     Pt was on antibiotics just stopped them on Monday and had another virtual visit and they prescribed more medication but pt hasn't taken them.      Ear Fullness     Right ear plugged.     HPI  Emery Barkley is a 52 year old male who presenting to urgent care for concerns regarding bilateral hearing changes.  Patient has had intermittent bilateral ear pains, with muffled type sensation of both ears.  Yesterday it was primarily on the left ear, and during the day today has right ear plugged sensation.  Patient did recently complete a round of antibiotics, amoxicillin, and completing them on Monday, 5 days ago.  Patient had virtual visit yesterday.  I reviewed notes on patient's personal telephone, stating that he had been prescribed Medrol Dosepak, fluticasone nasal spray, in addition to montelukast.  Has not yet begun those medications.  Presents with increased amounts of nasal congestion, facial pain, in addition to hearing related changes.  No significant cough has been noted.  Denies any fever.  No abdominal pain.  No other new concerns.    Allergies:  No Known Allergies    Problem List:    Patient Active Problem List    Diagnosis Date Noted     Common wart 07/10/2018     Priority: Medium     External hemorrhoid 11/07/2011     Priority: Medium     Hyperlipidemia LDL goal <100 10/26/2010     Priority: Medium     Family history of coronary artery disease 10/26/2010     Priority: Medium        Past Medical History:    Past Medical History:   Diagnosis Date     Hyperlipidemia        Past Surgical History:    Past Surgical History:   Procedure Laterality Date     ABDOMEN SURGERY  2/7/19    umbilical hernia     HERNIORRHAPHY UMBILICAL N/A 2/7/2019    Procedure: HERNIORRHAPHY UMBILICAL;  Surgeon: Tej Beaulieu DO;  Location: [facility] OR     VASECTOMY         Family History:    Family History   Problem Relation Age of Onset     C.A.D. Father          MI, chf, first mi in 50s.     Coronary Artery Disease Father      Hyperlipidemia Father      Cancer Sister         brain     Other Cancer Sister         Brain Cancer     Lipids Mother      Hyperlipidemia Mother      Depression Mother        Social History:  Marital Status:   [2]  Social History     Tobacco Use     Smoking status: Never     Smokeless tobacco: Never   Vaping Use     Vaping Use: Never used   Substance Use Topics     Alcohol use: Yes     Comment: 2 drinks per week     Drug use: No        Medications:    amoxicillin-clavulanate (AUGMENTIN) 875-125 MG tablet  atorvastatin (LIPITOR) 20 MG tablet  azelastine (ASTELIN) 0.1 % nasal spray  fluticasone (FLONASE) 50 MCG/ACT nasal spray  loratadine (CLARITIN) 10 MG tablet  methylPREDNISolone (MEDROL DOSEPAK) 4 MG tablet therapy pack          Review of Systems  See HPI  Physical Exam   BP: (!) 150/98  Pulse: 119  Temp: 99.7  F (37.6  C)  Resp: 20  Weight: 86.2 kg (190 lb)  SpO2: 96 %      Physical Exam  BP (!) 150/98   Pulse 119   Temp 99.7  F (37.6  C) (Tympanic)   Resp 20   Wt 86.2 kg (190 lb)   SpO2 96%   BMI 27.50 kg/m    General: alert and in no acute distress  Head: atraumatic, normocephalic  Ears: Bilateral erythema, bulging membrane, with air-fluid levels.  Sinus congestion, slight sinus tenderness present  Abd: nondistended  Musculoskel/Extremities: normal extremities, no apparent edema, and full AROM of major joints  Skin: no rashes, no diaphoresis and skin color normal  Neuro: Patient awake, alert, oriented, speech is fluent, gait is normal  Psychiatric: affect/mood normal, cooperative, normal judgement/insight and memory intact      ED Course                 Procedures              Critical Care time:  none               No results found for this or any previous visit (from the past 24 hour(s)).    Medications - No data to display    Assessments & Plan (with Medical Decision Making)  52 year old male presenting to urgent care with  concerns regarding nasal congestion, in addition to bilateral ear fullness.  The symptoms have worsened over the past 1 week.  Did have recent course of antibiotics, amoxicillin.  Clinically, patient with signs consistent with otitis media.  He also does have facial type pains, with history and exam consistent with sinusitis.  Will treat with Augmentin.  Patient also encouraged to fill his steroid prescription, and take antihistamines as needed.  Follow-up in clinic if not improving.  Return instructions discussed if new or worsening symptoms develop.     I have reviewed the nursing notes.    I have reviewed the findings, diagnosis, plan and need for follow up with the patient.                   Discharge Medication List as of 3/24/2023 12:32 PM      START taking these medications    Details   amoxicillin-clavulanate (AUGMENTIN) 875-125 MG tablet Take 1 tablet by mouth 2 times daily for 10 days, Disp-20 tablet, R-0, E-Prescribe             Final diagnoses:   OME (otitis media with effusion), bilateral       3/24/2023   [facility] EMERGENCY DEPT     Julian Hoffmann MD  03/24/23 [id number]

## 2023-04-21 ENCOUNTER — OFFICE VISIT (OUTPATIENT)
Dept: DERMATOLOGY | Facility: CLINIC | Age: 53
End: 2023-04-21
Payer: COMMERCIAL

## 2023-04-21 DIAGNOSIS — B07.8 COMMON WART: Primary | ICD-10-CM

## 2023-04-21 PROCEDURE — 17110 DESTRUCTION B9 LES UP TO 14: CPT | Performed by: PHYSICIAN ASSISTANT

## 2023-04-21 ASSESSMENT — PAIN SCALES - GENERAL: PAINLEVEL: NO PAIN (0)

## 2023-04-21 NOTE — PROGRESS NOTES
Emery Barkley is an extremely pleasant 52 year old year old male patient here today to recheck wart on upper lip near nare. He notes this is the only residual area.  Patient has no other skin complaints today.  Remainder of the HPI, Meds, PMH, Allergies, FH, and SH was reviewed in chart.   Past Medical History:   Diagnosis Date     Hyperlipidemia        Past Surgical History:   Procedure Laterality Date     ABDOMEN SURGERY  2/7/19    umbilical hernia     HERNIORRHAPHY UMBILICAL N/A 2/7/2019    Procedure: HERNIORRHAPHY UMBILICAL;  Surgeon: Tej Beaulieu DO;  Location: [facility] OR     VASECTOMY          Family History   Problem Relation Age of Onset     C.A.D. Father         MI, chf, first mi in 50s.     Coronary Artery Disease Father      Hyperlipidemia Father      Cancer Sister         brain     Other Cancer Sister         Brain Cancer     Lipids Mother      Hyperlipidemia Mother      Depression Mother        Social History     Socioeconomic History     Marital status:      Spouse name: Not on file     Number of children: Not on file     Years of education: Not on file     Highest education level: Not on file   Occupational History     Not on file   Tobacco Use     Smoking status: Never     Smokeless tobacco: Never   Vaping Use     Vaping status: Never Used   Substance and Sexual Activity     Alcohol use: Yes     Comment: 2 drinks per week     Drug use: No     Sexual activity: Yes     Partners: Female     Birth control/protection: Male Surgical   Other Topics Concern     Parent/sibling w/ CABG, MI or angioplasty before 65F 55M? Yes   Social History Narrative     Not on file     Social Determinants of Health     Financial Resource Strain: Not on file   Food Insecurity: Not on file   Transportation Needs: Not on file   Physical Activity: Not on file   Stress: Not on file   Social Connections: Not on file   Intimate Partner Violence: Not on file   Housing Stability: Not on file       Outpatient Encounter  Medications as of 4/21/2023   Medication Sig Dispense Refill     atorvastatin (LIPITOR) 20 MG tablet Take 1 tablet (20 mg) by mouth daily 90 tablet 3     azelastine (ASTELIN) 0.1 % nasal spray        fluticasone (FLONASE) 50 MCG/ACT nasal spray        loratadine (CLARITIN) 10 MG tablet Take 10 mg by mouth daily as needed for allergies       methylPREDNISolone (MEDROL DOSEPAK) 4 MG tablet therapy pack        No facility-administered encounter medications on file as of 4/21/2023.             O:   NAD, WDWN, Alert & Oriented, Mood & Affect wnl, Vitals stable   Here today alone   There were no vitals taken for this visit.   General appearance normal   Vitals stable   Alert, oriented and in no acute distress    Verrucous papule on left upper lip     Eyes: Conjunctivae/lids:Normal     ENT: Lips normal    MSK:Normal    Pulm: Breathing Normal    Neuro/Psych: Orientation:Alert and Orientedx3 ; Mood/Affect:normal   A/P:  1. Common wart x on left upper lip   LN2:  Treated with LN2 for 5s for 1-2 cycles. Warned risks of blistering, pain, pigment change, scarring, and incomplete resolution.  Advised patient to return if lesions do not completely resolve.  Wound care sheet given.

## 2023-04-21 NOTE — LETTER
4/21/2023         RE: Emery Barkley  [address]  [place] [address]        Dear Colleague,    Thank you for referring your patient, Emery Barkley, to the [facility]. Please see a copy of my visit note below.    Emery Barkley is an extremely pleasant 52 year old year old male patient here today to recheck wart on upper lip near nare. He notes this is the only residual area.  Patient has no other skin complaints today.  Remainder of the HPI, Meds, PMH, Allergies, FH, and SH was reviewed in chart.   Past Medical History:   Diagnosis Date     Hyperlipidemia        Past Surgical History:   Procedure Laterality Date     ABDOMEN SURGERY  2/7/19    umbilical hernia     HERNIORRHAPHY UMBILICAL N/A 2/7/2019    Procedure: HERNIORRHAPHY UMBILICAL;  Surgeon: Tej Beaulieu DO;  Location: [facility] OR     VASECTOMY          Family History   Problem Relation Age of Onset     C.A.D. Father         MI, chf, first mi in 50s.     Coronary Artery Disease Father      Hyperlipidemia Father      Cancer Sister         brain     Other Cancer Sister         Brain Cancer     Lipids Mother      Hyperlipidemia Mother      Depression Mother        Social History     Socioeconomic History     Marital status:      Spouse name: Not on file     Number of children: Not on file     Years of education: Not on file     Highest education level: Not on file   Occupational History     Not on file   Tobacco Use     Smoking status: Never     Smokeless tobacco: Never   Vaping Use     Vaping status: Never Used   Substance and Sexual Activity     Alcohol use: Yes     Comment: 2 drinks per week     Drug use: No     Sexual activity: Yes     Partners: Female     Birth control/protection: Male Surgical   Other Topics Concern     Parent/sibling w/ CABG, MI or angioplasty before 65F 55M? Yes   Social History Narrative     Not on file     Social Determinants of Health     Financial Resource Strain: Not on file   Food  Insecurity: Not on file   Transportation Needs: Not on file   Physical Activity: Not on file   Stress: Not on file   Social Connections: Not on file   Intimate Partner Violence: Not on file   Housing Stability: Not on file       Outpatient Encounter Medications as of 4/21/2023   Medication Sig Dispense Refill     atorvastatin (LIPITOR) 20 MG tablet Take 1 tablet (20 mg) by mouth daily 90 tablet 3     azelastine (ASTELIN) 0.1 % nasal spray        fluticasone (FLONASE) 50 MCG/ACT nasal spray        loratadine (CLARITIN) 10 MG tablet Take 10 mg by mouth daily as needed for allergies       methylPREDNISolone (MEDROL DOSEPAK) 4 MG tablet therapy pack        No facility-administered encounter medications on file as of 4/21/2023.             O:   NAD, WDWN, Alert & Oriented, Mood & Affect wnl, Vitals stable   Here today alone   There were no vitals taken for this visit.   General appearance normal   Vitals stable   Alert, oriented and in no acute distress    Verrucous papule on left upper lip     Eyes: Conjunctivae/lids:Normal     ENT: Lips normal    MSK:Normal    Pulm: Breathing Normal    Neuro/Psych: Orientation:Alert and Orientedx3 ; Mood/Affect:normal   A/P:  1. Common wart x on left upper lip   LN2:  Treated with LN2 for 5s for 1-2 cycles. Warned risks of blistering, pain, pigment change, scarring, and incomplete resolution.  Advised patient to return if lesions do not completely resolve.  Wound care sheet given.        Again, thank you for allowing me to participate in the care of your patient.        Sincerely,        Yuki King PA-C

## 2023-05-02 ENCOUNTER — OFFICE VISIT (OUTPATIENT)
Dept: OTOLARYNGOLOGY | Facility: CLINIC | Age: 53
End: 2023-05-02
Payer: COMMERCIAL

## 2023-05-02 ENCOUNTER — OFFICE VISIT (OUTPATIENT)
Dept: AUDIOLOGY | Facility: CLINIC | Age: 53
End: 2023-05-02
Payer: COMMERCIAL

## 2023-05-02 VITALS — OXYGEN SATURATION: 96 % | RESPIRATION RATE: 18 BRPM

## 2023-05-02 DIAGNOSIS — J32.4 CHRONIC PANSINUSITIS: Primary | ICD-10-CM

## 2023-05-02 DIAGNOSIS — H93.8X3 PLUGGED FEELING IN EAR, BILATERAL: ICD-10-CM

## 2023-05-02 DIAGNOSIS — H69.92 EUSTACHIAN TUBE DYSFUNCTION, LEFT: ICD-10-CM

## 2023-05-02 DIAGNOSIS — J33.9 NASAL POLYP: ICD-10-CM

## 2023-05-02 DIAGNOSIS — J34.2 NASAL SEPTAL DEVIATION: ICD-10-CM

## 2023-05-02 DIAGNOSIS — H69.93 DYSFUNCTION OF BOTH EUSTACHIAN TUBES: ICD-10-CM

## 2023-05-02 DIAGNOSIS — J34.3 NASAL TURBINATE HYPERTROPHY: ICD-10-CM

## 2023-05-02 DIAGNOSIS — J34.89 NASAL OBSTRUCTION: ICD-10-CM

## 2023-05-02 DIAGNOSIS — H90.42 SENSORINEURAL HEARING LOSS (SNHL) OF LEFT EAR WITH UNRESTRICTED HEARING OF RIGHT EAR: Primary | ICD-10-CM

## 2023-05-02 PROCEDURE — 96372 THER/PROPH/DIAG INJ SC/IM: CPT | Performed by: OTOLARYNGOLOGY

## 2023-05-02 PROCEDURE — 92557 COMPREHENSIVE HEARING TEST: CPT | Performed by: AUDIOLOGIST

## 2023-05-02 PROCEDURE — 92550 TYMPANOMETRY & REFLEX THRESH: CPT | Performed by: AUDIOLOGIST

## 2023-05-02 PROCEDURE — 99214 OFFICE O/P EST MOD 30 MIN: CPT | Mod: 25 | Performed by: OTOLARYNGOLOGY

## 2023-05-02 RX ORDER — TRIAMCINOLONE ACETONIDE 40 MG/ML
60 INJECTION, SUSPENSION INTRA-ARTICULAR; INTRAMUSCULAR ONCE
Status: COMPLETED | OUTPATIENT
Start: 2023-05-02 | End: 2023-05-02

## 2023-05-02 RX ADMIN — TRIAMCINOLONE ACETONIDE 60 MG: 40 INJECTION, SUSPENSION INTRA-ARTICULAR; INTRAMUSCULAR at 13:49

## 2023-05-02 NOTE — Clinical Note
5/2/2023         RE: Emery Barkley  [address] [address] [address]        Dear Colleague,    Thank you for referring your patient, Emery Barkley, to the [facility]. Please see a copy of my visit note below.    Chief Complaint - Hearing loss    History of Present Illness - Emery Barkley is a 52 year old male who presents to me today with hearing loss or a plugged feeling in *** ear.  It has been present and noticeable for approximately ***.  This was preceded by an upper respiratory infection. There is no history of recent head trauma, chronic ear disease or ear surgery. The patient denies vertigo, otorrhea, or otalgia***. The patient has tried ***, but these things have not helped.     Tests personally reviewed today for this visit:   1.) audiogram was performed today as part of the evaluation and personally reviewed. The audiogram showed a significant asymmetric high frequency sensorineural hearing loss with elijah at 6k Hz and left ear worse (moderate loss range)  2.) tympanograms were performed today Type C left, type A right.      Past Medical History -   Patient Active Problem List   Diagnosis     Hyperlipidemia LDL goal <100     Family history of coronary artery disease     External hemorrhoid     Common wart       Current Medications -   Current Outpatient Medications:      atorvastatin (LIPITOR) 20 MG tablet, Take 1 tablet (20 mg) by mouth daily, Disp: 90 tablet, Rfl: 3     azelastine (ASTELIN) 0.1 % nasal spray, , Disp: , Rfl:      fluticasone (FLONASE) 50 MCG/ACT nasal spray, , Disp: , Rfl:      loratadine (CLARITIN) 10 MG tablet, Take 10 mg by mouth daily as needed for allergies, Disp: , Rfl:      methylPREDNISolone (MEDROL DOSEPAK) 4 MG tablet therapy pack, , Disp: , Rfl:     Allergies - No Known Allergies    Social History -   Social History     Socioeconomic History     Marital status:    Tobacco Use     Smoking status: Never     Smokeless tobacco: Never    Vaping Use     Vaping status: Never Used   Substance and Sexual Activity     Alcohol use: Yes     Comment: 2 drinks per week     Drug use: No     Sexual activity: Yes     Partners: Female     Birth control/protection: Male Surgical   Other Topics Concern     Parent/sibling w/ CABG, MI or angioplasty before 65F 55M? Yes       Family History -   Family History   Problem Relation Age of Onset     C.A.D. Father         MI, chf, first mi in 50s.     Coronary Artery Disease Father      Hyperlipidemia Father      Cancer Sister         brain     Other Cancer Sister         Brain Cancer     Lipids Mother      Hyperlipidemia Mother      Depression Mother        Review of Systems - As per HPI and PMHx, otherwise 7 system review of the head and neck negative.    Physical Exam  B/P: Data Unavailable, T: Data Unavailable, P: Data Unavailable, R: Data Unavailable  General - The patient is nontoxic, in no distress.  Alert and oriented to person and place, answers questions and cooperates with examination appropriately.   Voice and Breathing - The patient was breathing comfortably without the use of accessory muscles. There was no wheezing, stridor, or stertor.  The patients voice was clear and strong.  Ears - The tympanic membrane on the *** is intact, but appears dull with a middle ear effusion. No acute infection.  No fluid or purulence was seen in the external canal. The tympanic membrane on the *** is intact, no middle ear effusion. No acute infection.  No fluid or purulence was seen in the external canal. Cohen lateralized ***. Air conduction was greater than bone conduction with a 256 Hz tuning fork.  Nose - Septum midline. Turbinates normal size. Airway patent bilaterally. No polyps, masses, or pus.   Eyes - Extraocular movements intact. Sclera were not icteric or injected.  Mouth - Examination of the oral cavity showed pink, healthy mucosa. No lesions or ulcerations noted.  The tongue was mobile and midline.  Throat - The  "walls of the oropharynx were smooth, symmetric, and had no lesions or ulcerations.  The uvula was midline on elevation.    Neck - Soft, non-tender. Palpation of the occipital, submental, submandibular, internal jugular chain, and supraclavicular nodes did not demonstrate any abnormal lymph nodes or masses. No parotid masses. Palpation of the thyroid was soft and smooth, with no nodules or goiter appreciated.  The trachea was mobile and midline.  Neurological - Cranial nerves 2 through 12 were grossly intact. House-Brackmann grade 1 out of 6 bilaterally.      Assessment and Plan - Emery Barkley is a 52 year old male who presents to me today with hearing loss.  This is most consistent with a middle ear effusion in the *** ear. This is likely due to transient eustachian tube dysfunction following an upper respiratory infection. I have advised trying sudafed and daily valsalva to try and improve the eustachian tube function. The patient will return in a month. If there is still evidence of an effusion I will perform myringotomy with aspiration.     Mario Gamboa MD  Otolaryngology  [facility]      Chief Complaint - Hearing loss, nasal medications    History of Present Illness - Emery Barkley is a 52 year old male who presents to me today with a plugged feeling in the ears. Has a sinus cold. It started after COVID 2/2023. He can clear ears now some. He had a cough now, but is better. Has postnasal drainage. He has a \"plugged sinuses,\" smell is diminished. He still has yellow drainage. There is no history of recent head trauma, chronic ear disease or ear surgery. The patient denies vertigo, otorrhea, or otalgia***. The patient has tried antibiotics, prednisone (medrol dose pack), , claritin, flonase, astelin, only helped a little. Only occasional sinus infection every 3 years.    Tests personally reviewed today for this visit:   1.) audiogram was performed today as part of the evaluation and personally reviewed. " The audiogram showed a significant asymmetric high frequency sensorineural hearing loss with elijah at 6k Hz and left ear worse (moderate loss range)  2.) tympanograms were performed today Type C left, type A right.      Past Medical History -   Patient Active Problem List   Diagnosis     Hyperlipidemia LDL goal <100     Family history of coronary artery disease     External hemorrhoid     Common wart       Current Medications -   Current Outpatient Medications:      atorvastatin (LIPITOR) 20 MG tablet, Take 1 tablet (20 mg) by mouth daily, Disp: 90 tablet, Rfl: 3     azelastine (ASTELIN) 0.1 % nasal spray, , Disp: , Rfl:      fluticasone (FLONASE) 50 MCG/ACT nasal spray, , Disp: , Rfl:      loratadine (CLARITIN) 10 MG tablet, Take 10 mg by mouth daily as needed for allergies, Disp: , Rfl:      methylPREDNISolone (MEDROL DOSEPAK) 4 MG tablet therapy pack, , Disp: , Rfl:     Allergies - No Known Allergies    Social History -   Social History     Socioeconomic History     Marital status:    Tobacco Use     Smoking status: Never     Smokeless tobacco: Never   Vaping Use     Vaping status: Never Used   Substance and Sexual Activity     Alcohol use: Yes     Comment: 2 drinks per week     Drug use: No     Sexual activity: Yes     Partners: Female     Birth control/protection: Male Surgical   Other Topics Concern     Parent/sibling w/ CABG, MI or angioplasty before 65F 55M? Yes       Family History -   Family History   Problem Relation Age of Onset     C.A.D. Father         MI, chf, first mi in 50s.     Coronary Artery Disease Father      Hyperlipidemia Father      Cancer Sister         brain     Other Cancer Sister         Brain Cancer     Lipids Mother      Hyperlipidemia Mother      Depression Mother        Review of Systems - As per HPI and PMHx, otherwise 7 system review of the head and neck negative.    Physical Exam  B/P: Data Unavailable, T: Data Unavailable, P: Data Unavailable, R: Data Unavailable  General  - The patient is nontoxic, in no distress.  Alert and oriented to person and place, answers questions and cooperates with examination appropriately.   Voice and Breathing - The patient was breathing comfortably without the use of accessory muscles. There was no wheezing, stridor, or stertor.  The patients voice was clear and strong.  Ears - The tympanic membrane on the *** is intact, but appears dull with a middle ear effusion. No acute infection.  No fluid or purulence was seen in the external canal. The tympanic membrane on the *** is intact, no middle ear effusion. No acute infection.  No fluid or purulence was seen in the external canal. Cohen lateralized ***. Air conduction was greater than bone conduction with a 256 Hz tuning fork.  Nose - Septum midline. Turbinates normal size. Airway patent bilaterally. No polyps, masses, or pus.   Eyes - Extraocular movements intact. Sclera were not icteric or injected.  Mouth - Examination of the oral cavity showed pink, healthy mucosa. No lesions or ulcerations noted.  The tongue was mobile and midline.  Throat - The walls of the oropharynx were smooth, symmetric, and had no lesions or ulcerations.  The uvula was midline on elevation.    Neck - Soft, non-tender. Palpation of the occipital, submental, submandibular, internal jugular chain, and supraclavicular nodes did not demonstrate any abnormal lymph nodes or masses. No parotid masses. Palpation of the thyroid was soft and smooth, with no nodules or goiter appreciated.  The trachea was mobile and midline.  Neurological - Cranial nerves 2 through 12 were grossly intact. House-Brackmann grade 1 out of 6 bilaterally.      Assessment and Plan - Emery Barkley is a 52 year old male who presents to me today with hearing loss.  This is most consistent with a middle ear effusion in the *** ear. This is likely due to transient eustachian tube dysfunction following an upper respiratory infection. I have advised trying sudafed and  daily valsalva to try and improve the eustachian tube function. The patient will return in a month. If there is still evidence of an effusion I will perform myringotomy with aspiration.     Mario Gamboa MD  Otolaryngology  [facility]        Again, thank you for allowing me to participate in the care of your patient.        Sincerely,        Mario Gamboa MD

## 2023-05-02 NOTE — PROGRESS NOTES
"Chief Complaint - Hearing loss, nasal medications    History of Present Illness - Emery Barkley is a 52 year old male who presents to me today with a plugged feeling in the ears. Has a sinus cold. It started after COVID 2/2023. He can clear ears now some. He had a cough now, but is better. Has postnasal drainage. He has a \"plugged sinuses,\" smell is diminished. He still has yellow drainage. There is no history of chronic ear disease or ear surgery. The patient has tried antibiotics, prednisone (medrol dose pack), , claritin, flonase, astelin, only helped a little. Only occasional sinus infection every 3 years.    Tests personally reviewed today for this visit:   1.) audiogram was performed today as part of the evaluation and personally reviewed. The audiogram showed a significant asymmetric high frequency sensorineural hearing loss with elijah at 6k Hz and left ear worse (moderate loss range)  2.) tympanograms were performed today Type C left, type A right.    3.) CT sinus ordered today    Past Medical History -   Patient Active Problem List   Diagnosis     Hyperlipidemia LDL goal <100     Family history of coronary artery disease     External hemorrhoid     Common wart       Current Medications -   Current Outpatient Medications:      atorvastatin (LIPITOR) 20 MG tablet, Take 1 tablet (20 mg) by mouth daily, Disp: 90 tablet, Rfl: 3     azelastine (ASTELIN) 0.1 % nasal spray, , Disp: , Rfl:      fluticasone (FLONASE) 50 MCG/ACT nasal spray, , Disp: , Rfl:      loratadine (CLARITIN) 10 MG tablet, Take 10 mg by mouth daily as needed for allergies, Disp: , Rfl:      methylPREDNISolone (MEDROL DOSEPAK) 4 MG tablet therapy pack, , Disp: , Rfl:     Allergies - No Known Allergies    Social History -   Social History     Socioeconomic History     Marital status:    Tobacco Use     Smoking status: Never     Smokeless tobacco: Never   Vaping Use     Vaping status: Never Used   Substance and Sexual Activity     Alcohol " use: Yes     Comment: 2 drinks per week     Drug use: No     Sexual activity: Yes     Partners: Female     Birth control/protection: Male Surgical   Other Topics Concern     Parent/sibling w/ CABG, MI or angioplasty before 65F 55M? Yes       Family History -   Family History   Problem Relation Age of Onset     C.A.D. Father         MI, chf, first mi in 50s.     Coronary Artery Disease Father      Hyperlipidemia Father      Cancer Sister         brain     Other Cancer Sister         Brain Cancer     Lipids Mother      Hyperlipidemia Mother      Depression Mother        Review of Systems - As per HPI and PMHx, otherwise 7 system review of the head and neck negative.    Physical Exam  General - The patient is nontoxic, in no distress.  Alert and oriented to person and place, answers questions and cooperates with examination appropriately.   Voice and Breathing - The patient was breathing comfortably without the use of accessory muscles. There was no wheezing, stridor, or stertor.  The patients voice was clear and strong.  Ears - clean canals. The tympanic membrane on the right is intact, no middle ear effusion. No acute infection.  No fluid or purulence was seen in the external canal. The tympanic membrane on the left is intact, no middle ear effusion. No acute infection.  No fluid or purulence was seen in the external canal. Patient can pop ears.  Nose - Septum midline. Turbinates normal size. Airway patent bilaterally. No polyps, masses, or pus.   Mouth - Examination of the oral cavity showed pink, healthy mucosa. No lesions or ulcerations noted.  The tongue was mobile and midline.  Throat - The walls of the oropharynx were smooth, symmetric, and had no lesions or ulcerations.  The uvula was midline on elevation.    Neck - Soft, non-tender. Palpation of the occipital, submental, submandibular, internal jugular chain, and supraclavicular nodes did not demonstrate any abnormal lymph nodes or masses. No parotid masses.  Palpation of the thyroid was soft and smooth, with no nodules or goiter appreciated.  The trachea was mobile and midline.  Neurological - Cranial nerves 2 through 12 were grossly intact. House-Brackmann grade 1 out of 6 bilaterally.  Nose -septum deviated to the left.  Significant nasal polyposis occluding much of the left nasal cavity.  No pus.  Right airway is more open but there is also polyps in the right middle meatus.  Again no pus.    Assessment and Plan -     ICD-10-CM    1. Chronic pansinusitis  J32.4 triamcinolone (KENALOG-40) injection 60 mg     CT Sinus w/o Contrast      2. Nasal polyp  J33.9 triamcinolone (KENALOG-40) injection 60 mg     CT Sinus w/o Contrast      3. Plugged feeling in ear, bilateral  H93.8X3 Adult Audiology  Referral      4. Dysfunction of both eustachian tubes  H69.83 Adult Audiology  Referral          Emery Barkley is a 52 year old male who presents to me today with sinus symptoms and ear plugging due to chronic rhinosinusitis and ETD, respectively. He has nasal polyps. I recommend CT sinus to verify this is bilateral disease and to examine the extent. We discussed treatment options and he will continue flonase, nasal saline irrigations, and I recommend a kenalog injection today.  We discussed antibiotics when he has infection but I do not think he has this today.  I will verify this with a CT sinus.  We discussed dupixent and surgery as options. Return in 3 months.  If he wants to proceed with Dupixent we will start a prior authorization.    Mario Gamboa MD  Otolaryngology  [facility]

## 2023-05-02 NOTE — PATIENT INSTRUCTIONS
- Options for polyps:   1.) flonase, nasal saline irrigations, intermittent steroids and antibiotics, allergy medications  2.) Dupixent shots - needs prior authorization  3.) surgery

## 2023-05-02 NOTE — PROGRESS NOTES
AUDIOLOGY REPORT:    Patient was referred from ENT by Dr. Gamboa for audiology evaluation. The patient reports that he had Covid in February of this year, and has been having sinus and ear problems since then. He reports that his ears were muffled for around two weeks, and they still plug up if he bends over, though he can clear them now. The patient reports that his hearing seems to be mostly back to baseline, and he had no previous hearing concerns. The patient reports a slight sensation of fluid in his ears, but does not have ear pain or pressure. The patient reports that he had two rounds of antibiotics as well as prednisone and nasal spray, with temporary improvement after each treatment. He reports that he still has a cough at night. 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 He reports that he does not have a prior history of ear problems or a history of ear surgery.    Testing:    Otoscopy:   Otoscopic exam indicates ears are clear of cerumen bilaterally     Tympanograms:    RIGHT: normal eardrum mobility     LEFT:   negative pressure     Reflexes (reported by stimulus ear):  RIGHT: Ipsilateral is present at normal levels  RIGHT: Contralateral is present at elevated levels   LEFT:   Ipsilateral is present at normal levels  LEFT:   Contralateral is present at normal levels    Thresholds:   Pure Tone Thresholds assessed using conventional audiometry with good reliability from 250-8000 Hz bilaterally using insert earphones and circumaural headphones     RIGHT:  normal hearing sensitivity at all tested frequencies     LEFT:    mild to moderate sensorineural hearing loss at  8282-8429 Hz with normal hearing sensitivity at all other tested frequencies    Speech Reception Threshold:    RIGHT: 5 dB HL    LEFT:   10 dB HL  Results are in agreement with pure tone average.     Word Recognition Score:     RIGHT: 100%  at 50 dB HL using NU-6 recorded word list.    LEFT:   100% at 60 dB HL using NU-6 recorded word list.    Discussed results with the patient.     Patient was returned to ENT for follow up.     Reji Damon, CCC-A  Licensed Audiologist #[id number]  5/2/2023

## 2023-05-15 ENCOUNTER — TRANSFERRED RECORDS (OUTPATIENT)
Dept: HEALTH INFORMATION MANAGEMENT | Facility: CLINIC | Age: 53
End: 2023-05-15
Payer: COMMERCIAL

## 2023-06-07 DIAGNOSIS — J32.4 CHRONIC PANSINUSITIS: Primary | ICD-10-CM

## 2023-06-07 DIAGNOSIS — J33.9 NASAL POLYP: ICD-10-CM

## 2023-06-07 RX ORDER — PREDNISONE 20 MG/1
TABLET ORAL
Qty: 16 TABLET | Refills: 0 | Status: SHIPPED | OUTPATIENT
Start: 2023-06-07 | End: 2023-07-14

## 2023-06-07 RX ORDER — SULFAMETHOXAZOLE/TRIMETHOPRIM 800-160 MG
1 TABLET ORAL 2 TIMES DAILY
Qty: 28 TABLET | Refills: 0 | Status: SHIPPED | OUTPATIENT
Start: 2023-06-07 | End: 2023-07-14

## 2023-06-07 NOTE — PROGRESS NOTES
I spoke with Emery on the phone.  I reviewed his CT findings which shows chronic pansinusitis and nasal polyps.  He has tried numerous medication options including antibiotics, Flonase, allergy medicine, a Kenalog injection, nasal saline irrigations, and nothing is helped.  I propose 2 options of endoscopic sinus surgery or Dupixent injections.  His preference is to undergo surgery.  I did reiterate that surgery is not a cure and that polyps may regrow and symptoms may recur.  He will still likely need other medication treatment and possibly Dupixent injections in the future.     The basic premise of functional endoscopic sinus surgery was discussed at length.  This included the concept behind restoration of the natural drainage pathways for the paranasal sinuses using the latest minimally invasive, minimally traumatic techniques and instruments.    Risks discussed included the risks on infection, bleeding, the risks of general anesthesia.  Also specific to functional endoscopic sinus surgery, the risks of permanent damage to the eyes/vision, tear ducts, sense of smell, base of skull/brain, and CSF leak were described.  And finally, the possibility that functional endoscopic sinus surgery may not relieve sinus disease, and that there might be continued need for medical management was also discussed.  The patient understood and wished to proceed with scheduling. He is okay with this and wishes to proceed.    I recommend image guided endoscopic sinus surgery, all sinuses, nasal polypectomy, septoplasty, bilateral inferior turbinate reduction.    I'll have the surgery schedulers call given dates and times.

## 2023-06-08 ENCOUNTER — TELEPHONE (OUTPATIENT)
Dept: OTOLARYNGOLOGY | Facility: CLINIC | Age: 53
End: 2023-06-08

## 2023-06-09 PROBLEM — J34.89 NASAL OBSTRUCTION: Status: ACTIVE | Noted: 2023-06-09

## 2023-06-09 PROBLEM — J34.2 NASAL SEPTAL DEVIATION: Status: ACTIVE | Noted: 2023-06-09

## 2023-06-09 PROBLEM — J33.9 NASAL POLYP: Status: ACTIVE | Noted: 2023-06-09

## 2023-06-09 PROBLEM — J32.4 CHRONIC PANSINUSITIS: Status: ACTIVE | Noted: 2023-06-09

## 2023-06-09 PROBLEM — J34.3 NASAL TURBINATE HYPERTROPHY: Status: ACTIVE | Noted: 2023-06-09

## 2023-06-09 NOTE — TELEPHONE ENCOUNTER
Type of surgery: SINUS SURGERY, ENDOSCOPIC, USING OPTICAL TRACKING SYSTEM - all sinuses; SEPTOPLASTY; bilateral inferior turbinate reduction (Bilateral)   CPT 74075,60826,06632,66142, 10463, 16126     Chronic pansinusitis J32.4     Nasal polyp J33.9     Plugged feeling in ear, bilateral H93.8X3     Dysfunction of both eustachian tubes H69.83     Nasal septal deviation J34.2     Nasal obstruction J34.89     Nasal turbinate hypertrophy J34.3    Location of surgery: [facility]  Date and time of surgery: 7/24  Surgeon: Cooper   Pre-Op Appt Date: 7/14  Post-Op Appt Date: 8/1 and 8/18   Packet sent out: Yes  Pre-cert/Authorization completed:  No prior auth required per Katie at [facility]. Call ref# [id number]  Date: 6-9-23    Mary Archer  Financial Securing/Prior Auth Dept  [phone]

## 2023-07-03 ENCOUNTER — MYC MEDICAL ADVICE (OUTPATIENT)
Dept: DERMATOLOGY | Facility: CLINIC | Age: 53
End: 2023-07-03
Payer: COMMERCIAL

## 2023-07-11 ENCOUNTER — OFFICE VISIT (OUTPATIENT)
Dept: DERMATOLOGY | Facility: CLINIC | Age: 53
End: 2023-07-11
Payer: COMMERCIAL

## 2023-07-11 DIAGNOSIS — B07.8 COMMON WART: Primary | ICD-10-CM

## 2023-07-11 PROCEDURE — 17110 DESTRUCTION B9 LES UP TO 14: CPT | Performed by: PHYSICIAN ASSISTANT

## 2023-07-11 ASSESSMENT — PAIN SCALES - GENERAL: PAINLEVEL: NO PAIN (0)

## 2023-07-11 NOTE — PROGRESS NOTES
Emery Barkley is an extremely pleasant 52 year old year old male patient here today for recheck wart. He notes wart on right pointer finger and left nare returned. Patient has no other skin complaints today.  Remainder of the HPI, Meds, PMH, Allergies, FH, and SH was reviewed in chart.    Past Medical History:   Diagnosis Date    Hyperlipidemia        Past Surgical History:   Procedure Laterality Date    ABDOMEN SURGERY  2/7/19    umbilical hernia    HERNIORRHAPHY UMBILICAL N/A 2/7/2019    Procedure: HERNIORRHAPHY UMBILICAL;  Surgeon: Tej Beaulieu DO;  Location: [facility]    VASECTOMY          Family History   Problem Relation Age of Onset    C.A.D. Father         MI, chf, first mi in 50s.    Coronary Artery Disease Father     Hyperlipidemia Father     Cancer Sister         brain    Other Cancer Sister         Brain Cancer    Lipids Mother     Hyperlipidemia Mother     Depression Mother        Social History     Socioeconomic History    Marital status:      Spouse name: Not on file    Number of children: Not on file    Years of education: Not on file    Highest education level: Not on file   Occupational History    Not on file   Tobacco Use    Smoking status: Never    Smokeless tobacco: Never   Vaping Use    Vaping Use: Never used   Substance and Sexual Activity    Alcohol use: Yes     Comment: 2 drinks per week    Drug use: No    Sexual activity: Yes     Partners: Female     Birth control/protection: Male Surgical   Other Topics Concern    Parent/sibling w/ CABG, MI or angioplasty before 65F 55M? Yes   Social History Narrative    Not on file     Social Determinants of Health     Financial Resource Strain: Not on file   Food Insecurity: Not on file   Transportation Needs: Not on file   Physical Activity: Not on file   Stress: Not on file   Social Connections: Not on file   Intimate Partner Violence: Not on file   Housing Stability: Not on file       Outpatient Encounter Medications as of 7/11/2023    Medication Sig Dispense Refill    atorvastatin (LIPITOR) 20 MG tablet Take 1 tablet (20 mg) by mouth daily 90 tablet 3    azelastine (ASTELIN) 0.1 % nasal spray       fluticasone (FLONASE) 50 MCG/ACT nasal spray       loratadine (CLARITIN) 10 MG tablet Take 10 mg by mouth daily as needed for allergies      predniSONE (DELTASONE) 20 MG tablet Take 40 mg daily for 5 days, 20 mg daily for 5 days, 10 mg daily for 2 days. 16 tablet 0    sulfamethoxazole-trimethoprim (BACTRIM DS) 800-160 MG tablet Take 1 tablet by mouth 2 times daily 28 tablet 0     No facility-administered encounter medications on file as of 7/11/2023.             O:   NAD, WDWN, Alert & Oriented, Mood & Affect wnl, Vitals stable   Here today alone   There were no vitals taken for this visit.   General appearance normal   Vitals stable   Alert, oriented and in no acute distress      Verrucous papule on left nare and right     Eyes: Conjunctivae/lids:Normal     ENT: Lips: normal    MSK:Normal    Pulm: Breathing Normal    Neuro/Psych: Orientation:Alert and Orientedx3 ; Mood/Affect:normal       A/P:  Common wart on right pointer finger.  After consent, anesthesia with LEC and prep, tangential excision performed  No complications and routine wound care.  LN2:  Treated with LN2 for 5s for 1-2 cycles. Warned risks of blistering, pain, pigment change, scarring, and incomplete resolution.  Advised patient to return if lesions do not completely resolve.  Wound care sheet given.  2. Common wart on left nare   LN2:  Treated with LN2 for 5s for 1-2 cycles. Warned risks of blistering, pain, pigment change, scarring, and incomplete resolution.  Advised patient to return if lesions do not completely resolve.  Wound care sheet given.

## 2023-07-11 NOTE — LETTER
7/11/2023         RE: Emery Barkley  [address]  [place] [address]        Dear Colleague,    Thank you for referring your patient, Emery Barkley, to the [facility]. Please see a copy of my visit note below.    Emery Barkley is an extremely pleasant 52 year old year old male patient here today for recheck wart. He notes wart on right pointer finger and left nare returned. Patient has no other skin complaints today.  Remainder of the HPI, Meds, PMH, Allergies, FH, and SH was reviewed in chart.    Past Medical History:   Diagnosis Date     Hyperlipidemia        Past Surgical History:   Procedure Laterality Date     ABDOMEN SURGERY  2/7/19    umbilical hernia     HERNIORRHAPHY UMBILICAL N/A 2/7/2019    Procedure: HERNIORRHAPHY UMBILICAL;  Surgeon: Tej Beaulieu DO;  Location: [facility] OR     VASECTOMY          Family History   Problem Relation Age of Onset     C.A.D. Father         MI, chf, first mi in 50s.     Coronary Artery Disease Father      Hyperlipidemia Father      Cancer Sister         brain     Other Cancer Sister         Brain Cancer     Lipids Mother      Hyperlipidemia Mother      Depression Mother        Social History     Socioeconomic History     Marital status:      Spouse name: Not on file     Number of children: Not on file     Years of education: Not on file     Highest education level: Not on file   Occupational History     Not on file   Tobacco Use     Smoking status: Never     Smokeless tobacco: Never   Vaping Use     Vaping Use: Never used   Substance and Sexual Activity     Alcohol use: Yes     Comment: 2 drinks per week     Drug use: No     Sexual activity: Yes     Partners: Female     Birth control/protection: Male Surgical   Other Topics Concern     Parent/sibling w/ CABG, MI or angioplasty before 65F 55M? Yes   Social History Narrative     Not on file     Social Determinants of Health     Financial Resource Strain: Not on file   Food  Insecurity: Not on file   Transportation Needs: Not on file   Physical Activity: Not on file   Stress: Not on file   Social Connections: Not on file   Intimate Partner Violence: Not on file   Housing Stability: Not on file       Outpatient Encounter Medications as of 7/11/2023   Medication Sig Dispense Refill     atorvastatin (LIPITOR) 20 MG tablet Take 1 tablet (20 mg) by mouth daily 90 tablet 3     azelastine (ASTELIN) 0.1 % nasal spray        fluticasone (FLONASE) 50 MCG/ACT nasal spray        loratadine (CLARITIN) 10 MG tablet Take 10 mg by mouth daily as needed for allergies       predniSONE (DELTASONE) 20 MG tablet Take 40 mg daily for 5 days, 20 mg daily for 5 days, 10 mg daily for 2 days. 16 tablet 0     sulfamethoxazole-trimethoprim (BACTRIM DS) 800-160 MG tablet Take 1 tablet by mouth 2 times daily 28 tablet 0     No facility-administered encounter medications on file as of 7/11/2023.             O:   NAD, WDWN, Alert & Oriented, Mood & Affect wnl, Vitals stable   Here today alone   There were no vitals taken for this visit.   General appearance normal   Vitals stable   Alert, oriented and in no acute distress      Verrucous papule on left nare and right     Eyes: Conjunctivae/lids:Normal     ENT: Lips: normal    MSK:Normal    Pulm: Breathing Normal    Neuro/Psych: Orientation:Alert and Orientedx3 ; Mood/Affect:normal       A/P:  Common wart on right pointer finger.  After consent, anesthesia with LEC and prep, tangential excision performed  No complications and routine wound care.  LN2:  Treated with LN2 for 5s for 1-2 cycles. Warned risks of blistering, pain, pigment change, scarring, and incomplete resolution.  Advised patient to return if lesions do not completely resolve.  Wound care sheet given.  2. Common wart on left nare   LN2:  Treated with LN2 for 5s for 1-2 cycles. Warned risks of blistering, pain, pigment change, scarring, and incomplete resolution.  Advised patient to return if lesions do not  completely resolve.  Wound care sheet given.      Again, thank you for allowing me to participate in the care of your patient.        Sincerely,        Yuki King PA-C

## 2023-07-11 NOTE — NURSING NOTE
Chief Complaint   Patient presents with     Wart     Finger & upper lip        There were no vitals filed for this visit.  Wt Readings from Last 1 Encounters:   03/24/23 86.2 kg (190 lb)       Elsa Tan LPN .................7/11/2023

## 2023-07-14 ENCOUNTER — OFFICE VISIT (OUTPATIENT)
Dept: FAMILY MEDICINE | Facility: CLINIC | Age: 53
End: 2023-07-14
Payer: COMMERCIAL

## 2023-07-14 VITALS
HEIGHT: 70 IN | BODY MASS INDEX: 28.58 KG/M2 | HEART RATE: 72 BPM | TEMPERATURE: 98.1 F | RESPIRATION RATE: 20 BRPM | DIASTOLIC BLOOD PRESSURE: 82 MMHG | WEIGHT: 199.6 LBS | OXYGEN SATURATION: 96 % | SYSTOLIC BLOOD PRESSURE: 124 MMHG

## 2023-07-14 DIAGNOSIS — E78.5 HYPERLIPIDEMIA LDL GOAL <100: ICD-10-CM

## 2023-07-14 DIAGNOSIS — Z13.1 SCREENING FOR DIABETES MELLITUS: ICD-10-CM

## 2023-07-14 DIAGNOSIS — Z82.49 FAMILY HISTORY OF CORONARY ARTERY DISEASE: ICD-10-CM

## 2023-07-14 DIAGNOSIS — Z12.5 SCREENING FOR PROSTATE CANCER: ICD-10-CM

## 2023-07-14 DIAGNOSIS — J32.9 CHRONIC SINUSITIS, UNSPECIFIED LOCATION: ICD-10-CM

## 2023-07-14 DIAGNOSIS — Z01.818 PREOP GENERAL PHYSICAL EXAM: Primary | ICD-10-CM

## 2023-07-14 LAB
CHOLEST SERPL-MCNC: 189 MG/DL
FASTING STATUS PATIENT QL REPORTED: YES
GLUCOSE SERPL-MCNC: 94 MG/DL (ref 70–99)
HDLC SERPL-MCNC: 51 MG/DL
LDLC SERPL CALC-MCNC: 115 MG/DL
NONHDLC SERPL-MCNC: 138 MG/DL
PSA SERPL DL<=0.01 NG/ML-MCNC: 1.22 NG/ML (ref 0–3.5)
TRIGL SERPL-MCNC: 115 MG/DL

## 2023-07-14 PROCEDURE — 80061 LIPID PANEL: CPT | Performed by: FAMILY MEDICINE

## 2023-07-14 PROCEDURE — 36415 COLL VENOUS BLD VENIPUNCTURE: CPT | Performed by: FAMILY MEDICINE

## 2023-07-14 PROCEDURE — 82947 ASSAY GLUCOSE BLOOD QUANT: CPT | Performed by: FAMILY MEDICINE

## 2023-07-14 PROCEDURE — G0103 PSA SCREENING: HCPCS | Performed by: FAMILY MEDICINE

## 2023-07-14 PROCEDURE — 99214 OFFICE O/P EST MOD 30 MIN: CPT | Performed by: FAMILY MEDICINE

## 2023-07-14 RX ORDER — ATORVASTATIN CALCIUM 20 MG/1
20 TABLET, FILM COATED ORAL DAILY
Qty: 90 TABLET | Refills: 3 | Status: SHIPPED | OUTPATIENT
Start: 2023-07-14 | End: 2024-08-02

## 2023-07-14 NOTE — PATIENT INSTRUCTIONS
For informational purposes only. Not to replace the advice of your health care provider. Copyright   2003,  [employer] [facility]. All rights reserved. Clinically reviewed by Silvia Robledo MD. [employer] [id number] - REV .  Preparing for Your Surgery  Getting started  A nurse will call you to review your health history and instructions. They will give you an arrival time based on your scheduled surgery time. Please be ready to share:  Your doctor's clinic name and phone number  Your medical, surgical, and anesthesia history  A list of allergies and sensitivities  A list of medicines, including herbal treatments and over-the-counter drugs  Whether the patient has a legal guardian (ask how to send us the papers in advance)  Please tell us if you're pregnant--or if there's any chance you might be pregnant. Some surgeries may injure a fetus (unborn baby), so they require a pregnancy test. Surgeries that are safe for a fetus don't always need a test, and you can choose whether to have one.   If you have a child who's having surgery, please ask for a copy of Preparing for Your Child's Surgery.    Preparing for surgery  Within 10 to 30 days of surgery: Have a pre-op exam (sometimes called an H&P, or History and Physical). This can be done at a clinic or pre-operative center.  If you're having a , you may not need this exam. Talk to your care team.  At your pre-op exam, talk to your care team about all medicines you take. If you need to stop any medicines before surgery, ask when to start taking them again.  We do this for your safety. Many medicines can make you bleed too much during surgery. Some change how well surgery (anesthesia) drugs work.  Call your insurance company to let them know you're having surgery. (If you don't have insurance, call [phone].)  Call your clinic if there's any change in your health. This includes signs of a cold or flu (sore throat, runny nose, cough, rash, fever). It also  includes a scrape or scratch near the surgery site.  If you have questions on the day of surgery, call your hospital or surgery center.  Eating and drinking guidelines  For your safety: Unless your surgeon tells you otherwise, follow the guidelines below.  Eat and drink as usual until 8 hours before you arrive for surgery. After that, no food or milk.  Drink clear liquids until 2 hours before you arrive. These are liquids you can see through, like water, Gatorade, and Propel Water. They also include plain black coffee and tea (no cream or milk), candy, and breath mints. You can spit out gum when you arrive.  If you drink alcohol: Stop drinking it the night before surgery.  If your care team tells you to take medicine on the morning of surgery, it's okay to take it with a sip of water.  Preventing infection  Shower or bathe the night before and morning of your surgery. Follow the instructions your clinic gave you. (If no instructions, use regular soap.)  Don't shave or clip hair near your surgery site. We'll remove the hair if needed.  Don't smoke or vape the morning of surgery. You may chew nicotine gum up to 2 hours before surgery. A nicotine patch is okay.  Note: Some surgeries require you to completely quit smoking and nicotine. Check with your surgeon.  Your care team will make every effort to keep you safe from infection. We will:  Clean our hands often with soap and water (or an alcohol-based hand rub).  Clean the skin at your surgery site with a special soap that kills germs.  Give you a special gown to keep you warm. (Cold raises the risk of infection.)  Wear special hair covers, masks, gowns and gloves during surgery.  Give antibiotic medicine, if prescribed. Not all surgeries need antibiotics.  What to bring on the day of surgery  Photo ID and insurance card  Copy of your health care directive, if you have one  Glasses and hearing aids (bring cases)  You can't wear contacts during surgery  Inhaler and eye  drops, if you use them (tell us about these when you arrive)  CPAP machine or breathing device, if you use them  A few personal items, if spending the night  If you have . . .  A pacemaker, ICD (cardiac defibrillator) or other implant: Bring the ID card.  An implanted stimulator: Bring the remote control.  A legal guardian: Bring a copy of the certified (court-stamped) guardianship papers.  Please remove any jewelry, including body piercings. Leave jewelry and other valuables at home.  If you're going home the day of surgery  You must have a responsible adult drive you home. They should stay with you overnight as well.  If you don't have someone to stay with you, and you aren't safe to go home alone, we may keep you overnight. Insurance often won't pay for this.  After surgery  If it's hard to control your pain or you need more pain medicine, please call your surgeon's office.  Questions?   If you have any questions for your care team, list them here: _________________________________________________________________________________________________________________________________________________________________________ ____________________________________ ____________________________________ ____________________________________    How to Take Your Medication Before Surgery  - discussed with patient to take atorvastatin at night    For informational purposes only. Not to replace the advice of your health care provider. Copyright   2003, 2019 [employer] [facility]. All rights reserved. Clinically reviewed by Silvia Robledo MD. [email or web address] [id number] - REV 12/22.  Preparing for Your Surgery  Getting started  A nurse will call you to review your health history and instructions. They will give you an arrival time based on your scheduled surgery time. Please be ready to share:  Your doctor's clinic name and phone number  Your medical, surgical, and anesthesia history  A list of allergies and sensitivities  A list of medicines,  including herbal treatments and over-the-counter drugs  Whether the patient has a legal guardian (ask how to send us the papers in advance)  Please tell us if you're pregnant--or if there's any chance you might be pregnant. Some surgeries may injure a fetus (unborn baby), so they require a pregnancy test. Surgeries that are safe for a fetus don't always need a test, and you can choose whether to have one.   If you have a child who's having surgery, please ask for a copy of Preparing for Your Child's Surgery.    Preparing for surgery  Within 10 to 30 days of surgery: Have a pre-op exam (sometimes called an H&P, or History and Physical). This can be done at a clinic or pre-operative center.  If you're having a , you may not need this exam. Talk to your care team.  At your pre-op exam, talk to your care team about all medicines you take. If you need to stop any medicines before surgery, ask when to start taking them again.  We do this for your safety. Many medicines can make you bleed too much during surgery. Some change how well surgery (anesthesia) drugs work.  Call your insurance company to let them know you're having surgery. (If you don't have insurance, call [phone].)  Call your clinic if there's any change in your health. This includes signs of a cold or flu (sore throat, runny nose, cough, rash, fever). It also includes a scrape or scratch near the surgery site.  If you have questions on the day of surgery, call your hospital or surgery center.  Eating and drinking guidelines  For your safety: Unless your surgeon tells you otherwise, follow the guidelines below.  Eat and drink as usual until 8 hours before you arrive for surgery. After that, no food or milk.  Drink clear liquids until 2 hours before you arrive. These are liquids you can see through, like water, Gatorade, and Propel Water. They also include plain black coffee and tea (no cream or milk), candy, and breath mints. You can spit out gum  when you arrive.  If you drink alcohol: Stop drinking it the night before surgery.  If your care team tells you to take medicine on the morning of surgery, it's okay to take it with a sip of water.  Preventing infection  Shower or bathe the night before and morning of your surgery. Follow the instructions your clinic gave you. (If no instructions, use regular soap.)  Don't shave or clip hair near your surgery site. We'll remove the hair if needed.  Don't smoke or vape the morning of surgery. You may chew nicotine gum up to 2 hours before surgery. A nicotine patch is okay.  Note: Some surgeries require you to completely quit smoking and nicotine. Check with your surgeon.  Your care team will make every effort to keep you safe from infection. We will:  Clean our hands often with soap and water (or an alcohol-based hand rub).  Clean the skin at your surgery site with a special soap that kills germs.  Give you a special gown to keep you warm. (Cold raises the risk of infection.)  Wear special hair covers, masks, gowns and gloves during surgery.  Give antibiotic medicine, if prescribed. Not all surgeries need antibiotics.  What to bring on the day of surgery  Photo ID and insurance card  Copy of your health care directive, if you have one  Glasses and hearing aids (bring cases)  You can't wear contacts during surgery  Inhaler and eye drops, if you use them (tell us about these when you arrive)  CPAP machine or breathing device, if you use them  A few personal items, if spending the night  If you have . . .  A pacemaker, ICD (cardiac defibrillator) or other implant: Bring the ID card.  An implanted stimulator: Bring the remote control.  A legal guardian: Bring a copy of the certified (court-stamped) guardianship papers.  Please remove any jewelry, including body piercings. Leave jewelry and other valuables at home.  If you're going home the day of surgery  You must have a responsible adult drive you home. They should stay  with you overnight as well.  If you don't have someone to stay with you, and you aren't safe to go home alone, we may keep you overnight. Insurance often won't pay for this.  After surgery  If it's hard to control your pain or you need more pain medicine, please call your surgeon's office.  Questions?   If you have any questions for your care team, list them here: _________________________________________________________________________________________________________________________________________________________________________ ____________________________________ ____________________________________ ____________________________________    How to Take Your Medication Before Surgery  - he takes his cholesterol med at night

## 2023-07-14 NOTE — H&P (VIEW-ONLY)
[facility]  [address] [address]  Phone: [phone]  Primary Provider: Shreya Lyon  Pre-op Performing Provider: SHREYA LYON      PREOPERATIVE EVALUATION:  Today's date: 7/14/2023    Emery Barkley is a 52 year old male who presents for a preoperative evaluation.       No data to display              Surgical Information:  Surgery/Procedure:   SINUS SURGERY, ENDOSCOPIC, USING OPTICAL TRACKING SYSTEM - all sinuses; SEPTOPLASTY; bilateral inferior turbinate reduction Bilateral General   SEPTOPLASTY; bilateral inferior turbinate reduction N/A      Surgery Location:  OR   Surgeon: Mario Gamboa MD   Surgery Date: 7/24/2023   Time of Surgery: 9:45 AM   Where patient plans to recover: At home with family  Fax number for surgical facility: Note does not need to be faxed, will be available electronically in Epic.    Assessment & Plan     The proposed surgical procedure is considered INTERMEDIATE risk.    Preop general physical exam      Chronic sinusitis, unspecified location              - No identified additional risk factors other than previously addressed    Antiplatelet or Anticoagulation Medication Instructions:   - Patient is on no antiplatelet or anticoagulation medications.    Additional Medication Instructions:  He takes his cholesterol medication at night    RECOMMENDATION:  APPROVAL GIVEN to proceed with proposed procedure, without further diagnostic evaluation.            Subjective       HPI related to upcoming procedure: h/o chronic pansinusitis and nasal polyps.        7/12/2023    12:05 PM   Preop Questions   1. Have you ever had a heart attack or stroke? No   2. Have you ever had surgery on your heart or blood vessels, such as a stent placement, a coronary artery bypass, or surgery on an artery in your head, neck, heart, or legs? No   3. Do you have chest pain with activity? No   4. Do you have a history of  heart failure? No   5. Do  you currently have a cold, bronchitis or symptoms of other infection? No   6. Do you have a cough, shortness of breath, or wheezing? No   7. Do you or anyone in your family have previous history of blood clots? No   8. Do you or does anyone in your family have a serious bleeding problem such as prolonged bleeding following surgeries or cuts? No   9. Have you ever had problems with anemia or been told to take iron pills? No   10. Have you had any abnormal blood loss such as black, tarry or bloody stools? No   11. Have you ever had a blood transfusion? No   12. Are you willing to have a blood transfusion if it is medically needed before, during, or after your surgery? Yes   13. Have you or any of your relatives ever had problems with anesthesia? No   14. Do you have sleep apnea, excessive snoring or daytime drowsiness? No   15. Do you have any artifical heart valves or other implanted medical devices like a pacemaker, defibrillator, or continuous glucose monitor? No   16. Do you have artificial joints? No   17. Are you allergic to latex? No       Health Care Directive:  Patient does not have a Health Care Directive or Living Will: Discussed advance care planning with patient; information given to patient to review.    Preoperative Review of :   reviewed - no record of controlled substances prescribed.      Status of Chronic Conditions:  See problem list for active medical problems.  Problems all longstanding and stable, except as noted/documented.  See ROS for pertinent symptoms related to these conditions.    Review of Systems  Review Of Systems  Consitutional: negative  Skin: negative  Eyes: negative  Ears/Nose/Throat: nasal issues as above  Respiratory: No shortness of breath, dyspnea on exertion, cough, or hemoptysis  Cardiovascular: negative  Gastrointestinal: negative  Genitourinary: negative  Musculoskeletal: negative  Neurologic: negative  Psychiatric: negative  Hematologic/Lymphatic/Immunologic:  negative  Endocrine: negative      Patient Active Problem List    Diagnosis Date Noted    Chronic pansinusitis 06/09/2023     Priority: Medium     Added automatically from request for surgery [id number]      Nasal polyp 06/09/2023     Priority: Medium     Added automatically from request for surgery [id number]      Nasal septal deviation 06/09/2023     Priority: Medium     Added automatically from request for surgery [id number]      Nasal obstruction 06/09/2023     Priority: Medium     Added automatically from request for surgery [id number]      Nasal turbinate hypertrophy 06/09/2023     Priority: Medium     Added automatically from request for surgery [id number]      Common wart 07/10/2018     Priority: Medium    External hemorrhoid 11/07/2011     Priority: Medium    Hyperlipidemia LDL goal <100 10/26/2010     Priority: Medium    Family history of coronary artery disease 10/26/2010     Priority: Medium      Past Medical History:   Diagnosis Date    Hyperlipidemia      Past Surgical History:   Procedure Laterality Date    ABDOMEN SURGERY  2/7/19    umbilical hernia    HERNIORRHAPHY UMBILICAL N/A 2/7/2019    Procedure: HERNIORRHAPHY UMBILICAL;  Surgeon: Tej Beaulieu DO;  Location: [facility] OR    VASECTOMY       Current Outpatient Medications   Medication Sig Dispense Refill    atorvastatin (LIPITOR) 20 MG tablet Take 1 tablet (20 mg) by mouth daily 90 tablet 3    loratadine (CLARITIN) 10 MG tablet Take 10 mg by mouth daily as needed for allergies      azelastine (ASTELIN) 0.1 % nasal spray  (Patient not taking: Reported on 7/14/2023)      fluticasone (FLONASE) 50 MCG/ACT nasal spray  (Patient not taking: Reported on 7/14/2023)      predniSONE (DELTASONE) 20 MG tablet Take 40 mg daily for 5 days, 20 mg daily for 5 days, 10 mg daily for 2 days. 16 tablet 0    sulfamethoxazole-trimethoprim (BACTRIM DS) 800-160 MG tablet Take 1 tablet by mouth 2 times daily 28 tablet 0       No Known Allergies     Social History     Tobacco Use  "   Smoking status: Never    Smokeless tobacco: Never   Substance Use Topics    Alcohol use: Yes     Comment: 2 drinks per week     Family History   Problem Relation Age of Onset    C.A.D. Father         MI, chf, first mi in 50s.    Coronary Artery Disease Father     Hyperlipidemia Father     Cancer Sister         brain    Other Cancer Sister         Brain Cancer    Lipids Mother     Hyperlipidemia Mother     Depression Mother      History   Drug Use No         Objective     /82 (BP Location: Left arm, Patient Position: Sitting, Cuff Size: Adult Regular)   Pulse 72   Temp 98.1  F (36.7  C) (Tympanic)   Resp 20   Ht 1.77 m (5' 9.69\")   Wt 90.5 kg (199 lb 9.6 oz)   SpO2 96%   BMI 28.90 kg/m      Physical Exam    GENERAL APPEARANCE: healthy, alert and no distress     EYES: EOMI,  PERRL     HENT: ear canals and TM's normal and nose and mouth without ulcers or lesions     NECK: no adenopathy, no asymmetry, masses, or scars and thyroid normal to palpation     RESP: lungs clear to auscultation - no rales, rhonchi or wheezes     CV: regular rates and rhythm, normal S1 S2, no S3 or S4 and no murmur, click or rub     ABDOMEN:  soft, nontender, no HSM or masses and bowel sounds normal     MS: extremities normal- no gross deformities noted, no evidence of inflammation in joints, FROM in all extremities.     SKIN: no suspicious lesions or rashes     NEURO: Normal strength and tone, sensory exam grossly normal, mentation intact and speech normal     PSYCH: mentation appears normal. and affect normal/bright     LYMPHATICS: No cervical adenopathy    No results for input(s): HGB, PLT, INR, NA, POTASSIUM, CR, A1C in the last [id number] hours.     Diagnostics:  No labs were ordered during this visit.   No EKG required, no history of coronary heart disease, significant arrhythmia, peripheral arterial disease or other structural heart disease.    Revised Cardiac Risk Index (RCRI):  The patient has the following serious " cardiovascular risks for perioperative complications:   - No serious cardiac risks = 0 points     RCRI Interpretation: 0 points: Class I (very low risk - 0.4% complication rate)         Signed Electronically by: Devyn Cox MD  Copy of this evaluation report is provided to requesting physician.

## 2023-07-14 NOTE — PROGRESS NOTES
[facility]  [address] [place] [address]  Phone: [phone]  Primary Provider: Shreya Lyon  Pre-op Performing Provider: SHREYA LYON      PREOPERATIVE EVALUATION:  Today's date: 7/14/2023    Emery Barkley is a 52 year old male who presents for a preoperative evaluation.       No data to display              Surgical Information:  Surgery/Procedure:   SINUS SURGERY, ENDOSCOPIC, USING OPTICAL TRACKING SYSTEM - all sinuses; SEPTOPLASTY; bilateral inferior turbinate reduction Bilateral General   SEPTOPLASTY; bilateral inferior turbinate reduction N/A      Surgery Location:  OR   Surgeon: Mario Gamboa MD   Surgery Date: 7/24/2023   Time of Surgery: 9:45 AM   Where patient plans to recover: At home with family  Fax number for surgical facility: Note does not need to be faxed, will be available electronically in Epic.    Assessment & Plan     The proposed surgical procedure is considered INTERMEDIATE risk.    Preop general physical exam      Chronic sinusitis, unspecified location              - No identified additional risk factors other than previously addressed    Antiplatelet or Anticoagulation Medication Instructions:   - Patient is on no antiplatelet or anticoagulation medications.    Additional Medication Instructions:  He takes his cholesterol medication at night    RECOMMENDATION:  APPROVAL GIVEN to proceed with proposed procedure, without further diagnostic evaluation.            Subjective       HPI related to upcoming procedure: h/o chronic pansinusitis and nasal polyps.        7/12/2023    12:05 PM   Preop Questions   1. Have you ever had a heart attack or stroke? No   2. Have you ever had surgery on your heart or blood vessels, such as a stent placement, a coronary artery bypass, or surgery on an artery in your head, neck, heart, or legs? No   3. Do you have chest pain with activity? No   4. Do you have a history of  heart failure? No   5. Do  you currently have a cold, bronchitis or symptoms of other infection? No   6. Do you have a cough, shortness of breath, or wheezing? No   7. Do you or anyone in your family have previous history of blood clots? No   8. Do you or does anyone in your family have a serious bleeding problem such as prolonged bleeding following surgeries or cuts? No   9. Have you ever had problems with anemia or been told to take iron pills? No   10. Have you had any abnormal blood loss such as black, tarry or bloody stools? No   11. Have you ever had a blood transfusion? No   12. Are you willing to have a blood transfusion if it is medically needed before, during, or after your surgery? Yes   13. Have you or any of your relatives ever had problems with anesthesia? No   14. Do you have sleep apnea, excessive snoring or daytime drowsiness? No   15. Do you have any artifical heart valves or other implanted medical devices like a pacemaker, defibrillator, or continuous glucose monitor? No   16. Do you have artificial joints? No   17. Are you allergic to latex? No       Health Care Directive:  Patient does not have a Health Care Directive or Living Will: Discussed advance care planning with patient; information given to patient to review.    Preoperative Review of :   reviewed - no record of controlled substances prescribed.      Status of Chronic Conditions:  See problem list for active medical problems.  Problems all longstanding and stable, except as noted/documented.  See ROS for pertinent symptoms related to these conditions.    Review of Systems  Review Of Systems  Consitutional: negative  Skin: negative  Eyes: negative  Ears/Nose/Throat: nasal issues as above  Respiratory: No shortness of breath, dyspnea on exertion, cough, or hemoptysis  Cardiovascular: negative  Gastrointestinal: negative  Genitourinary: negative  Musculoskeletal: negative  Neurologic: negative  Psychiatric: negative  Hematologic/Lymphatic/Immunologic:  negative  Endocrine: negative      Patient Active Problem List    Diagnosis Date Noted    Chronic pansinusitis 06/09/2023     Priority: Medium     Added automatically from request for surgery [id number]      Nasal polyp 06/09/2023     Priority: Medium     Added automatically from request for surgery [id number]      Nasal septal deviation 06/09/2023     Priority: Medium     Added automatically from request for surgery [id number]      Nasal obstruction 06/09/2023     Priority: Medium     Added automatically from request for surgery [id number]      Nasal turbinate hypertrophy 06/09/2023     Priority: Medium     Added automatically from request for surgery [id number]      Common wart 07/10/2018     Priority: Medium    External hemorrhoid 11/07/2011     Priority: Medium    Hyperlipidemia LDL goal <100 10/26/2010     Priority: Medium    Family history of coronary artery disease 10/26/2010     Priority: Medium      Past Medical History:   Diagnosis Date    Hyperlipidemia      Past Surgical History:   Procedure Laterality Date    ABDOMEN SURGERY  2/7/19    umbilical hernia    HERNIORRHAPHY UMBILICAL N/A 2/7/2019    Procedure: HERNIORRHAPHY UMBILICAL;  Surgeon: Tej Beaulieu DO;  Location: [facility] OR    VASECTOMY       Current Outpatient Medications   Medication Sig Dispense Refill    atorvastatin (LIPITOR) 20 MG tablet Take 1 tablet (20 mg) by mouth daily 90 tablet 3    loratadine (CLARITIN) 10 MG tablet Take 10 mg by mouth daily as needed for allergies      azelastine (ASTELIN) 0.1 % nasal spray  (Patient not taking: Reported on 7/14/2023)      fluticasone (FLONASE) 50 MCG/ACT nasal spray  (Patient not taking: Reported on 7/14/2023)      predniSONE (DELTASONE) 20 MG tablet Take 40 mg daily for 5 days, 20 mg daily for 5 days, 10 mg daily for 2 days. 16 tablet 0    sulfamethoxazole-trimethoprim (BACTRIM DS) 800-160 MG tablet Take 1 tablet by mouth 2 times daily 28 tablet 0       No Known Allergies     Social History     Tobacco Use  "   Smoking status: Never    Smokeless tobacco: Never   Substance Use Topics    Alcohol use: Yes     Comment: 2 drinks per week     Family History   Problem Relation Age of Onset    C.A.D. Father         MI, chf, first mi in 50s.    Coronary Artery Disease Father     Hyperlipidemia Father     Cancer Sister         brain    Other Cancer Sister         Brain Cancer    Lipids Mother     Hyperlipidemia Mother     Depression Mother      History   Drug Use No         Objective     /82 (BP Location: Left arm, Patient Position: Sitting, Cuff Size: Adult Regular)   Pulse 72   Temp 98.1  F (36.7  C) (Tympanic)   Resp 20   Ht 1.77 m (5' 9.69\")   Wt 90.5 kg (199 lb 9.6 oz)   SpO2 96%   BMI 28.90 kg/m      Physical Exam    GENERAL APPEARANCE: healthy, alert and no distress     EYES: EOMI,  PERRL     HENT: ear canals and TM's normal and nose and mouth without ulcers or lesions     NECK: no adenopathy, no asymmetry, masses, or scars and thyroid normal to palpation     RESP: lungs clear to auscultation - no rales, rhonchi or wheezes     CV: regular rates and rhythm, normal S1 S2, no S3 or S4 and no murmur, click or rub     ABDOMEN:  soft, nontender, no HSM or masses and bowel sounds normal     MS: extremities normal- no gross deformities noted, no evidence of inflammation in joints, FROM in all extremities.     SKIN: no suspicious lesions or rashes     NEURO: Normal strength and tone, sensory exam grossly normal, mentation intact and speech normal     PSYCH: mentation appears normal. and affect normal/bright     LYMPHATICS: No cervical adenopathy    No results for input(s): HGB, PLT, INR, NA, POTASSIUM, CR, A1C in the last 57614 hours.     Diagnostics:  No labs were ordered during this visit.   No EKG required, no history of coronary heart disease, significant arrhythmia, peripheral arterial disease or other structural heart disease.    Revised Cardiac Risk Index (RCRI):  The patient has the following serious " cardiovascular risks for perioperative complications:   - No serious cardiac risks = 0 points     RCRI Interpretation: 0 points: Class I (very low risk - 0.4% complication rate)         Signed Electronically by: Devyn Cox MD  Copy of this evaluation report is provided to requesting physician.

## 2023-07-14 NOTE — LETTER
July 17, 2023      Emery Barkley  [address] [place] [address]        Dear ,    We are writing to inform you of your test results.    You have no signs of prostate cancer.   You have no signs of diabetes.   Your cholesterol is good.  Only slight elevation of your LDL cholesterol.   Please continue with a low cholesterol diet.     Resulted Orders   Prostate Specific Antigen Screen   Result Value Ref Range    Prostate Specific Antigen Screen 1.22 0.00 - 3.50 ng/mL    Narrative    This result is obtained using the Roche Elecsys total PSA method on the pinky [id number] immunoassay analyzer. Results obtained with different assay methods or kits cannot be used interchangeably.   Lipid panel reflex to direct LDL Fasting   Result Value Ref Range    Cholesterol 189 <200 mg/dL    Triglycerides 115 <150 mg/dL    Direct Measure HDL 51 >=40 mg/dL    LDL Cholesterol Calculated 115 (H) <=100 mg/dL    Non HDL Cholesterol 138 (H) <130 mg/dL    Narrative    Cholesterol  Desirable:  <200 mg/dL    Triglycerides  Normal:  Less than 150 mg/dL  Borderline High:  150-199 mg/dL  High:  200-499 mg/dL  Very High:  Greater than or equal to 500 mg/dL    Direct Measure HDL  Female:  Greater than or equal to 50 mg/dL   Male:  Greater than or equal to 40 mg/dL    LDL Cholesterol  Desirable:  <100mg/dL  Above Desirable:  100-129 mg/dL   Borderline High:  130-159 mg/dL   High:  160-189 mg/dL   Very High:  >= 190 mg/dL    Non HDL Cholesterol  Desirable:  130 mg/dL  Above Desirable:  130-159 mg/dL  Borderline High:  160-189 mg/dL  High:  190-219 mg/dL  Very High:  Greater than or equal to 220 mg/dL   Glucose   Result Value Ref Range    Glucose 94 70 - 99 mg/dL    Patient Fasting > 8hrs? Yes       Comment:      Fasting since 1700 on 07/13/2023  Fasting since 1700 on 07/13/2023       If you have any questions or concerns, please call the clinic at the number listed above.       Sincerely,      Devyn Cox MD

## 2023-07-18 ENCOUNTER — TELEPHONE (OUTPATIENT)
Dept: OTOLARYNGOLOGY | Facility: CLINIC | Age: 53
End: 2023-07-18
Payer: COMMERCIAL

## 2023-07-18 DIAGNOSIS — J33.9 NASAL POLYP: ICD-10-CM

## 2023-07-18 DIAGNOSIS — J32.4 CHRONIC PANSINUSITIS: Primary | ICD-10-CM

## 2023-07-18 RX ORDER — PREDNISONE 20 MG/1
TABLET ORAL
Qty: 20 TABLET | Refills: 0 | Status: SHIPPED | OUTPATIENT
Start: 2023-07-18 | End: 2023-08-15

## 2023-07-18 NOTE — TELEPHONE ENCOUNTER
----- Message from Mario Gamboa MD sent at 7/18/2023  9:45 AM CDT -----  Regarding: please call and instruct to start prednisone now  Patient has sinus surgery Monday. I want him to take prednisone starting now and to lead up to surgery. It helps shrink polyp, which helps visualization and minimizes bleeding and complications.     Rx sent to [facility].    Thanks,    Estrada

## 2023-07-18 NOTE — TELEPHONE ENCOUNTER
RN left message for patient requesting call back    Anastacia ALCALA, Specialty RN 7/18/2023 9:50 AM

## 2023-07-18 NOTE — TELEPHONE ENCOUNTER
Patient returned call and verbalized understanding of prednisone. Patient will  shortly    Anastacia ALCALA, Specialty RN 7/18/2023 11:37 AM

## 2023-07-20 ENCOUNTER — ANESTHESIA EVENT (OUTPATIENT)
Dept: SURGERY | Facility: AMBULATORY SURGERY CENTER | Age: 53
End: 2023-07-20
Payer: COMMERCIAL

## 2023-07-20 ENCOUNTER — TELEPHONE (OUTPATIENT)
Dept: NURSING | Facility: CLINIC | Age: 53
End: 2023-07-20
Payer: COMMERCIAL

## 2023-07-20 NOTE — TELEPHONE ENCOUNTER
General Call    Contacts       Type Contact Phone/Fax    07/20/2023 03:24 PM CDT Phone (Incoming) Emery Barkley (Self) [phone] (M)        Reason for Call:  questions    What are your questions or concerns:  Pt has questions about his upcoming surgery regarding insurance issues    Date of last appointment with provider: n/a    Could we send this information to you in [facility] or would you prefer to receive a phone call?:   Patient would prefer a phone call   Okay to leave a detailed message?: Yes at Cell number on file:    Telephone Information:   Mobile [phone]   Mobile Not on file.

## 2023-07-20 NOTE — TELEPHONE ENCOUNTER
Called and spoke with rep at [facility]. Patient's auth request is still pending. Current turnaround time is 15 days from submission.   We will need to reschedule this patient out at least 10 days to ensure that authorization is obtained.    We do not have authorization at this time.    Please call the patient to reschedule.    Thank you.

## 2023-07-21 NOTE — TELEPHONE ENCOUNTER
Voicemail overnight from [facility] states that Saadia Ruiz (reviewal rep) tried faxing me and calling me for clinicals, but there are no faxes in [employer] and no voicemails on my line.   Original fax to [facility] on 7/14 shows it was sent successfully.   Faxed again to [phone] attn: Saadia Ruiz, asked that she call and let me know it was received.   Emery is contacting [facility] to have them send the MRI to her as well.     He does not actually have approval, as of yet.

## 2023-07-21 NOTE — TELEPHONE ENCOUNTER
Spoke with Jennifer Jett to see if clinicals were received. She states that they have been uploaded along with [facility] and the case has been escalated. She states if I do not hear from them by mid afternoon to call.

## 2023-07-24 ENCOUNTER — ANESTHESIA (OUTPATIENT)
Dept: SURGERY | Facility: AMBULATORY SURGERY CENTER | Age: 53
End: 2023-07-24
Payer: COMMERCIAL

## 2023-07-24 ENCOUNTER — HOSPITAL ENCOUNTER (OUTPATIENT)
Facility: AMBULATORY SURGERY CENTER | Age: 53
Discharge: HOME | End: 2023-07-24
Attending: OTOLARYNGOLOGY | Admitting: OTOLARYNGOLOGY
Payer: COMMERCIAL

## 2023-07-24 VITALS
HEART RATE: 91 BPM | DIASTOLIC BLOOD PRESSURE: 89 MMHG | TEMPERATURE: 97.3 F | OXYGEN SATURATION: 92 % | RESPIRATION RATE: 12 BRPM | BODY MASS INDEX: 28.49 KG/M2 | WEIGHT: 199 LBS | HEIGHT: 70 IN | SYSTOLIC BLOOD PRESSURE: 126 MMHG

## 2023-07-24 DIAGNOSIS — J34.3 NASAL TURBINATE HYPERTROPHY: ICD-10-CM

## 2023-07-24 DIAGNOSIS — J33.9 NASAL POLYP: ICD-10-CM

## 2023-07-24 DIAGNOSIS — J34.89 NASAL OBSTRUCTION: ICD-10-CM

## 2023-07-24 DIAGNOSIS — J34.2 NASAL SEPTAL DEVIATION: ICD-10-CM

## 2023-07-24 DIAGNOSIS — J32.4 CHRONIC PANSINUSITIS: ICD-10-CM

## 2023-07-24 PROCEDURE — 87185 SC STD ENZYME DETCJ PER NZM: CPT | Performed by: OTOLARYNGOLOGY

## 2023-07-24 PROCEDURE — 31257 NSL/SINS NDSC TOT W/SPHENDT: CPT | Mod: RT

## 2023-07-24 PROCEDURE — 31259 NSL/SINS NDSC SPHN TISS RMVL: CPT | Mod: 50 | Performed by: OTOLARYNGOLOGY

## 2023-07-24 PROCEDURE — 87077 CULTURE AEROBIC IDENTIFY: CPT | Performed by: OTOLARYNGOLOGY

## 2023-07-24 PROCEDURE — 31253 NSL/SINS NDSC TOTAL: CPT | Mod: 50 | Performed by: OTOLARYNGOLOGY

## 2023-07-24 PROCEDURE — G8916 PT W IV AB GIVEN ON TIME: HCPCS

## 2023-07-24 PROCEDURE — 31267 ENDOSCOPY MAXILLARY SINUS: CPT | Mod: LT

## 2023-07-24 PROCEDURE — 61782 SCAN PROC CRANIAL EXTRA: CPT

## 2023-07-24 PROCEDURE — 87102 FUNGUS ISOLATION CULTURE: CPT | Performed by: OTOLARYNGOLOGY

## 2023-07-24 PROCEDURE — 30140 RESECT INFERIOR TURBINATE: CPT | Mod: LT

## 2023-07-24 PROCEDURE — 31256 EXPLORATION MAXILLARY SINUS: CPT | Mod: RT

## 2023-07-24 PROCEDURE — 87070 CULTURE OTHR SPECIMN AEROBIC: CPT | Performed by: OTOLARYNGOLOGY

## 2023-07-24 PROCEDURE — G8907 PT DOC NO EVENTS ON DISCHARG: HCPCS

## 2023-07-24 PROCEDURE — 88304 TISSUE EXAM BY PATHOLOGIST: CPT | Performed by: STUDENT IN AN ORGANIZED HEALTH CARE EDUCATION/TRAINING PROGRAM

## 2023-07-24 PROCEDURE — 31276 NSL/SINS NDSC FRNT TISS RMVL: CPT | Mod: LT

## 2023-07-24 PROCEDURE — 87186 SC STD MICRODIL/AGAR DIL: CPT | Mod: 59 | Performed by: OTOLARYNGOLOGY

## 2023-07-24 PROCEDURE — 30520 REPAIR OF NASAL SEPTUM: CPT

## 2023-07-24 PROCEDURE — 31267 ENDOSCOPY MAXILLARY SINUS: CPT | Mod: 50 | Performed by: OTOLARYNGOLOGY

## 2023-07-24 PROCEDURE — 30520 REPAIR OF NASAL SEPTUM: CPT | Performed by: OTOLARYNGOLOGY

## 2023-07-24 PROCEDURE — 87075 CULTR BACTERIA EXCEPT BLOOD: CPT | Performed by: OTOLARYNGOLOGY

## 2023-07-24 PROCEDURE — 30140 RESECT INFERIOR TURBINATE: CPT | Mod: 50 | Performed by: OTOLARYNGOLOGY

## 2023-07-24 PROCEDURE — 87076 CULTURE ANAEROBE IDENT EACH: CPT | Performed by: OTOLARYNGOLOGY

## 2023-07-24 PROCEDURE — 61782 SCAN PROC CRANIAL EXTRA: CPT | Performed by: OTOLARYNGOLOGY

## 2023-07-24 PROCEDURE — 31259 NSL/SINS NDSC SPHN TISS RMVL: CPT | Mod: LT

## 2023-07-24 DEVICE — PROPEL CONTOUR SINUS IMPLANT
Type: IMPLANTABLE DEVICE | Site: NOSE | Status: FUNCTIONAL
Brand: PROPEL CONTOUR

## 2023-07-24 RX ORDER — HALOPERIDOL 5 MG/ML
1 INJECTION INTRAMUSCULAR
Status: DISCONTINUED | OUTPATIENT
Start: 2023-07-24 | End: 2023-07-25 | Stop reason: HOSPADM

## 2023-07-24 RX ORDER — ONDANSETRON 4 MG/1
4 TABLET, ORALLY DISINTEGRATING ORAL EVERY 30 MIN PRN
Status: DISCONTINUED | OUTPATIENT
Start: 2023-07-24 | End: 2023-07-25 | Stop reason: HOSPADM

## 2023-07-24 RX ORDER — OXYCODONE HYDROCHLORIDE 5 MG/1
5 TABLET ORAL
Status: DISCONTINUED | OUTPATIENT
Start: 2023-07-24 | End: 2023-07-25 | Stop reason: HOSPADM

## 2023-07-24 RX ORDER — FENTANYL CITRATE 50 UG/ML
25 INJECTION, SOLUTION INTRAMUSCULAR; INTRAVENOUS EVERY 5 MIN PRN
Status: DISCONTINUED | OUTPATIENT
Start: 2023-07-24 | End: 2023-07-25 | Stop reason: HOSPADM

## 2023-07-24 RX ORDER — CEPHALEXIN 500 MG/1
500 CAPSULE ORAL 3 TIMES DAILY
Qty: 24 CAPSULE | Refills: 0 | Status: SHIPPED | OUTPATIENT
Start: 2023-07-24 | End: 2023-08-01

## 2023-07-24 RX ORDER — HYDROCODONE BITARTRATE AND ACETAMINOPHEN 5; 325 MG/1; MG/1
1-2 TABLET ORAL EVERY 6 HOURS PRN
Qty: 12 TABLET | Refills: 0 | Status: SHIPPED | OUTPATIENT
Start: 2023-07-24 | End: 2023-08-15

## 2023-07-24 RX ORDER — ACETAMINOPHEN 325 MG/1
975 TABLET ORAL ONCE
Status: DISCONTINUED | OUTPATIENT
Start: 2023-07-24 | End: 2023-07-25 | Stop reason: HOSPADM

## 2023-07-24 RX ORDER — LABETALOL HYDROCHLORIDE 5 MG/ML
10 INJECTION, SOLUTION INTRAVENOUS
Status: DISCONTINUED | OUTPATIENT
Start: 2023-07-24 | End: 2023-07-25 | Stop reason: HOSPADM

## 2023-07-24 RX ORDER — SODIUM CHLORIDE, SODIUM LACTATE, POTASSIUM CHLORIDE, CALCIUM CHLORIDE 600; 310; 30; 20 MG/100ML; MG/100ML; MG/100ML; MG/100ML
INJECTION, SOLUTION INTRAVENOUS CONTINUOUS
Status: DISCONTINUED | OUTPATIENT
Start: 2023-07-24 | End: 2023-07-25 | Stop reason: HOSPADM

## 2023-07-24 RX ORDER — ACETAMINOPHEN 325 MG/1
975 TABLET ORAL ONCE
Status: COMPLETED | OUTPATIENT
Start: 2023-07-24 | End: 2023-07-24

## 2023-07-24 RX ORDER — KETOROLAC TROMETHAMINE 30 MG/ML
15 INJECTION, SOLUTION INTRAMUSCULAR; INTRAVENOUS
Status: DISCONTINUED | OUTPATIENT
Start: 2023-07-24 | End: 2023-07-25 | Stop reason: HOSPADM

## 2023-07-24 RX ORDER — CEFAZOLIN SODIUM 2 G/100ML
2 INJECTION, SOLUTION INTRAVENOUS
Status: COMPLETED | OUTPATIENT
Start: 2023-07-24 | End: 2023-07-24

## 2023-07-24 RX ORDER — DEXAMETHASONE SODIUM PHOSPHATE 4 MG/ML
4 INJECTION, SOLUTION INTRA-ARTICULAR; INTRALESIONAL; INTRAMUSCULAR; INTRAVENOUS; SOFT TISSUE
Status: DISCONTINUED | OUTPATIENT
Start: 2023-07-24 | End: 2023-07-25 | Stop reason: HOSPADM

## 2023-07-24 RX ORDER — HYDROXYZINE HYDROCHLORIDE 25 MG/1
25 TABLET, FILM COATED ORAL EVERY 6 HOURS PRN
Status: DISCONTINUED | OUTPATIENT
Start: 2023-07-24 | End: 2023-07-25 | Stop reason: HOSPADM

## 2023-07-24 RX ORDER — ACETAMINOPHEN 325 MG/1
975 TABLET ORAL ONCE
Status: DISCONTINUED | OUTPATIENT
Start: 2023-07-24 | End: 2023-07-24

## 2023-07-24 RX ORDER — LORAZEPAM 2 MG/ML
.5-1 INJECTION INTRAMUSCULAR
Status: DISCONTINUED | OUTPATIENT
Start: 2023-07-24 | End: 2023-07-25 | Stop reason: HOSPADM

## 2023-07-24 RX ORDER — OXYCODONE HYDROCHLORIDE 5 MG/1
10 TABLET ORAL
Status: DISCONTINUED | OUTPATIENT
Start: 2023-07-24 | End: 2023-07-25 | Stop reason: HOSPADM

## 2023-07-24 RX ORDER — DEXAMETHASONE SODIUM PHOSPHATE 10 MG/ML
10 INJECTION, SOLUTION INTRAMUSCULAR; INTRAVENOUS ONCE
Status: COMPLETED | OUTPATIENT
Start: 2023-07-24 | End: 2023-07-24

## 2023-07-24 RX ORDER — COCAINE HYDROCHLORIDE 40 MG/ML
SOLUTION NASAL PRN
Status: DISCONTINUED | OUTPATIENT
Start: 2023-07-24 | End: 2023-07-24 | Stop reason: HOSPADM

## 2023-07-24 RX ORDER — ONDANSETRON 2 MG/ML
4 INJECTION INTRAMUSCULAR; INTRAVENOUS EVERY 30 MIN PRN
Status: DISCONTINUED | OUTPATIENT
Start: 2023-07-24 | End: 2023-07-25 | Stop reason: HOSPADM

## 2023-07-24 RX ORDER — HYDRALAZINE HYDROCHLORIDE 20 MG/ML
2.5-5 INJECTION INTRAMUSCULAR; INTRAVENOUS EVERY 10 MIN PRN
Status: DISCONTINUED | OUTPATIENT
Start: 2023-07-24 | End: 2023-07-25 | Stop reason: HOSPADM

## 2023-07-24 RX ORDER — ALBUTEROL SULFATE 0.83 MG/ML
2.5 SOLUTION RESPIRATORY (INHALATION) EVERY 4 HOURS PRN
Status: DISCONTINUED | OUTPATIENT
Start: 2023-07-24 | End: 2023-07-25 | Stop reason: HOSPADM

## 2023-07-24 RX ORDER — BACITRACIN ZINC 500 [USP'U]/G
OINTMENT TOPICAL PRN
Status: DISCONTINUED | OUTPATIENT
Start: 2023-07-24 | End: 2023-07-24 | Stop reason: HOSPADM

## 2023-07-24 RX ORDER — FENTANYL CITRATE 50 UG/ML
25 INJECTION, SOLUTION INTRAMUSCULAR; INTRAVENOUS
Status: DISCONTINUED | OUTPATIENT
Start: 2023-07-24 | End: 2023-07-25 | Stop reason: HOSPADM

## 2023-07-24 RX ORDER — FENTANYL CITRATE 50 UG/ML
50 INJECTION, SOLUTION INTRAMUSCULAR; INTRAVENOUS EVERY 5 MIN PRN
Status: DISCONTINUED | OUTPATIENT
Start: 2023-07-24 | End: 2023-07-25 | Stop reason: HOSPADM

## 2023-07-24 RX ORDER — LIDOCAINE HYDROCHLORIDE AND EPINEPHRINE 10; 10 MG/ML; UG/ML
INJECTION, SOLUTION INFILTRATION; PERINEURAL PRN
Status: DISCONTINUED | OUTPATIENT
Start: 2023-07-24 | End: 2023-07-24 | Stop reason: HOSPADM

## 2023-07-24 RX ORDER — CEFAZOLIN SODIUM 2 G/100ML
2 INJECTION, SOLUTION INTRAVENOUS SEE ADMIN INSTRUCTIONS
Status: DISCONTINUED | OUTPATIENT
Start: 2023-07-24 | End: 2023-07-25 | Stop reason: HOSPADM

## 2023-07-24 RX ORDER — ACETAMINOPHEN 500 MG
1000 TABLET ORAL ONCE
Status: COMPLETED | OUTPATIENT
Start: 2023-07-24 | End: 2023-07-24

## 2023-07-24 RX ORDER — MEPERIDINE HYDROCHLORIDE 25 MG/ML
12.5 INJECTION INTRAMUSCULAR; INTRAVENOUS; SUBCUTANEOUS EVERY 5 MIN PRN
Status: DISCONTINUED | OUTPATIENT
Start: 2023-07-24 | End: 2023-07-25 | Stop reason: HOSPADM

## 2023-07-24 RX ORDER — LIDOCAINE 40 MG/G
CREAM TOPICAL
Status: DISCONTINUED | OUTPATIENT
Start: 2023-07-24 | End: 2023-07-25 | Stop reason: HOSPADM

## 2023-07-24 RX ORDER — ONDANSETRON 2 MG/ML
INJECTION INTRAMUSCULAR; INTRAVENOUS PRN
Status: DISCONTINUED | OUTPATIENT
Start: 2023-07-24 | End: 2023-07-24

## 2023-07-24 RX ORDER — LIDOCAINE HYDROCHLORIDE 20 MG/ML
INJECTION, SOLUTION INFILTRATION; PERINEURAL PRN
Status: DISCONTINUED | OUTPATIENT
Start: 2023-07-24 | End: 2023-07-24

## 2023-07-24 RX ORDER — DIMENHYDRINATE 50 MG/ML
25 INJECTION, SOLUTION INTRAMUSCULAR; INTRAVENOUS
Status: DISCONTINUED | OUTPATIENT
Start: 2023-07-24 | End: 2023-07-25 | Stop reason: HOSPADM

## 2023-07-24 RX ORDER — PROPOFOL 10 MG/ML
INJECTION, EMULSION INTRAVENOUS CONTINUOUS PRN
Status: DISCONTINUED | OUTPATIENT
Start: 2023-07-24 | End: 2023-07-24

## 2023-07-24 RX ORDER — PROPOFOL 10 MG/ML
INJECTION, EMULSION INTRAVENOUS PRN
Status: DISCONTINUED | OUTPATIENT
Start: 2023-07-24 | End: 2023-07-24

## 2023-07-24 RX ADMIN — Medication 0.3 MG: at 12:21

## 2023-07-24 RX ADMIN — LIDOCAINE HYDROCHLORIDE 40 MG: 20 INJECTION, SOLUTION INFILTRATION; PERINEURAL at 10:04

## 2023-07-24 RX ADMIN — CEFAZOLIN SODIUM 2 G: 2 INJECTION, SOLUTION INTRAVENOUS at 09:58

## 2023-07-24 RX ADMIN — SODIUM CHLORIDE, SODIUM LACTATE, POTASSIUM CHLORIDE, CALCIUM CHLORIDE: 600; 310; 30; 20 INJECTION, SOLUTION INTRAVENOUS at 09:04

## 2023-07-24 RX ADMIN — Medication 50 MG: at 10:05

## 2023-07-24 RX ADMIN — PROPOFOL 200 MG: 10 INJECTION, EMULSION INTRAVENOUS at 10:04

## 2023-07-24 RX ADMIN — Medication 0.5 MG: at 10:04

## 2023-07-24 RX ADMIN — PROPOFOL 100 MCG/KG/MIN: 10 INJECTION, EMULSION INTRAVENOUS at 10:18

## 2023-07-24 RX ADMIN — Medication 0.2 MG: at 12:38

## 2023-07-24 RX ADMIN — Medication 1000 MG: at 08:58

## 2023-07-24 RX ADMIN — DEXAMETHASONE SODIUM PHOSPHATE 10 MG: 10 INJECTION, SOLUTION INTRAMUSCULAR; INTRAVENOUS at 10:28

## 2023-07-24 RX ADMIN — ONDANSETRON 4 MG: 2 INJECTION INTRAMUSCULAR; INTRAVENOUS at 12:34

## 2023-07-24 NOTE — INTERVAL H&P NOTE
"I have reviewed the surgical (or preoperative) H&P that is linked to this encounter, and examined the patient. There are no significant changes    Clinical Conditions Present on Arrival:  Clinically Significant Risk Factors Present on Admission                  # Overweight: Estimated body mass index is 28.97 kg/m  as calculated from the following:    Height as of this encounter: 1.765 m (5' 9.5\").    Weight as of this encounter: 90.3 kg (199 lb).       "

## 2023-07-24 NOTE — TELEPHONE ENCOUNTER
Called [facility] and spoke with Shayne in [employer]. He said the case is marked APPROVED.  Auth# [id number]   Dates of Service 7/24/23 - 10/21/23

## 2023-07-24 NOTE — OP NOTE
PREOPERATIVE DIAGNOSES:   1. Nasal polyposis.   2. Chronic sinusitis.   3. Deviated nasal septum.   4. Turbinate hypertrophy.   5. Nasal obstruction.     POSTOPERATIVE DIAGNOSES:   1. Nasal polyposis.   2. Chronic sinusitis.   3. Deviated nasal septum.   4. Turbinate hypertrophy.   5. Nasal obstruction.     PROCEDURES PERFORMED:   1. Three-dimensional image-guided computer-assisted sinus surgery.   2. Bilateral endoscopic removal of extensive nasal polyposis.   3. Bilateral endoscopic maxillary antrostomy.  4. Bilateral endoscopic total ethmoidectomy.   5. Bilateral endoscopic sphenoidotomy.  6. Bilateral endoscopic frontal sinusotomy.   7. Septoplasty   8. Bilateral submucous resection of inferior turbinates  9. Bilateral inferior turbinate out-fracture  SURGEON: Mario Gamboa MD   BLOOD LOSS: 100 mL.   COMPLICATIONS: None.   SPECIMENS: left nasal cavity mass; left maxillary sinus culture  ANESTHESIA: GETA.   INDICATIONS: Emery Barkley who presented to me with a long history of chronic nasal disease and chronic sinusitis. He also had nasal obstruction with a septal deviation and turbinate hypertrophy.  On evaluation, the patient had a significant amount of nasal polyposis bilaterally (left greater than right), and CT scan confirmed pansinusitis. They failed maximal medical therapy in the months preceding surgery.Therefore, my recommendation was for the above-named procedures. Preoperatively, risks discussed included the risks of infection, bleeding, the risks of general anesthesia, possible recurrence of polyp disease, possible injury to the eyes or orbital muscles, base of skull and tear duct system, and possible alteration of sense of smell. The patient understood these risks and possible outcomes and wished to proceed.   OPERATIVE PROCEDURE: After being taken to the operating room and induction of general endotracheal tube anesthesia, the bed was rotated 90 degrees. I attached the [employer]  electromagnetic reference strap to the head and the device was calibrated per 's recommendations. I used image guidance throughout the procedure to identify key structures including the skull base, orbit, the frontal and sphenoid sinuses.  A pause was conducted to identify the patient by name, birthday, and procedure. I began by applying topical anesthetic in the form of 2 cottonoids on each side of the nose which had been soaked with a total of 4 mL of 4% liquid cocaine.   After that, the patient was prepped and draped in the normal clean fashion. I removed the cottonoids from the right side of the nose and entered the right nasal cavity.  I injected 1% lidocaine with 1:100,000 epinephrine using a 0 degree endoscope and injected the posterior lateral wall of the nasal cavity as well as the body of the right middle turbinate and the anterior insertion of the right middle turbinate. After I did this field block, I went back to the sinus shaver and removed polyps in the middle meatus. After these polyps were removed, I used the rotating backbiter and trimmed away at the uncinate process to expose the natural ostium of the right maxillary sinus. I used a 45 degree scope and entered the right maxillary sinus. No infection on the right. I widened the maxillary antrostomy with a true cut and shaver. After this was done, I then confirmed the position of the ethmoid bulla using the image guidance system. I switched back to the 12 degree shaver and a 0 degree endoscope and took down the face of the ethmoid bulla. Polyps were present and I removed these with the shaver. I skeletonized the horizontal lamella and proceeded directly posteriorly through several layers of posterior ethmoid air cells which had polypoid edema. After reaching the face of the sphenoid sinus on the right side, I trimmed a part of the superior turbinate and identified the sphenoid os with image guidance. I entered the right sphenoid sinus  and enlarged the os with the shaver.  I then went in a posterior to anterior direction. I dissected upward and, using the image guidance system, confirmed that I had found the posterior extent of the roof of the ethmoid. I then was able to dissect in a posterior to anterior direction, removing polyps and bony septations along the way. Eventually I crossed into the anterior ethmoid system. At this point, I used the rotating backbiter to trim away at the anterior insertion of the right middle turbinate to expose the agger nasi cells. I used the 12 degree shaver to remove several more anterior ethmoid air cells. I also used the giraffes to remove bony septations. I then used the image-guided curved suction to palpate the natural ostium to the right frontal sinus. I then used giraffes to remove the posterior agger nasi cell wall and widen the frontal sinus ostia. I could then see into the frontal sinus, no pus. After this was done, I trimmed down more diseased tissue and polypoid edema to open the right frontal sinus. At this point, I had completed the right-sided sinus surgery.   At this point, I turned my attention to the septum as the deviation to the left caused near complete obstruction and I also couldn't proceed with sinus surgery.  I injected that anterior septum and the heads of the inferior turbinates with 1% lidocaine, 1:100,000 epinephrine.  I made a septoplasty hemitransfixion incision in the right nasal vestibule in a traditional open fashion. I then dissected down onto the left side of the cartilaginous septum through the right-sided incision. I then was able to start a mucoperichondrial pocket directly on the left side of the cartilaginous septum with a caudal. After I completely elevated the mucoperichondrium off the left side of the septum and septal spur, I then made a chondrotomy through the cartilage approximately 1.5 cm back from the anterior edge. I broke over to the right side and raised a  submucoperiosteal flap on the entire right side of the nasal septum. After this was done, I used a 15 blade to remove the entire rhomboid portion of the cartilaginous septum, and I removed it in one large piece. It was brought to the back table and crushed and later reinserted back into the mucoperichondrial pocket. I removed the deviated bony portions posteriorly as well including a deviated maxillary crest. I irrigated with saline. I laid the flaps back together and this significantly improved the left nasal airway. I reinserted the cartilage. I then closed my septoplasty incision with 3 simple interrupted 4-0 chromic gut sutures.   Next, I performed bilateral submucous resection of inferior turbinates with outfracture. I switched to a 2.0 inferior turbinate blade and started on the right side. I made a stab incision at the anterior insertion of the right inferior turbinate and raised a submucoperiosteal tunnel along the medial surface of the right inferior turbinate bone. I then slowly entered and withdrew the shaver blade as I ran the shaver to perform my submucous resection.   I then performed the same procedure on the left side, once again using the 2.0 mm turbinate blade to make a stab incision at the anterior insertion of the left inferior turbinate blade. I raised a submucoperiosteal tunnel along the entire medial surface of the left inferior turbinate bone with a caudal and slowly entered and withdrew the shaver back and forth as I ran the shaver function to perform submucous resection. I out-fractured both turbinates laterally with the back of the caudal along all points of the inferior turbinates. I then used the longest nasal speculum to outfracture the inferior turbinates laterally. I closed the stab incisions with 5-0 chromic.    I moved on to sinus surgery in the left nasal cavity. There were large polyps growing from the middle meatus completely filling the left nasal cavity. I took photos. I  injected directly into them with 1% lidocaine with 1:100,000 epinephrine. I took a biopsy of some of the polyps. Next with the 0 degree endoscope and the 12 degree shaver, I followed the polyps by trimming away at them and debulking them in the nasal cavity. After removing this extensive polyposis enough to access the posterior nasal cavity, I used a spinal needle with local anesthetic to inject the posterior lateral wall as well as the middle turbinate turbinate insertion to the lateral wall. After this was done, I went back to the 12 degree shaver and continued to follow the polyps into the left middle meatus. Eventually I cleaned it out and was able to see the uncinate process. I used the rotating backbiter to trim away at the uncinate bone to expose the natural ostium of the left maxillary sinus. I then used a 45 degree scope. Once I identified the maxillary os, I was able to use the sinus shaver to trim away at the remainder of the uncinate and open the left maxillary sinus where more polyps and pus obscured the sinus opening from inside the sinus. I cultured the pus. I removed the polyps from the left maxillary sinus with a shaver and girafftoshia. I irrigated the left maxillary sinus with saline. After this was completely cleaned out and the maxillary antrostomy was opened widely, I switched back to the 12 degree shaver and a 0 degree endoscope. I took down the face of the ethmoid bulla and skeletonized the horizontal lamella and proceeded directly posteriorly through several layers of posterior ethmoid cells which had many polyps. Eventually I reached the posterior most left ethmoid cell which had pneumatization. I identified and confirmed that I had located the roof of the posterior ethmoid system with the image guidance system. I palpated with an image guidance suction the natural sphenoid os. I entered the sinus and opened this with a shaver. There were some polypoid changes within the sinus. I cleared any  obscuring the opening to the sinusotomy. I then dissected in a posterior to anterior direction with the 12 degree shaver, trimming away diseased mucosa and polyps as well as bony septations. I crossed into the anterior ethmoid system and, using the rotating backbiter, trimmed away at the superiormost remnant of the uncinate bone to identify the agger nasi cell. Using a shaver, I trimmed away at the remainder of the floor of the agger nasi, and it was also filled with polyps. Using the image-guided curved suction and a 45 degree scope, I then palpated up into the natural ostium of the left frontal sinus. After I identified the location, I used the [place] image-guided frontal sinus balloon and inserted this into the frontal outflow tract and sinus and ballooned it open. This was then further widened with the giraffe and shaver to open the frontal outflow tract. I then used giraffes to remove the posterior agger nasi cell and widen the frontal sinus ostia. At this point, I completed the sinuses on the left side. Photographs were taken.  At this point, the entire procedure was now complete. I reinspected both sides of the nose and there was good hemostasis. I then placed small pieces of Nasopore dressing in between the middle turbinates and the lateral walls to prevent lateralization and scarring. Lastly, I placed Oreilly splints in each side and sutured them together with a 3-0 nylon suture. All instruments were accounted for and all counts were correct. The patient's bed was rotated 90 degrees back to the care of anesthesia. He was awakened, extubated and sent to the recovery room in good condition.

## 2023-07-24 NOTE — ANESTHESIA CARE TRANSFER NOTE
Patient: Emery Barkley    Procedure: Procedure(s):  SINUS SURGERY, ENDOSCOPIC, USING OPTICAL TRACKING SYSTEM - all sinuses  SEPTOPLASTY; bilateral inferior turbinate reduction       Diagnosis: Chronic pansinusitis [J32.4]  Nasal polyp [J33.9]  Nasal septal deviation [J34.2]  Nasal obstruction [J34.89]  Nasal turbinate hypertrophy [J34.3]  Diagnosis Additional Information: No value filed.    Anesthesia Type:   General     Note:    Oropharynx: oropharynx clear of all foreign objects and spontaneously breathing  Level of Consciousness: awake and drowsy  Oxygen Supplementation: face mask  Level of Supplemental Oxygen (L/min / FiO2): 6  Independent Airway: airway patency satisfactory and stable  Dentition: dentition unchanged  Vital Signs Stable: post-procedure vital signs reviewed and stable  Report to RN Given: handoff report given  Patient transferred to: PACU    Handoff Report: Identifed the Patient, Identified the Reponsible Provider, Reviewed the pertinent medical history, Discussed the surgical course, Reviewed Intra-OP anesthesia mangement and issues during anesthesia, Set expectations for post-procedure period and Allowed opportunity for questions and acknowledgement of understanding      Vitals:  Vitals Value Taken Time   /89 07/24/23 1252   Temp 97.6  F (36.4  C) 07/24/23 1252   Pulse 99 07/24/23 1258   Resp 14 07/24/23 1258   SpO2 95 % 07/24/23 1258   Vitals shown include unvalidated device data.    Electronically Signed By: MICHAEL Moore CRNA  July 24, 2023  12:59 PM

## 2023-07-24 NOTE — ANESTHESIA POSTPROCEDURE EVALUATION
Patient: Emery Barkley    Procedure: Procedure(s):  SINUS SURGERY, ENDOSCOPIC, USING OPTICAL TRACKING SYSTEM - all sinuses  SEPTOPLASTY; bilateral inferior turbinate reduction       Anesthesia Type:  General    Note:  Disposition: Outpatient   Postop Pain Control: Uneventful            Sign Out: Well controlled pain   PONV: No   Neuro/Psych: Uneventful            Sign Out: Acceptable/Baseline neuro status   Airway/Respiratory: Uneventful            Sign Out: Acceptable/Baseline resp. status   CV/Hemodynamics: Uneventful            Sign Out: Acceptable CV status; No obvious hypovolemia; No obvious fluid overload   Other NRE: NONE   DID A NON-ROUTINE EVENT OCCUR? No           Last vitals:  Vitals Value Taken Time   /90 07/24/23 1305   Temp 97.1  F (36.2  C) 07/24/23 1315   Pulse 91 07/24/23 1330   Resp 12 07/24/23 1330   SpO2 95 % 07/24/23 1330       Electronically Signed By: Julian Wills MD  July 24, 2023  1:53 PM

## 2023-07-24 NOTE — ANESTHESIA PREPROCEDURE EVALUATION
Anesthesia Pre-Procedure Evaluation    Patient: Emery Barkley   MRN: [id number] : 1970        Procedure : Procedure(s):  SINUS SURGERY, ENDOSCOPIC, USING OPTICAL TRACKING SYSTEM - all sinuses; SEPTOPLASTY; bilateral inferior turbinate reduction  SEPTOPLASTY; bilateral inferior turbinate reduction          Past Medical History:   Diagnosis Date     Hyperlipidemia       Past Surgical History:   Procedure Laterality Date     ABDOMEN SURGERY  19    umbilical hernia     HERNIORRHAPHY UMBILICAL N/A 2019    Procedure: HERNIORRHAPHY UMBILICAL;  Surgeon: Tej Beaulieu DO;  Location: [facility]     VASECTOMY        No Known Allergies   Social History     Tobacco Use     Smoking status: Never     Smokeless tobacco: Never   Substance Use Topics     Alcohol use: Yes     Comment: 2 drinks per week      Wt Readings from Last 1 Encounters:   23 90.3 kg (199 lb)        Anesthesia Evaluation   Pt has had prior anesthetic. Type: General and MAC.    No history of anesthetic complications       ROS/MED HX  ENT/Pulmonary:  - neg pulmonary ROS     Neurologic:  - neg neurologic ROS     Cardiovascular:     (+) Dyslipidemia -----    METS/Exercise Tolerance:     Hematologic:  - neg hematologic  ROS     Musculoskeletal:  - neg musculoskeletal ROS     GI/Hepatic:  - neg GI/hepatic ROS     Renal/Genitourinary:  - neg Renal ROS     Endo:  - neg endo ROS     Psychiatric/Substance Use:  - neg psychiatric ROS     Infectious Disease:  - neg infectious disease ROS     Malignancy:  - neg malignancy ROS     Other:  - neg other ROS          Physical Exam    Airway  airway exam normal           Respiratory Devices and Support         Dental    unable to assess        Cardiovascular   cardiovascular exam normal          Pulmonary   pulmonary exam normal                OUTSIDE LABS:  CBC: No results found for: WBC, HGB, HCT, PLT  BMP:   Lab Results   Component Value Date     2009    POTASSIUM 4.1 2009     CHLORIDE 105 07/25/2009    CO2 29 07/25/2009    BUN 12 07/25/2009    CR 0.96 07/25/2009    GLC 94 07/14/2023     (H) 08/19/2022     COAGS: No results found for: PTT, INR, FIBR  POC: No results found for: BGM, HCG, HCGS  HEPATIC:   Lab Results   Component Value Date    ALT 54 04/18/2014     OTHER:   Lab Results   Component Value Date    LON 9.3 07/25/2009       Anesthesia Plan    ASA Status:  2   NPO Status:  NPO Appropriate    Anesthesia Type: General.     - Airway: ETT   Induction: Intravenous, Propofol.   Maintenance: Balanced.        Consents    Anesthesia Plan(s) and associated risks, benefits, and realistic alternatives discussed. Questions answered and patient/representative(s) expressed understanding.    - Discussed:     - Discussed with:  Patient, Spouse      - Extended Intubation/Ventilatory Support Discussed: No.      - Patient is DNR/DNI Status: No    Use of blood products discussed: No .     Postoperative Care    Pain management: IV analgesics, Oral pain medications.   PONV prophylaxis: Ondansetron (or other 5HT-3), Dexamethasone or Solumedrol, Background Propofol Infusion     Comments:                Julian Wills MD

## 2023-07-24 NOTE — DISCHARGE INSTRUCTIONS
You had 975 mg of Tylenol at 9 AM. You may repeat this after 3 PM. Maximum amount of Tylenol/Acetaminophen in a 24 hour period is 4,000 mg.      Instructions following Septoplasty & Sinus Surgery    Recovery - Everyone recovers differently from a general anesthetic.  Symptoms such as fatigue, nausea, lightheadedness, and sometimes a low grade fever (up to 101 degrees) are not unusual.  As your body removes the anesthetic drugs from circulation, these symptoms will resolve.  Your nose will be sore after surgery, and you may even have symptoms similar to a sinus infection with headache, congestion, and pressure.  These will resolve with healing.  For several days you may experience bloody drainage from the nose, please use the drip pad as necessary for this. Call the office if the bleeding persists after 3 days or is perfuse. There are no diet restrictions after septoplasty, and you can resume your home medications except for blood thinners.  Please do not blow your nose for two weeks after surgery. You may gently dab your nose with Kleenex. Limit your activity to no strenuous activities until I see you for the first follow up visit, and sleep with your head elevated.    Medications - You were sent home with narcotic pain medication.  If you can tolerate the discomfort during your recovery by using just plain Tylenol or ibuprofen (advil), please do so.  However, do not hesitate to use the stronger pain medication if needed.  If you were sent home with an antibiotic, it is primarily used to help the healing process.  If it causes loose bowel movements or other signs of intolerance, it is appropriate to discontinue it.      Complications - Problems related to septoplasty almost always are detected during the operation, and special instruction will be given in that situation.  However, unexpected things can happen.  If you experience persistent bleeding, fevers, changes in vision, severe headache or nasal pain, this may  be the sign of an infection.  Any of these symptoms should be called into my office or to the on call ENT if after hours. There may be small plastic pieces placed inside your nose during surgery (splints). These help to promote the septum into healing in its straightened position, and will be removed at the follow up visit.    Follow up - Splints will be removed at the follow up visit. This is simple and takes just a few seconds afterwards, the improvement you will expereince in breathing from the septoplasty is usually dramatic and immediate.      If there are any questions or issues with the above, or if there are other issues that concern you, always feel free to call [phone].    Mario Gamboa MD  Otolaryngology       [facility]   Adult Discharge Orders & Instructions For 24 hours after surgery:  Get plenty of rest.  A responsible adult must stay with you for at least 24 hours after you leave the hospital.   Do not drive or use heavy equipment.  If you have weakness or tingling, don't drive or use heavy equipment until this feeling goes away.  Do not drink alcohol.  Avoid strenuous or risky activities.  Ask for help when climbing stairs.   You may feel lightheaded.  IF so, sit for a few minutes before standing.  Have someone help you get up.   If you have nausea (feel sick to your stomach): Drink only clear liquids such as apple juice, ginger ale, broth or 7-Up.  Rest may also help.  Be sure to drink enough fluids.  Move to a regular diet as you feel able.  You may have a slight fever. Call the doctor if your fever is over 100 F (37.7 C) (taken under the tongue) or lasts longer than 24 hours.  You may have a dry mouth, a sore throat, muscle aches or trouble sleeping.  These should go away after 24 hours.  Do not make important or legal decisions.   Call your doctor for any of the followin.  Signs of infection (fever, growing tenderness at the surgery site, a large  amount of drainage or bleeding, severe pain, foul-smelling drainage, redness, swelling).  2. It has been over 8 to 10 hours since surgery and you are still not able to urinate (pass water).  3.  Headache for over 24 hours.  4.  Numbness, tingling or weakness the day after surgery (if you had spinal anesthesia).

## 2023-07-24 NOTE — ANESTHESIA PROCEDURE NOTES
Airway         Procedure Start/Stop Times: 7/24/2023 10:07 AM  Staff -        CRNA: Chayo Colindres APRN CRNA       Performed By: CRNA  Consent for Airway        Urgency: elective  Indications and Patient Condition       Indications for airway management: ammon-procedural       Induction type:intravenous       Mask difficulty assessment: 2 - vent by mask + OA or adjuvant +/- NMBA    Final Airway Details       Final airway type: endotracheal airway       Successful airway: ETT - single  Endotracheal Airway Details        ETT size (mm): 7.5       Cuffed: yes       Successful intubation technique: direct laryngoscopy       DL Blade Type: Traylor 3       Grade View of Cords: 1       Adjucts: stylet       Position: Left       Measured from: gums/teeth       Secured at (cm): 23       Bite block used: None    Post intubation assessment        Placement verified by: capnometry, equal breath sounds and chest rise        Number of attempts at approach: 1       Secured with: silk tape       Ease of procedure: easy       Dentition: Intact and Unchanged    Medication(s) Administered   Medication Administration Time: 7/24/2023 10:07 AM    Additional Comments       Pre-existing chip anterior surface left upper front tooth.  Discussed with patient pre-operatively.

## 2023-07-26 DIAGNOSIS — J32.4 CHRONIC PANSINUSITIS: Primary | ICD-10-CM

## 2023-07-26 RX ORDER — CEFDINIR 300 MG/1
300 CAPSULE ORAL 2 TIMES DAILY
Qty: 14 CAPSULE | Refills: 0 | Status: SHIPPED | OUTPATIENT
Start: 2023-07-26 | End: 2023-08-02

## 2023-07-27 NOTE — TELEPHONE ENCOUNTER
Returned call and he had no questions. Advised tylenol and IBU are okay. Advised no bending, lifting, stopping, no exercising. Walking only okay if not looking to elevate BP.    VALERIE Mari RN 7/27/2023 3:50 PM

## 2023-07-28 LAB
BACTERIA SPEC CULT: ABNORMAL

## 2023-07-30 NOTE — PROGRESS NOTES
HPI - Emery Barkley is a 52 year old male who is here for their first postoperative visit, status post endoscopic sinus surgery (all sinuses), septoplasty, and inferior turbinate reduction on 7/24/23.  They report the expected amount of congestion, facial pressure, and mild bloody drainage. Nasal saline rinses and oral antibiotics have been tolerated. Sinus culture grew staph and haemophilus. I switched his antibiotics to cefdinir.       Past Medical History:   Diagnosis Date    Hyperlipidemia      Physical Exam  General - The patient is in no distress.  Alert and oriented to person and place, answers questions and cooperates with examination appropriately.   Eyes - Extraocular movements intact. Sclera were not icteric or injected, conjunctiva were pink and moist.  Neuro - CN 2-12 grossly intact. HB 1 out of 6.   Nose - I cut the nylon suture and removed both Oreilly splints. After removing the splints and debris with a suction, everything looks to be healing well.  The incision is intact and the turbinates are well lateralized. The airway is wide open bilaterally. No sign of synechiae or infection. No hematoma or septal perforation.    PROCEDURE - ENDOSCOPIC SINUS DEBRIDEMENT    Nasal exam performed with a zero degree rigid endoscope for purposes of endoscopic sinus debridement. I began by spraying both sides with lidocaine and neosynephrine.  Color photographs were taken for the medical record.  I began on the right side. The middle meatus was noted to obstructed with a dark crust, this was gently dislodged from the lateral wall with a number 10 suction, I was then able to visualize the right maxillary sinusotomy, it is healing well and open.  No purulence noted.  I then moved further back, and debrided some dark crusts and thick dark mucous away from the ethmoid roof.  The roof was then visualized and is healing well. I turned my attention to the left side.  Once again some dark crust was dislodged from the middle  meatus and removed from the nose.  I was then able to pass the scope into the left middle meatus.  The maxillary sinusotomy is healing well, no abnormal secretions noted.  Further back, I debrided some crusts from the lateral wall and roof of the ethmoid system.  I was then able to visualize a nicely remucosalizing surface.      A/P - Emerycarol Barkley is a 52 year old male is doing well from sinus surgery and septoplasty with turbinate reduction.  There are no signs of complications or infection.  Patient instructed to continue saline rinses 2-3 times daily.  I will see them in 2 weeks for possible additional endoscopically assisted debridement of the sinuses. No nose blowing. Sneeze with mouth open.

## 2023-07-31 LAB — BACTERIA SPEC CULT: ABNORMAL

## 2023-08-01 ENCOUNTER — DOCUMENTATION ONLY (OUTPATIENT)
Dept: OTHER | Facility: CLINIC | Age: 53
End: 2023-08-01

## 2023-08-01 ENCOUNTER — OFFICE VISIT (OUTPATIENT)
Dept: OTOLARYNGOLOGY | Facility: CLINIC | Age: 53
End: 2023-08-01
Payer: COMMERCIAL

## 2023-08-01 VITALS — HEART RATE: 63 BPM | OXYGEN SATURATION: 97 % | SYSTOLIC BLOOD PRESSURE: 119 MMHG | DIASTOLIC BLOOD PRESSURE: 77 MMHG

## 2023-08-01 DIAGNOSIS — J33.9 NASAL POLYP: ICD-10-CM

## 2023-08-01 DIAGNOSIS — Z98.890 S/P NASAL SEPTOPLASTY: ICD-10-CM

## 2023-08-01 DIAGNOSIS — Z98.890 S/P FESS (FUNCTIONAL ENDOSCOPIC SINUS SURGERY): ICD-10-CM

## 2023-08-01 DIAGNOSIS — J32.4 CHRONIC PANSINUSITIS: Primary | ICD-10-CM

## 2023-08-01 PROCEDURE — 31237 NSL/SINS NDSC SURG BX POLYPC: CPT | Mod: 50 | Performed by: OTOLARYNGOLOGY

## 2023-08-01 PROCEDURE — 99024 POSTOP FOLLOW-UP VISIT: CPT | Performed by: OTOLARYNGOLOGY

## 2023-08-01 NOTE — LETTER
8/1/2023         RE: Emery Barkley  [address] [facility] [address]        Dear Colleague,    Thank you for referring your patient, Emery Barkley, to the [facility]. Please see a copy of my visit note below.    HPI - Emery Barkley is a 52 year old male who is here for their first postoperative visit, status post endoscopic sinus surgery (all sinuses), septoplasty, and inferior turbinate reduction on 7/24/23.  They report the expected amount of congestion, facial pressure, and mild bloody drainage. Nasal saline rinses and oral antibiotics have been tolerated. Sinus culture grew staph and haemophilus. I switched his antibiotics to cefdinir.       Past Medical History:   Diagnosis Date     Hyperlipidemia      Physical Exam  General - The patient is in no distress.  Alert and oriented to person and place, answers questions and cooperates with examination appropriately.   Eyes - Extraocular movements intact. Sclera were not icteric or injected, conjunctiva were pink and moist.  Neuro - CN 2-12 grossly intact. HB 1 out of 6.   Nose - I cut the nylon suture and removed both Oreilly splints. After removing the splints and debris with a suction, everything looks to be healing well.  The incision is intact and the turbinates are well lateralized. The airway is wide open bilaterally. No sign of synechiae or infection. No hematoma or septal perforation.    PROCEDURE - ENDOSCOPIC SINUS DEBRIDEMENT    Nasal exam performed with a zero degree rigid endoscope for purposes of endoscopic sinus debridement. I began by spraying both sides with lidocaine and neosynephrine.  Color photographs were taken for the medical record.  I began on the right side. The middle meatus was noted to obstructed with a dark crust, this was gently dislodged from the lateral wall with a number 10 suction, I was then able to visualize the right maxillary sinusotomy, it is healing well and open.  No purulence noted.  I then  moved further back, and debrided some dark crusts and thick dark mucous away from the ethmoid roof.  The roof was then visualized and is healing well. I turned my attention to the left side.  Once again some dark crust was dislodged from the middle meatus and removed from the nose.  I was then able to pass the scope into the left middle meatus.  The maxillary sinusotomy is healing well, no abnormal secretions noted.  Further back, I debrided some crusts from the lateral wall and roof of the ethmoid system.  I was then able to visualize a nicely remucosalizing surface.      A/P - Emerycarol Barkley is a 52 year old male is doing well from sinus surgery and septoplasty with turbinate reduction.  There are no signs of complications or infection.  Patient instructed to continue saline rinses 2-3 times daily.  I will see them in 2 weeks for possible additional endoscopically assisted debridement of the sinuses. No nose blowing. Sneeze with mouth open.             Again, thank you for allowing me to participate in the care of your patient.        Sincerely,        Mario Gamboa MD

## 2023-08-15 ENCOUNTER — OFFICE VISIT (OUTPATIENT)
Dept: DERMATOLOGY | Facility: CLINIC | Age: 53
End: 2023-08-15
Payer: COMMERCIAL

## 2023-08-15 DIAGNOSIS — M67.449 DIGITAL MUCOUS CYST: ICD-10-CM

## 2023-08-15 DIAGNOSIS — B07.8 COMMON WART: Primary | ICD-10-CM

## 2023-08-15 PROCEDURE — 17110 DESTRUCTION B9 LES UP TO 14: CPT | Performed by: PHYSICIAN ASSISTANT

## 2023-08-15 PROCEDURE — 11900 INJECT SKIN LESIONS </W 7: CPT | Mod: 59 | Performed by: PHYSICIAN ASSISTANT

## 2023-08-15 PROCEDURE — 99213 OFFICE O/P EST LOW 20 MIN: CPT | Mod: 25 | Performed by: PHYSICIAN ASSISTANT

## 2023-08-15 RX ORDER — CANDIDA ALBICANS 1000 [PNU]/ML
0.4 INJECTION, SOLUTION INTRADERMAL ONCE
Status: COMPLETED | OUTPATIENT
Start: 2023-08-15 | End: 2023-08-15

## 2023-08-15 RX ADMIN — CANDIDA ALBICANS 0.4 ML: 1000 INJECTION, SOLUTION INTRADERMAL at 15:23

## 2023-08-15 ASSESSMENT — PAIN SCALES - GENERAL: PAINLEVEL: NO PAIN (0)

## 2023-08-15 NOTE — PROGRESS NOTES
Emery Barkley is an extremely pleasant 52 year old year old male patient here today for recheck wart. He notes wart on right pointer finger and left nare returned. Patient has no other skin complaints today.  Remainder of the HPI, Meds, PMH, Allergies, FH, and SH was reviewed in chart.    Past Medical History:   Diagnosis Date    Hyperlipidemia        Past Surgical History:   Procedure Laterality Date    ABDOMEN SURGERY  2/7/19    umbilical hernia    HERNIORRHAPHY UMBILICAL N/A 2/7/2019    Procedure: HERNIORRHAPHY UMBILICAL;  Surgeon: Tej Beaulieu DO;  Location: [facility] OR    OPTICAL TRACKING SYSTEM ENDOSCOPIC SINUS SURGERY Bilateral 7/24/2023    Procedure: SINUS SURGERY, ENDOSCOPIC, USING OPTICAL TRACKING SYSTEM - all sinuses;  Surgeon: Mario Gamboa MD;  Location: [facility] OR    SEPTOPLASTY, TURBINOPLASTY, COMBINED Bilateral 7/24/2023    Procedure: SEPTOPLASTY; bilateral inferior turbinate reduction;  Surgeon: Mario Gamboa MD;  Location: [facility] OR    VASECTOMY          Family History   Problem Relation Age of Onset    C.A.D. Father         MI, chf, first mi in 50s.    Coronary Artery Disease Father     Hyperlipidemia Father     Cancer Sister         brain    Other Cancer Sister         Brain Cancer    Lipids Mother     Hyperlipidemia Mother     Depression Mother        Social History     Socioeconomic History    Marital status:      Spouse name: Not on file    Number of children: Not on file    Years of education: Not on file    Highest education level: Not on file   Occupational History    Not on file   Tobacco Use    Smoking status: Never    Smokeless tobacco: Never   Vaping Use    Vaping Use: Never used   Substance and Sexual Activity    Alcohol use: Yes     Comment: 2 drinks per week    Drug use: No    Sexual activity: Yes     Partners: Female     Birth control/protection: Male Surgical   Other Topics Concern    Parent/sibling w/ CABG, MI or angioplasty before 65F 55M? Yes   Social History  Narrative    Not on file     Social Determinants of Health     Financial Resource Strain: Not on file   Food Insecurity: Not on file   Transportation Needs: Not on file   Physical Activity: Not on file   Stress: Not on file   Social Connections: Not on file   Intimate Partner Violence: Not on file   Housing Stability: Not on file       Outpatient Encounter Medications as of 8/15/2023   Medication Sig Dispense Refill    atorvastatin (LIPITOR) 20 MG tablet Take 1 tablet (20 mg) by mouth daily Hold on file until needed. 90 tablet 3    HYDROcodone-acetaminophen (NORCO) 5-325 MG tablet Take 1-2 tablets by mouth every 6 hours as needed for moderate to severe pain 12 tablet 0    loratadine (CLARITIN) 10 MG tablet Take 10 mg by mouth daily as needed for allergies (Patient not taking: Reported on 8/15/2023)      predniSONE (DELTASONE) 20 MG tablet Take 60 mg daily for 3 days, 40 mg daily for 3 days, 20 mg daily for 3 days, 10 mg daily for 3 days. 20 tablet 0     Facility-Administered Encounter Medications as of 8/15/2023   Medication Dose Route Frequency Provider Last Rate Last Admin    [COMPLETED] candida albicans skin test injection 0.4 mL  0.4 mL Intradermal Once Yuki Estevez PA-C   0.4 mL at 08/15/23 1523             O:   NAD, WDWN, Alert & Oriented, Mood & Affect wnl, Vitals stable   Here today alone   There were no vitals taken for this visit.   General appearance normal   Vitals stable   Alert, oriented and in no acute distress      Verrucous papule on left nare and right hand   Digital mucus cyst causing     Eyes: Conjunctivae/lids:Normal     ENT: Lips: normal    MSK:Normal    Pulm: Breathing Normal    Neuro/Psych: Orientation:Alert and Orientedx3 ; Mood/Affect:normal       A/P:  Common wart on right pointer finger.  After consent, anesthesia with LEC and prep, tangential excision performed  No complications and routine wound care.  LN2:  Treated with LN2 for 5s for 1-2 cycles. Warned risks of blistering,  pain, pigment change, scarring, and incomplete resolution.  Advised patient to return if lesions do not completely resolve.  Wound care sheet given.  2. Common warts on neck x 8  LN2:  Treated with LN2 for 5s for 1-2 cycles. Warned risks of blistering, pain, pigment change, scarring, and incomplete resolution.  Advised patient to return if lesions do not completely resolve.  Wound care sheet given.  IL Candin: PGACAC discussed.  Risks including but not limited to injection site reaction, bruising, no resolution.  All questions answered and entertained to patient s satisfaction.  Informed consent obtained.  IL Candin in concentration of 1 unit/ 0.1 ml was injected ID to neck and right hand index finger.  Total injected was  4 units.  Patient tolerated without complications and given wound care instructions, including not to move product around.  Return in 4 weeks for follow-up and possible additional IL Candin.    2. Digital mucous cyst on left hand   Discussed removal, can excise with dr. Nicholas if wanted.

## 2023-08-15 NOTE — LETTER
8/15/2023         RE: Emery Barkley  [address]  [place] [address]        Dear Colleague,    Thank you for referring your patient, Emery Barkley, to the [facility]. Please see a copy of my visit note below.    Emery Barkley is an extremely pleasant 52 year old year old male patient here today for recheck wart. He notes wart on right pointer finger and left nare returned. Patient has no other skin complaints today.  Remainder of the HPI, Meds, PMH, Allergies, FH, and SH was reviewed in chart.    Past Medical History:   Diagnosis Date     Hyperlipidemia        Past Surgical History:   Procedure Laterality Date     ABDOMEN SURGERY  2/7/19    umbilical hernia     HERNIORRHAPHY UMBILICAL N/A 2/7/2019    Procedure: HERNIORRHAPHY UMBILICAL;  Surgeon: Tej Beaulieu DO;  Location: [facility] OR     OPTICAL TRACKING SYSTEM ENDOSCOPIC SINUS SURGERY Bilateral 7/24/2023    Procedure: SINUS SURGERY, ENDOSCOPIC, USING OPTICAL TRACKING SYSTEM - all sinuses;  Surgeon: Mario aGmboa MD;  Location: [facility] OR     SEPTOPLASTY, TURBINOPLASTY, COMBINED Bilateral 7/24/2023    Procedure: SEPTOPLASTY; bilateral inferior turbinate reduction;  Surgeon: Mario Gamboa MD;  Location: [facility] OR     VASECTOMY          Family History   Problem Relation Age of Onset     C.A.D. Father         MI, chf, first mi in 50s.     Coronary Artery Disease Father      Hyperlipidemia Father      Cancer Sister         brain     Other Cancer Sister         Brain Cancer     Lipids Mother      Hyperlipidemia Mother      Depression Mother        Social History     Socioeconomic History     Marital status:      Spouse name: Not on file     Number of children: Not on file     Years of education: Not on file     Highest education level: Not on file   Occupational History     Not on file   Tobacco Use     Smoking status: Never     Smokeless tobacco: Never   Vaping Use     Vaping Use: Never used   Substance and Sexual  Activity     Alcohol use: Yes     Comment: 2 drinks per week     Drug use: No     Sexual activity: Yes     Partners: Female     Birth control/protection: Male Surgical   Other Topics Concern     Parent/sibling w/ CABG, MI or angioplasty before 65F 55M? Yes   Social History Narrative     Not on file     Social Determinants of Health     Financial Resource Strain: Not on file   Food Insecurity: Not on file   Transportation Needs: Not on file   Physical Activity: Not on file   Stress: Not on file   Social Connections: Not on file   Intimate Partner Violence: Not on file   Housing Stability: Not on file       Outpatient Encounter Medications as of 8/15/2023   Medication Sig Dispense Refill     atorvastatin (LIPITOR) 20 MG tablet Take 1 tablet (20 mg) by mouth daily Hold on file until needed. 90 tablet 3     HYDROcodone-acetaminophen (NORCO) 5-325 MG tablet Take 1-2 tablets by mouth every 6 hours as needed for moderate to severe pain 12 tablet 0     loratadine (CLARITIN) 10 MG tablet Take 10 mg by mouth daily as needed for allergies (Patient not taking: Reported on 8/15/2023)       predniSONE (DELTASONE) 20 MG tablet Take 60 mg daily for 3 days, 40 mg daily for 3 days, 20 mg daily for 3 days, 10 mg daily for 3 days. 20 tablet 0     Facility-Administered Encounter Medications as of 8/15/2023   Medication Dose Route Frequency Provider Last Rate Last Admin     [COMPLETED] candida albicans skin test injection 0.4 mL  0.4 mL Intradermal Once Yuki Estevez PA-C   0.4 mL at 08/15/23 1523             O:   NAD, WDWN, Alert & Oriented, Mood & Affect wnl, Vitals stable   Here today alone   There were no vitals taken for this visit.   General appearance normal   Vitals stable   Alert, oriented and in no acute distress      Verrucous papule on left nare and right hand   Digital mucus cyst causing     Eyes: Conjunctivae/lids:Normal     ENT: Lips: normal    MSK:Normal    Pulm: Breathing Normal    Neuro/Psych:  Orientation:Alert and Orientedx3 ; Mood/Affect:normal       A/P:  Common wart on right pointer finger.  After consent, anesthesia with LEC and prep, tangential excision performed  No complications and routine wound care.  LN2:  Treated with LN2 for 5s for 1-2 cycles. Warned risks of blistering, pain, pigment change, scarring, and incomplete resolution.  Advised patient to return if lesions do not completely resolve.  Wound care sheet given.  2. Common warts on neck x 8  LN2:  Treated with LN2 for 5s for 1-2 cycles. Warned risks of blistering, pain, pigment change, scarring, and incomplete resolution.  Advised patient to return if lesions do not completely resolve.  Wound care sheet given.  IL Candin: PGACAC discussed.  Risks including but not limited to injection site reaction, bruising, no resolution.  All questions answered and entertained to patient s satisfaction.  Informed consent obtained.  IL Candin in concentration of 1 unit/ 0.1 ml was injected ID to neck and right hand index finger.  Total injected was  4 units.  Patient tolerated without complications and given wound care instructions, including not to move product around.  Return in 4 weeks for follow-up and possible additional IL Candin.    2. Digital mucous cyst on left hand   Discussed removal, can excise with dr. Nicholas if wanted.       Again, thank you for allowing me to participate in the care of your patient.        Sincerely,        Yuki King PA-C

## 2023-08-18 ENCOUNTER — OFFICE VISIT (OUTPATIENT)
Dept: OTOLARYNGOLOGY | Facility: CLINIC | Age: 53
End: 2023-08-18
Payer: COMMERCIAL

## 2023-08-18 VITALS
RESPIRATION RATE: 16 BRPM | OXYGEN SATURATION: 98 % | SYSTOLIC BLOOD PRESSURE: 135 MMHG | HEART RATE: 68 BPM | DIASTOLIC BLOOD PRESSURE: 92 MMHG

## 2023-08-18 DIAGNOSIS — J33.9 NASAL POLYP: ICD-10-CM

## 2023-08-18 DIAGNOSIS — J32.4 CHRONIC PANSINUSITIS: ICD-10-CM

## 2023-08-18 DIAGNOSIS — Z98.890 S/P FESS (FUNCTIONAL ENDOSCOPIC SINUS SURGERY): ICD-10-CM

## 2023-08-18 DIAGNOSIS — Z98.890 S/P NASAL SEPTOPLASTY: Primary | ICD-10-CM

## 2023-08-18 PROCEDURE — 31231 NASAL ENDOSCOPY DX: CPT | Mod: 58 | Performed by: OTOLARYNGOLOGY

## 2023-08-18 PROCEDURE — 99024 POSTOP FOLLOW-UP VISIT: CPT | Performed by: OTOLARYNGOLOGY

## 2023-08-18 NOTE — LETTER
8/18/2023         RE: Emery Barkley  [address] [place] [address]        Dear Colleague,    Thank you for referring your patient, Emery Barkley, to the [facility]. Please see a copy of my visit note below.    HPI - Emery Barkley is a 52 year old male who is here for his second postoperative visit, status post endoscopic sinus surgery (all sinuses), septoplasty, and inferior turbinate reduction on 7/24/23. Sinus culture grew staph and haemophilus. I switched his antibiotics to cefdinir. He returns and notes he is doing well, breathing well, and no discolored drainage.       Past Medical History:   Diagnosis Date     Hyperlipidemia      Physical Exam  General - The patient is in no distress.  Alert, answers questions and cooperates with examination appropriately.   Nose - dorsum straight. Septum midline. No sign of synechiae or infection. No hematoma or septal perforation. Airway open.     PROCEDURE - ENDOSCOPIC SINUS DEBRIDEMENT     Nasal exam performed with a zero degree rigid endoscope for purposes of endoscopic sinus debridement. I began by spraying both sides with lidocaine and neosynephrine.  Color photographs were taken for the medical record.  I began on the right side. The middle meatus was clean and clear, I was then able to visualize the right maxillary sinusotomy, it is healing well and open.  No purulence noted.  I then moved further back, and the ethmoids were patent. The right frontal was patent. No infection. No polyps.  The roof was then visualized and is healing well. I turned my attention to the left side. Some crust left side I suctioned (old blood). I was then able to pass the scope into the left middle meatus.  The maxillary sinusotomy is healing well, no abnormal secretions noted.  Ethmoid was patent. No infections or polyps. Left frontal with a dissolvable propel, clean and clear and widely patent.     A/P - Emery Barkley is a 52 year old male is doing  well from sinus surgery (all sinuses) and septoplasty with turbinate reduction.  There are no signs of complications or infection.  Patient instructed to continue saline rinses daily or as needed.          Again, thank you for allowing me to participate in the care of your patient.        Sincerely,        Mario Gamboa MD

## 2023-08-18 NOTE — PROGRESS NOTES
HPI - Emery Barkley is a 52 year old male who is here for his second postoperative visit, status post endoscopic sinus surgery (all sinuses), septoplasty, and inferior turbinate reduction on 7/24/23. Sinus culture grew staph and haemophilus. I switched his antibiotics to cefdinir. He returns and notes he is doing well, breathing well, and no discolored drainage.       Past Medical History:   Diagnosis Date    Hyperlipidemia      Physical Exam  General - The patient is in no distress.  Alert, answers questions and cooperates with examination appropriately.   Nose - dorsum straight. Septum midline. No sign of synechiae or infection. No hematoma or septal perforation. Airway open.     PROCEDURE - ENDOSCOPIC SINUS DEBRIDEMENT     Nasal exam performed with a zero degree rigid endoscope for purposes of endoscopic sinus debridement. I began by spraying both sides with lidocaine and neosynephrine.  Color photographs were taken for the medical record.  I began on the right side. The middle meatus was clean and clear, I was then able to visualize the right maxillary sinusotomy, it is healing well and open.  No purulence noted.  I then moved further back, and the ethmoids were patent. The right frontal was patent. No infection. No polyps.  The roof was then visualized and is healing well. I turned my attention to the left side. Some crust left side I suctioned (old blood). I was then able to pass the scope into the left middle meatus.  The maxillary sinusotomy is healing well, no abnormal secretions noted.  Ethmoid was patent. No infections or polyps. Left frontal with a dissolvable propel, clean and clear and widely patent.     A/P - Emery Barkley is a 52 year old male is doing well from sinus surgery (all sinuses) and septoplasty with turbinate reduction.  There are no signs of complications or infection.  Patient instructed to continue saline rinses daily or as needed.

## 2023-08-21 LAB — BACTERIA SPEC CULT: NO GROWTH

## 2023-09-07 ENCOUNTER — OFFICE VISIT (OUTPATIENT)
Dept: DERMATOLOGY | Facility: CLINIC | Age: 53
End: 2023-09-07
Payer: COMMERCIAL

## 2023-09-07 DIAGNOSIS — B07.8 COMMON WART: Primary | ICD-10-CM

## 2023-09-07 PROCEDURE — 11900 INJECT SKIN LESIONS </W 7: CPT | Mod: 59 | Performed by: PHYSICIAN ASSISTANT

## 2023-09-07 PROCEDURE — 17110 DESTRUCTION B9 LES UP TO 14: CPT | Performed by: PHYSICIAN ASSISTANT

## 2023-09-07 RX ORDER — CANDIDA ALBICANS 1000 [PNU]/ML
0.4 INJECTION, SOLUTION INTRADERMAL ONCE
Status: COMPLETED | OUTPATIENT
Start: 2023-09-07 | End: 2023-09-07

## 2023-09-07 RX ADMIN — CANDIDA ALBICANS 0.4 ML: 1000 INJECTION, SOLUTION INTRADERMAL at 15:39

## 2023-09-07 ASSESSMENT — PAIN SCALES - GENERAL: PAINLEVEL: NO PAIN (0)

## 2023-09-07 NOTE — LETTER
9/7/2023         RE: Emery Barkley  [address]  [place] [address]        Dear Colleague,    Thank you for referring your patient, Emery Barkley, to the [facility]. Please see a copy of my visit note below.    Emery Barkley is an extremely pleasant 52 year old year old male patient here today for recheck wart. He notes wart on right pointer finger and left nare returned. Patient has no other skin complaints today.  Remainder of the HPI, Meds, PMH, Allergies, FH, and SH was reviewed in chart.    Past Medical History:   Diagnosis Date     Hyperlipidemia        Past Surgical History:   Procedure Laterality Date     ABDOMEN SURGERY  2/7/19    umbilical hernia     HERNIORRHAPHY UMBILICAL N/A 2/7/2019    Procedure: HERNIORRHAPHY UMBILICAL;  Surgeon: Tej Beaulieu DO;  Location: [facility]     OPTICAL TRACKING SYSTEM ENDOSCOPIC SINUS SURGERY Bilateral 7/24/2023    Procedure: SINUS SURGERY, ENDOSCOPIC, USING OPTICAL TRACKING SYSTEM - all sinuses;  Surgeon: Mario Gamboa MD;  Location: [facility]     SEPTOPLASTY, TURBINOPLASTY, COMBINED Bilateral 7/24/2023    Procedure: SEPTOPLASTY; bilateral inferior turbinate reduction;  Surgeon: Mario Gamboa MD;  Location: [facility]     VASECTOMY          Family History   Problem Relation Age of Onset     C.A.D. Father         MI, chf, first mi in 50s.     Coronary Artery Disease Father      Hyperlipidemia Father      Cancer Sister         brain     Other Cancer Sister         Brain Cancer     Lipids Mother      Hyperlipidemia Mother      Depression Mother        Social History     Socioeconomic History     Marital status:      Spouse name: Not on file     Number of children: Not on file     Years of education: Not on file     Highest education level: Not on file   Occupational History     Not on file   Tobacco Use     Smoking status: Never     Smokeless tobacco: Never   Vaping Use     Vaping Use: Never used   Substance and Sexual  Activity     Alcohol use: Yes     Comment: 2 drinks per week     Drug use: No     Sexual activity: Yes     Partners: Female     Birth control/protection: Male Surgical   Other Topics Concern     Parent/sibling w/ CABG, MI or angioplasty before 65F 55M? Yes   Social History Narrative     Not on file     Social Determinants of Health     Financial Resource Strain: Not on file   Food Insecurity: Not on file   Transportation Needs: Not on file   Physical Activity: Not on file   Stress: Not on file   Social Connections: Not on file   Intimate Partner Violence: Not on file   Housing Stability: Not on file       Outpatient Encounter Medications as of 2023   Medication Sig Dispense Refill     atorvastatin (LIPITOR) 20 MG tablet Take 1 tablet (20 mg) by mouth daily Hold on file until needed. 90 tablet 3     loratadine (CLARITIN) 10 MG tablet Take 10 mg by mouth daily as needed for allergies (Patient not taking: Reported on 8/15/2023)       [] candida albicans skin test injection 0.4 mL        No facility-administered encounter medications on file as of 2023.             O:   NAD, WDWN, Alert & Oriented, Mood & Affect wnl, Vitals stable   Here today alone   There were no vitals taken for this visit.   General appearance normal   Vitals stable   Alert, oriented and in no acute distress      Verrucous papule on left nare and right hand   Digital mucus cyst causing     Eyes: Conjunctivae/lids:Normal     ENT: Lips: normal    MSK:Normal    Pulm: Breathing Normal    Neuro/Psych: Orientation:Alert and Orientedx3 ; Mood/Affect:normal       A/P:  Common wart on right palm  LN2:  Treated with LN2 for 5s for 1-2 cycles. Warned risks of blistering, pain, pigment change, scarring, and incomplete resolution.  Advised patient to return if lesions do not completely resolve.  Wound care sheet given.  2. Common warts on neck x 8  Treated larger with shave removal  After consent, anesthesia with LEC and prep, tangential excision  performed.  No complications and routine wound care.   LN2:  Treated with LN2 for 5s for 1-2 cycles. Warned risks of blistering, pain, pigment change, scarring, and incomplete resolution.  Advised patient to return if lesions do not completely resolve.  Wound care sheet given.  IL Candin: PGACAC discussed.  Risks including but not limited to injection site reaction, bruising, no resolution.  All questions answered and entertained to patient s satisfaction.  Informed consent obtained.  IL Candin in concentration of 1 unit/ 0.1 ml was injected ID to neck and right hand index finger.  Total injected was  4 units.  Patient tolerated without complications and given wound care instructions, including not to move product around.  Return in 4 weeks for follow-up and possible additional IL Candin.        Again, thank you for allowing me to participate in the care of your patient.        Sincerely,        Yuki King PA-C

## 2023-09-07 NOTE — NURSING NOTE
Chief Complaint   Patient presents with    Wart     Follow up, new ones on neck        There were no vitals filed for this visit.  Wt Readings from Last 1 Encounters:   07/24/23 90.3 kg (199 lb)       Elsa Tan LPN .................9/7/2023

## 2023-09-09 NOTE — PROGRESS NOTES
Emery Barkley is an extremely pleasant 52 year old year old male patient here today for recheck wart. He notes wart on right pointer finger and left nare returned. Patient has no other skin complaints today.  Remainder of the HPI, Meds, PMH, Allergies, FH, and SH was reviewed in chart.    Past Medical History:   Diagnosis Date    Hyperlipidemia        Past Surgical History:   Procedure Laterality Date    ABDOMEN SURGERY  2/7/19    umbilical hernia    HERNIORRHAPHY UMBILICAL N/A 2/7/2019    Procedure: HERNIORRHAPHY UMBILICAL;  Surgeon: Tej Beaulieu DO;  Location: [facility]    OPTICAL TRACKING SYSTEM ENDOSCOPIC SINUS SURGERY Bilateral 7/24/2023    Procedure: SINUS SURGERY, ENDOSCOPIC, USING OPTICAL TRACKING SYSTEM - all sinuses;  Surgeon: Mario Gamboa MD;  [facility]: [facility]    SEPTOPLASTY, TURBINOPLASTY, COMBINED Bilateral 7/24/2023    Procedure: SEPTOPLASTY; bilateral inferior turbinate reduction;  Surgeon: Mario Gamboa MD;  Location: [facility]    VASECTOMY          Family History   Problem Relation Age of Onset    C.A.D. Father         MI, chf, first mi in 50s.    Coronary Artery Disease Father     Hyperlipidemia Father     Cancer Sister         brain    Other Cancer Sister         Brain Cancer    Lipids Mother     Hyperlipidemia Mother     Depression Mother        Social History     Socioeconomic History    Marital status:      Spouse name: Not on file    Number of children: Not on file    Years of education: Not on file    Highest education level: Not on file   Occupational History    Not on file   Tobacco Use    Smoking status: Never    Smokeless tobacco: Never   Vaping Use    Vaping Use: Never used   Substance and Sexual Activity    Alcohol use: Yes     Comment: 2 drinks per week    Drug use: No    Sexual activity: Yes     Partners: Female     Birth control/protection: Male Surgical   Other Topics Concern    Parent/sibling w/ CABG, MI or angioplasty before 65F 55M? Yes   Social History  Narrative    Not on file     Social Determinants of Health     Financial Resource Strain: Not on file   Food Insecurity: Not on file   Transportation Needs: Not on file   Physical Activity: Not on file   Stress: Not on file   Social Connections: Not on file   Intimate Partner Violence: Not on file   Housing Stability: Not on file       Outpatient Encounter Medications as of 2023   Medication Sig Dispense Refill    atorvastatin (LIPITOR) 20 MG tablet Take 1 tablet (20 mg) by mouth daily Hold on file until needed. 90 tablet 3    loratadine (CLARITIN) 10 MG tablet Take 10 mg by mouth daily as needed for allergies (Patient not taking: Reported on 8/15/2023)      [] candida albicans skin test injection 0.4 mL        No facility-administered encounter medications on file as of 2023.             O:   NAD, WDWN, Alert & Oriented, Mood & Affect wnl, Vitals stable   Here today alone   There were no vitals taken for this visit.   General appearance normal   Vitals stable   Alert, oriented and in no acute distress      Verrucous papule on left nare and right hand   Digital mucus cyst causing     Eyes: Conjunctivae/lids:Normal     ENT: Lips: normal    MSK:Normal    Pulm: Breathing Normal    Neuro/Psych: Orientation:Alert and Orientedx3 ; Mood/Affect:normal       A/P:  Common wart on right palm  LN2:  Treated with LN2 for 5s for 1-2 cycles. Warned risks of blistering, pain, pigment change, scarring, and incomplete resolution.  Advised patient to return if lesions do not completely resolve.  Wound care sheet given.  2. Common warts on neck x 8  Treated larger with shave removal  After consent, anesthesia with LEC and prep, tangential excision performed.  No complications and routine wound care.   LN2:  Treated with LN2 for 5s for 1-2 cycles. Warned risks of blistering, pain, pigment change, scarring, and incomplete resolution.  Advised patient to return if lesions do not completely resolve.  Wound care sheet  given.  IL Candin: PGACAC discussed.  Risks including but not limited to injection site reaction, bruising, no resolution.  All questions answered and entertained to patient s satisfaction.  Informed consent obtained.  IL Candin in concentration of 1 unit/ 0.1 ml was injected ID to neck and right hand index finger.  Total injected was  4 units.  Patient tolerated without complications and given wound care instructions, including not to move product around.  Return in 4 weeks for follow-up and possible additional IL Candin.

## 2023-10-03 ENCOUNTER — OFFICE VISIT (OUTPATIENT)
Dept: DERMATOLOGY | Facility: CLINIC | Age: 53
End: 2023-10-03
Payer: COMMERCIAL

## 2023-10-03 DIAGNOSIS — B07.8 COMMON WART: Primary | ICD-10-CM

## 2023-10-03 PROCEDURE — 17110 DESTRUCTION B9 LES UP TO 14: CPT | Performed by: PHYSICIAN ASSISTANT

## 2023-10-03 RX ORDER — CANDIDA ALBICANS 1000 [PNU]/ML
0.1 INJECTION, SOLUTION INTRADERMAL ONCE
Status: COMPLETED | OUTPATIENT
Start: 2023-10-03 | End: 2023-10-03

## 2023-10-03 RX ADMIN — CANDIDA ALBICANS 0.1 ML: 1000 INJECTION, SOLUTION INTRADERMAL at 15:54

## 2023-10-03 ASSESSMENT — PAIN SCALES - GENERAL: PAINLEVEL: NO PAIN (0)

## 2023-10-03 NOTE — LETTER
10/3/2023         RE: Emery Barkley  [address]  [place] [address]        Dear Colleague,    Thank you for referring your patient, Emery Barkley, to the [facility]. Please see a copy of my visit note below.    Emery Barkley is an extremely pleasant 52 year old year old male patient here today for recheck wart. Recheck warts on hand and neck. He notes some are still persisting. Patient has no other skin complaints today.  Remainder of the HPI, Meds, PMH, Allergies, FH, and SH was reviewed in chart.    Past Medical History:   Diagnosis Date     Hyperlipidemia        Past Surgical History:   Procedure Laterality Date     ABDOMEN SURGERY  2/7/19    umbilical hernia     HERNIORRHAPHY UMBILICAL N/A 2/7/2019    Procedure: HERNIORRHAPHY UMBILICAL;  Surgeon: Tej Beaulieu DO;  Location: [facility]     OPTICAL TRACKING SYSTEM ENDOSCOPIC SINUS SURGERY Bilateral 7/24/2023    Procedure: SINUS SURGERY, ENDOSCOPIC, USING OPTICAL TRACKING SYSTEM - all sinuses;  Surgeon: Mario Gamboa MD;  Location: [facility]     SEPTOPLASTY, TURBINOPLASTY, COMBINED Bilateral 7/24/2023    Procedure: SEPTOPLASTY; bilateral inferior turbinate reduction;  Surgeon: Mario Gamboa MD;  Location: [facility]     VASECTOMY          Family History   Problem Relation Age of Onset     C.A.D. Father         MI, chf, first mi in 50s.     Coronary Artery Disease Father      Hyperlipidemia Father      Cancer Sister         brain     Other Cancer Sister         Brain Cancer     Lipids Mother      Hyperlipidemia Mother      Depression Mother        Social History     Socioeconomic History     Marital status:      Spouse name: Not on file     Number of children: Not on file     Years of education: Not on file     Highest education level: Not on file   Occupational History     Not on file   Tobacco Use     Smoking status: Never     Smokeless tobacco: Never   Vaping Use     Vaping Use: Never used   Substance and  Sexual Activity     Alcohol use: Yes     Comment: 2 drinks per week     Drug use: No     Sexual activity: Yes     Partners: Female     Birth control/protection: Male Surgical   Other Topics Concern     Parent/sibling w/ CABG, MI or angioplasty before 65F 55M? Yes   Social History Narrative     Not on file     Social Determinants of Health     Financial Resource Strain: Not on file   Food Insecurity: Not on file   Transportation Needs: Not on file   Physical Activity: Not on file   Stress: Not on file   Social Connections: Not on file   Interpersonal Safety: Not on file   Housing Stability: Not on file       Outpatient Encounter Medications as of 10/3/2023   Medication Sig Dispense Refill     atorvastatin (LIPITOR) 20 MG tablet Take 1 tablet (20 mg) by mouth daily Hold on file until needed. 90 tablet 3     loratadine (CLARITIN) 10 MG tablet Take 10 mg by mouth daily as needed for allergies (Patient not taking: Reported on 8/15/2023)       No facility-administered encounter medications on file as of 10/3/2023.             O:   NAD, WDWN, Alert & Oriented, Mood & Affect wnl, Vitals stable   Here today alone   There were no vitals taken for this visit.   General appearance normal   Vitals stable   Alert, oriented and in no acute distress      Verrucous papule on neck and right hand    Eyes: Conjunctivae/lids:Normal     ENT: Lips: normal    MSK:Normal    Pulm: Breathing Normal    Neuro/Psych: Orientation:Alert and Orientedx3 ; Mood/Affect:normal       A/P:  Common wart on right hand  x4  LN2:  Treated with LN2 for 5s for 1-2 cycles. Warned risks of blistering, pain, pigment change, scarring, and incomplete resolution.  Advised patient to return if lesions do not completely resolve.  Wound care sheet given.  2. Common warts on neck x 6  LN2:  Treated with LN2 for 5s for 1-2 cycles. Warned risks of blistering, pain, pigment change, scarring, and incomplete resolution.  Advised patient to return if lesions do not completely  resolve.  Wound care sheet given.  IL Candin: PGACAC discussed.  Risks including but not limited to injection site reaction, bruising, no resolution.  All questions answered and entertained to patient s satisfaction.  Informed consent obtained.  IL Candin in concentration of 1 unit/ 0.1 ml was injected ID to neck and right hand index finger.  Total injected was  1 units.  Patient tolerated without complications and given wound care instructions, including not to move product around.  Return in 4 weeks for follow-up and possible additional IL Candin.        Again, thank you for allowing me to participate in the care of your patient.        Sincerely,        Yuki King PA-C

## 2023-10-03 NOTE — NURSING NOTE
Chief Complaint   Patient presents with    Wart     Follow up, still present on neck        There were no vitals filed for this visit.  Wt Readings from Last 1 Encounters:   07/24/23 90.3 kg (199 lb)       Elsa Tan LPN .................10/3/2023

## 2023-10-03 NOTE — PROGRESS NOTES
Emery Barkley is an extremely pleasant 52 year old year old male patient here today for recheck wart. Recheck warts on hand and neck. He notes some are still persisting. Patient has no other skin complaints today.  Remainder of the HPI, Meds, PMH, Allergies, FH, and SH was reviewed in chart.    Past Medical History:   Diagnosis Date    Hyperlipidemia        Past Surgical History:   Procedure Laterality Date    ABDOMEN SURGERY  2/7/19    umbilical hernia    HERNIORRHAPHY UMBILICAL N/A 2/7/2019    Procedure: HERNIORRHAPHY UMBILICAL;  Surgeon: Tej Beaulieu DO;  Location: [facility]    OPTICAL TRACKING SYSTEM ENDOSCOPIC SINUS SURGERY Bilateral 7/24/2023    Procedure: SINUS SURGERY, ENDOSCOPIC, USING OPTICAL TRACKING SYSTEM - all sinuses;  Surgeon: Mario Gamboa MD;  Location: [facility]    SEPTOPLASTY, TURBINOPLASTY, COMBINED Bilateral 7/24/2023    Procedure: SEPTOPLASTY; bilateral inferior turbinate reduction;  Surgeon: Mario Gamboa MD;  Location: [facility]    VASECTOMY          Family History   Problem Relation Age of Onset    C.A.D. Father         MI, chf, first mi in 50s.    Coronary Artery Disease Father     Hyperlipidemia Father     Cancer Sister         brain    Other Cancer Sister         Brain Cancer    Lipids Mother     Hyperlipidemia Mother     Depression Mother        Social History     Socioeconomic History    Marital status:      Spouse name: Not on file    Number of children: Not on file    Years of education: Not on file    Highest education level: Not on file   Occupational History    Not on file   Tobacco Use    Smoking status: Never    Smokeless tobacco: Never   Vaping Use    Vaping Use: Never used   Substance and Sexual Activity    Alcohol use: Yes     Comment: 2 drinks per week    Drug use: No    Sexual activity: Yes     Partners: Female     Birth control/protection: Male Surgical   Other Topics Concern    Parent/sibling w/ CABG, MI or angioplasty before 65F 55M? Yes   Social  History Narrative    Not on file     Social Determinants of Health     Financial Resource Strain: Not on file   Food Insecurity: Not on file   Transportation Needs: Not on file   Physical Activity: Not on file   Stress: Not on file   Social Connections: Not on file   Interpersonal Safety: Not on file   Housing Stability: Not on file       Outpatient Encounter Medications as of 10/3/2023   Medication Sig Dispense Refill    atorvastatin (LIPITOR) 20 MG tablet Take 1 tablet (20 mg) by mouth daily Hold on file until needed. 90 tablet 3    loratadine (CLARITIN) 10 MG tablet Take 10 mg by mouth daily as needed for allergies (Patient not taking: Reported on 8/15/2023)       No facility-administered encounter medications on file as of 10/3/2023.             O:   NAD, WDWN, Alert & Oriented, Mood & Affect wnl, Vitals stable   Here today alone   There were no vitals taken for this visit.   General appearance normal   Vitals stable   Alert, oriented and in no acute distress      Verrucous papule on neck and right hand    Eyes: Conjunctivae/lids:Normal     ENT: Lips: normal    MSK:Normal    Pulm: Breathing Normal    Neuro/Psych: Orientation:Alert and Orientedx3 ; Mood/Affect:normal       A/P:  Common wart on right hand  x4  LN2:  Treated with LN2 for 5s for 1-2 cycles. Warned risks of blistering, pain, pigment change, scarring, and incomplete resolution.  Advised patient to return if lesions do not completely resolve.  Wound care sheet given.  2. Common warts on neck x 6  LN2:  Treated with LN2 for 5s for 1-2 cycles. Warned risks of blistering, pain, pigment change, scarring, and incomplete resolution.  Advised patient to return if lesions do not completely resolve.  Wound care sheet given.  IL Candin: PGACAC discussed.  Risks including but not limited to injection site reaction, bruising, no resolution.  All questions answered and entertained to patient s satisfaction.  Informed consent obtained.  IL Candin in concentration of  1 unit/ 0.1 ml was injected ID to neck and right hand index finger.  Total injected was  1 units.  Patient tolerated without complications and given wound care instructions, including not to move product around.  Return in 4 weeks for follow-up and possible additional IL Candin.

## 2023-11-02 ENCOUNTER — OFFICE VISIT (OUTPATIENT)
Dept: DERMATOLOGY | Facility: CLINIC | Age: 53
End: 2023-11-02
Payer: COMMERCIAL

## 2023-11-02 DIAGNOSIS — B07.8 COMMON WART: Primary | ICD-10-CM

## 2023-11-02 PROCEDURE — 17110 DESTRUCTION B9 LES UP TO 14: CPT | Performed by: PHYSICIAN ASSISTANT

## 2023-11-02 PROCEDURE — 11900 INJECT SKIN LESIONS </W 7: CPT | Mod: 59 | Performed by: PHYSICIAN ASSISTANT

## 2023-11-02 RX ORDER — CANDIDA ALBICANS 1000 [PNU]/ML
0.4 INJECTION, SOLUTION INTRADERMAL ONCE
Status: COMPLETED | OUTPATIENT
Start: 2023-11-02 | End: 2023-11-02

## 2023-11-02 RX ADMIN — CANDIDA ALBICANS 0.4 ML: 1000 INJECTION, SOLUTION INTRADERMAL at 15:27

## 2023-11-02 ASSESSMENT — PAIN SCALES - GENERAL: PAINLEVEL: NO PAIN (0)

## 2023-11-02 NOTE — NURSING NOTE
Chief Complaint   Patient presents with    Wart     Follow up        There were no vitals filed for this visit.  Wt Readings from Last 1 Encounters:   07/24/23 90.3 kg (199 lb)       Elsa Tan LPN .................11/2/2023

## 2023-11-02 NOTE — LETTER
11/2/2023         RE: Emeyr Barkley  [address]  [place] [address]        Dear Colleague,    Thank you for referring your patient, Emery Barkley, to the [facility]. Please see a copy of my visit note below.    Emery Barkley is an extremely pleasant 53  year old year old male patient here today for recheck wart. Recheck warts on hand and neck. He notes some are still persisting. Patient has no other skin complaints today.  Remainder of the HPI, Meds, PMH, Allergies, FH, and SH was reviewed in chart.    Past Medical History:   Diagnosis Date     Hyperlipidemia        Past Surgical History:   Procedure Laterality Date     ABDOMEN SURGERY  2/7/19    umbilical hernia     HERNIORRHAPHY UMBILICAL N/A 2/7/2019    Procedure: HERNIORRHAPHY UMBILICAL;  Surgeon: Tej Beaulieu DO;  Location: [facility]     OPTICAL TRACKING SYSTEM ENDOSCOPIC SINUS SURGERY Bilateral 7/24/2023    Procedure: SINUS SURGERY, ENDOSCOPIC, USING OPTICAL TRACKING SYSTEM - all sinuses;  Surgeon: Mario Gamboa MD;  Location: [facility]     SEPTOPLASTY, TURBINOPLASTY, COMBINED Bilateral 7/24/2023    Procedure: SEPTOPLASTY; bilateral inferior turbinate reduction;  Surgeon: Mario Gamboa MD;  Location: [facility]     VASECTOMY          Family History   Problem Relation Age of Onset     C.A.D. Father         MI, chf, first mi in 50s.     Coronary Artery Disease Father      Hyperlipidemia Father      Cancer Sister         brain     Other Cancer Sister         Brain Cancer     Lipids Mother      Hyperlipidemia Mother      Depression Mother        Social History     Socioeconomic History     Marital status:      Spouse name: Not on file     Number of children: Not on file     Years of education: Not on file     Highest education level: Not on file   Occupational History     Not on file   Tobacco Use     Smoking status: Never     Smokeless tobacco: Never   Vaping Use     Vaping Use: Never used   Substance and  Sexual Activity     Alcohol use: Yes     Comment: 2 drinks per week     Drug use: No     Sexual activity: Yes     Partners: Female     Birth control/protection: Male Surgical   Other Topics Concern     Parent/sibling w/ CABG, MI or angioplasty before 65F 55M? Yes   Social History Narrative     Not on file     Social Determinants of Health     Financial Resource Strain: Not on file   Food Insecurity: Not on file   Transportation Needs: Not on file   Physical Activity: Not on file   Stress: Not on file   Social Connections: Not on file   Interpersonal Safety: Not on file   Housing Stability: Not on file       Outpatient Encounter Medications as of 11/2/2023   Medication Sig Dispense Refill     atorvastatin (LIPITOR) 20 MG tablet Take 1 tablet (20 mg) by mouth daily Hold on file until needed. 90 tablet 3     loratadine (CLARITIN) 10 MG tablet Take 10 mg by mouth daily as needed for allergies (Patient not taking: Reported on 8/15/2023)       Facility-Administered Encounter Medications as of 11/2/2023   Medication Dose Route Frequency Provider Last Rate Last Admin     [COMPLETED] candida albicans skin test injection 0.4 mL  0.4 mL Intradermal Once Yuki Estevez PA-C   0.4 mL at 11/02/23 1527             O:   NAD, WDWN, Alert & Oriented, Mood & Affect wnl, Vitals stable   Here today alone   There were no vitals taken for this visit.   General appearance normal   Vitals stable   Alert, oriented and in no acute distress      Verrucous papule on neck and right hand    Eyes: Conjunctivae/lids:Normal     ENT: Lips: normal    MSK:Normal    Pulm: Breathing Normal    Neuro/Psych: Orientation:Alert and Orientedx3 ; Mood/Affect:normal       A/P:  Common wart on right hand  x2  LN2:  Treated with LN2 for 5s for 1-2 cycles. Warned risks of blistering, pain, pigment change, scarring, and incomplete resolution.  Advised patient to return if lesions do not completely resolve.  Wound care sheet given.  2. Common warts on neck x  6  LN2:  Treated with LN2 for 5s for 1-2 cycles. Warned risks of blistering, pain, pigment change, scarring, and incomplete resolution.  Advised patient to return if lesions do not completely resolve.  Wound care sheet given.  IL Candin: PGACAC discussed.  Risks including but not limited to injection site reaction, bruising, no resolution.  All questions answered and entertained to patient s satisfaction.  Informed consent obtained.  IL Candin in concentration of 1 unit/ 0.1 ml was injected ID to neck and right hand index finger.  Total injected was  4 units.  Patient tolerated without complications and given wound care instructions, including not to move product around.  Return in 4 weeks for follow-up and possible additional IL Candin.        Again, thank you for allowing me to participate in the care of your patient.        Sincerely,        Yuki King PA-C

## 2023-11-03 NOTE — PROGRESS NOTES
Emery Barkley is an extremely pleasant 53  year old year old male patient here today for recheck wart. Recheck warts on hand and neck. He notes some are still persisting. Patient has no other skin complaints today.  Remainder of the HPI, Meds, PMH, Allergies, FH, and SH was reviewed in chart.    Past Medical History:   Diagnosis Date    Hyperlipidemia        Past Surgical History:   Procedure Laterality Date    ABDOMEN SURGERY  2/7/19    umbilical hernia    HERNIORRHAPHY UMBILICAL N/A 2/7/2019    Procedure: HERNIORRHAPHY UMBILICAL;  Surgeon: Tej Beaulieu DO;  Location: [facility]    OPTICAL TRACKING SYSTEM ENDOSCOPIC SINUS SURGERY Bilateral 7/24/2023    Procedure: SINUS SURGERY, ENDOSCOPIC, USING OPTICAL TRACKING SYSTEM - all sinuses;  Surgeon: Mario Gamboa MD;  Location: [facility]    SEPTOPLASTY, TURBINOPLASTY, COMBINED Bilateral 7/24/2023    Procedure: SEPTOPLASTY; bilateral inferior turbinate reduction;  Surgeon: Mario Gamboa MD;  Location: [facility]    VASECTOMY          Family History   Problem Relation Age of Onset    C.A.D. Father         MI, chf, first mi in 50s.    Coronary Artery Disease Father     Hyperlipidemia Father     Cancer Sister         brain    Other Cancer Sister         Brain Cancer    Lipids Mother     Hyperlipidemia Mother     Depression Mother        Social History     Socioeconomic History    Marital status:      Spouse name: Not on file    Number of children: Not on file    Years of education: Not on file    Highest education level: Not on file   Occupational History    Not on file   Tobacco Use    Smoking status: Never    Smokeless tobacco: Never   Vaping Use    Vaping Use: Never used   Substance and Sexual Activity    Alcohol use: Yes     Comment: 2 drinks per week    Drug use: No    Sexual activity: Yes     Partners: Female     Birth control/protection: Male Surgical   Other Topics Concern    Parent/sibling w/ CABG, MI or angioplasty before 65F 55M? Yes   Social  History Narrative    Not on file     Social Determinants of Health     Financial Resource Strain: Not on file   Food Insecurity: Not on file   Transportation Needs: Not on file   Physical Activity: Not on file   Stress: Not on file   Social Connections: Not on file   Interpersonal Safety: Not on file   Housing Stability: Not on file       Outpatient Encounter Medications as of 11/2/2023   Medication Sig Dispense Refill    atorvastatin (LIPITOR) 20 MG tablet Take 1 tablet (20 mg) by mouth daily Hold on file until needed. 90 tablet 3    loratadine (CLARITIN) 10 MG tablet Take 10 mg by mouth daily as needed for allergies (Patient not taking: Reported on 8/15/2023)       Facility-Administered Encounter Medications as of 11/2/2023   Medication Dose Route Frequency Provider Last Rate Last Admin    [COMPLETED] candida albicans skin test injection 0.4 mL  0.4 mL Intradermal Once Yuki Estevez PA-C   0.4 mL at 11/02/23 1527             O:   NAD, WDWN, Alert & Oriented, Mood & Affect wnl, Vitals stable   Here today alone   There were no vitals taken for this visit.   General appearance normal   Vitals stable   Alert, oriented and in no acute distress      Verrucous papule on neck and right hand    Eyes: Conjunctivae/lids:Normal     ENT: Lips: normal    MSK:Normal    Pulm: Breathing Normal    Neuro/Psych: Orientation:Alert and Orientedx3 ; Mood/Affect:normal       A/P:  Common wart on right hand  x2  LN2:  Treated with LN2 for 5s for 1-2 cycles. Warned risks of blistering, pain, pigment change, scarring, and incomplete resolution.  Advised patient to return if lesions do not completely resolve.  Wound care sheet given.  2. Common warts on neck x 6  LN2:  Treated with LN2 for 5s for 1-2 cycles. Warned risks of blistering, pain, pigment change, scarring, and incomplete resolution.  Advised patient to return if lesions do not completely resolve.  Wound care sheet given.  IL Candin: PGACAC discussed.  Risks including but  not limited to injection site reaction, bruising, no resolution.  All questions answered and entertained to patient s satisfaction.  Informed consent obtained.  IL Candin in concentration of 1 unit/ 0.1 ml was injected ID to neck and right hand index finger.  Total injected was  4 units.  Patient tolerated without complications and given wound care instructions, including not to move product around.  Return in 4 weeks for follow-up and possible additional IL Candin.

## 2023-11-26 ENCOUNTER — HEALTH MAINTENANCE LETTER (OUTPATIENT)
Age: 53
End: 2023-11-26

## 2023-12-12 ENCOUNTER — OFFICE VISIT (OUTPATIENT)
Dept: DERMATOLOGY | Facility: CLINIC | Age: 53
End: 2023-12-12
Payer: COMMERCIAL

## 2023-12-12 DIAGNOSIS — B07.8 COMMON WART: Primary | ICD-10-CM

## 2023-12-12 PROCEDURE — 17110 DESTRUCTION B9 LES UP TO 14: CPT | Performed by: PHYSICIAN ASSISTANT

## 2023-12-12 PROCEDURE — 11900 INJECT SKIN LESIONS </W 7: CPT | Mod: 59 | Performed by: PHYSICIAN ASSISTANT

## 2023-12-12 RX ADMIN — CANDIDA ALBICANS 0.4 ML: 1000 INJECTION, SOLUTION INTRADERMAL at 15:30

## 2023-12-12 ASSESSMENT — PAIN SCALES - GENERAL: PAINLEVEL: NO PAIN (0)

## 2023-12-12 NOTE — LETTER
12/12/2023         RE: Emery Barkley  [address] [facility]  [place] [address]        Dear Colleague,    Thank you for referring your patient, Emery Barkley, to the [facility]. Please see a copy of my visit note below.    Emery Barkley is an extremely pleasant 53  year old year old male patient here today for recheck warts. Recheck warts on hand and neck and nostril. He notes some are still persisting. Patient has no other skin complaints today.  Remainder of the HPI, Meds, PMH, Allergies, FH, and SH was reviewed in chart.    Past Medical History:   Diagnosis Date     Hyperlipidemia        Past Surgical History:   Procedure Laterality Date     ABDOMEN SURGERY  2/7/19    umbilical hernia     HERNIORRHAPHY UMBILICAL N/A 2/7/2019    Procedure: HERNIORRHAPHY UMBILICAL;  Surgeon: Tej Beaulieu DO;  Location: [facility]     OPTICAL TRACKING SYSTEM ENDOSCOPIC SINUS SURGERY Bilateral 7/24/2023    Procedure: SINUS SURGERY, ENDOSCOPIC, USING OPTICAL TRACKING SYSTEM - all sinuses;  Surgeon: Mario Gamboa MD;  Location: [facility]     SEPTOPLASTY, TURBINOPLASTY, COMBINED Bilateral 7/24/2023    Procedure: SEPTOPLASTY; bilateral inferior turbinate reduction;  Surgeon: Mario Gamboa MD;  Location: [facility]     VASECTOMY          Family History   Problem Relation Age of Onset     C.A.D. Father         MI, chf, first mi in 50s.     Coronary Artery Disease Father      Hyperlipidemia Father      Cancer Sister         brain     Other Cancer Sister         Brain Cancer     Lipids Mother      Hyperlipidemia Mother      Depression Mother        Social History     Socioeconomic History     Marital status:      Spouse name: Not on file     Number of children: Not on file     Years of education: Not on file     Highest education level: Not on file   Occupational History     Not on file   Tobacco Use     Smoking status: Never     Smokeless tobacco: Never   Vaping Use     Vaping Use: Never used    Substance and Sexual Activity     Alcohol use: Yes     Comment: 2 drinks per week     Drug use: No     Sexual activity: Yes     Partners: Female     Birth control/protection: Male Surgical   Other Topics Concern     Parent/sibling w/ CABG, MI or angioplasty before 65F 55M? Yes   Social History Narrative     Not on file     Social Determinants of Health     Financial Resource Strain: Not on file   Food Insecurity: Not on file   Transportation Needs: Not on file   Physical Activity: Not on file   Stress: Not on file   Social Connections: Not on file   Interpersonal Safety: Not on file   Housing Stability: Not on file       Outpatient Encounter Medications as of 12/12/2023   Medication Sig Dispense Refill     atorvastatin (LIPITOR) 20 MG tablet Take 1 tablet (20 mg) by mouth daily Hold on file until needed. 90 tablet 3     loratadine (CLARITIN) 10 MG tablet Take 10 mg by mouth daily as needed for allergies (Patient not taking: Reported on 8/15/2023)       No facility-administered encounter medications on file as of 12/12/2023.             O:   NAD, WDWN, Alert & Oriented, Mood & Affect wnl, Vitals stable   Here today alone   There were no vitals taken for this visit.   General appearance normal   Vitals stable   Alert, oriented and in no acute distress      Verrucous papule on neck and right hand and one in nostril     Eyes: Conjunctivae/lids:Normal     ENT: Lips: normal    MSK:Normal    Pulm: Breathing Normal    Neuro/Psych: Orientation:Alert and Orientedx3 ; Mood/Affect:normal       A/P:  Common wart in nostril  LN2:  Treated with LN2 for 5s for 1-2 cycles. Warned risks of blistering, pain, pigment change, scarring, and incomplete resolution.  Advised patient to return if lesions do not completely resolve.  Wound care sheet given.  Common wart on right index finger   LN2:  Treated with LN2 for 5s for 1-2 cycles. Warned risks of blistering, pain, pigment change, scarring, and incomplete resolution.  Advised patient  to return if lesions do not completely resolve.  Wound care sheet given.  Also treat with cantharidin.   Applied to warts, leave on for 4-6 hours then rinse with soap and water. May or may not blister. Routine wound care.   2. Common warts on neck x 3  LN2:  Treated with LN2 for 5s for 1-2 cycles. Warned risks of blistering, pain, pigment change, scarring, and incomplete resolution.  Advised patient to return if lesions do not completely resolve.  Wound care sheet given.  IL Candin: PGACAC discussed.  Risks including but not limited to injection site reaction, bruising, no resolution.  All questions answered and entertained to patient s satisfaction.  Informed consent obtained.  IL Candin in concentration of 1 unit/ 0.1 ml was injected ID to neck and right hand index finger.  Total injected was  4 units.  Patient tolerated without complications and given wound care instructions, including not to move product around.  Return in 4 weeks for follow-up and possible additional IL Candin.        Again, thank you for allowing me to participate in the care of your patient.        Sincerely,        Yuki King PA-C

## 2023-12-12 NOTE — PROGRESS NOTES
Emery Barkley is an extremely pleasant 53  year old year old male patient here today for recheck warts. Recheck warts on hand and neck and nostril. He notes some are still persisting. Patient has no other skin complaints today.  Remainder of the HPI, Meds, PMH, Allergies, FH, and SH was reviewed in chart.    Past Medical History:   Diagnosis Date    Hyperlipidemia        Past Surgical History:   Procedure Laterality Date    ABDOMEN SURGERY  2/7/19    umbilical hernia    HERNIORRHAPHY UMBILICAL N/A 2/7/2019    Procedure: HERNIORRHAPHY UMBILICAL;  Surgeon: Tej Beaulieu DO;  Location: [facility]    OPTICAL TRACKING SYSTEM ENDOSCOPIC SINUS SURGERY Bilateral 7/24/2023    Procedure: SINUS SURGERY, ENDOSCOPIC, USING OPTICAL TRACKING SYSTEM - all sinuses;  Surgeon: Mario Gamboa MD;  Location: [facility]    SEPTOPLASTY, TURBINOPLASTY, COMBINED Bilateral 7/24/2023    Procedure: SEPTOPLASTY; bilateral inferior turbinate reduction;  Surgeon: Mario Gamboa MD;  Location: [facility]    VASECTOMY          Family History   Problem Relation Age of Onset    C.A.D. Father         MI, chf, first mi in 50s.    Coronary Artery Disease Father     Hyperlipidemia Father     Cancer Sister         brain    Other Cancer Sister         Brain Cancer    Lipids Mother     Hyperlipidemia Mother     Depression Mother        Social History     Socioeconomic History    Marital status:      Spouse name: Not on file    Number of children: Not on file    Years of education: Not on file    Highest education level: Not on file   Occupational History    Not on file   Tobacco Use    Smoking status: Never    Smokeless tobacco: Never   Vaping Use    Vaping Use: Never used   Substance and Sexual Activity    Alcohol use: Yes     Comment: 2 drinks per week    Drug use: No    Sexual activity: Yes     Partners: Female     Birth control/protection: Male Surgical   Other Topics Concern    Parent/sibling w/ CABG, MI or angioplasty before 65F 55M? Yes    Social History Narrative    Not on file     Social Determinants of Health     Financial Resource Strain: Not on file   Food Insecurity: Not on file   Transportation Needs: Not on file   Physical Activity: Not on file   Stress: Not on file   Social Connections: Not on file   Interpersonal Safety: Not on file   Housing Stability: Not on file       Outpatient Encounter Medications as of 12/12/2023   Medication Sig Dispense Refill    atorvastatin (LIPITOR) 20 MG tablet Take 1 tablet (20 mg) by mouth daily Hold on file until needed. 90 tablet 3    loratadine (CLARITIN) 10 MG tablet Take 10 mg by mouth daily as needed for allergies (Patient not taking: Reported on 8/15/2023)       No facility-administered encounter medications on file as of 12/12/2023.             O:   NAD, WDWN, Alert & Oriented, Mood & Affect wnl, Vitals stable   Here today alone   There were no vitals taken for this visit.   General appearance normal   Vitals stable   Alert, oriented and in no acute distress      Verrucous papule on neck and right hand and one in nostril     Eyes: Conjunctivae/lids:Normal     ENT: Lips: normal    MSK:Normal    Pulm: Breathing Normal    Neuro/Psych: Orientation:Alert and Orientedx3 ; Mood/Affect:normal       A/P:  Common wart in nostril  LN2:  Treated with LN2 for 5s for 1-2 cycles. Warned risks of blistering, pain, pigment change, scarring, and incomplete resolution.  Advised patient to return if lesions do not completely resolve.  Wound care sheet given.  Common wart on right index finger   LN2:  Treated with LN2 for 5s for 1-2 cycles. Warned risks of blistering, pain, pigment change, scarring, and incomplete resolution.  Advised patient to return if lesions do not completely resolve.  Wound care sheet given.  Also treat with cantharidin.   Applied to warts, leave on for 4-6 hours then rinse with soap and water. May or may not blister. Routine wound care.   2. Common warts on neck x 3  LN2:  Treated with LN2 for 5s  for 1-2 cycles. Warned risks of blistering, pain, pigment change, scarring, and incomplete resolution.  Advised patient to return if lesions do not completely resolve.  Wound care sheet given.  IL Candin: PGACAC discussed.  Risks including but not limited to injection site reaction, bruising, no resolution.  All questions answered and entertained to patient s satisfaction.  Informed consent obtained.  IL Candin in concentration of 1 unit/ 0.1 ml was injected ID to neck and right hand index finger.  Total injected was  4 units.  Patient tolerated without complications and given wound care instructions, including not to move product around.  Return in 4 weeks for follow-up and possible additional IL Candin.

## 2023-12-12 NOTE — NURSING NOTE
Chief Complaint   Patient presents with    Wart     Nose, neck and finger        There were no vitals filed for this visit.  Wt Readings from Last 1 Encounters:   07/24/23 90.3 kg (199 lb)       Elsa Tan LPN .................12/12/2023

## 2023-12-14 RX ORDER — CANDIDA ALBICANS 1000 [PNU]/ML
0.4 INJECTION, SOLUTION INTRADERMAL ONCE
Status: COMPLETED | OUTPATIENT
Start: 2023-12-12 | End: 2023-12-12

## 2024-01-16 ENCOUNTER — OFFICE VISIT (OUTPATIENT)
Dept: DERMATOLOGY | Facility: CLINIC | Age: 54
End: 2024-01-16
Payer: COMMERCIAL

## 2024-01-16 DIAGNOSIS — B07.8 COMMON WART: Primary | ICD-10-CM

## 2024-01-16 PROCEDURE — 11900 INJECT SKIN LESIONS </W 7: CPT | Mod: 59 | Performed by: PHYSICIAN ASSISTANT

## 2024-01-16 PROCEDURE — 17110 DESTRUCTION B9 LES UP TO 14: CPT | Performed by: PHYSICIAN ASSISTANT

## 2024-01-16 RX ORDER — FLUOROURACIL 50 MG/G
CREAM TOPICAL
Qty: 40 G | Refills: 0 | Status: SHIPPED | OUTPATIENT
Start: 2024-01-16 | End: 2024-08-02

## 2024-01-16 RX ORDER — CANDIDA ALBICANS 1000 [PNU]/ML
0.3 INJECTION, SOLUTION INTRADERMAL ONCE
Status: COMPLETED | OUTPATIENT
Start: 2024-01-16 | End: 2024-01-16

## 2024-01-16 RX ADMIN — CANDIDA ALBICANS 0.3 ML: 1000 INJECTION, SOLUTION INTRADERMAL at 16:02

## 2024-01-16 ASSESSMENT — PAIN SCALES - GENERAL: PAINLEVEL: NO PAIN (0)

## 2024-01-16 NOTE — LETTER
1/16/2024         RE: Emery Barkley  [address]  [place] [address]        Dear Colleague,    Thank you for referring your patient, Emery Barkley, to the [facility]. Please see a copy of my visit note below.    Emery Barkley is an extremely pleasant 53  year old year old male patient here today for recheck warts. Recheck warts on hand and neck and nostril. He notes some are still persisting. Patient has no other skin complaints today.  Remainder of the HPI, Meds, PMH, Allergies, FH, and SH was reviewed in chart.    Past Medical History:   Diagnosis Date     Hyperlipidemia        Past Surgical History:   Procedure Laterality Date     ABDOMEN SURGERY  2/7/19    umbilical hernia     HERNIORRHAPHY UMBILICAL N/A 2/7/2019    Procedure: HERNIORRHAPHY UMBILICAL;  Surgeon: Tej Beaulieu DO;  Location: [facility]     OPTICAL TRACKING SYSTEM ENDOSCOPIC SINUS SURGERY Bilateral 7/24/2023    Procedure: SINUS SURGERY, ENDOSCOPIC, USING OPTICAL TRACKING SYSTEM - all sinuses;  Surgeon: Mario Gamboa MD;  Location: [facility]     SEPTOPLASTY, TURBINOPLASTY, COMBINED Bilateral 7/24/2023    Procedure: SEPTOPLASTY; bilateral inferior turbinate reduction;  Surgeon: Mario Gamboa MD;  Location: [facility]     VASECTOMY          Family History   Problem Relation Age of Onset     C.A.D. Father         MI, chf, first mi in 50s.     Coronary Artery Disease Father      Hyperlipidemia Father      Cancer Sister         brain     Other Cancer Sister         Brain Cancer     Lipids Mother      Hyperlipidemia Mother      Depression Mother        Social History     Socioeconomic History     Marital status:      Spouse name: Not on file     Number of children: Not on file     Years of education: Not on file     Highest education level: Not on file   Occupational History     Not on file   Tobacco Use     Smoking status: Never     Smokeless tobacco: Never   Vaping Use     Vaping Use: Never used    Substance and Sexual Activity     Alcohol use: Yes     Comment: 2 drinks per week     Drug use: No     Sexual activity: Yes     Partners: Female     Birth control/protection: Male Surgical   Other Topics Concern     Parent/sibling w/ CABG, MI or angioplasty before 65F 55M? Yes   Social History Narrative     Not on file     Social Determinants of Health     Financial Resource Strain: Not on file   Food Insecurity: Not on file   Transportation Needs: Not on file   Physical Activity: Not on file   Stress: Not on file   Social Connections: Not on file   Interpersonal Safety: Not on file   Housing Stability: Not on file       Outpatient Encounter Medications as of 1/16/2024   Medication Sig Dispense Refill     atorvastatin (LIPITOR) 20 MG tablet Take 1 tablet (20 mg) by mouth daily Hold on file until needed. 90 tablet 3     loratadine (CLARITIN) 10 MG tablet Take 10 mg by mouth daily as needed for allergies (Patient not taking: Reported on 8/15/2023)       Facility-Administered Encounter Medications as of 1/16/2024   Medication Dose Route Frequency Provider Last Rate Last Admin     [COMPLETED] candida albicans skin test injection 0.3 mL  0.3 mL Intradermal Once Yuki Estevez PA-C   0.3 mL at 01/16/24 1602             O:   NAD, WDWN, Alert & Oriented, Mood & Affect wnl, Vitals stable   Here today alone   There were no vitals taken for this visit.   General appearance normal   Vitals stable   Alert, oriented and in no acute distress      Verrucous papule on neck and right hand, entrance to left nare and one in nostril     Eyes: Conjunctivae/lids:Normal     ENT: Lips: normal    MSK:Normal    Pulm: Breathing Normal    Neuro/Psych: Orientation:Alert and Orientedx3 ; Mood/Affect:normal       A/P:  Common wart in nostril and entrance of nare   LN2:  Treated with LN2 for 5s for 1-2 cycles. Warned risks of blistering, pain, pigment change, scarring, and incomplete resolution.  Advised patient to return if lesions do  not completely resolve.  Wound care sheet given.  Common wart on right index finger   After consent, anesthesia with LEC and prep, tangential excision performed.  No complications and routine wound care.    LN2:  Treated with LN2 for 5s for 1-2 cycles. Warned risks of blistering, pain, pigment change, scarring, and incomplete resolution.  Advised patient to return if lesions do not completely resolve.  Wound care sheet given.  IL Candin: PGACAC discussed.  Risks including but not limited to injection site reaction, bruising, no resolution.  All questions answered and entertained to patient s satisfaction.  Informed consent obtained.  IL Candin in concentration of 1 unit/ 0.1 ml was injected ID to neck and right entrance of nare, and right index finger.  Total injected was  3 units.  Patient tolerated without complications and given wound care instructions, including not to move product around.  Return in 4 weeks for follow-up and possible additional IL Candin.        Again, thank you for allowing me to participate in the care of your patient.        Sincerely,        Yuki King PA-C

## 2024-01-17 NOTE — PROGRESS NOTES
Emery Barkley is an extremely pleasant 53  year old year old male patient here today for recheck warts. Recheck warts on hand and neck and nostril. He notes some are still persisting. Patient has no other skin complaints today.  Remainder of the HPI, Meds, PMH, Allergies, FH, and SH was reviewed in chart.    Past Medical History:   Diagnosis Date    Hyperlipidemia        Past Surgical History:   Procedure Laterality Date    ABDOMEN SURGERY  2/7/19    umbilical hernia    HERNIORRHAPHY UMBILICAL N/A 2/7/2019    Procedure: HERNIORRHAPHY UMBILICAL;  Surgeon: Tej Beaulieu DO;  Location: [facility]    OPTICAL TRACKING SYSTEM ENDOSCOPIC SINUS SURGERY Bilateral 7/24/2023    Procedure: SINUS SURGERY, ENDOSCOPIC, USING OPTICAL TRACKING SYSTEM - all sinuses;  Surgeon: Mario Gamboa MD;  Location: [facility]    SEPTOPLASTY, TURBINOPLASTY, COMBINED Bilateral 7/24/2023    Procedure: SEPTOPLASTY; bilateral inferior turbinate reduction;  Surgeon: Mario Gamboa MD;  Location: [facility]    VASECTOMY          Family History   Problem Relation Age of Onset    C.A.D. Father         MI, chf, first mi in 50s.    Coronary Artery Disease Father     Hyperlipidemia Father     Cancer Sister         brain    Other Cancer Sister         Brain Cancer    Lipids Mother     Hyperlipidemia Mother     Depression Mother        Social History     Socioeconomic History    Marital status:      Spouse name: Not on file    Number of children: Not on file    Years of education: Not on file    Highest education level: Not on file   Occupational History    Not on file   Tobacco Use    Smoking status: Never    Smokeless tobacco: Never   Vaping Use    Vaping Use: Never used   Substance and Sexual Activity    Alcohol use: Yes     Comment: 2 drinks per week    Drug use: No    Sexual activity: Yes     Partners: Female     Birth control/protection: Male Surgical   Other Topics Concern    Parent/sibling w/ CABG, MI or angioplasty before 65F 55M? Yes    Social History Narrative    Not on file     Social Determinants of Health     Financial Resource Strain: Not on file   Food Insecurity: Not on file   Transportation Needs: Not on file   Physical Activity: Not on file   Stress: Not on file   Social Connections: Not on file   Interpersonal Safety: Not on file   Housing Stability: Not on file       Outpatient Encounter Medications as of 1/16/2024   Medication Sig Dispense Refill    atorvastatin (LIPITOR) 20 MG tablet Take 1 tablet (20 mg) by mouth daily Hold on file until needed. 90 tablet 3    loratadine (CLARITIN) 10 MG tablet Take 10 mg by mouth daily as needed for allergies (Patient not taking: Reported on 8/15/2023)       Facility-Administered Encounter Medications as of 1/16/2024   Medication Dose Route Frequency Provider Last Rate Last Admin    [COMPLETED] candida albicans skin test injection 0.3 mL  0.3 mL Intradermal Once Yuki Estevez PA-C   0.3 mL at 01/16/24 1602             O:   NAD, WDWN, Alert & Oriented, Mood & Affect wnl, Vitals stable   Here today alone   There were no vitals taken for this visit.   General appearance normal   Vitals stable   Alert, oriented and in no acute distress      Verrucous papule on neck and right hand, entrance to left nare and one in nostril     Eyes: Conjunctivae/lids:Normal     ENT: Lips: normal    MSK:Normal    Pulm: Breathing Normal    Neuro/Psych: Orientation:Alert and Orientedx3 ; Mood/Affect:normal       A/P:  Common wart in nostril and entrance of nare   LN2:  Treated with LN2 for 5s for 1-2 cycles. Warned risks of blistering, pain, pigment change, scarring, and incomplete resolution.  Advised patient to return if lesions do not completely resolve.  Wound care sheet given.  Common wart on right index finger   After consent, anesthesia with LEC and prep, tangential excision performed.  No complications and routine wound care.    LN2:  Treated with LN2 for 5s for 1-2 cycles. Warned risks of blistering,  pain, pigment change, scarring, and incomplete resolution.  Advised patient to return if lesions do not completely resolve.  Wound care sheet given.  IL Candin: PGACAC discussed.  Risks including but not limited to injection site reaction, bruising, no resolution.  All questions answered and entertained to patient s satisfaction.  Informed consent obtained.  IL Candin in concentration of 1 unit/ 0.1 ml was injected ID to neck and right entrance of nare, and right index finger.  Total injected was  3 units.  Patient tolerated without complications and given wound care instructions, including not to move product around.  Return in 4 weeks for follow-up and possible additional IL Candin.    Sent in efudex apply to affected area at bedtime occlude with band aid for wart on finger and use on neck or facial, avoid in nose. Rinse in morning. If causing too much irritation then stop using.

## 2024-01-19 ENCOUNTER — TELEPHONE (OUTPATIENT)
Dept: OTOLARYNGOLOGY | Facility: CLINIC | Age: 54
End: 2024-01-19
Payer: COMMERCIAL

## 2024-01-19 NOTE — TELEPHONE ENCOUNTER
Pt called back and stated that the Feb date doesn't work for him and was wondering if he could come in any time the first week of March. Please call pt back to discuss

## 2024-01-19 NOTE — TELEPHONE ENCOUNTER
Called and left message.  Dr. Gamboa would like him scheduled at [facility] on Feb 15 at 345 for a 30 min slot.      Elisabeth Grajeda MA, CMA ......1/19/2024...8:43 AM

## 2024-03-12 NOTE — TELEPHONE ENCOUNTER
"Requested Prescriptions   Pending Prescriptions Disp Refills     atorvastatin (LIPITOR) 20 MG tablet 90 tablet 2     Sig: Take 1 tablet (20 mg) by mouth daily       Statins Protocol Passed - 9/29/2019  8:30 PM        Passed - LDL on file in past 12 months     Recent Labs   Lab Test 11/03/18  0656   LDL 78             Passed - No abnormal creatine kinase in past 12 months     No lab results found.             Passed - Recent (12 mo) or future (30 days) visit within the authorizing provider's specialty     Patient has had an office visit with the authorizing provider or a provider within the authorizing providers department within the previous 12 mos or has a future within next 30 days. See \"Patient Info\" tab in inbasket, or \"Choose Columns\" in Meds & Orders section of the refill encounter.              Passed - Medication is active on med list        Passed - Patient is age 18 or older        Prescription approved per [facility] [place].    "
No (0)

## 2024-03-21 NOTE — PROGRESS NOTES
History of Present Illness - Emery Barkley is a 53 year old male here to see me for the first time for removal of a small intranasal lesion, likley a benign wart.   He is a patient of Dr Gamboa, and is status post functional endoscopic sinus surgery with septo in 2023.  He was seen by Derm, and removal of the wart was not successful with medical management, and so they referred back to ENT for removal.      Past medical history -   Patient Active Problem List   Diagnosis    Hyperlipidemia LDL goal <100    Family history of coronary artery disease    External hemorrhoid    Common wart    Chronic pansinusitis    Nasal polyp    Nasal septal deviation    Nasal obstruction    Nasal turbinate hypertrophy       /87   Pulse 77   Resp 16   Wt 92.1 kg (203 lb)   SpO2 99%   BMI 29.55 kg/m        General - The patient is well nourished and well developed, and appears to have good nutritional status.  Alert and oriented to person and place, answers questions and cooperates with examination appropriately.   Head and Face - Normocephalic and atraumatic, with no gross asymmetry noted of the contour of the facial features.  The facial nerve is intact, with strong symmetric movements.  Eyes - Extraocular movements intact, and the pupils were reactive to light.  Sclera were not icteric or injected, conjunctiva were pink and moist.  Mouth - Examination of the oral cavity shows pink, healthy, moist mucosa.  No lesions or ulceration noted.  The dentition are in good repair.  The tongue is mobile and midline.    Procedure - Removal Skin Lesion    Based on the location inside the LEFT nasal vestibule, I did discuss the option of removal in the OR, with possible skin graft as primary closure in that part of the vestibule would definitely run the risk of scarring and contraction which could lead to nasal deformity or nasal valve compromise.  We opted for a conservative debulking.    After informed consent was discussed and signed,  I proceeded to cleanse the skin around the lesion in the nostril with alcohol.  I then infiltrated the skin with 2% lidocaine with 1:100,000 epinephrine.  I then used a 4mm loop curette to debulk the warty tissue.      Hemostasis was achieved with silver nitrate, and the patient tolerated it well.  No specimen was sent.      A/P - Emery Barkley  (R22.0) Intranasal mass  (primary encounter diagnosis)  (B07.8) Common wart    Emery Barkley is a 53 year old male who has had a skin lesion excised from the nasal vestibule today.  I have instructed the patient on wound care and signs of infection.    Follow up in 6 months to check on regrowth

## 2024-03-29 ENCOUNTER — OFFICE VISIT (OUTPATIENT)
Dept: OTOLARYNGOLOGY | Facility: CLINIC | Age: 54
End: 2024-03-29
Attending: PHYSICIAN ASSISTANT
Payer: COMMERCIAL

## 2024-03-29 VITALS
SYSTOLIC BLOOD PRESSURE: 121 MMHG | RESPIRATION RATE: 16 BRPM | DIASTOLIC BLOOD PRESSURE: 87 MMHG | BODY MASS INDEX: 29.55 KG/M2 | OXYGEN SATURATION: 99 % | WEIGHT: 203 LBS | HEART RATE: 77 BPM

## 2024-03-29 DIAGNOSIS — R22.0 INTRANASAL MASS: Primary | ICD-10-CM

## 2024-03-29 DIAGNOSIS — B07.8 COMMON WART: ICD-10-CM

## 2024-03-29 PROCEDURE — 99213 OFFICE O/P EST LOW 20 MIN: CPT | Mod: 25 | Performed by: OTOLARYNGOLOGY

## 2024-03-29 PROCEDURE — 11440 EXC FACE-MM B9+MARG 0.5 CM/<: CPT | Performed by: OTOLARYNGOLOGY

## 2024-03-29 ASSESSMENT — PAIN SCALES - GENERAL: PAINLEVEL: NO PAIN (0)

## 2024-03-29 NOTE — LETTER
3/29/2024         RE: Emery Barkley  [address] [facility]  [place] [address]        Dear Colleague,    Thank you for referring your patient, Emery Barkley, to the [facility]. Please see a copy of my visit note below.    History of Present Illness - Emery Barkley is a 53 year old male here to see me for the first time for removal of a small intranasal lesion, likley a benign wart.   He is a patient of Dr Gamboa, and is status post functional endoscopic sinus surgery with septo in 2023.  He was seen by Derm, and removal of the wart was not successful with medical management, and so they referred back to ENT for removal.      Past medical history -   Patient Active Problem List   Diagnosis     Hyperlipidemia LDL goal <100     Family history of coronary artery disease     External hemorrhoid     Common wart     Chronic pansinusitis     Nasal polyp     Nasal septal deviation     Nasal obstruction     Nasal turbinate hypertrophy       /87   Pulse 77   Resp 16   Wt 92.1 kg (203 lb)   SpO2 99%   BMI 29.55 kg/m        General - The patient is well nourished and well developed, and appears to have good nutritional status.  Alert and oriented to person and place, answers questions and cooperates with examination appropriately.   Head and Face - Normocephalic and atraumatic, with no gross asymmetry noted of the contour of the facial features.  The facial nerve is intact, with strong symmetric movements.  Eyes - Extraocular movements intact, and the pupils were reactive to light.  Sclera were not icteric or injected, conjunctiva were pink and moist.  Mouth - Examination of the oral cavity shows pink, healthy, moist mucosa.  No lesions or ulceration noted.  The dentition are in good repair.  The tongue is mobile and midline.    Procedure - Removal Skin Lesion    Based on the location inside the LEFT nasal vestibule, I did discuss the option of removal in the OR, with possible skin graft as  primary closure in that part of the vestibule would definitely run the risk of scarring and contraction which could lead to nasal deformity or nasal valve compromise.  We opted for a conservative debulking.    After informed consent was discussed and signed, I proceeded to cleanse the skin around the lesion in the nostril with alcohol.  I then infiltrated the skin with 2% lidocaine with 1:100,000 epinephrine.  I then used a 4mm loop curette to debulk the warty tissue.      Hemostasis was achieved with silver nitrate, and the patient tolerated it well.  No specimen was sent.      A/P - Emery Barkley  (R22.0) Intranasal mass  (primary encounter diagnosis)  (B07.8) Common wart    mEery Barkely is a 53 year old male who has had a skin lesion excised from the nasal vestibule today.  I have instructed the patient on wound care and signs of infection.    Follow up in 6 months to check on regrowth      Again, thank you for allowing me to participate in the care of your patient.        Sincerely,        Nick Gomez MD

## 2024-04-02 ENCOUNTER — OFFICE VISIT (OUTPATIENT)
Dept: DERMATOLOGY | Facility: CLINIC | Age: 54
End: 2024-04-02
Payer: COMMERCIAL

## 2024-04-02 DIAGNOSIS — B07.8 COMMON WART: Primary | ICD-10-CM

## 2024-04-02 PROCEDURE — 17110 DESTRUCTION B9 LES UP TO 14: CPT | Performed by: PHYSICIAN ASSISTANT

## 2024-04-02 NOTE — LETTER
4/2/2024         RE: Emery Barkley  [address]  [place] [address]        Dear Colleague,    Thank you for referring your patient, Emery Barkley, to the [facility]. Please see a copy of my visit note below.    Emery Barkley is an extremely pleasant 53  year old year old male patient here today for recheck warts. Recheck warts on nostril. He notes some are still persisting. Patient has no other skin complaints today.  Remainder of the HPI, Meds, PMH, Allergies, FH, and SH was reviewed in chart.    Past Medical History:   Diagnosis Date     Hyperlipidemia        Past Surgical History:   Procedure Laterality Date     ABDOMEN SURGERY  2/7/19    umbilical hernia     HERNIORRHAPHY UMBILICAL N/A 2/7/2019    Procedure: HERNIORRHAPHY UMBILICAL;  Surgeon: Tej Beaulieu DO;  Location: [facility]     OPTICAL TRACKING SYSTEM ENDOSCOPIC SINUS SURGERY Bilateral 7/24/2023    Procedure: SINUS SURGERY, ENDOSCOPIC, USING OPTICAL TRACKING SYSTEM - all sinuses;  Surgeon: Mario Gamboa MD;  Location: [facility]     SEPTOPLASTY, TURBINOPLASTY, COMBINED Bilateral 7/24/2023    Procedure: SEPTOPLASTY; bilateral inferior turbinate reduction;  Surgeon: Mario Gamboa MD;  Location: [facility]     VASECTOMY          Family History   Problem Relation Age of Onset     C.A.D. Father         MI, chf, first mi in 50s.     Coronary Artery Disease Father      Hyperlipidemia Father      Cancer Sister         brain     Other Cancer Sister         Brain Cancer     Lipids Mother      Hyperlipidemia Mother      Depression Mother        Social History     Socioeconomic History     Marital status:      Spouse name: Not on file     Number of children: Not on file     Years of education: Not on file     Highest education level: Not on file   Occupational History     Not on file   Tobacco Use     Smoking status: Never     Smokeless tobacco: Never   Vaping Use     Vaping Use: Never used   Substance and Sexual  Activity     Alcohol use: Yes     Comment: 2 drinks per week     Drug use: No     Sexual activity: Yes     Partners: Female     Birth control/protection: Male Surgical   Other Topics Concern     Parent/sibling w/ CABG, MI or angioplasty before 65F 55M? Yes   Social History Narrative     Not on file     Social Determinants of Health     Financial Resource Strain: Not on file   Food Insecurity: Not on file   Transportation Needs: Not on file   Physical Activity: Not on file   Stress: Not on file   Social Connections: Not on file   Interpersonal Safety: Not on file   Housing Stability: Not on file       Outpatient Encounter Medications as of 4/2/2024   Medication Sig Dispense Refill     atorvastatin (LIPITOR) 20 MG tablet Take 1 tablet (20 mg) by mouth daily Hold on file until needed. 90 tablet 3     fluorouracil (EFUDEX) 5 % external cream Apply small amount at bedtime to warts, if skin irritation stop using. 40 g 0     loratadine (CLARITIN) 10 MG tablet Take 10 mg by mouth daily as needed for allergies (Patient not taking: Reported on 8/15/2023)       No facility-administered encounter medications on file as of 4/2/2024.             O:   NAD, WDWN, Alert & Oriented, Mood & Affect wnl, Vitals stable   Here today alone   There were no vitals taken for this visit.   General appearance normal   Vitals stable   Alert, oriented and in no acute distress      Verrucous papule on entrance of nare     Eyes: Conjunctivae/lids:Normal     ENT: Lips: normal    MSK:Normal    Pulm: Breathing Normal    Neuro/Psych: Orientation:Alert and Orientedx3 ; Mood/Affect:normal       A/P:  Common wart in entrance of nare  After consent, anesthesia with LEC and prep, tangential excision performed.  No complications and routine wound care. Patient told to call our office in 1-2 weeks for result.  LN2:  Treated with LN2 for 5s for 1-2 cycles. Warned risks of blistering, pain, pigment change, scarring, and incomplete resolution.  Advised patient  to return if lesions do not completely resolve.  Wound care sheet given.            Again, thank you for allowing me to participate in the care of your patient.        Sincerely,        Yuki King PA-C

## 2024-04-03 NOTE — PROGRESS NOTES
Emery Barkley is an extremely pleasant 53  year old year old male patient here today for recheck warts. Recheck warts on nostril. He notes some are still persisting. Patient has no other skin complaints today.  Remainder of the HPI, Meds, PMH, Allergies, FH, and SH was reviewed in chart.    Past Medical History:   Diagnosis Date    Hyperlipidemia        Past Surgical History:   Procedure Laterality Date    ABDOMEN SURGERY  2/7/19    umbilical hernia    HERNIORRHAPHY UMBILICAL N/A 2/7/2019    Procedure: HERNIORRHAPHY UMBILICAL;  Surgeon: Tej Beaulieu DO;  Location: [facility]    OPTICAL TRACKING SYSTEM ENDOSCOPIC SINUS SURGERY Bilateral 7/24/2023    Procedure: SINUS SURGERY, ENDOSCOPIC, USING OPTICAL TRACKING SYSTEM - all sinuses;  Surgeon: Mario Gamboa MD;  Location: [facility]    SEPTOPLASTY, TURBINOPLASTY, COMBINED Bilateral 7/24/2023    Procedure: SEPTOPLASTY; bilateral inferior turbinate reduction;  Surgeon: Mario Gamboa MD;  Location: [facility]    VASECTOMY          Family History   Problem Relation Age of Onset    C.A.D. Father         MI, chf, first mi in 50s.    Coronary Artery Disease Father     Hyperlipidemia Father     Cancer Sister         brain    Other Cancer Sister         Brain Cancer    Lipids Mother     Hyperlipidemia Mother     Depression Mother        Social History     Socioeconomic History    Marital status:      Spouse name: Not on file    Number of children: Not on file    Years of education: Not on file    Highest education level: Not on file   Occupational History    Not on file   Tobacco Use    Smoking status: Never    Smokeless tobacco: Never   Vaping Use    Vaping Use: Never used   Substance and Sexual Activity    Alcohol use: Yes     Comment: 2 drinks per week    Drug use: No    Sexual activity: Yes     Partners: Female     Birth control/protection: Male Surgical   Other Topics Concern    Parent/sibling w/ CABG, MI or angioplasty before 65F 55M? Yes   Social History  Narrative    Not on file     Social Determinants of Health     Financial Resource Strain: Not on file   Food Insecurity: Not on file   Transportation Needs: Not on file   Physical Activity: Not on file   Stress: Not on file   Social Connections: Not on file   Interpersonal Safety: Not on file   Housing Stability: Not on file       Outpatient Encounter Medications as of 4/2/2024   Medication Sig Dispense Refill    atorvastatin (LIPITOR) 20 MG tablet Take 1 tablet (20 mg) by mouth daily Hold on file until needed. 90 tablet 3    fluorouracil (EFUDEX) 5 % external cream Apply small amount at bedtime to warts, if skin irritation stop using. 40 g 0    loratadine (CLARITIN) 10 MG tablet Take 10 mg by mouth daily as needed for allergies (Patient not taking: Reported on 8/15/2023)       No facility-administered encounter medications on file as of 4/2/2024.             O:   NAD, WDWN, Alert & Oriented, Mood & Affect wnl, Vitals stable   Here today alone   There were no vitals taken for this visit.   General appearance normal   Vitals stable   Alert, oriented and in no acute distress      Verrucous papule on entrance of nare     Eyes: Conjunctivae/lids:Normal     ENT: Lips: normal    MSK:Normal    Pulm: Breathing Normal    Neuro/Psych: Orientation:Alert and Orientedx3 ; Mood/Affect:normal       A/P:  Common wart in entrance of nare  After consent, anesthesia with LEC and prep, tangential excision performed.  No complications and routine wound care. Patient told to call our office in 1-2 weeks for result.  LN2:  Treated with LN2 for 5s for 1-2 cycles. Warned risks of blistering, pain, pigment change, scarring, and incomplete resolution.  Advised patient to return if lesions do not completely resolve.  Wound care sheet given.

## 2024-04-14 ENCOUNTER — HOSPITAL ENCOUNTER (EMERGENCY)
Facility: CLINIC | Age: 54
Discharge: HOME | End: 2024-04-14
Attending: NURSE PRACTITIONER | Admitting: NURSE PRACTITIONER
Payer: COMMERCIAL

## 2024-04-14 ENCOUNTER — APPOINTMENT (OUTPATIENT)
Dept: GENERAL RADIOLOGY | Facility: CLINIC | Age: 54
End: 2024-04-14
Attending: NURSE PRACTITIONER
Payer: COMMERCIAL

## 2024-04-14 VITALS
RESPIRATION RATE: 20 BRPM | SYSTOLIC BLOOD PRESSURE: 135 MMHG | OXYGEN SATURATION: 98 % | HEART RATE: 75 BPM | TEMPERATURE: 98.3 F | DIASTOLIC BLOOD PRESSURE: 89 MMHG

## 2024-04-14 DIAGNOSIS — M77.8 TENDINITIS OF FINGER OF RIGHT HAND: ICD-10-CM

## 2024-04-14 LAB — URATE SERPL-MCNC: 6.7 MG/DL (ref 3.4–7)

## 2024-04-14 PROCEDURE — 99213 OFFICE O/P EST LOW 20 MIN: CPT | Performed by: NURSE PRACTITIONER

## 2024-04-14 PROCEDURE — 36415 COLL VENOUS BLD VENIPUNCTURE: CPT | Performed by: NURSE PRACTITIONER

## 2024-04-14 PROCEDURE — 84550 ASSAY OF BLOOD/URIC ACID: CPT | Performed by: NURSE PRACTITIONER

## 2024-04-14 PROCEDURE — 73140 X-RAY EXAM OF FINGER(S): CPT | Mod: RT

## 2024-04-14 PROCEDURE — G0463 HOSPITAL OUTPT CLINIC VISIT: HCPCS

## 2024-04-14 RX ORDER — PREDNISONE 20 MG/1
20 TABLET ORAL 2 TIMES DAILY
Qty: 6 TABLET | Refills: 0 | Status: SHIPPED | OUTPATIENT
Start: 2024-04-14 | End: 2024-04-17

## 2024-04-14 ASSESSMENT — ACTIVITIES OF DAILY LIVING (ADL)
ADLS_ACUITY_SCORE: 35
ADLS_ACUITY_SCORE: 35

## 2024-04-14 ASSESSMENT — COLUMBIA-SUICIDE SEVERITY RATING SCALE - C-SSRS
1. IN THE PAST MONTH, HAVE YOU WISHED YOU WERE DEAD OR WISHED YOU COULD GO TO SLEEP AND NOT WAKE UP?: NO
2. HAVE YOU ACTUALLY HAD ANY THOUGHTS OF KILLING YOURSELF IN THE PAST MONTH?: NO
6. HAVE YOU EVER DONE ANYTHING, STARTED TO DO ANYTHING, OR PREPARED TO DO ANYTHING TO END YOUR LIFE?: NO

## 2024-04-14 NOTE — DISCHARGE INSTRUCTIONS
X-ray was negative for fractures or abnormalities, uric acid levels were normal ruling out fractures of your finger or symptoms of gout.  Condition is thought to be a tendinitis of your right middle finger.  Recommend taking the prednisone as ordered contact us if symptoms or not improving or if symptoms worsen despite recommended treatment plan.

## 2024-04-23 ENCOUNTER — OFFICE VISIT (OUTPATIENT)
Dept: FAMILY MEDICINE | Facility: CLINIC | Age: 54
End: 2024-04-23
Payer: COMMERCIAL

## 2024-04-23 VITALS
DIASTOLIC BLOOD PRESSURE: 84 MMHG | HEART RATE: 97 BPM | BODY MASS INDEX: 28.63 KG/M2 | TEMPERATURE: 98 F | OXYGEN SATURATION: 95 % | WEIGHT: 200 LBS | RESPIRATION RATE: 18 BRPM | HEIGHT: 70 IN | SYSTOLIC BLOOD PRESSURE: 120 MMHG

## 2024-04-23 DIAGNOSIS — L03.113 CELLULITIS OF HAND, RIGHT: ICD-10-CM

## 2024-04-23 DIAGNOSIS — D72.829 LEUKOCYTOSIS, UNSPECIFIED TYPE: Primary | ICD-10-CM

## 2024-04-23 LAB
BASOPHILS # BLD AUTO: 0.4 10E3/UL (ref 0–0.2)
BASOPHILS NFR BLD AUTO: 0 %
EOSINOPHIL # BLD AUTO: 0.1 10E3/UL (ref 0–0.7)
EOSINOPHIL NFR BLD AUTO: 0 %
ERYTHROCYTE [DISTWIDTH] IN BLOOD BY AUTOMATED COUNT: 13 % (ref 10–15)
HCT VFR BLD AUTO: 43.1 % (ref 40–53)
HGB BLD-MCNC: 14.5 G/DL (ref 13.3–17.7)
IMM GRANULOCYTES # BLD: 0.1 10E3/UL
IMM GRANULOCYTES NFR BLD: 0 %
LYMPHOCYTES # BLD AUTO: 82 10E3/UL (ref 0.8–5.3)
LYMPHOCYTES NFR BLD AUTO: 87 %
MCH RBC QN AUTO: 30.2 PG (ref 26.5–33)
MCHC RBC AUTO-ENTMCNC: 33.6 G/DL (ref 31.5–36.5)
MCV RBC AUTO: 90 FL (ref 78–100)
MONOCYTES # BLD AUTO: 8.1 10E3/UL (ref 0–1.3)
MONOCYTES NFR BLD AUTO: 9 %
NEUTROPHILS # BLD AUTO: 3.6 10E3/UL (ref 1.6–8.3)
NEUTROPHILS NFR BLD AUTO: 4 %
NRBC # BLD AUTO: 0 10E3/UL
NRBC BLD AUTO-RTO: 0 /100
PLATELET # BLD AUTO: 193 10E3/UL (ref 150–450)
RBC # BLD AUTO: 4.8 10E6/UL (ref 4.4–5.9)
WBC # BLD AUTO: 94.2 10E3/UL (ref 4–11)

## 2024-04-23 PROCEDURE — 99213 OFFICE O/P EST LOW 20 MIN: CPT | Performed by: FAMILY MEDICINE

## 2024-04-23 PROCEDURE — 85007 BL SMEAR W/DIFF WBC COUNT: CPT | Performed by: FAMILY MEDICINE

## 2024-04-23 PROCEDURE — 36415 COLL VENOUS BLD VENIPUNCTURE: CPT | Performed by: FAMILY MEDICINE

## 2024-04-23 PROCEDURE — 85025 COMPLETE CBC W/AUTO DIFF WBC: CPT | Performed by: FAMILY MEDICINE

## 2024-04-23 RX ORDER — CEFADROXIL 500 MG/1
500 CAPSULE ORAL
COMMUNITY
Start: 2024-04-19 | End: 2024-04-29

## 2024-04-23 RX ORDER — DOXYCYCLINE 100 MG/1
100 CAPSULE ORAL
COMMUNITY
Start: 2024-04-19 | End: 2024-04-29

## 2024-04-23 ASSESSMENT — PAIN SCALES - GENERAL: PAINLEVEL: NO PAIN (0)

## 2024-04-23 NOTE — PROGRESS NOTES
"  Assessment & Plan     Leukocytosis, unspecified type  Cellulitis of hand, right  Patient was seen in [facility] urgent care recently and diagnosed with a right hand cellulitis, found to have significantly elevated white blood cell count, leukocytosis.  No fever, chills, night sweats or other relevant systemic symptoms.  CBC with differential ordered and will consider hematology consult depending on lab result.  Suggested to continue doxycycline, cefadroxil antibiotic course.  All questions answered.  - CBC with platelets and differential; Future  - CBC with platelets and differential      Claudio Land is a 53 year old, presenting for the following health issues:  Results    History of Present Illness       Reason for visit:  I was seen at the [facility] for cellulitis, and they noted an extremely high white blood cell count.  Symptom onset:  1-2 weeks ago  Symptoms include:  Pain and inflammation in my right middle finger and hand.  Symptom intensity:  Mild  Symptom progression:  Improving  Had these symptoms before:  No  What makes it worse:  No  What makes it better:  Antibiotics    He eats 2-3 servings of fruits and vegetables daily.He consumes 1 sweetened beverage(s) daily.He exercises with enough effort to increase his heart rate 30 to 60 minutes per day.  He exercises with enough effort to increase his heart rate 4 days per week.   He is taking medications regularly.       Review of Systems  Constitutional, neuro, ENT, endocrine, pulmonary, cardiac, gastrointestinal, genitourinary, musculoskeletal, integument and psychiatric systems are negative, except as otherwise noted.      Objective    /84 (Cuff Size: Adult Large)   Pulse 97   Temp 98  F (36.7  C) (Tympanic)   Resp 18   Ht 1.765 m (5' 9.5\")   Wt 90.7 kg (200 lb)   SpO2 95%   BMI 29.11 kg/m    Body mass index is 29.11 kg/m .  Physical Exam   GENERAL: alert and no distress  EYES: Eyes grossly normal to inspection, PERRL and conjunctivae " and sclerae normal  HENT: normal cephalic/atraumatic, nose and mouth without ulcers or lesions, oropharynx clear, and oral mucous membranes moist  NECK: no adenopathy, no asymmetry, masses, or scars  RESP: lungs clear to auscultation - no rales, rhonchi or wheezes  CV: regular rates and rhythm, normal S1 S2, no S3 or S4, and no murmur, click or rub  NEURO: Normal strength and tone, mentation intact and speech normal  PSYCH: mentation appears normal, affect normal/bright  MSK/SKIN: Right hand moderately swollen with some stiffness, no significant skin discoloration or warmth noted, radial pulses 3+, sensation to touch and pressure intact            Signed Electronically by: Madhu Mcfarland MD

## 2024-04-24 ENCOUNTER — PATIENT OUTREACH (OUTPATIENT)
Dept: ONCOLOGY | Facility: CLINIC | Age: 54
End: 2024-04-24
Payer: COMMERCIAL

## 2024-04-24 DIAGNOSIS — D72.829 LEUKOCYTOSIS, UNSPECIFIED TYPE: Primary | ICD-10-CM

## 2024-04-24 NOTE — PROGRESS NOTES
New Patient Oncology Nurse Navigator Note     Referring provider: Mdahu Mcfarland MD      Referring Clinic/Organization: [facility]      Referred to (specialty:) Hematology/Oncology      Requested provider (if applicable): NA     Date Referral Received: April 24, 2024     Evaluation for:  D72.829 (ICD-10-CM) - Leukocytosis, unspecified type      Clinical History (per Nurse review of records provided):      Patient was recently seen at Urgent Care for cellulitis and it was noted that he had an extremely high WBC.     Latest Reference Range & Units 04/23/24 16:05   WBC 4.0 - 11.0 10e3/uL 94.2 (HH)   Hemoglobin 13.3 - 17.7 g/dL 14.5   Hematocrit 40.0 - 53.0 % 43.1   Platelet Count 150 - 450 10e3/uL 193   RBC Count 4.40 - 5.90 10e6/uL 4.80   MCV 78 - 100 fL 90   MCH 26.5 - 33.0 pg 30.2   MCHC 31.5 - 36.5 g/dL 33.6   RDW 10.0 - 15.0 % 13.0   % Neutrophils %  % 4  6 (P)   % Lymphocytes %  % 87  94 (P)   % Monocytes %  % 9  0 (P)   % Eosinophils %  % 0  0 (P)   % Basophils %  % 0  0 (P)   Absolute Basophils 0.0 - 0.2 10e3/uL 0.4 (H)   Absolute Basophils 0.0 - 0.2 10e3/uL 0.0 (P)   Absolute Neutrophil 1.6 - 8.3 10e3/uL 5.7 (P)   Absolute Lymphocytes 0.8 - 5.3 10e3/uL 88.5 (H) (P)   Absolute Monocytes 0.0 - 1.3 10e3/uL 0.0 (P)   Absolute Eosinophils 0.0 - 0.7 10e3/uL 0.0 (P)   Absolute Eosinophils 0.0 - 0.7 10e3/uL 0.1   Absolute Immature Granulocytes <=0.4 10e3/uL 0.1   Absolute Lymphocytes 0.8 - 5.3 10e3/uL 82.0 (H)   Absolute Monocytes 0.0 - 1.3 10e3/uL 8.1 (H)   % Immature Granulocytes % 0   Absolute Neutrophils 1.6 - 8.3 10e3/uL 3.6   Absolute NRBCs 10e3/uL 0.0   NRBCs per 100 WBC <1 /100 0   RBC Morphology  Confirmed RBC Indices (P)   Platelet Morphology Automated Count Confirmed. Platelet morphology is normal.  Automated Count Confirmed. Platelet morphology is normal. (P)   Smudge Cells None Seen  Present ! (P)        Records Location: See Bookmarked material     Records  Needed: NA     Additional testing needed prior to consult: Message sent to consulting MD about work up    Payor: [facility] / Plan: R [facility] CHOICE / Product Type: Indemnity     April 24, 2024  Referral received and reviewed. Slot placed on HOLD for Dr. Friedell in [place] for 4/29/24.     Called patient to introduced myself and role as nurse navigator with [facility] Hematology/Oncology department and to inform them that we have received the referral for a diagnosis of Leukocytosis from Dr. Zurita.     Patient did not answer the phone so a detailed message was left for them including NPS number. Requested callback to speak to a  to set the consult date/time/location. Explained to patient in the message that they will receive a call from our new patient scheduling team (NPS number below, hours are Monday - Friday 8am - 4:30 pm) in the next 1-2 business days to schedule the consultation. Encouraged them to call back with any questions.     April 24, 2024  Patient returned call. Reviewed referral and reasoning for referral.  At this time, other than patient's hand, he is feeling well. No complaints of fevers, fatigue, night sweats, etc. Reviewed with him that if he develops any symptoms between now and Monday that he needs to go to Urgent Care to be reassessed. Patient verbalized understanding and was grateful for the call and coordination of care. Patient was transferred to NPS to schedule and message sent to Dr. Friedell about referral.    Vanessa Brown, RN, BSN  [facility] Hematology/Oncology Nurse Navigator  [phone]

## 2024-04-25 LAB
BASOPHILS # BLD MANUAL: 0 10E3/UL (ref 0–0.2)
BASOPHILS NFR BLD MANUAL: 0 %
EOSINOPHIL # BLD MANUAL: 0 10E3/UL (ref 0–0.7)
EOSINOPHIL NFR BLD MANUAL: 0 %
LYMPHOCYTES # BLD MANUAL: 88.5 10E3/UL (ref 0.8–5.3)
LYMPHOCYTES NFR BLD MANUAL: 94 %
MONOCYTES # BLD MANUAL: 0 10E3/UL (ref 0–1.3)
MONOCYTES NFR BLD MANUAL: 0 %
NEUTROPHILS # BLD MANUAL: 5.7 10E3/UL (ref 1.6–8.3)
NEUTROPHILS NFR BLD MANUAL: 6 %
PLAT MORPH BLD: ABNORMAL
RBC MORPH BLD: ABNORMAL
SMUDGE CELLS BLD QL SMEAR: PRESENT

## 2024-04-26 NOTE — TELEPHONE ENCOUNTER
RECORDS STATUS - ALL OTHER DIAGNOSIS      RECORDS RECEIVED FROM: [facility]   NOTES STATUS DETAILS   OFFICE NOTE from referring provider Epic 04/23/24: Dr. Madhu Mcfarland   MEDICATION LIST [facility]    LABS     PATHOLOGY REPORTS     ANYTHING RELATED TO DIAGNOSIS Epic Most recent 04/23/24

## 2024-04-29 ENCOUNTER — APPOINTMENT (OUTPATIENT)
Dept: LAB | Facility: CLINIC | Age: 54
End: 2024-04-29
Payer: COMMERCIAL

## 2024-04-29 ENCOUNTER — ONCOLOGY VISIT (OUTPATIENT)
Dept: ONCOLOGY | Facility: CLINIC | Age: 54
End: 2024-04-29
Attending: FAMILY MEDICINE
Payer: COMMERCIAL

## 2024-04-29 ENCOUNTER — PRE VISIT (OUTPATIENT)
Dept: ONCOLOGY | Facility: CLINIC | Age: 54
End: 2024-04-29
Payer: COMMERCIAL

## 2024-04-29 VITALS
RESPIRATION RATE: 16 BRPM | HEART RATE: 85 BPM | OXYGEN SATURATION: 96 % | DIASTOLIC BLOOD PRESSURE: 89 MMHG | BODY MASS INDEX: 29.35 KG/M2 | WEIGHT: 205 LBS | SYSTOLIC BLOOD PRESSURE: 132 MMHG | TEMPERATURE: 98.3 F | HEIGHT: 70 IN

## 2024-04-29 DIAGNOSIS — C91.10 CLL (CHRONIC LYMPHOCYTIC LEUKEMIA) (H): Primary | ICD-10-CM

## 2024-04-29 DIAGNOSIS — D72.829 LEUKOCYTOSIS, UNSPECIFIED TYPE: ICD-10-CM

## 2024-04-29 LAB
ALBUMIN SERPL BCG-MCNC: 4.2 G/DL (ref 3.5–5.2)
ALP SERPL-CCNC: 114 U/L (ref 40–150)
ALT SERPL W P-5'-P-CCNC: 17 U/L (ref 0–70)
ANION GAP SERPL CALCULATED.3IONS-SCNC: 9 MMOL/L (ref 7–15)
AST SERPL W P-5'-P-CCNC: 25 U/L (ref 0–45)
BASOPHILS # BLD MANUAL: 0 10E3/UL (ref 0–0.2)
BASOPHILS NFR BLD MANUAL: 0 %
BILIRUB SERPL-MCNC: 0.4 MG/DL
BUN SERPL-MCNC: 10.4 MG/DL (ref 6–20)
CALCIUM SERPL-MCNC: 8.8 MG/DL (ref 8.6–10)
CHLORIDE SERPL-SCNC: 105 MMOL/L (ref 98–107)
CREAT SERPL-MCNC: 1.12 MG/DL (ref 0.67–1.17)
DEPRECATED HCO3 PLAS-SCNC: 26 MMOL/L (ref 22–29)
EGFRCR SERPLBLD CKD-EPI 2021: 79 ML/MIN/1.73M2
EOSINOPHIL # BLD MANUAL: 0 10E3/UL (ref 0–0.7)
EOSINOPHIL NFR BLD MANUAL: 0 %
ERYTHROCYTE [DISTWIDTH] IN BLOOD BY AUTOMATED COUNT: 13.2 % (ref 10–15)
GLUCOSE SERPL-MCNC: 104 MG/DL (ref 70–99)
HCT VFR BLD AUTO: 41.5 % (ref 40–53)
HGB BLD-MCNC: 14.4 G/DL (ref 13.3–17.7)
LDH SERPL L TO P-CCNC: 312 U/L (ref 0–250)
LYMPHOCYTES # BLD MANUAL: 55.4 10E3/UL (ref 0.8–5.3)
LYMPHOCYTES NFR BLD MANUAL: 82 %
MCH RBC QN AUTO: 30.8 PG (ref 26.5–33)
MCHC RBC AUTO-ENTMCNC: 34.7 G/DL (ref 31.5–36.5)
MCV RBC AUTO: 89 FL (ref 78–100)
MONOCYTES # BLD MANUAL: 6.8 10E3/UL (ref 0–1.3)
MONOCYTES NFR BLD MANUAL: 10 %
NEUTROPHILS # BLD MANUAL: 5.4 10E3/UL (ref 1.6–8.3)
NEUTROPHILS NFR BLD MANUAL: 8 %
NRBC # BLD AUTO: 0 10E3/UL
NRBC BLD AUTO-RTO: 0 /100
PLAT MORPH BLD: ABNORMAL
PLATELET # BLD AUTO: 153 10E3/UL (ref 150–450)
POTASSIUM SERPL-SCNC: 4.2 MMOL/L (ref 3.4–5.3)
PROT SERPL-MCNC: 7 G/DL (ref 6.4–8.3)
RBC # BLD AUTO: 4.67 10E6/UL (ref 4.4–5.9)
RBC MORPH BLD: ABNORMAL
RETICS # AUTO: 0.05 10E6/UL (ref 0.03–0.1)
RETICS/RBC NFR AUTO: 1.1 % (ref 0.5–2)
SODIUM SERPL-SCNC: 140 MMOL/L (ref 135–145)
URATE SERPL-MCNC: 6.5 MG/DL (ref 3.4–7)
VARIANT LYMPHS BLD QL SMEAR: PRESENT
WBC # BLD AUTO: 67.6 10E3/UL (ref 4–11)

## 2024-04-29 PROCEDURE — 99205 OFFICE O/P NEW HI 60 MIN: CPT | Performed by: INTERNAL MEDICINE

## 2024-04-29 PROCEDURE — 85025 COMPLETE CBC W/AUTO DIFF WBC: CPT | Performed by: INTERNAL MEDICINE

## 2024-04-29 PROCEDURE — 88184 FLOWCYTOMETRY/ TC 1 MARKER: CPT | Performed by: STUDENT IN AN ORGANIZED HEALTH CARE EDUCATION/TRAINING PROGRAM

## 2024-04-29 PROCEDURE — 80053 COMPREHEN METABOLIC PANEL: CPT | Performed by: INTERNAL MEDICINE

## 2024-04-29 PROCEDURE — 88271 CYTOGENETICS DNA PROBE: CPT | Performed by: INTERNAL MEDICINE

## 2024-04-29 PROCEDURE — 99213 OFFICE O/P EST LOW 20 MIN: CPT | Performed by: INTERNAL MEDICINE

## 2024-04-29 PROCEDURE — 88184 FLOWCYTOMETRY/ TC 1 MARKER: CPT | Performed by: INTERNAL MEDICINE

## 2024-04-29 PROCEDURE — 88275 CYTOGENETICS 100-300: CPT | Performed by: INTERNAL MEDICINE

## 2024-04-29 PROCEDURE — 88185 FLOWCYTOMETRY/TC ADD-ON: CPT | Performed by: INTERNAL MEDICINE

## 2024-04-29 PROCEDURE — 88368 INSITU HYBRIDIZATION MANUAL: CPT | Mod: 26 | Performed by: MEDICAL GENETICS

## 2024-04-29 PROCEDURE — G2211 COMPLEX E/M VISIT ADD ON: HCPCS | Performed by: INTERNAL MEDICINE

## 2024-04-29 PROCEDURE — 85007 BL SMEAR W/DIFF WBC COUNT: CPT | Performed by: INTERNAL MEDICINE

## 2024-04-29 PROCEDURE — 83615 LACTATE (LD) (LDH) ENZYME: CPT | Performed by: INTERNAL MEDICINE

## 2024-04-29 PROCEDURE — 84550 ASSAY OF BLOOD/URIC ACID: CPT | Performed by: INTERNAL MEDICINE

## 2024-04-29 PROCEDURE — 88369 M/PHMTRC ALYSISHQUANT/SEMIQ: CPT | Mod: 26 | Performed by: MEDICAL GENETICS

## 2024-04-29 PROCEDURE — 88189 FLOWCYTOMETRY/READ 16 & >: CPT | Mod: GC | Performed by: STUDENT IN AN ORGANIZED HEALTH CARE EDUCATION/TRAINING PROGRAM

## 2024-04-29 PROCEDURE — 36415 COLL VENOUS BLD VENIPUNCTURE: CPT | Performed by: INTERNAL MEDICINE

## 2024-04-29 PROCEDURE — 85045 AUTOMATED RETICULOCYTE COUNT: CPT | Performed by: INTERNAL MEDICINE

## 2024-04-29 RX ORDER — DOXYCYCLINE 100 MG/1
100 CAPSULE ORAL 2 TIMES DAILY
Qty: 20 CAPSULE | Refills: 1 | Status: SHIPPED | OUTPATIENT
Start: 2024-04-29 | End: 2024-08-02

## 2024-04-29 RX ORDER — CEFADROXIL 500 MG/1
500 CAPSULE ORAL 2 TIMES DAILY
Qty: 20 CAPSULE | Refills: 1 | Status: SHIPPED | OUTPATIENT
Start: 2024-04-29 | End: 2024-08-02

## 2024-04-29 ASSESSMENT — PAIN SCALES - GENERAL: PAINLEVEL: NO PAIN (1)

## 2024-04-29 NOTE — PROGRESS NOTES
"Oncology Rooming Note    April 29, 2024 1:51 PM   Emery Barkley is a 53 year old male who presents for:    Chief Complaint   Patient presents with    Hematology      Leukocytosis, unspecified type - New consult     Initial Vitals: /89 (BP Location: Right arm, Patient Position: Sitting, Cuff Size: Adult Large)   Pulse 85   Temp 98.3  F (36.8  C) (Tympanic)   Resp 16   Ht 1.778 m (5' 10\")   Wt 93 kg (205 lb)   SpO2 96%   BMI 29.41 kg/m   Estimated body mass index is 29.41 kg/m  as calculated from the following:    Height as of this encounter: 1.778 m (5' 10\").    Weight as of this encounter: 93 kg (205 lb). Body surface area is 2.14 meters squared.  No Pain (1) Comment: Data Unavailable   No LMP for male patient.  Allergies reviewed: Yes  Medications reviewed: Yes    Medications: Medication refills not needed today.  Pharmacy name entered into [facility]:    [id number] - [place], [place] - [address] [facility] - [place], [place] - [address] [facility] - [place], [place] - [address] [facility]    Frailty Screening:   Is the patient here for a new oncology consult visit in cancer care? 2. No      Clinical concerns:  New consult      Nancie Roldan CMA            "

## 2024-04-29 NOTE — LETTER
"    4/29/2024         RE: Emery Barkley  [address] [facility] [address]      Oncology Rooming Note    April 29, 2024 1:51 PM   Emery Barkley is a 53 year old male who presents for:    Chief Complaint   Patient presents with     Hematology      Leukocytosis, unspecified type - New consult     Initial Vitals: /89 (BP Location: Right arm, Patient Position: Sitting, Cuff Size: Adult Large)   Pulse 85   Temp 98.3  F (36.8  C) (Tympanic)   Resp 16   Ht 1.778 m (5' 10\")   Wt 93 kg (205 lb)   SpO2 96%   BMI 29.41 kg/m   Estimated body mass index is 29.41 kg/m  as calculated from the following:    Height as of this encounter: 1.778 m (5' 10\").    Weight as of this encounter: 93 kg (205 lb). Body surface area is 2.14 meters squared.  No Pain (1) Comment: Data Unavailable   No LMP for male patient.  Allergies reviewed: Yes  Medications reviewed: Yes    Medications: Medication refills not needed today.  Pharmacy name entered into [employer]:    [facility] - [place], [place] - [address] [facility] - [place], [place] - [address] [facility] - [place], [place] - [address] [facility]    Frailty Screening:   Is the patient here for a new oncology consult visit in cancer care? 2. No      Clinical concerns:  New consult      Nancie Roldan [facility] Hematology and Oncology Consult Note    Patient: Emery Barkley  MRN: [id number]  Date of Service: Apr 29, 2024       Reason for Visit    I was consulted by     Madhu Mcfarland MD         ECOG Performance  ECOG = 0    Problem List Items Addressed This Visit       CLL (chronic lymphocytic leukemia) (H) - Primary    Relevant Medications    cefadroxil (DURICEF) 500 MG capsule    doxycycline hyclate (VIBRAMYCIN) 100 MG capsule    Other Relevant Orders    Flow Cytometry    FISH With Professional Interpretation    Lactate Dehydrogenase    Lab Blood Morphology Pathologist Review    CBC with Platelets & " Differential    Uric acid    Comprehensive metabolic panel (BMP + Alb, Alk Phos, ALT, AST, Total. Bili, TP)    PET Oncology (Eyes to Thighs)   Patient most certainly has chronic lymphocytic leukemia.  In view of his age, after the workup as listed above he will be candidate for definitive treatment with obinutuzumab and venetoclax.  Follow-up as planned after PET CT scan is completed          ______________________________________________________________________________    Staging History  Cancer Staging   Stage to be determined after PET CT resulted..        History of Present Illness    Mr. Emery Barkley is a 53 year old man who has generally been in good health.  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  He noticed swelling in his right middle finger.  Over a day this progressed to swelling of his entire hand.  He was seen in urgent care and placed on antibiotics with Duricef and Vibramycin.  A routine CBC was performed and no white count was found to be 124,000 absolute lymphocyte count was 88.5.  The remainder of the CBC was within normal limits.  While on antibiotics, the swelling of his right middle finger and hand has mostly resolved.    Otherwise patient feels well.  He is maintaining his weight.  His appetite and digestion are normal.  He does not have chills, fevers or sweats.  He does not have cough, chest pain or shortness of breath.  He has no change in bowel or bladder habit.  He can go about his daily activities without difficulty.    He does not smoke cigarettes or use alcohol to excess.  He is a .  He lives with his wife and 2 children.    Medications:    Current Outpatient Medications   Medication Sig Dispense Refill     atorvastatin (LIPITOR) 20 MG tablet Take 1 tablet (20 mg) by mouth daily Hold on file until needed. 90 tablet 3     cefadroxil (DURICEF) 500 MG capsule Take 1 capsule (500 mg) by mouth 2 times daily 20 capsule 1     doxycycline hyclate  (VIBRAMYCIN) 100 MG capsule Take 1 capsule (100 mg) by mouth 2 times daily 20 capsule 1     cefadroxil (DURICEF) 500 MG capsule Take 500 mg by mouth (Patient not taking: Reported on 4/29/2024)       doxycycline hyclate (VIBRAMYCIN) 100 MG capsule Take 100 mg by mouth (Patient not taking: Reported on 4/29/2024)       fluorouracil (EFUDEX) 5 % external cream Apply small amount at bedtime to warts, if skin irritation stop using. (Patient not taking: Reported on 4/29/2024) 40 g 0     No current facility-administered medications for this visit.           Past History    Past Medical History:   Diagnosis Date     Hyperlipidemia      Past Surgical History:   Procedure Laterality Date     ABDOMEN SURGERY  2/7/19    umbilical hernia     HERNIORRHAPHY UMBILICAL N/A 2/7/2019    Procedure: HERNIORRHAPHY UMBILICAL;  Surgeon: Tej Beaulieu DO;  Location: [place] OR     OPTICAL TRACKING SYSTEM ENDOSCOPIC SINUS SURGERY Bilateral 7/24/2023    Procedure: SINUS SURGERY, ENDOSCOPIC, USING OPTICAL TRACKING SYSTEM - all sinuses;  Surgeon: Mario Gamboa MD;  Location: [facility] OR     SEPTOPLASTY, TURBINOPLASTY, COMBINED Bilateral 7/24/2023    Procedure: SEPTOPLASTY; bilateral inferior turbinate reduction;  Surgeon: Mario Gamboa MD;  Location: [facility] OR     VASECTOMY       No Known Allergies  Family History   Problem Relation Age of Onset     C.A.D. Father         MI, chf, first mi in 50s.     Coronary Artery Disease Father      Hyperlipidemia Father      Cancer Sister         brain     Other Cancer Sister         Brain Cancer     Lipids Mother      Hyperlipidemia Mother      Depression Mother      Social History     Socioeconomic History     Marital status:    Tobacco Use     Smoking status: Never     Passive exposure: Never     Smokeless tobacco: Never   Vaping Use     Vaping status: Never Used   Substance and Sexual Activity     Alcohol use: Yes     Comment: 2 drinks per week     Drug use: No     Sexual activity: Yes      "Partners: Female     Birth control/protection: Male Surgical   Other Topics Concern     Parent/sibling w/ CABG, MI or angioplasty before 65F 55M? Yes     Social Determinants of Health     Interpersonal Safety: Low Risk  (4/23/2024)    Interpersonal Safety      Do you feel physically and emotionally safe where you currently live?: Yes      Within the past 12 months, have you been hit, slapped, kicked or otherwise physically hurt by someone?: No      Within the past 12 months, have you been humiliated or emotionally abused in other ways by your partner or ex-partner?: No         Physical Exam    /89 (BP Location: Right arm, Patient Position: Sitting, Cuff Size: Adult Large)   Pulse 85   Temp 98.3  F (36.8  C) (Tympanic)   Resp 16   Ht 1.778 m (5' 10\")   Wt 93 kg (205 lb)   SpO2 96%   BMI 29.41 kg/m      GENERAL APPEARANCE: Healthy appearing man in no apparent distress.  HEAD: Atraumatic; normocephalic; without lesions.  EYES: Conjunctiva, corneas and eyelids normal; pupils equal, round, reactive to light; No Icterus.  MOUTH/OROPHARYNX: oral mucosa intact  NECK: Supple with no nodes.  LUNGS:  Clear to auscultation and percussion with no extra sounds.  HEART: Regular rhythm and rate; S1 and S2 normal; no murmurs noted.  ABDOMEN: Soft; no masses or tenderness, no hepatosplenomegaly.  NEUROLOGIC: Alert and oriented.  No obvious focal findings.  EXTREMITIES: No cyanosis, or edema.  SKIN: No abnormal bruising or bleeding. No suspicious lesions noted on exposed skin.  PSYCHIATRIC: Mental status normal; no apparent psychiatric issues  No cervical, axillary or inguinal lymphadenopathy noted        Lab Results    Recent Results (from the past 240 hour(s))   CBC with platelets and differential    Collection Time: 04/23/24  4:05 PM   Result Value Ref Range    WBC Count 94.2 (HH) 4.0 - 11.0 10e3/uL    RBC Count 4.80 4.40 - 5.90 10e6/uL    Hemoglobin 14.5 13.3 - 17.7 g/dL    Hematocrit 43.1 40.0 - 53.0 %    MCV 90 78 - " 100 fL    MCH 30.2 26.5 - 33.0 pg    MCHC 33.6 31.5 - 36.5 g/dL    RDW 13.0 10.0 - 15.0 %    Platelet Count 193 150 - 450 10e3/uL    % Neutrophils 4 %    % Lymphocytes 87 %    % Monocytes 9 %    % Eosinophils 0 %    % Basophils 0 %    % Immature Granulocytes 0 %    NRBCs per 100 WBC 0 <1 /100    Absolute Neutrophils 3.6 1.6 - 8.3 10e3/uL    Absolute Lymphocytes 82.0 (H) 0.8 - 5.3 10e3/uL    Absolute Monocytes 8.1 (H) 0.0 - 1.3 10e3/uL    Absolute Eosinophils 0.1 0.0 - 0.7 10e3/uL    Absolute Basophils 0.4 (H) 0.0 - 0.2 10e3/uL    Absolute Immature Granulocytes 0.1 <=0.4 10e3/uL    Absolute NRBCs 0.0 10e3/uL   Manual Differential    Collection Time: 04/23/24  4:05 PM   Result Value Ref Range    % Neutrophils 6 %    % Lymphocytes 94 %    % Monocytes 0 %    % Eosinophils 0 %    % Basophils 0 %    Absolute Neutrophils 5.7 1.6 - 8.3 10e3/uL    Absolute Lymphocytes 88.5 (H) 0.8 - 5.3 10e3/uL    Absolute Monocytes 0.0 0.0 - 1.3 10e3/uL    Absolute Eosinophils 0.0 0.0 - 0.7 10e3/uL    Absolute Basophils 0.0 0.0 - 0.2 10e3/uL    RBC Morphology Confirmed RBC Indices     Platelet Assessment  Automated Count Confirmed. Platelet morphology is normal.     Automated Count Confirmed. Platelet morphology is normal.    Smudge Cells Present (A) None Seen       Imaging Results    DX Hand Right 3+ Views    Result Date: 4/19/2024  EXAM: DX HAND RIGHT 3+ VIEWS FINDINGS: 3 images of the right hand are interpreted without comparison. There are no acute fractures or dislocations. No focal soft tissue swelling. No radiopaque foreign bodies.    No acute osseous abnormality of the right hand.    XR Finger Right G/E 2 Views    Result Date: 4/14/2024  EXAM: XR FINGER RIGHT G/E 2 VIEWS LOCATION: [facility] DATE: 04/14/2024 INDICATION: Swelling and pain of right middle finger. COMPARISON: None.     IMPRESSION: Anatomic alignment right long finger. No acute displaced long finger fracture. Long finger soft tissue swelling  is more pronounced proximal. No significant long finger joint space narrowing. Chronic corticated ossicle is present along the volar base of the middle phalanx ring finger at the PIP joint, likely the sequelae of chronic trauma. No radiopaque soft tissue foreign body.      I spent 60 minutes on the patient's visit today.  This included preparation for the visit, face-to-face time with the patient and documentation following the visit.  It did not include teaching or procedure time.    Signed by: Peter E. Friedell, MD Peter E. Friedell, MD

## 2024-04-29 NOTE — LETTER
"    4/29/2024         RE: Emery Barkley  [address] [address] [address]        Dear Colleague,    Thank you for referring your patient, Emery Barkley, to the [facility]. Please see a copy of my visit note below.    Oncology Rooming Note    April 29, 2024 1:51 PM   Emery Barkley is a 53 year old male who presents for:    Chief Complaint   Patient presents with     Hematology      Leukocytosis, unspecified type - New consult     Initial Vitals: /89 (BP Location: Right arm, Patient Position: Sitting, Cuff Size: Adult Large)   Pulse 85   Temp 98.3  F (36.8  C) (Tympanic)   Resp 16   Ht 1.778 m (5' 10\")   Wt 93 kg (205 lb)   SpO2 96%   BMI 29.41 kg/m   Estimated body mass index is 29.41 kg/m  as calculated from the following:    Height as of this encounter: 1.778 m (5' 10\").    Weight as of this encounter: 93 kg (205 lb). Body surface area is 2.14 meters squared.  No Pain (1) Comment: Data Unavailable   No LMP for male patient.  Allergies reviewed: Yes  Medications reviewed: Yes    Medications: Medication refills not needed today.  Pharmacy name entered into [employer]:    [facility] - [place], [place] - [address] [facility] - [place], [place] - [address] [facility] [place] [place] [facility] [address] [facility]    Frailty Screening:   Is the patient here for a new oncology consult visit in cancer care? 2. No      Clinical concerns:  New consult      Nancie Roldan CMA              [facility] Hematology and Oncology Consult Note    Patient: Emery Barkley  MRN: [id number]  Date of Service: Apr 29, 2024       Reason for Visit    I was consulted by     Madhu Mcfarland MD         ECOG Performance  ECOG = 0    Problem List Items Addressed This Visit       CLL (chronic lymphocytic leukemia) (H) - Primary    Relevant Medications    cefadroxil (DURICEF) 500 MG capsule    doxycycline hyclate (VIBRAMYCIN) 100 MG capsule    Other " Relevant Orders    Flow Cytometry    FISH With Professional Interpretation    Lactate Dehydrogenase    Lab Blood Morphology Pathologist Review    CBC with Platelets & Differential    Uric acid    Comprehensive metabolic panel (BMP + Alb, Alk Phos, ALT, AST, Total. Bili, TP)    PET Oncology (Eyes to Thighs)   Patient most certainly has chronic lymphocytic leukemia.  In view of his age, after the workup as listed above he will be candidate for definitive treatment with obinutuzumab and venetoclax.  Follow-up as planned after PET CT scan is completed          ______________________________________________________________________________    Staging History  Cancer Staging   Stage to be determined after PET CT resulted..        History of Present Illness    Mr. Emery Barkley is a 53 year old man who has generally been in good health.  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  He noticed swelling in his right middle finger.  Over a day this progressed to swelling of his entire hand.  He was seen in urgent care and placed on antibiotics with Duricef and Vibramycin.  A routine CBC was performed and no white count was found to be 124,000 absolute lymphocyte count was 88.5.  The remainder of the CBC was within normal limits.  While on antibiotics, the swelling of his right middle finger and hand has mostly resolved.    Otherwise patient feels well.  He is maintaining his weight.  His appetite and digestion are normal.  He does not have chills, fevers or sweats.  He does not have cough, chest pain or shortness of breath.  He has no change in bowel or bladder habit.  He can go about his daily activities without difficulty.    He does not smoke cigarettes or use alcohol to excess.  He is a .  He lives with his wife and 2 children.    Medications:    Current Outpatient Medications   Medication Sig Dispense Refill     atorvastatin (LIPITOR) 20 MG tablet Take 1 tablet (20 mg) by mouth daily Hold  on file until needed. 90 tablet 3     cefadroxil (DURICEF) 500 MG capsule Take 1 capsule (500 mg) by mouth 2 times daily 20 capsule 1     doxycycline hyclate (VIBRAMYCIN) 100 MG capsule Take 1 capsule (100 mg) by mouth 2 times daily 20 capsule 1     cefadroxil (DURICEF) 500 MG capsule Take 500 mg by mouth (Patient not taking: Reported on 4/29/2024)       doxycycline hyclate (VIBRAMYCIN) 100 MG capsule Take 100 mg by mouth (Patient not taking: Reported on 4/29/2024)       fluorouracil (EFUDEX) 5 % external cream Apply small amount at bedtime to warts, if skin irritation stop using. (Patient not taking: Reported on 4/29/2024) 40 g 0     No current facility-administered medications for this visit.           Past History    Past Medical History:   Diagnosis Date     Hyperlipidemia      Past Surgical History:   Procedure Laterality Date     ABDOMEN SURGERY  2/7/19    umbilical hernia     HERNIORRHAPHY UMBILICAL N/A 2/7/2019    Procedure: HERNIORRHAPHY UMBILICAL;  Surgeon: Tej Beaulieu DO;  Location: [facility] OR     OPTICAL TRACKING SYSTEM ENDOSCOPIC SINUS SURGERY Bilateral 7/24/2023    Procedure: SINUS SURGERY, ENDOSCOPIC, USING OPTICAL TRACKING SYSTEM - all sinuses;  Surgeon: Mario Gamboa MD;  Location: [facility] OR     SEPTOPLASTY, TURBINOPLASTY, COMBINED Bilateral 7/24/2023    Procedure: SEPTOPLASTY; bilateral inferior turbinate reduction;  Surgeon: Mario Gamboa MD;  Location: [facility] OR     VASECTOMY       No Known Allergies  Family History   Problem Relation Age of Onset     C.A.D. Father         MI, chf, first mi in 50s.     Coronary Artery Disease Father      Hyperlipidemia Father      Cancer Sister         brain     Other Cancer Sister         Brain Cancer     Lipids Mother      Hyperlipidemia Mother      Depression Mother      Social History     Socioeconomic History     Marital status:    Tobacco Use     Smoking status: Never     Passive exposure: Never     Smokeless tobacco: Never   Vaping Use  "    Vaping status: Never Used   Substance and Sexual Activity     Alcohol use: Yes     Comment: 2 drinks per week     Drug use: No     Sexual activity: Yes     Partners: Female     Birth control/protection: Male Surgical   Other Topics Concern     Parent/sibling w/ CABG, MI or angioplasty before 65F 55M? Yes     Social Determinants of Health     Interpersonal Safety: Low Risk  (4/23/2024)    Interpersonal Safety      Do you feel physically and emotionally safe where you currently live?: Yes      Within the past 12 months, have you been hit, slapped, kicked or otherwise physically hurt by someone?: No      Within the past 12 months, have you been humiliated or emotionally abused in other ways by your partner or ex-partner?: No         Physical Exam    /89 (BP Location: Right arm, Patient Position: Sitting, Cuff Size: Adult Large)   Pulse 85   Temp 98.3  F (36.8  C) (Tympanic)   Resp 16   Ht 1.778 m (5' 10\")   Wt 93 kg (205 lb)   SpO2 96%   BMI 29.41 kg/m      GENERAL APPEARANCE: Healthy appearing man in no apparent distress.  HEAD: Atraumatic; normocephalic; without lesions.  EYES: Conjunctiva, corneas and eyelids normal; pupils equal, round, reactive to light; No Icterus.  MOUTH/OROPHARYNX: oral mucosa intact  NECK: Supple with no nodes.  LUNGS:  Clear to auscultation and percussion with no extra sounds.  HEART: Regular rhythm and rate; S1 and S2 normal; no murmurs noted.  ABDOMEN: Soft; no masses or tenderness, no hepatosplenomegaly.  NEUROLOGIC: Alert and oriented.  No obvious focal findings.  EXTREMITIES: No cyanosis, or edema.  SKIN: No abnormal bruising or bleeding. No suspicious lesions noted on exposed skin.  PSYCHIATRIC: Mental status normal; no apparent psychiatric issues  No cervical, axillary or inguinal lymphadenopathy noted        Lab Results    Recent Results (from the past 240 hour(s))   CBC with platelets and differential    Collection Time: 04/23/24  4:05 PM   Result Value Ref Range    " WBC Count 94.2 (HH) 4.0 - 11.0 10e3/uL    RBC Count 4.80 4.40 - 5.90 10e6/uL    Hemoglobin 14.5 13.3 - 17.7 g/dL    Hematocrit 43.1 40.0 - 53.0 %    MCV 90 78 - 100 fL    MCH 30.2 26.5 - 33.0 pg    MCHC 33.6 31.5 - 36.5 g/dL    RDW 13.0 10.0 - 15.0 %    Platelet Count 193 150 - 450 10e3/uL    % Neutrophils 4 %    % Lymphocytes 87 %    % Monocytes 9 %    % Eosinophils 0 %    % Basophils 0 %    % Immature Granulocytes 0 %    NRBCs per 100 WBC 0 <1 /100    Absolute Neutrophils 3.6 1.6 - 8.3 10e3/uL    Absolute Lymphocytes 82.0 (H) 0.8 - 5.3 10e3/uL    Absolute Monocytes 8.1 (H) 0.0 - 1.3 10e3/uL    Absolute Eosinophils 0.1 0.0 - 0.7 10e3/uL    Absolute Basophils 0.4 (H) 0.0 - 0.2 10e3/uL    Absolute Immature Granulocytes 0.1 <=0.4 10e3/uL    Absolute NRBCs 0.0 10e3/uL   Manual Differential    Collection Time: 04/23/24  4:05 PM   Result Value Ref Range    % Neutrophils 6 %    % Lymphocytes 94 %    % Monocytes 0 %    % Eosinophils 0 %    % Basophils 0 %    Absolute Neutrophils 5.7 1.6 - 8.3 10e3/uL    Absolute Lymphocytes 88.5 (H) 0.8 - 5.3 10e3/uL    Absolute Monocytes 0.0 0.0 - 1.3 10e3/uL    Absolute Eosinophils 0.0 0.0 - 0.7 10e3/uL    Absolute Basophils 0.0 0.0 - 0.2 10e3/uL    RBC Morphology Confirmed RBC Indices     Platelet Assessment  Automated Count Confirmed. Platelet morphology is normal.     Automated Count Confirmed. Platelet morphology is normal.    Smudge Cells Present (A) None Seen       Imaging Results    DX Hand Right 3+ Views    Result Date: 4/19/2024  EXAM: DX HAND RIGHT 3+ VIEWS FINDINGS: 3 images of the right hand are interpreted without comparison. There are no acute fractures or dislocations. No focal soft tissue swelling. No radiopaque foreign bodies.    No acute osseous abnormality of the right hand.    XR Finger Right G/E 2 Views    Result Date: 4/14/2024  EXAM: XR FINGER RIGHT G/E 2 VIEWS LOCATION: [facility] DATE: 04/14/2024 INDICATION: Swelling and pain of right  middle finger. COMPARISON: None.     IMPRESSION: Anatomic alignment right long finger. No acute displaced long finger fracture. Long finger soft tissue swelling is more pronounced proximal. No significant long finger joint space narrowing. Chronic corticated ossicle is present along the volar base of the middle phalanx ring finger at the PIP joint, likely the sequelae of chronic trauma. No radiopaque soft tissue foreign body.      I spent 60 minutes on the patient's visit today.  This included preparation for the visit, face-to-face time with the patient and documentation following the visit.  It did not include teaching or procedure time.    Signed by: Peter E. Friedell, MD      Again, thank you for allowing me to participate in the care of your patient.        Sincerely,        Peter E. Friedell, MD

## 2024-04-29 NOTE — PROGRESS NOTES
[facility] Hematology and Oncology Consult Note    Patient: Emery Barkley  MRN: [id number]  Date of Service: Apr 29, 2024       Reason for Visit    I was consulted by     Madhu Mcfarland MD         ECOG Performance  ECOG = 0    Problem List Items Addressed This Visit       CLL (chronic lymphocytic leukemia) (H) - Primary    Relevant Medications    cefadroxil (DURICEF) 500 MG capsule    doxycycline hyclate (VIBRAMYCIN) 100 MG capsule    Other Relevant Orders    Flow Cytometry    FISH With Professional Interpretation    Lactate Dehydrogenase    Lab Blood Morphology Pathologist Review    CBC with Platelets & Differential    Uric acid    Comprehensive metabolic panel (BMP + Alb, Alk Phos, ALT, AST, Total. Bili, TP)    PET Oncology (Eyes to Thighs)   Patient most certainly has chronic lymphocytic leukemia.  In view of his age, after the workup as listed above he will be candidate for definitive treatment with obinutuzumab and venetoclax.  Follow-up as planned after PET CT scan is completed          ______________________________________________________________________________    Staging History  Cancer Staging   Stage to be determined after PET CT resulted..        History of Present Illness    Mr. Emery Barkley is a 53 year old man who has generally been in good health.  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  He noticed swelling in his right middle finger.  Over a day this progressed to swelling of his entire hand.  He was seen in urgent care and placed on antibiotics with Duricef and Vibramycin.  A routine CBC was performed and no white count was found to be 124,000 absolute lymphocyte count was 88.5.  The remainder of the CBC was within normal limits.  While on antibiotics, the swelling of his right middle finger and hand has mostly resolved.    Otherwise patient feels well.  He is maintaining his weight.  His appetite and digestion are normal.  He does not have chills, fevers or sweats.  He  does not have cough, chest pain or shortness of breath.  He has no change in bowel or bladder habit.  He can go about his daily activities without difficulty.    He does not smoke cigarettes or use alcohol to excess.  He is a .  He lives with his wife and 2 children.    Medications:    Current Outpatient Medications   Medication Sig Dispense Refill    atorvastatin (LIPITOR) 20 MG tablet Take 1 tablet (20 mg) by mouth daily Hold on file until needed. 90 tablet 3    cefadroxil (DURICEF) 500 MG capsule Take 1 capsule (500 mg) by mouth 2 times daily 20 capsule 1    doxycycline hyclate (VIBRAMYCIN) 100 MG capsule Take 1 capsule (100 mg) by mouth 2 times daily 20 capsule 1    cefadroxil (DURICEF) 500 MG capsule Take 500 mg by mouth (Patient not taking: Reported on 4/29/2024)      doxycycline hyclate (VIBRAMYCIN) 100 MG capsule Take 100 mg by mouth (Patient not taking: Reported on 4/29/2024)      fluorouracil (EFUDEX) 5 % external cream Apply small amount at bedtime to warts, if skin irritation stop using. (Patient not taking: Reported on 4/29/2024) 40 g 0     No current facility-administered medications for this visit.           Past History    Past Medical History:   Diagnosis Date    Hyperlipidemia      Past Surgical History:   Procedure Laterality Date    ABDOMEN SURGERY  2/7/19    umbilical hernia    HERNIORRHAPHY UMBILICAL N/A 2/7/2019    Procedure: HERNIORRHAPHY UMBILICAL;  Surgeon: Tej Beaulieu DO;  Location: [facility] OR    OPTICAL TRACKING SYSTEM ENDOSCOPIC SINUS SURGERY Bilateral 7/24/2023    Procedure: SINUS SURGERY, ENDOSCOPIC, USING OPTICAL TRACKING SYSTEM - all sinuses;  Surgeon: Mario Gamboa MD;  Location: [facility] OR    SEPTOPLASTY, TURBINOPLASTY, COMBINED Bilateral 7/24/2023    Procedure: SEPTOPLASTY; bilateral inferior turbinate reduction;  Surgeon: Mario Gamboa MD;  Location: [facility] OR    VASECTOMY       No Known Allergies  Family History   Problem Relation Age of Onset  "   C.A.D. Father         MI, chf, first mi in 50s.    Coronary Artery Disease Father     Hyperlipidemia Father     Cancer Sister         brain    Other Cancer Sister         Brain Cancer    Lipids Mother     Hyperlipidemia Mother     Depression Mother      Social History     Socioeconomic History    Marital status:    Tobacco Use    Smoking status: Never     Passive exposure: Never    Smokeless tobacco: Never   Vaping Use    Vaping status: Never Used   Substance and Sexual Activity    Alcohol use: Yes     Comment: 2 drinks per week    Drug use: No    Sexual activity: Yes     Partners: Female     Birth control/protection: Male Surgical   Other Topics Concern    Parent/sibling w/ CABG, MI or angioplasty before 65F 55M? Yes     Social Determinants of Health     Interpersonal Safety: Low Risk  (4/23/2024)    Interpersonal Safety     Do you feel physically and emotionally safe where you currently live?: Yes     Within the past 12 months, have you been hit, slapped, kicked or otherwise physically hurt by someone?: No     Within the past 12 months, have you been humiliated or emotionally abused in other ways by your partner or ex-partner?: No         Physical Exam    /89 (BP Location: Right arm, Patient Position: Sitting, Cuff Size: Adult Large)   Pulse 85   Temp 98.3  F (36.8  C) (Tympanic)   Resp 16   Ht 1.778 m (5' 10\")   Wt 93 kg (205 lb)   SpO2 96%   BMI 29.41 kg/m      GENERAL APPEARANCE: Healthy appearing man in no apparent distress.  HEAD: Atraumatic; normocephalic; without lesions.  EYES: Conjunctiva, corneas and eyelids normal; pupils equal, round, reactive to light; No Icterus.  MOUTH/OROPHARYNX: oral mucosa intact  NECK: Supple with no nodes.  LUNGS:  Clear to auscultation and percussion with no extra sounds.  HEART: Regular rhythm and rate; S1 and S2 normal; no murmurs noted.  ABDOMEN: Soft; no masses or tenderness, no hepatosplenomegaly.  NEUROLOGIC: Alert and oriented.  No obvious focal " findings.  EXTREMITIES: No cyanosis, or edema.  SKIN: No abnormal bruising or bleeding. No suspicious lesions noted on exposed skin.  PSYCHIATRIC: Mental status normal; no apparent psychiatric issues  No cervical, axillary or inguinal lymphadenopathy noted        Lab Results    Recent Results (from the past 240 hour(s))   CBC with platelets and differential    Collection Time: 04/23/24  4:05 PM   Result Value Ref Range    WBC Count 94.2 (HH) 4.0 - 11.0 10e3/uL    RBC Count 4.80 4.40 - 5.90 10e6/uL    Hemoglobin 14.5 13.3 - 17.7 g/dL    Hematocrit 43.1 40.0 - 53.0 %    MCV 90 78 - 100 fL    MCH 30.2 26.5 - 33.0 pg    MCHC 33.6 31.5 - 36.5 g/dL    RDW 13.0 10.0 - 15.0 %    Platelet Count 193 150 - 450 10e3/uL    % Neutrophils 4 %    % Lymphocytes 87 %    % Monocytes 9 %    % Eosinophils 0 %    % Basophils 0 %    % Immature Granulocytes 0 %    NRBCs per 100 WBC 0 <1 /100    Absolute Neutrophils 3.6 1.6 - 8.3 10e3/uL    Absolute Lymphocytes 82.0 (H) 0.8 - 5.3 10e3/uL    Absolute Monocytes 8.1 (H) 0.0 - 1.3 10e3/uL    Absolute Eosinophils 0.1 0.0 - 0.7 10e3/uL    Absolute Basophils 0.4 (H) 0.0 - 0.2 10e3/uL    Absolute Immature Granulocytes 0.1 <=0.4 10e3/uL    Absolute NRBCs 0.0 10e3/uL   Manual Differential    Collection Time: 04/23/24  4:05 PM   Result Value Ref Range    % Neutrophils 6 %    % Lymphocytes 94 %    % Monocytes 0 %    % Eosinophils 0 %    % Basophils 0 %    Absolute Neutrophils 5.7 1.6 - 8.3 10e3/uL    Absolute Lymphocytes 88.5 (H) 0.8 - 5.3 10e3/uL    Absolute Monocytes 0.0 0.0 - 1.3 10e3/uL    Absolute Eosinophils 0.0 0.0 - 0.7 10e3/uL    Absolute Basophils 0.0 0.0 - 0.2 10e3/uL    RBC Morphology Confirmed RBC Indices     Platelet Assessment  Automated Count Confirmed. Platelet morphology is normal.     Automated Count Confirmed. Platelet morphology is normal.    Smudge Cells Present (A) None Seen       Imaging Results    DX Hand Right 3+ Views    Result Date: 4/19/2024  EXAM: DX HAND RIGHT 3+ VIEWS  FINDINGS: 3 images of the right hand are interpreted without comparison. There are no acute fractures or dislocations. No focal soft tissue swelling. No radiopaque foreign bodies.    No acute osseous abnormality of the right hand.    XR Finger Right G/E 2 Views    Result Date: 4/14/2024  EXAM: XR FINGER RIGHT G/E 2 VIEWS LOCATION: [facility] DATE: 04/14/2024 INDICATION: Swelling and pain of right middle finger. COMPARISON: None.     IMPRESSION: Anatomic alignment right long finger. No acute displaced long finger fracture. Long finger soft tissue swelling is more pronounced proximal. No significant long finger joint space narrowing. Chronic corticated ossicle is present along the volar base of the middle phalanx ring finger at the PIP joint, likely the sequelae of chronic trauma. No radiopaque soft tissue foreign body.      I spent 60 minutes on the patient's visit today.  This included preparation for the visit, face-to-face time with the patient and documentation following the visit.  It did not include teaching or procedure time.    Signed by: Peter E. Friedell, MD

## 2024-04-30 NOTE — ED PROVIDER NOTES
ED Provider Note  [facility]      History     Chief Complaint   Patient presents with    Finger     Third digit right hand pain , patient began Augmentin Friday evening , patient states he feels his symptoms are worsening since beginning  ABX , ABX were prescribed via [facility] well visit   Patient does not recall a major injury , he states he does remember a week ago he was cleaning pheasants and is unsure if he was poked by something when cleaning the pheasant       HPI  Emery Barkley is a 53 year old male who presents for right hand middle finger pain and swelling.  Reports that he was cleaning pheasants and using his hands for several days.  He was cleaning presents and is unsure if he had something poke into his finger as his finger began swelling 2 days ago and it seems to be mildly improved.  He was started on Augmentin antibiotics.  Patient is able to move his hand and fingers but reports pain and decreased functioning of his right middle finger.  Denies any open wounds of his fingers or hand.          Allergies:  No Known Allergies    Problem List:    Patient Active Problem List    Diagnosis Date Noted    CLL (chronic lymphocytic leukemia) (H) 04/29/2024     Priority: Medium    Chronic pansinusitis 06/09/2023     Priority: Medium     Added automatically from request for surgery [id number]      Nasal polyp 06/09/2023     Priority: Medium     Added automatically from request for surgery [id number]      Nasal septal deviation 06/09/2023     Priority: Medium     Added automatically from request for surgery [id number]      Nasal obstruction 06/09/2023     Priority: Medium     Added automatically from request for surgery [id number]      Nasal turbinate hypertrophy 06/09/2023     Priority: Medium     Added automatically from request for surgery [id number]      Common wart 07/10/2018     Priority: Medium    External hemorrhoid 11/07/2011     Priority: Medium    Hyperlipidemia LDL goal <100 10/26/2010      Priority: Medium    Family history of coronary artery disease 10/26/2010     Priority: Medium        Past Medical History:    Past Medical History:   Diagnosis Date    Hyperlipidemia        Past Surgical History:    Past Surgical History:   Procedure Laterality Date    ABDOMEN SURGERY  2/7/19    umbilical hernia    HERNIORRHAPHY UMBILICAL N/A 2/7/2019    Procedure: HERNIORRHAPHY UMBILICAL;  Surgeon: Tej Beaulieu DO;  Location: [facility] OR    OPTICAL TRACKING SYSTEM ENDOSCOPIC SINUS SURGERY Bilateral 7/24/2023    Procedure: SINUS SURGERY, ENDOSCOPIC, USING OPTICAL TRACKING SYSTEM - all sinuses;  Surgeon: Mario Gamboa MD;  Location: [facility] OR    SEPTOPLASTY, TURBINOPLASTY, COMBINED Bilateral 7/24/2023    Procedure: SEPTOPLASTY; bilateral inferior turbinate reduction;  Surgeon: Mario Gamboa MD;  Location: [facility] OR    VASECTOMY         Family History:    Family History   Problem Relation Age of Onset    C.A.D. Father         MI, chf, first mi in 50s.    Coronary Artery Disease Father     Hyperlipidemia Father     Cancer Sister         brain    Other Cancer Sister         Brain Cancer    Lipids Mother     Hyperlipidemia Mother     Depression Mother        Social History:  Marital Status:   [2]  Social History     Tobacco Use    Smoking status: Never     Passive exposure: Never    Smokeless tobacco: Never   Vaping Use    Vaping status: Never Used   Substance Use Topics    Alcohol use: Yes     Comment: 2 drinks per week    Drug use: No        Medications:    atorvastatin (LIPITOR) 20 MG tablet  cefadroxil (DURICEF) 500 MG capsule  cefadroxil (DURICEF) 500 MG capsule  doxycycline hyclate (VIBRAMYCIN) 100 MG capsule  doxycycline hyclate (VIBRAMYCIN) 100 MG capsule  fluorouracil (EFUDEX) 5 % external cream          Review of Systems  A medically appropriate review of systems was performed with pertinent positives and negatives noted in the HPI, and all other systems negative.    Physical Exam   No data  found.       Physical Exam  Musculoskeletal:      Right wrist: Swelling, tenderness and bony tenderness present. No deformity, effusion, lacerations, snuff box tenderness or crepitus. Decreased range of motion. Normal pulse.      Left wrist: Normal.        Arms:    General: alert and in no acute distress on arrival  Head: atraumatic, normocephalic  Lungs:  nonlabored, CTA bilateral throughout.  CV: Regular rate and rhythm, extremities warm and perfused, brisk capillary refill in all extremities.  Skin: no rashes, no diaphoresis and skin color normal  Neuro: Patient awake, alert, speech is fluent, appropriate historian.  Psychiatric: affect/mood normal, pleasant.      ED Course               Procedures         Narrative & Impression   EXAM: XR FINGER RIGHT G/E 2 VIEWS  LOCATION: [facility]  DATE: 04/14/2024     INDICATION: Swelling and pain of right middle finger.  COMPARISON: None.                                                                      IMPRESSION: Anatomic alignment right long finger. No acute displaced long finger fracture. Long finger soft tissue swelling is more pronounced proximal. No significant long finger joint space narrowing. Chronic corticated ossicle is present along the   volar base of the middle phalanx ring finger at the PIP joint, likely the sequelae of chronic trauma. No radiopaque soft tissue foreign body.                   No results found for this or any previous visit (from the past 24 hour(s)).    MEDICATIONS GIVEN IN THE EMERGENCY DEPARTMENT:  Medications - No data to display             Assessments & Plan (with Medical Decision Making)  53 year old male who presents to the Urgent Care for evaluation of tendinitis of finger of right hand, prednisone burst ordered to decrease inflammation, encourage patient to ice, heat and stay mobile to prevent stiffness.  Advised to return if symptoms worsen despite recommended treatment plan.  X-ray was reviewed  personally, reviewed radiology interpretation both consistent with exam findings.       I have reviewed the nursing notes.    I have reviewed the findings, diagnosis, plan and need for follow up with the patient.        NEW PRESCRIPTIONS STARTED AT TODAY'S ER VISIT  Discharge Medication List as of 4/14/2024  1:19 PM        START taking these medications    Details   predniSONE (DELTASONE) 20 MG tablet Take 1 tablet (20 mg) by mouth 2 times daily for 3 days, Disp-6 tablet, R-0, E-Prescribe             Final diagnoses:   Tendinitis of finger of right hand       4/14/2024   [facility] EMERGENCY DEPT       Ladan Archer, MICHAEL CNP  04/29/24 [id number]

## 2024-05-01 LAB
PATH REPORT.COMMENTS IMP SPEC: ABNORMAL
PATH REPORT.COMMENTS IMP SPEC: YES
PATH REPORT.COMMENTS IMP SPEC: YES
PATH REPORT.FINAL DX SPEC: ABNORMAL
PATH REPORT.FINAL DX SPEC: ABNORMAL
PATH REPORT.MICROSCOPIC SPEC OTHER STN: ABNORMAL
PATH REPORT.RELEVANT HX SPEC: ABNORMAL
PATH REPORT.RELEVANT HX SPEC: ABNORMAL
PATH REPORT.SUPPLEMENTAL REPORTS SPEC: ABNORMAL

## 2024-05-01 PROCEDURE — 99207 BLOOD MORPHOLOGY PATHOLOGIST REVIEW: CPT | Performed by: PATHOLOGY

## 2024-05-02 ENCOUNTER — PATIENT OUTREACH (OUTPATIENT)
Dept: ONCOLOGY | Facility: CLINIC | Age: 54
End: 2024-05-02
Payer: COMMERCIAL

## 2024-05-02 RX ORDER — LORAZEPAM 0.5 MG/1
0.5 TABLET ORAL
COMMUNITY
Start: 2024-05-02 | End: 2024-08-02

## 2024-05-03 ENCOUNTER — HOSPITAL ENCOUNTER (OUTPATIENT)
Dept: PET IMAGING | Facility: HOSPITAL | Age: 54
Discharge: HOME | End: 2024-05-03
Attending: INTERNAL MEDICINE | Admitting: INTERNAL MEDICINE
Payer: COMMERCIAL

## 2024-05-03 DIAGNOSIS — C91.10 CLL (CHRONIC LYMPHOCYTIC LEUKEMIA) (H): ICD-10-CM

## 2024-05-03 LAB — GLUCOSE BLDC GLUCOMTR-MCNC: 91 MG/DL (ref 70–99)

## 2024-05-03 PROCEDURE — 343N000001 HC RX 343: Performed by: INTERNAL MEDICINE

## 2024-05-03 PROCEDURE — A9552 F18 FDG: HCPCS | Performed by: INTERNAL MEDICINE

## 2024-05-03 PROCEDURE — 82962 GLUCOSE BLOOD TEST: CPT

## 2024-05-03 PROCEDURE — 78815 PET IMAGE W/CT SKULL-THIGH: CPT | Mod: PI

## 2024-05-03 RX ORDER — FLUDEOXYGLUCOSE F 18 200 MCI/ML
9-18 INJECTION, SOLUTION INTRAVENOUS ONCE
Status: COMPLETED | OUTPATIENT
Start: 2024-05-03 | End: 2024-05-03

## 2024-05-03 RX ADMIN — FLUDEOXYGLUCOSE F 18 12.13 MILLICURIE: 200 INJECTION, SOLUTION INTRAVENOUS at 09:37

## 2024-05-06 ENCOUNTER — ONCOLOGY VISIT (OUTPATIENT)
Dept: ONCOLOGY | Facility: CLINIC | Age: 54
End: 2024-05-06
Attending: INTERNAL MEDICINE
Payer: COMMERCIAL

## 2024-05-06 VITALS
DIASTOLIC BLOOD PRESSURE: 84 MMHG | TEMPERATURE: 98.2 F | WEIGHT: 200.8 LBS | BODY MASS INDEX: 28.75 KG/M2 | OXYGEN SATURATION: 96 % | HEIGHT: 70 IN | HEART RATE: 81 BPM | SYSTOLIC BLOOD PRESSURE: 135 MMHG

## 2024-05-06 DIAGNOSIS — C91.10 CLL (CHRONIC LYMPHOCYTIC LEUKEMIA) (H): Primary | ICD-10-CM

## 2024-05-06 LAB — CULTURE HARVEST COMPLETE DATE: NORMAL

## 2024-05-06 PROCEDURE — G2211 COMPLEX E/M VISIT ADD ON: HCPCS | Performed by: INTERNAL MEDICINE

## 2024-05-06 PROCEDURE — 99215 OFFICE O/P EST HI 40 MIN: CPT | Performed by: INTERNAL MEDICINE

## 2024-05-06 PROCEDURE — 99214 OFFICE O/P EST MOD 30 MIN: CPT | Performed by: INTERNAL MEDICINE

## 2024-05-06 ASSESSMENT — PAIN SCALES - GENERAL: PAINLEVEL: NO PAIN (0)

## 2024-05-06 NOTE — PROGRESS NOTES
[facility] Hematology and Oncology Consult Note    Patient: Emery Barkley  MRN: [id number]  Date of Service: May 6, 2024       Reason for Visit    I was consulted by     Madhu Mcfarland MD         ECOG Performance  ECOG = 0    Problem List Items Addressed This Visit       CLL (chronic lymphocytic leukemia) (H) - Primary    Relevant Medications    cefadroxil (DURICEF) 500 MG capsule    doxycycline hyclate (VIBRAMYCIN) 100 MG capsule    Other Relevant Orders    Flow Cytometry    FISH With Professional Interpretation    Lactate Dehydrogenase    Lab Blood Morphology Pathologist Review    CBC with Platelets & Differential    Uric acid    Comprehensive metabolic panel (BMP + Alb, Alk Phos, ALT, AST, Total. Bili, TP)    PET Oncology (Eyes to Thighs)       FISH studies returned findings consistent with CLL.  Follow-up as planned on 5/13/2024 with repeat CBC.  Treatment options to be discussed at that visit.      ______________________________________________________________________________    Staging History  Cancer Staging    Cancer Staging   CLL (chronic lymphocytic leukemia) (H)  Staging form: Chronic Lymphocytic Leukemia, AJCC 8th Edition  - Clinical stage from 5/7/2024: Modified Castro Stage II (Modified Castro risk: Intermediate, Lymphocytosis: Present, Adenopathy: Present, Organomegaly: Present, Anemia: Absent, Thrombocytopenia: Absent) - Signed by Friedell, Peter E, MD on 5/7/2024    .        History of Present Illness    Mr. Emery Barkley is a 53 year old man who has generally been in good health.  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  He noticed swelling in his right middle finger.  Over a day this progressed to swelling of his entire hand.  He was seen in urgent care and placed on antibiotics with Duricef and Vibramycin.  A routine CBC was performed and no white count was found to be 124,000 absolute lymphocyte count was 88.5.  The remainder of the CBC was within normal limits.  While on  antibiotics, the swelling of his right middle finger and hand has mostly resolved.    Otherwise patient feels well.  He is maintaining his weight.  His appetite and digestion are normal.  He does not have chills, fevers or sweats.  He does not have cough, chest pain or shortness of breath.  He has no change in bowel or bladder habit.  He can go about his daily activities without difficulty.    He does not smoke cigarettes or use alcohol to excess.  He is a .  He lives with his wife and 2 children.    Update 5/6/2024.  Patient returns for follow-up to review test reports including flow cytometry and PET scan.  PET scan shows lymphadenopathy above and below the diaphragm along with splenomegaly 16.8 cm.  Flow cytometry is consistent with mantle cell lymphoma however FISH studies for T(11:14) translocation are pending.    Medications:    Current Outpatient Medications   Medication Sig Dispense Refill    atorvastatin (LIPITOR) 20 MG tablet Take 1 tablet (20 mg) by mouth daily Hold on file until needed. 90 tablet 3    cefadroxil (DURICEF) 500 MG capsule Take 1 capsule (500 mg) by mouth 2 times daily 20 capsule 1    doxycycline hyclate (VIBRAMYCIN) 100 MG capsule Take 1 capsule (100 mg) by mouth 2 times daily 20 capsule 1    fluorouracil (EFUDEX) 5 % external cream Apply small amount at bedtime to warts, if skin irritation stop using. (Patient not taking: Reported on 4/29/2024) 40 g 0    LORazepam (ATIVAN) 0.5 MG tablet Take 1 tablet (0.5 mg) by mouth Take 1 hour prior to procedure (Patient not taking: Reported on 5/6/2024)       No current facility-administered medications for this visit.           Past History    Past Medical History:   Diagnosis Date    Hyperlipidemia      Past Surgical History:   Procedure Laterality Date    ABDOMEN SURGERY  2/7/19    umbilical hernia    HERNIORRHAPHY UMBILICAL N/A 2/7/2019    Procedure: HERNIORRHAPHY UMBILICAL;  Surgeon: Tej Beaulieu DO;  Location:  "[facility]    OPTICAL TRACKING SYSTEM ENDOSCOPIC SINUS SURGERY Bilateral 7/24/2023    Procedure: SINUS SURGERY, ENDOSCOPIC, USING OPTICAL TRACKING SYSTEM - all sinuses;  Surgeon: Mario Gamboa MD;  Location: [facility] OR    SEPTOPLASTY, TURBINOPLASTY, COMBINED Bilateral 7/24/2023    Procedure: SEPTOPLASTY; bilateral inferior turbinate reduction;  Surgeon: Mario Gamboa MD;  Location: [facility] OR    VASECTOMY       No Known Allergies  Family History   Problem Relation Age of Onset    C.A.D. Father         MI, chf, first mi in 50s.    Coronary Artery Disease Father     Hyperlipidemia Father     Cancer Sister         brain    Other Cancer Sister         Brain Cancer    Lipids Mother     Hyperlipidemia Mother     Depression Mother      Social History     Socioeconomic History    Marital status:    Tobacco Use    Smoking status: Never     Passive exposure: Never    Smokeless tobacco: Never   Vaping Use    Vaping status: Never Used   Substance and Sexual Activity    Alcohol use: Yes     Comment: 2 drinks per week    Drug use: No    Sexual activity: Yes     Partners: Female     Birth control/protection: Male Surgical   Other Topics Concern    Parent/sibling w/ CABG, MI or angioplasty before 65F 55M? Yes     Social Determinants of Health     Interpersonal Safety: Low Risk  (4/23/2024)    Interpersonal Safety     Do you feel physically and emotionally safe where you currently live?: Yes     Within the past 12 months, have you been hit, slapped, kicked or otherwise physically hurt by someone?: No     Within the past 12 months, have you been humiliated or emotionally abused in other ways by your partner or ex-partner?: No         Physical Exam    /84 (BP Location: Right arm, Patient Position: Sitting, Cuff Size: Adult Large)   Pulse 81   Temp 98.2  F (36.8  C) (Tympanic)   Ht 1.778 m (5' 10\")   Wt 91.1 kg (200 lb 12.8 oz)   SpO2 96%   BMI 28.81 kg/m      Not examined this visit        Lab Results    Recent Results " (from the past 240 hour(s))   Flow Cytometry    Collection Time: 04/29/24  3:33 PM    Specimen: Peripheral Blood   Result Value Ref Range    Case Report       Flow Cytometry Report                             Case: [id number]                                  Authorizing Provider:  Friedell, Peter E, MD      Collected:           04/29/2024 03:33 PM          Ordering Location:     [facility]   Received:            04/29/2024 03:40 PM                                 [facility]                                                               Pathologist:           Joaquin Loya MD                                                     Specimen:    Peripheral Blood                                                                           Flow Interpretation       A. Peripheral Blood:  - CD5 positive kappa-monotypic B cells (87%)  - Unusual T-cell population (0.05%, 48 event)  - See comment          Comment       A diagnosis of mantle cell lymphoma is favored based on the bright CD20, bright immunoglobulin light chain, expression of CD79b and lack of CD23. However, correlation with additional studies (such as cytogenetics and/or t(11;14) FISH) [and/or immunohistochemical stain for cyclin D1] are recommended to confirm this diagnosis.  The significance of this expanded population of unusual T-cell  is uncertain in the context of this patient's B cell lymphoid proliferation.          Flow Phenotypic Data       Unless otherwise indicated, percentages reported below are based on the total number of CD45 positive viable leukocytes. If applicable, percentage of plasma cells is from total viable nucleated cells.    87% B cells which express CD5, CD19, CD20, CD23 (partial), CD79b (partial) and monotypic kappa immunoglobulin light chains and lack CD10 and CD38. The B cells have slightly increased forward scatter relative to background T cells suggestive of slightly increased size; however, precise size  determination is deferred to morphology.    13% of the B cells express CD38 at levels above a FMO (fluorescence minus one) negative control. 31% of the B cells express CD49d at levels above a FMO negative control.      0.05% unusual T cells which express CD2, CD3 (slightly dim), CD5, CD7 (dim), CD8, CD16, CD45, CD56, CD57, T cell receptor alpha beta(dim) and are monotypic for TCRcB1 and lack CD4.     Also present are:  4.4% T cells with a CD4:CD8 ratio of 1.1:1.   0.2% NK cells    Case was reviewed by the following:  Pathology Fellow: Gil Kothari MD  A resident/fellow was involved in the selection of testing, review of flow scattergrams, and/or interpretation of this case.  I, as the senior physician, attest that I: (i) confirmed appropriate testing, (ii) examined the relevant flow scattergrams for the specimen(s); and (ii) rendered or confirmed the interpretation(s).      Flow Processing Information       Multi-color flow analysis is performed for the following markers: CD2, CD3, CD4, CD5, CD7, CD8, CD10, CD16, CD19, CD20, CD23, CD34, CD38, CD45, CD56, CD57, CD49d, CD79b, T cell receptor alpha beta, TCRcB1, kappa and lambda immunoglobulin light chains. Cells are gated to isolate populations (CD45 versus side scatter and forward scatter versus side scatter), to exclude debris (forward scatter versus side scatter) and to exclude cell doublets (forward scatter height versus forward scatter width and side scatter height versus side scatter width). Forward scatter varies with cell size. Side scatter varies with the amount of cytoplasmic granules. Intensity for CD45 usually increases as hematolymphoid cells mature.      Clinical Information       53 yr old male with lymphocytosis.      FDA Disclaimer       This test was developed and its performance characteristics determined by the [facility]. It has not been cleared or approved by the US Food and Drug  Administration.  FDA does not require this test to go through premarket FDA review. This test is used for clinical purposes and should not be regarded as investigational or for research. This laboratory is certified under the Clinical Laboratory Improvement Amendments (CLIA) as qualified to perform high complexity clinical laboratory testing.      MCRS Yes (A) N/A    Performing Labs       The technical component of this testing was completed at [facility]     Comprehensive metabolic panel (BMP + Alb, Alk Phos, ALT, AST, Total. Bili, TP)    Collection Time: 04/29/24  3:33 PM   Result Value Ref Range    Sodium 140 135 - 145 mmol/L    Potassium 4.2 3.4 - 5.3 mmol/L    Carbon Dioxide (CO2) 26 22 - 29 mmol/L    Anion Gap 9 7 - 15 mmol/L    Urea Nitrogen 10.4 6.0 - 20.0 mg/dL    Creatinine 1.12 0.67 - 1.17 mg/dL    GFR Estimate 79 >60 mL/min/1.73m2    Calcium 8.8 8.6 - 10.0 mg/dL    Chloride 105 98 - 107 mmol/L    Glucose 104 (H) 70 - 99 mg/dL    Alkaline Phosphatase 114 40 - 150 U/L    AST 25 0 - 45 U/L    ALT 17 0 - 70 U/L    Protein Total 7.0 6.4 - 8.3 g/dL    Albumin 4.2 3.5 - 5.2 g/dL    Bilirubin Total 0.4 <=1.2 mg/dL   Uric acid    Collection Time: 04/29/24  3:33 PM   Result Value Ref Range    Uric Acid 6.5 3.4 - 7.0 mg/dL   Lactate Dehydrogenase    Collection Time: 04/29/24  3:33 PM   Result Value Ref Range    Lactate Dehydrogenase 312 (H) 0 - 250 U/L   FISH With Professional Interpretation    Collection Time: 04/29/24  3:33 PM   Result Value Ref Range    Interpretation       METHODS:  Specimen: B cell-mitogen stimulated culture  Test performed: Fluorescence in situ hybridization (FISH)  Interphase cells examined: 200 for each probe set  Probes:  - MYB (6q23.3) / D6Z1 (6p11.1-q11.1) (dual color) - [employer]  - CCND1 (11q13.3) / IGH (14q32.3) (dual fusion) - [employer]  - FAITH (11q22.3) / TP53 (17p13.1) (dual color) - [employer]ell  - D12Z3 (12p11.1-q11)  / control locus CEP 10 (10p11.1-q11.1) (dual color) - [employer]  - [id number] (13q14.3) / LAMP1 (13q34) (dual color) - [employer]  - IGH (14q32.3) / BCL2 (18q21.3) (dual fusion) - [employer]  - BCL3 (19q13.32) (breakapart) - [employer]       RESULTS:    ABNORMAL  - Loss of MYB (88.5%)  - Monosomy 6 (6.5%)  - Loss of LAMP1 (13q34) (13.5%)  - IGH rearrangement (80-82.5%)  - BCL3 rearrangement (91.5%)    NORMAL  - No evidence of loss of FAITH, [id number], or TP53  - No gain of chromosome 12  - No IGH::CCND1 fusion  - No IGH::BCL2 fusion      INTERPRETATION:    Rearrangements of IGH and BCL3, as may result from a t(14;19), are well-documented albeit nonspecific recurring abnormalities in a variety of neoplasms, most commonly chronic lymphocytic leukemia/small lymphocytic lymphoma and splenic or stacey marginal zone lymphoma (Carbo-Meix A et al, 2024, Haematologica 109:493). In CLL, IGH::BCL3 fusion has been associated with more aggressive disease (Fang H et al, 2019, Am J Hematol 94:338).    These findings were discussed with Dr. Joaquin Delgado on 5-7-24.      ISCN:  nuc sukhjinder(D6Z1x2,MYBx1)[177/200]/(D6Z1,MYB)x1[13/200]  nuc sukhjinder(CEP10,D12Z3)x2[200]  nuc sukhjinder(TTGA1d7,IGHx1,IGH dimx2)[160/200]  nuc sukhjinder(FAITH,TP53)x2[200]  nuc sukhjinder([id number],YVED4w8)[27/200]  nuc sukhjinder(IGHx1,IGH dimx2,BCL2x2)[165/200]  nuc sukhjinder(BCL3x2)(5'BCL3 sep 3'BCL3x1)[183/200]      ADDITIONAL COMMENTS:  Control ranges:   -6: 0-0.8%   MYBx1: 0-1.8%   IGH::CCND1: 0-0.1%   ATMx1: 0-2.3%   +12: 0-0.1%   [id number]: 0-3.0%   OLNY1q8: 0-2.4%   IGH rearr: 0-0.2%   IGH::BCL2: 0-0.1%   BCL3 rearr: 0-0.3%   TP53x1: 0-3.2%     Analyte Specific Reagents (ASRs) are used in many laboratory tests necessary for standard medical care and generally do not require FDA approval.  This test was developed and its performance characteristics determined by the [facility].  It has not been cleared or  approved by the U.S. Food and Drug Administration.     Electronically signed by Fe Garcia M.D., Lavernesibeth on 5/7/24 at 4:15 PM.     Bld morphology pathology review    Collection Time: 04/29/24  3:33 PM   Result Value Ref Range    Final Diagnosis       A.  Peripheral blood smear for morphology:  - Marked atypical lymphocytosis consistent with low-grade lymphoproliferative neoplasm, CD5 positive kappa monotypic B cells by flow cytometry (see comment) with concurrent absolute monocytosis      Comment       The findings are consistent with a low-grade lymphoproliferative neoplasm with flow cytometry features favoring a peripheralized mantle cell lymphoma although the possibility of a chronic lymphocytic leukemia/small lymphocytic lymphoma also being in the differential.   Further evaluation by FISH and cytogenetics is required to further subclassify this process.      Clinical Information       53-year-old male with marked leukocytosis with atypical lymphocytosis.      Peripheral Smear       PERIPHERAL BLOOD DIFFERENTIAL:    The automated differential associated with the CBC for this case was imported from [employer] and was confirmed accurate with a 200 cell count manual differential review of the smear prepared from a blood sample collected 4/29/2024.    PERIPHERAL BLOOD MORPHOLOGY:    ERYTHROCYTES:  The red cells are normocytic, normochromic and normal in number for the patient's age and gender. No significant anisopoikilocytosis is seen. No features of hemolysis or increased polychromasia are identified.  No parasites are identified.    LEUKOCYTES:  The leukocytes are are markedly increased and associated with a marked absolute atypical lymphocytosis and absolute monocytosis. No immature precursors or evidence of neutrophilic dysplasia is seen.  No bacteria,  parasites or parasitic inclusions are seen.           PLATELETS:  The platelets appear normal in number and morphology.      Peripheral Hematologic Data         Latest Reference Range & Units 04/29/24 15:33   WBC 4.0 - 11.0 10e3/uL 67.6 (HH)   Hemoglobin 13.3 - 17.7 g/dL 14.4   Hematocrit 40.0 - 53.0 % 41.5   Platelet Count 150 - 450 10e3/uL 153   RBC Count 4.40 - 5.90 10e6/uL 4.67   MCV 78 - 100 fL 89   MCH 26.5 - 33.0 pg 30.8   MCHC 31.5 - 36.5 g/dL 34.7   RDW 10.0 - 15.0 % 13.2   % Neutrophils % 8   % Lymphocytes % 82   % Monocytes % 10   % Eosinophils % 0   % Basophils % 0   Absolute Basophils 0.0 - 0.2 10e3/uL 0.0   Absolute Neutrophil 1.6 - 8.3 10e3/uL 5.4   Absolute Lymphocytes 0.8 - 5.3 10e3/uL 55.4 (H)   Absolute Monocytes 0.0 - 1.3 10e3/uL 6.8 (H)   Absolute Eosinophils 0.0 - 0.7 10e3/uL 0.0   Absolute NRBCs 10e3/uL 0.0   NRBCs per 100 WBC <1 /100 0   % Retic 0.5 - 2.0 % 1.1   Absolute Retic 0.025 - 0.095 10e6/uL 0.053   (HH): Data is critically high  (H): Data is abnormally high  !: Data is abnormal      Flow Cytometry Summary       Flow Cytometry: [id number]  Collected 4/29/2024  3:33 PM      Dx: CLL (chronic lymphocytic leukemia) (H)    Specimen Information: Peripheral Blood     Flow Interpretation   A. Peripheral Blood:  - CD5 positive kappa-monotypic B cells (87%)  - Unusual T-cell population (0.05%, 48 event)  - See comment         Electronically signed by Joaquin Loya MD on 5/1/2024 at  3:26 PM   Comment    A diagnosis of mantle cell lymphoma is favored based on the bright CD20, bright immunoglobulin light chain, expression of CD79b and lack of CD23. However, correlation with additional studies (such as cytogenetics and/or t(11;14) FISH) [and/or immunohistochemical stain for cyclin D1] are recommended to confirm this diagnosis.  The significance of this expanded population of unusual T-cell  is uncertain in the context of this patient's B cell lymphoid proliferation.            Performing Labs       The technical component of this testing was completed at [facility]      MCRS Yes (A) N/A   CBC with platelets and differential    Collection Time: 04/29/24  3:33 PM   Result Value Ref Range    WBC Count 67.6 (HH) 4.0 - 11.0 10e3/uL    RBC Count 4.67 4.40 - 5.90 10e6/uL    Hemoglobin 14.4 13.3 - 17.7 g/dL    Hematocrit 41.5 40.0 - 53.0 %    MCV 89 78 - 100 fL    MCH 30.8 26.5 - 33.0 pg    MCHC 34.7 31.5 - 36.5 g/dL    RDW 13.2 10.0 - 15.0 %    Platelet Count 153 150 - 450 10e3/uL    NRBCs per 100 WBC 0 <1 /100    Absolute NRBCs 0.0 10e3/uL   Reticulocyte count    Collection Time: 04/29/24  3:33 PM   Result Value Ref Range    % Reticulocyte 1.1 0.5 - 2.0 %    Absolute Reticulocyte 0.053 0.025 - 0.095 10e6/uL   Manual Differential    Collection Time: 04/29/24  3:33 PM   Result Value Ref Range    % Neutrophils 8 %    % Lymphocytes 82 %    % Monocytes 10 %    % Eosinophils 0 %    % Basophils 0 %    Absolute Neutrophils 5.4 1.6 - 8.3 10e3/uL    Absolute Lymphocytes 55.4 (H) 0.8 - 5.3 10e3/uL    Absolute Monocytes 6.8 (H) 0.0 - 1.3 10e3/uL    Absolute Eosinophils 0.0 0.0 - 0.7 10e3/uL    Absolute Basophils 0.0 0.0 - 0.2 10e3/uL    RBC Morphology Confirmed RBC Indices     Platelet Assessment  Automated Count Confirmed. Platelet morphology is normal.     Automated Count Confirmed. Platelet morphology is normal.    Reactive Lymphocytes Present (A) None Seen   DFISH Culture    Collection Time: 04/29/24  3:33 PM   Result Value Ref Range    Culture [place] Complete Date     3PMA(oligo) Culture    Collection Time: 04/29/24  3:33 PM   Result Value Ref Range    Culture [place] Complete Date     Glucose by meter    Collection Time: 05/03/24  9:33 AM   Result Value Ref Range    GLUCOSE BY METER POCT 91 70 - 99 mg/dL       Imaging Results    PET Oncology (Eyes to Thighs)    Result Date: 5/3/2024  EXAM: PET ONCOLOGY (EYES TO THIGHS) LOCATION: [facility] DATE: 5/3/2024 INDICATION: Chronic lymphocytic leukemia of B-cell type  not having achieved remission. Initial treatment strategy. COMPARISON: CT sinuses 5/15/2023 CONTRAST: None TECHNIQUE: Serum glucose level 91 mg/dL. One hour post intravenous administration of 12.13 mCi F-18 FDG, PET imaging was performed from the skull vertex to mid thighs, utilizing attenuation correction with concurrent axial CT and PET/CT image fusion. Dose reduction techniques were used. PET/CT FINDINGS: FDG avid bilateral cervical, axillary, upper abdominal, retroperitoneal, mesenteric and inguinal lymph nodes, for reference a 2.8 x 1.5 cm periportal lymph node (SUV max 6.1) and a 2.1 x 1.0 cm mesenteric lymph node (SUV max 3.8), with elevated FDG uptake in the enlarged spleen (SUV max 5.2, 16.8 cm AP measurement) suspicious for sites of involvement by chronic lymphocytic leukemia. CT FINDINGS: No acute intracranial findings. Mucus retention polyp or cyst inferior bilateral maxillary sinuses. Bilateral gynecomastia. 4 mm solid pulmonary nodule in the right middle lobe which is below PET resolution. No appreciable coronary arterial calcification. Accessory splenule. Colonic diverticulosis. Fat-containing right inguinal hernia. Non-FDG avid sclerosis in the right humeral head. Minimal degenerative changes in the spine.     IMPRESSION: Multistation FDG avid lymphadenopathy above and below the diaphragm with elevated FDG uptake in the enlarged spleen suspicious for sites of involvement by chronic lymphocytic leukemia.    DX Hand Right 3+ Views    Result Date: 4/19/2024  EXAM: DX HAND RIGHT 3+ VIEWS FINDINGS: 3 images of the right hand are interpreted without comparison. There are no acute fractures or dislocations. No focal soft tissue swelling. No radiopaque foreign bodies.    No acute osseous abnormality of the right hand.    XR Finger Right G/E 2 Views    Result Date: 4/14/2024  EXAM: XR FINGER RIGHT G/E 2 VIEWS LOCATION: [facility] DATE: 04/14/2024 INDICATION: Swelling and pain of  right middle finger. COMPARISON: None.     IMPRESSION: Anatomic alignment right long finger. No acute displaced long finger fracture. Long finger soft tissue swelling is more pronounced proximal. No significant long finger joint space narrowing. Chronic corticated ossicle is present along the volar base of the middle phalanx ring finger at the PIP joint, likely the sequelae of chronic trauma. No radiopaque soft tissue foreign body.      I spent 52 minutes on the patient's visit today.  This included preparation for the visit, face-to-face time with the patient and documentation following the visit.  It did not include teaching or procedure time.    Signed by: Peter E. Friedell, MD

## 2024-05-06 NOTE — LETTER
"    5/6/2024         RE: Emery Barkley  [address] [facility] [address]        Dear Colleague,    Thank you for referring your patient, Emery Barkley, to the [facility] [place]. Please see a copy of my visit note below.    Oncology Rooming Note    May 6, 2024 12:29 PM   Emery Barkley is a 53 year old male who presents for:    Chief Complaint   Patient presents with     Oncology Clinic Visit     chronic lymphocytic leukemia - provider visit only     Initial Vitals: /84 (BP Location: Right arm, Patient Position: Sitting, Cuff Size: Adult Large)   Pulse 81   Temp 98.2  F (36.8  C) (Tympanic)   Ht 1.778 m (5' 10\")   Wt 91.1 kg (200 lb 12.8 oz)   SpO2 96%   BMI 28.81 kg/m   Estimated body mass index is 28.81 kg/m  as calculated from the following:    Height as of this encounter: 1.778 m (5' 10\").    Weight as of this encounter: 91.1 kg (200 lb 12.8 oz). Body surface area is 2.12 meters squared.  No Pain (0) Comment: Data Unavailable   No LMP for male patient.  Allergies reviewed: Yes  Medications reviewed: Yes    Medications: Medication refills not needed today.  Pharmacy name entered into [employer]:    [facility] - [place], [place] - [address] [facility] - [place], [place] - [address] [facility] - [place], [place] - [address] [facility]    Frailty Screening:   Is the patient here for a new oncology consult visit in cancer care? 2. No      Clinical concerns: None today.       Jen Hancock MA              [facility] Hematology and Oncology Consult Note    Patient: Emery Barkley  MRN: [id number]  Date of Service: May 6, 2024       Reason for Visit    I was consulted by     Madhu Mcfarland MD         ECOG Performance  ECOG = 0    Problem List Items Addressed This Visit       CLL (chronic lymphocytic leukemia) (H) - Primary    Relevant Medications    cefadroxil (DURICEF) 500 MG capsule    doxycycline hyclate (VIBRAMYCIN) 100 MG " capsule    Other Relevant Orders    Flow Cytometry    FISH With Professional Interpretation    Lactate Dehydrogenase    Lab Blood Morphology Pathologist Review    CBC with Platelets & Differential    Uric acid    Comprehensive metabolic panel (BMP + Alb, Alk Phos, ALT, AST, Total. Bili, TP)    PET Oncology (Eyes to Thighs)       FISH studies returned findings consistent with CLL.  Follow-up as planned on 5/13/2024 with repeat CBC.  Treatment options to be discussed at that visit.      ______________________________________________________________________________    Staging History  Cancer Staging    Cancer Staging   CLL (chronic lymphocytic leukemia) (H)  Staging form: Chronic Lymphocytic Leukemia, AJCC 8th Edition  - Clinical stage from 5/7/2024: Modified Castro Stage II (Modified Castro risk: Intermediate, Lymphocytosis: Present, Adenopathy: Present, Organomegaly: Present, Anemia: Absent, Thrombocytopenia: Absent) - Signed by Friedell, Peter E, MD on 5/7/2024    .        History of Present Illness    Mr. Emery Barkley is a 53 year old man who has generally been in good health.  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  He noticed swelling in his right middle finger.  Over a day this progressed to swelling of his entire hand.  He was seen in urgent care and placed on antibiotics with Duricef and Vibramycin.  A routine CBC was performed and no white count was found to be 124,000 absolute lymphocyte count was 88.5.  The remainder of the CBC was within normal limits.  While on antibiotics, the swelling of his right middle finger and hand has mostly resolved.    Otherwise patient feels well.  He is maintaining his weight.  His appetite and digestion are normal.  He does not have chills, fevers or sweats.  He does not have cough, chest pain or shortness of breath.  He has no change in bowel or bladder habit.  He can go about his daily activities without difficulty.    He does not smoke cigarettes or use alcohol  to excess.  He is a .  He lives with his wife and 2 children.    Update 5/6/2024.  Patient returns for follow-up to review test reports including flow cytometry and PET scan.  PET scan shows lymphadenopathy above and below the diaphragm along with splenomegaly 16.8 cm.  Flow cytometry is consistent with mantle cell lymphoma however FISH studies for T(11:14) translocation are pending.    Medications:    Current Outpatient Medications   Medication Sig Dispense Refill     atorvastatin (LIPITOR) 20 MG tablet Take 1 tablet (20 mg) by mouth daily Hold on file until needed. 90 tablet 3     cefadroxil (DURICEF) 500 MG capsule Take 1 capsule (500 mg) by mouth 2 times daily 20 capsule 1     doxycycline hyclate (VIBRAMYCIN) 100 MG capsule Take 1 capsule (100 mg) by mouth 2 times daily 20 capsule 1     fluorouracil (EFUDEX) 5 % external cream Apply small amount at bedtime to warts, if skin irritation stop using. (Patient not taking: Reported on 4/29/2024) 40 g 0     LORazepam (ATIVAN) 0.5 MG tablet Take 1 tablet (0.5 mg) by mouth Take 1 hour prior to procedure (Patient not taking: Reported on 5/6/2024)       No current facility-administered medications for this visit.           Past History    Past Medical History:   Diagnosis Date     Hyperlipidemia      Past Surgical History:   Procedure Laterality Date     ABDOMEN SURGERY  2/7/19    umbilical hernia     HERNIORRHAPHY UMBILICAL N/A 2/7/2019    Procedure: HERNIORRHAPHY UMBILICAL;  Surgeon: Tej Beaulieu, DO;  Location: [facility] OR     OPTICAL TRACKING SYSTEM ENDOSCOPIC SINUS SURGERY Bilateral 7/24/2023    Procedure: SINUS SURGERY, ENDOSCOPIC, USING OPTICAL TRACKING SYSTEM - all sinuses;  Surgeon: Mario Gamboa MD;  Location:  OR     SEPTOPLASTY, TURBINOPLASTY, COMBINED Bilateral 7/24/2023    Procedure: SEPTOPLASTY; bilateral inferior turbinate reduction;  Surgeon: Mario Gamboa MD;  Location: [facility] OR     VASECTOMY       No Known  "Allergies  Family History   Problem Relation Age of Onset     C.A.D. Father         MI, chf, first mi in 50s.     Coronary Artery Disease Father      Hyperlipidemia Father      Cancer Sister         brain     Other Cancer Sister         Brain Cancer     Lipids Mother      Hyperlipidemia Mother      Depression Mother      Social History     Socioeconomic History     Marital status:    Tobacco Use     Smoking status: Never     Passive exposure: Never     Smokeless tobacco: Never   Vaping Use     Vaping status: Never Used   Substance and Sexual Activity     Alcohol use: Yes     Comment: 2 drinks per week     Drug use: No     Sexual activity: Yes     Partners: Female     Birth control/protection: Male Surgical   Other Topics Concern     Parent/sibling w/ CABG, MI or angioplasty before 65F 55M? Yes     Social Determinants of Health     Interpersonal Safety: Low Risk  (4/23/2024)    Interpersonal Safety      Do you feel physically and emotionally safe where you currently live?: Yes      Within the past 12 months, have you been hit, slapped, kicked or otherwise physically hurt by someone?: No      Within the past 12 months, have you been humiliated or emotionally abused in other ways by your partner or ex-partner?: No         Physical Exam    /84 (BP Location: Right arm, Patient Position: Sitting, Cuff Size: Adult Large)   Pulse 81   Temp 98.2  F (36.8  C) (Tympanic)   Ht 1.778 m (5' 10\")   Wt 91.1 kg (200 lb 12.8 oz)   SpO2 96%   BMI 28.81 kg/m      Not examined this visit        Lab Results    Recent Results (from the past 240 hour(s))   Flow Cytometry    Collection Time: 04/29/24  3:33 PM    Specimen: Peripheral Blood   Result Value Ref Range    Case Report       Flow Cytometry Report                             Case: [id number]                                  Authorizing Provider:  Friedell, Peter E, MD      Collected:           04/29/2024 03:33 PM          Ordering Location:     [facility]   Received:            04/29/2024 03:40 PM                                 [facility]                                                               Pathologist:           Joaquin Loya MD                                                     Specimen:    Peripheral Blood                                                                           Flow Interpretation       A. Peripheral Blood:  - CD5 positive kappa-monotypic B cells (87%)  - Unusual T-cell population (0.05%, 48 event)  - See comment          Comment       A diagnosis of mantle cell lymphoma is favored based on the bright CD20, bright immunoglobulin light chain, expression of CD79b and lack of CD23. However, correlation with additional studies (such as cytogenetics and/or t(11;14) FISH) [and/or immunohistochemical stain for cyclin D1] are recommended to confirm this diagnosis.  The significance of this expanded population of unusual T-cell  is uncertain in the context of this patient's B cell lymphoid proliferation.          Flow Phenotypic Data       Unless otherwise indicated, percentages reported below are based on the total number of CD45 positive viable leukocytes. If applicable, percentage of plasma cells is from total viable nucleated cells.    87% B cells which express CD5, CD19, CD20, CD23 (partial), CD79b (partial) and monotypic kappa immunoglobulin light chains and lack CD10 and CD38. The B cells have slightly increased forward scatter relative to background T cells suggestive of slightly increased size; however, precise size determination is deferred to morphology.    13% of the B cells express CD38 at levels above a FMO (fluorescence minus one) negative control. 31% of the B cells express CD49d at levels above a FMO negative control.      0.05% unusual T cells which express CD2, CD3 (slightly dim), CD5, CD7 (dim), CD8, CD16, CD45, CD56, CD57, T cell receptor alpha beta(dim) and are monotypic for TCRcB1 and lack CD4.     Also  present are:  4.4% T cells with a CD4:CD8 ratio of 1.1:1.   0.2% NK cells    Case was reviewed by the following:  Pathology Fellow: Gil Kothari MD  A resident/fellow was involved in the selection of testing, review of flow scattergrams, and/or interpretation of this case.  I, as the senior physician, attest that I: (i) confirmed appropriate testing, (ii) examined the relevant flow scattergrams for the specimen(s); and (ii) rendered or confirmed the interpretation(s).      Flow Processing Information       Multi-color flow analysis is performed for the following markers: CD2, CD3, CD4, CD5, CD7, CD8, CD10, CD16, CD19, CD20, CD23, CD34, CD38, CD45, CD56, CD57, CD49d, CD79b, T cell receptor alpha beta, TCRcB1, kappa and lambda immunoglobulin light chains. Cells are gated to isolate populations (CD45 versus side scatter and forward scatter versus side scatter), to exclude debris (forward scatter versus side scatter) and to exclude cell doublets (forward scatter height versus forward scatter width and side scatter height versus side scatter width). Forward scatter varies with cell size. Side scatter varies with the amount of cytoplasmic granules. Intensity for CD45 usually increases as hematolymphoid cells mature.      Clinical Information       53 yr old male with lymphocytosis.      FDA Disclaimer       This test was developed and its performance characteristics determined by the [facility]. It has not been cleared or approved by the US Food and Drug Administration.  FDA does not require this test to go through premarket FDA review. This test is used for clinical purposes and should not be regarded as investigational or for research. This laboratory is certified under the Clinical Laboratory Improvement Amendments (CLIA) as qualified to perform high complexity clinical laboratory testing.      MCRS Yes (A) N/A    Performing Labs       The technical  component of this testing was completed at [facility]     Comprehensive metabolic panel (BMP + Alb, Alk Phos, ALT, AST, Total. Bili, TP)    Collection Time: 04/29/24  3:33 PM   Result Value Ref Range    Sodium 140 135 - 145 mmol/L    Potassium 4.2 3.4 - 5.3 mmol/L    Carbon Dioxide (CO2) 26 22 - 29 mmol/L    Anion Gap 9 7 - 15 mmol/L    Urea Nitrogen 10.4 6.0 - 20.0 mg/dL    Creatinine 1.12 0.67 - 1.17 mg/dL    GFR Estimate 79 >60 mL/min/1.73m2    Calcium 8.8 8.6 - 10.0 mg/dL    Chloride 105 98 - 107 mmol/L    Glucose 104 (H) 70 - 99 mg/dL    Alkaline Phosphatase 114 40 - 150 U/L    AST 25 0 - 45 U/L    ALT 17 0 - 70 U/L    Protein Total 7.0 6.4 - 8.3 g/dL    Albumin 4.2 3.5 - 5.2 g/dL    Bilirubin Total 0.4 <=1.2 mg/dL   Uric acid    Collection Time: 04/29/24  3:33 PM   Result Value Ref Range    Uric Acid 6.5 3.4 - 7.0 mg/dL   Lactate Dehydrogenase    Collection Time: 04/29/24  3:33 PM   Result Value Ref Range    Lactate Dehydrogenase 312 (H) 0 - 250 U/L   FISH With Professional Interpretation    Collection Time: 04/29/24  3:33 PM   Result Value Ref Range    Interpretation       METHODS:  Specimen: B cell-mitogen stimulated culture  Test performed: Fluorescence in situ hybridization (FISH)  Interphase cells examined: 200 for each probe set  Probes:  - MYB (6q23.3) / D6Z1 (6p11.1-q11.1) (dual color) - [employer]  - CCND1 (11q13.3) / IGH (14q32.3) (dual fusion) - [employer]  - FAITH (11q22.3) / TP53 (17p13.1) (dual color) - [employer]  - D12Z3 (12p11.1-q11) / control locus CEP 10 (10p11.1-q11.1) (dual color) - [employer]  - [id number] (13q14.3) / LAMP1 (13q34) (dual color) - [employer]  - IGH (14q32.3) / BCL2 (18q21.3) (dual fusion) - [employer]  - BCL3 (19q13.32) (breakapart) - "[employer]"       RESULTS:    ABNORMAL  - Loss of MYB (88.5%)  - Monosomy 6 (6.5%)  - Loss of LAMP1 (13q34) (13.5%)  - IGH rearrangement (80-82.5%)  - BCL3  rearrangement (91.5%)    NORMAL  - No evidence of loss of FAITH, S04H098, or TP53  - No gain of chromosome 12  - No IGH::CCND1 fusion  - No IGH::BCL2 fusion      INTERPRETATION:    Rearrangements of IGH and BCL3, as may result from a t(14;19), are well-documented albeit nonspecific recurring abnormalities in a variety of neoplasms, most commonly chronic lymphocytic leukemia/small lymphocytic lymphoma and splenic or stacey marginal zone lymphoma (Carbo-Moiz A et al, 2024, Haematologica 109:493). In CLL, IGH::BCL3 fusion has been associated with more aggressive disease (Lindy H et al, 2019, Am J Hematol 94:338).    These findings were discussed with Dr. Joaquin Delgado on 5-7-24.      ISCN:  nuc sukhjinder(D6Z1x2,MYBx1)[177/200]/(D6Z1,MYB)x1[13/200]  nuc sukhjinder(CEP10,D12Z3)x2[200]  nuc sukhjinder(MHXQ8j3,IGHx1,IGH dimx2)[160/200]  nuc sukhjinder(FAITH,TP53)x2[200]  nuc sukhjinder(G16I131r0,AFIM5b0)[27/200]  nuc sukhjinder(IGHx1,IGH dimx2,BCL2x2)[165/200]  nuc sukhjinder(BCL3x2)(5'BCL3 sep 3'BCL3x1)[183/200]      ADDITIONAL COMMENTS:  Control ranges:   -6: 0-0.8%   MYBx1: 0-1.8%   IGH::CCND1: 0-0.1%   ATMx1: 0-2.3%   +12: 0-0.1%   [id number]: 0-3.0%   HXGQ3p7: 0-2.4%   IGH rearr: 0-0.2%   IGH::BCL2: 0-0.1%   BCL3 rearr: 0-0.3%   TP53x1: 0-3.2%     Analyte Specific Reagents (ASRs) are used in many laboratory tests necessary for standard medical care and generally do not require FDA approval.  This test was developed and its performance characteristics determined by the [facility].  It has not been cleared or approved by the U.S. Food and Drug Administration.     Electronically signed by Fe Garcia M.D., [facility] on 5/7/24 at 4:15 PM.     Bld morphology pathology review    Collection Time: 04/29/24  3:33 PM   Result Value Ref Range    Final Diagnosis       A.  Peripheral blood smear for morphology:  - Marked atypical lymphocytosis consistent with low-grade lymphoproliferative neoplasm, CD5 positive  kappa monotypic B cells by flow cytometry (see comment) with concurrent absolute monocytosis      Comment       The findings are consistent with a low-grade lymphoproliferative neoplasm with flow cytometry features favoring a peripheralized mantle cell lymphoma although the possibility of a chronic lymphocytic leukemia/small lymphocytic lymphoma also being in the differential.   Further evaluation by FISH and cytogenetics is required to further subclassify this process.      Clinical Information       53-year-old male with marked leukocytosis with atypical lymphocytosis.      Peripheral Smear       PERIPHERAL BLOOD DIFFERENTIAL:    The automated differential associated with the CBC for this case was imported from [employer] and was confirmed accurate with a 200 cell count manual differential review of the smear prepared from a blood sample collected 4/29/2024.    PERIPHERAL BLOOD MORPHOLOGY:    ERYTHROCYTES:  The red cells are normocytic, normochromic and normal in number for the patient's age and gender. No significant anisopoikilocytosis is seen. No features of hemolysis or increased polychromasia are identified.  No parasites are identified.    LEUKOCYTES:  The leukocytes are are markedly increased and associated with a marked absolute atypical lymphocytosis and absolute monocytosis. No immature precursors or evidence of neutrophilic dysplasia is seen.  No bacteria,  parasites or parasitic inclusions are seen.           PLATELETS:  The platelets appear normal in number and morphology.      Peripheral Hematologic Data        Latest Reference Range & Units 04/29/24 15:33   WBC 4.0 - 11.0 10e3/uL 67.6 (HH)   Hemoglobin 13.3 - 17.7 g/dL 14.4   Hematocrit 40.0 - 53.0 % 41.5   Platelet Count 150 - 450 10e3/uL 153   RBC Count 4.40 - 5.90 10e6/uL 4.67   MCV 78 - 100 fL 89   MCH 26.5 - 33.0 pg 30.8   MCHC 31.5 - 36.5 g/dL 34.7   RDW 10.0 - 15.0 % 13.2   % Neutrophils % 8   % Lymphocytes % 82   % Monocytes % 10   % Eosinophils %  0   % Basophils % 0   Absolute Basophils 0.0 - 0.2 10e3/uL 0.0   Absolute Neutrophil 1.6 - 8.3 10e3/uL 5.4   Absolute Lymphocytes 0.8 - 5.3 10e3/uL 55.4 (H)   Absolute Monocytes 0.0 - 1.3 10e3/uL 6.8 (H)   Absolute Eosinophils 0.0 - 0.7 10e3/uL 0.0   Absolute NRBCs 10e3/uL 0.0   NRBCs per 100 WBC <1 /100 0   % Retic 0.5 - 2.0 % 1.1   Absolute Retic 0.025 - 0.095 10e6/uL 0.053   (HH): Data is critically high  (H): Data is abnormally high  !: Data is abnormal      Flow Cytometry Summary       Flow Cytometry: [id number]  Collected 4/29/2024  3:33 PM      Dx: CLL (chronic lymphocytic leukemia) (H)    Specimen Information: Peripheral Blood     Flow Interpretation   A. Peripheral Blood:  - CD5 positive kappa-monotypic B cells (87%)  - Unusual T-cell population (0.05%, 48 event)  - See comment         Electronically signed by Joaquin Loya MD on 5/1/2024 at  3:26 PM   Comment    A diagnosis of mantle cell lymphoma is favored based on the bright CD20, bright immunoglobulin light chain, expression of CD79b and lack of CD23. However, correlation with additional studies (such as cytogenetics and/or t(11;14) FISH) [and/or immunohistochemical stain for cyclin D1] are recommended to confirm this diagnosis.  The significance of this expanded population of unusual T-cell  is uncertain in the context of this patient's B cell lymphoid proliferation.            Performing Labs       The technical component of this testing was completed at [facility] and [facility] Yes (A) N/A   CBC with platelets and differential    Collection Time: 04/29/24  3:33 PM   Result Value Ref Range    WBC Count 67.6 (HH) 4.0 - 11.0 10e3/uL    RBC Count 4.67 4.40 - 5.90 10e6/uL    Hemoglobin 14.4 13.3 - 17.7 g/dL    Hematocrit 41.5 40.0 - 53.0 %    MCV 89 78 - 100 fL    MCH 30.8 26.5 - 33.0 pg    MCHC 34.7 31.5 - 36.5 g/dL    RDW 13.2  10.0 - 15.0 %    Platelet Count 153 150 - 450 10e3/uL    NRBCs per 100 WBC 0 <1 /100    Absolute NRBCs 0.0 10e3/uL   Reticulocyte count    Collection Time: 04/29/24  3:33 PM   Result Value Ref Range    % Reticulocyte 1.1 0.5 - 2.0 %    Absolute Reticulocyte 0.053 0.025 - 0.095 10e6/uL   Manual Differential    Collection Time: 04/29/24  3:33 PM   Result Value Ref Range    % Neutrophils 8 %    % Lymphocytes 82 %    % Monocytes 10 %    % Eosinophils 0 %    % Basophils 0 %    Absolute Neutrophils 5.4 1.6 - 8.3 10e3/uL    Absolute Lymphocytes 55.4 (H) 0.8 - 5.3 10e3/uL    Absolute Monocytes 6.8 (H) 0.0 - 1.3 10e3/uL    Absolute Eosinophils 0.0 0.0 - 0.7 10e3/uL    Absolute Basophils 0.0 0.0 - 0.2 10e3/uL    RBC Morphology Confirmed RBC Indices     Platelet Assessment  Automated Count Confirmed. Platelet morphology is normal.     Automated Count Confirmed. Platelet morphology is normal.    Reactive Lymphocytes Present (A) None Seen   DFISH Culture    Collection Time: 04/29/24  3:33 PM   Result Value Ref Range    Culture [place] Complete Date     3PMA(oligo) Culture    Collection Time: 04/29/24  3:33 PM   Result Value Ref Range    Culture [place] Complete Date     Glucose by meter    Collection Time: 05/03/24  9:33 AM   Result Value Ref Range    GLUCOSE BY METER POCT 91 70 - 99 mg/dL       Imaging Results    PET Oncology (Eyes to Thighs)    Result Date: 5/3/2024  EXAM: PET ONCOLOGY (EYES TO THIGHS) LOCATION: [facility] DATE: 5/3/2024 INDICATION: Chronic lymphocytic leukemia of B-cell type not having achieved remission. Initial treatment strategy. COMPARISON: CT sinuses 5/15/2023 CONTRAST: None TECHNIQUE: Serum glucose level 91 mg/dL. One hour post intravenous administration of 12.13 mCi F-18 FDG, PET imaging was performed from the skull vertex to mid thighs, utilizing attenuation correction with concurrent axial CT and PET/CT image fusion. Dose reduction techniques were used. PET/CT FINDINGS: FDG  avid bilateral cervical, axillary, upper abdominal, retroperitoneal, mesenteric and inguinal lymph nodes, for reference a 2.8 x 1.5 cm periportal lymph node (SUV max 6.1) and a 2.1 x 1.0 cm mesenteric lymph node (SUV max 3.8), with elevated FDG uptake in the enlarged spleen (SUV max 5.2, 16.8 cm AP measurement) suspicious for sites of involvement by chronic lymphocytic leukemia. CT FINDINGS: No acute intracranial findings. Mucus retention polyp or cyst inferior bilateral maxillary sinuses. Bilateral gynecomastia. 4 mm solid pulmonary nodule in the right middle lobe which is below PET resolution. No appreciable coronary arterial calcification. Accessory splenule. Colonic diverticulosis. Fat-containing right inguinal hernia. Non-FDG avid sclerosis in the right humeral head. Minimal degenerative changes in the spine.     IMPRESSION: Multistation FDG avid lymphadenopathy above and below the diaphragm with elevated FDG uptake in the enlarged spleen suspicious for sites of involvement by chronic lymphocytic leukemia.    DX Hand Right 3+ Views    Result Date: 4/19/2024  EXAM: DX HAND RIGHT 3+ VIEWS FINDINGS: 3 images of the right hand are interpreted without comparison. There are no acute fractures or dislocations. No focal soft tissue swelling. No radiopaque foreign bodies.    No acute osseous abnormality of the right hand.    XR Finger Right G/E 2 Views    Result Date: 4/14/2024  EXAM: XR FINGER RIGHT G/E 2 VIEWS LOCATION: [facility] DATE: 04/14/2024 INDICATION: Swelling and pain of right middle finger. COMPARISON: None.     IMPRESSION: Anatomic alignment right long finger. No acute displaced long finger fracture. Long finger soft tissue swelling is more pronounced proximal. No significant long finger joint space narrowing. Chronic corticated ossicle is present along the volar base of the middle phalanx ring finger at the PIP joint, likely the sequelae of chronic trauma. No radiopaque soft  tissue foreign body.      I spent 52 minutes on the patient's visit today.  This included preparation for the visit, face-to-face time with the patient and documentation following the visit.  It did not include teaching or procedure time.    Signed by: Peter E. Friedell, MD      Again, thank you for allowing me to participate in the care of your patient.        Sincerely,        Peter E. Friedell, MD

## 2024-05-06 NOTE — PROGRESS NOTES
"Oncology Rooming Note    May 6, 2024 12:29 PM   Emery Barkley is a 53 year old male who presents for:    Chief Complaint   Patient presents with    Oncology Clinic Visit     chronic lymphocytic leukemia - provider visit only     Initial Vitals: /84 (BP Location: Right arm, Patient Position: Sitting, Cuff Size: Adult Large)   Pulse 81   Temp 98.2  F (36.8  C) (Tympanic)   Ht 1.778 m (5' 10\")   Wt 91.1 kg (200 lb 12.8 oz)   SpO2 96%   BMI 28.81 kg/m   Estimated body mass index is 28.81 kg/m  as calculated from the following:    Height as of this encounter: 1.778 m (5' 10\").    Weight as of this encounter: 91.1 kg (200 lb 12.8 oz). Body surface area is 2.12 meters squared.  No Pain (0) Comment: Data Unavailable   No LMP for male patient.  Allergies reviewed: Yes  Medications reviewed: Yes    Medications: Medication refills not needed today.  Pharmacy name entered into [facility]:    [facility] - [place], [place] - [address] [facility] - [place], [place] - [address] [facility] - [place], [place] - [address] [facility]    Frailty Screening:   Is the patient here for a new oncology consult visit in cancer care? 2. No      Clinical concerns: None today.       Jen Hancock MA            "

## 2024-05-07 LAB
CULTURE HARVEST COMPLETE DATE: NORMAL
INTERPRETATION: NORMAL

## 2024-05-13 ENCOUNTER — ONCOLOGY VISIT (OUTPATIENT)
Dept: ONCOLOGY | Facility: CLINIC | Age: 54
End: 2024-05-13
Attending: INTERNAL MEDICINE
Payer: COMMERCIAL

## 2024-05-13 ENCOUNTER — LAB (OUTPATIENT)
Dept: LAB | Facility: CLINIC | Age: 54
End: 2024-05-13
Payer: COMMERCIAL

## 2024-05-13 VITALS
DIASTOLIC BLOOD PRESSURE: 89 MMHG | SYSTOLIC BLOOD PRESSURE: 130 MMHG | BODY MASS INDEX: 28.63 KG/M2 | HEIGHT: 70 IN | TEMPERATURE: 97.8 F | WEIGHT: 200 LBS | RESPIRATION RATE: 12 BRPM | OXYGEN SATURATION: 96 % | HEART RATE: 90 BPM

## 2024-05-13 DIAGNOSIS — C91.10 CLL (CHRONIC LYMPHOCYTIC LEUKEMIA) (H): Primary | ICD-10-CM

## 2024-05-13 DIAGNOSIS — C91.10 CLL (CHRONIC LYMPHOCYTIC LEUKEMIA) (H): ICD-10-CM

## 2024-05-13 LAB
BASOPHILS # BLD MANUAL: 0 10E3/UL (ref 0–0.2)
BASOPHILS NFR BLD MANUAL: 0 %
EOSINOPHIL # BLD MANUAL: 0 10E3/UL (ref 0–0.7)
EOSINOPHIL NFR BLD MANUAL: 0 %
ERYTHROCYTE [DISTWIDTH] IN BLOOD BY AUTOMATED COUNT: 13.5 % (ref 10–15)
HCT VFR BLD AUTO: 39.7 % (ref 40–53)
HGB BLD-MCNC: 13.5 G/DL (ref 13.3–17.7)
HOLD SPECIMEN: NORMAL
LYMPHOCYTES # BLD MANUAL: 67.3 10E3/UL (ref 0.8–5.3)
LYMPHOCYTES NFR BLD MANUAL: 96 %
MCH RBC QN AUTO: 30.5 PG (ref 26.5–33)
MCHC RBC AUTO-ENTMCNC: 34 G/DL (ref 31.5–36.5)
MCV RBC AUTO: 90 FL (ref 78–100)
MONOCYTES # BLD MANUAL: 2.1 10E3/UL (ref 0–1.3)
MONOCYTES NFR BLD MANUAL: 3 %
NEUTROPHILS # BLD MANUAL: 0.7 10E3/UL (ref 1.6–8.3)
NEUTROPHILS NFR BLD MANUAL: 1 %
NRBC # BLD AUTO: 0.2 10E3/UL
NRBC BLD AUTO-RTO: 0 /100
PLAT MORPH BLD: ABNORMAL
PLATELET # BLD AUTO: 149 10E3/UL (ref 150–450)
RBC # BLD AUTO: 4.43 10E6/UL (ref 4.4–5.9)
RBC MORPH BLD: ABNORMAL
SMUDGE CELLS BLD QL SMEAR: PRESENT
VARIANT LYMPHS BLD QL SMEAR: PRESENT
WBC # BLD AUTO: 70.1 10E3/UL (ref 4–11)

## 2024-05-13 PROCEDURE — 85025 COMPLETE CBC W/AUTO DIFF WBC: CPT

## 2024-05-13 PROCEDURE — 85007 BL SMEAR W/DIFF WBC COUNT: CPT

## 2024-05-13 PROCEDURE — 36415 COLL VENOUS BLD VENIPUNCTURE: CPT

## 2024-05-13 PROCEDURE — 99214 OFFICE O/P EST MOD 30 MIN: CPT | Performed by: INTERNAL MEDICINE

## 2024-05-13 PROCEDURE — 99213 OFFICE O/P EST LOW 20 MIN: CPT | Performed by: INTERNAL MEDICINE

## 2024-05-13 RX ORDER — ALLOPURINOL 300 MG/1
300 TABLET ORAL DAILY
Qty: 60 TABLET | Refills: 1 | Status: SHIPPED | OUTPATIENT
Start: 2024-05-13 | End: 2024-08-02

## 2024-05-13 ASSESSMENT — PAIN SCALES - GENERAL: PAINLEVEL: NO PAIN (0)

## 2024-05-13 NOTE — PROGRESS NOTES
"Oncology Rooming Note    May 13, 2024 3:50 PM   Emery Barkley is a 53 year old male who presents for:    Chief Complaint   Patient presents with    Oncology Clinic Visit     CLL (chronic lymphocytic leukemia) - Labs and provider visits     Initial Vitals: /89 (BP Location: Right arm, Patient Position: Sitting, Cuff Size: Adult Regular)   Pulse 90   Temp 97.8  F (36.6  C) (Tympanic)   Resp 12   Ht 1.778 m (5' 10\")   Wt 90.7 kg (200 lb)   SpO2 96%   BMI 28.70 kg/m   Estimated body mass index is 28.7 kg/m  as calculated from the following:    Height as of this encounter: 1.778 m (5' 10\").    Weight as of this encounter: 90.7 kg (200 lb). Body surface area is 2.12 meters squared.  No Pain (0) Comment: Data Unavailable   No LMP for male patient.  Allergies reviewed: Yes  Medications reviewed: Yes    Medications: Medication refills not needed today.  Pharmacy name entered into [facility]:    [facility] - [place], [place] - [address] [facility] - [place], [place] - [address] [facility] - [place], [place] - [address] [facility]    Frailty Screening:   Is the patient here for a new oncology consult visit in cancer care? 2. No      Clinical concerns:  None      Nancie Roldan CMA            "

## 2024-05-13 NOTE — LETTER
"    5/13/2024         RE: Emery Barkley  [address]  [place] [address]        Dear Colleague,    Thank you for referring your patient, Emery Barkley, to the [facility]. Please see a copy of my visit note below.    Oncology Rooming Note    May 13, 2024 3:50 PM   Emery Barkley is a 53 year old male who presents for:    Chief Complaint   Patient presents with     Oncology Clinic Visit     CLL (chronic lymphocytic leukemia) - Labs and provider visits     Initial Vitals: /89 (BP Location: Right arm, Patient Position: Sitting, Cuff Size: Adult Regular)   Pulse 90   Temp 97.8  F (36.6  C) (Tympanic)   Resp 12   Ht 1.778 m (5' 10\")   Wt 90.7 kg (200 lb)   SpO2 96%   BMI 28.70 kg/m   Estimated body mass index is 28.7 kg/m  as calculated from the following:    Height as of this encounter: 1.778 m (5' 10\").    Weight as of this encounter: 90.7 kg (200 lb). Body surface area is 2.12 meters squared.  No Pain (0) Comment: Data Unavailable   No LMP for male patient.  Allergies reviewed: Yes  Medications reviewed: Yes    Medications: Medication refills not needed today.  Pharmacy name entered into [facility]:    [facility] - [place], [place] - [address] [facility] - [place], [place] - [address] [facility] - [place], [place] - [address] [facility]    Frailty Screening:   Is the patient here for a new oncology consult visit in cancer care? 2. No      Clinical concerns:  None      Nancie Roldan [facility] Hematology and Oncology Consult Note    Patient: Emery Barkley  MRN: [id number]  Date of Service: May 13, 2024       Reason for Visit    I was consulted by     Madhu Mcfarland MD         ECOG Performance  ECOG = 0    Problem List Items Addressed This Visit       CLL (chronic lymphocytic leukemia) (H) - Primary    Relevant Medications    cefadroxil (DURICEF) 500 MG capsule    doxycycline hyclate (VIBRAMYCIN) 100 " MG capsule    Other Relevant Orders    Flow Cytometry    FISH With Professional Interpretation    Lactate Dehydrogenase    Lab Blood Morphology Pathologist Review    CBC with Platelets & Differential    Uric acid    Comprehensive metabolic panel (BMP + Alb, Alk Phos, ALT, AST, Total. Bili, TP)    PET Oncology (Eyes to Thighs)     Patient returns for follow-up.  His white blood cell count is stable.  He would like to begin chemotherapy as soon as school is out around June 7, 2024.  Treatment plan is entered for obinutuzumab and venetoclax.  Allopurinol 300 mg/day is started.  Patient will return for hepatitis studies and chemo education.  Patient will need a chemotherapy port.      ______________________________________________________________________________    Staging History  Cancer Staging    Cancer Staging   CLL (chronic lymphocytic leukemia) (H)  Staging form: Chronic Lymphocytic Leukemia, AJCC 8th Edition  - Clinical stage from 5/7/2024: Modified Castro Stage II (Modified Castro risk: Intermediate, Lymphocytosis: Present, Adenopathy: Present, Organomegaly: Present, Anemia: Absent, Thrombocytopenia: Absent) - Signed by Friedell, Peter E, MD on 5/7/2024    .        History of Present Illness    Mr. Emery Barkley is a 53 year old man who has generally been in good health.  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  He noticed swelling in his right middle finger.  Over a day this progressed to swelling of his entire hand.  He was seen in urgent care and placed on antibiotics with Duricef and Vibramycin.  A routine CBC was performed and no white count was found to be 124,000 absolute lymphocyte count was 88.5.  The remainder of the CBC was within normal limits.  While on antibiotics, the swelling of his right middle finger and hand has mostly resolved.    Otherwise patient feels well.  He is maintaining his weight.  His appetite and digestion are normal.  He does not have chills, fevers or sweats.  He does  not have cough, chest pain or shortness of breath.  He has no change in bowel or bladder habit.  He can go about his daily activities without difficulty.    He does not smoke cigarettes or use alcohol to excess.  He is a .  He lives with his wife and 2 children.    Update 5/13/2024.  Patient returns for follow-up to review test reports including flow cytometry and PET scan.  PET scan shows lymphadenopathy above and below the diaphragm along with splenomegaly 16.8 cm.  Flow cytometry is consistent with mantle cell lymphoma however FISH studies for T(11:14) translocation is absent,  Confirming the diagnosis of CLL.    Medications:    Current Outpatient Medications   Medication Sig Dispense Refill     allopurinol (ZYLOPRIM) 300 MG tablet Take 1 tablet (300 mg) by mouth daily 60 tablet 1     atorvastatin (LIPITOR) 20 MG tablet Take 1 tablet (20 mg) by mouth daily Hold on file until needed. 90 tablet 3     cefadroxil (DURICEF) 500 MG capsule Take 1 capsule (500 mg) by mouth 2 times daily 20 capsule 1     doxycycline hyclate (VIBRAMYCIN) 100 MG capsule Take 1 capsule (100 mg) by mouth 2 times daily 20 capsule 1     fluorouracil (EFUDEX) 5 % external cream Apply small amount at bedtime to warts, if skin irritation stop using. (Patient not taking: Reported on 4/29/2024) 40 g 0     LORazepam (ATIVAN) 0.5 MG tablet Take 1 tablet (0.5 mg) by mouth Take 1 hour prior to procedure (Patient not taking: Reported on 5/6/2024)       No current facility-administered medications for this visit.           Past History    Past Medical History:   Diagnosis Date     Hyperlipidemia      Past Surgical History:   Procedure Laterality Date     ABDOMEN SURGERY  2/7/19    umbilical hernia     HERNIORRHAPHY UMBILICAL N/A 2/7/2019    Procedure: HERNIORRHAPHY UMBILICAL;  Surgeon: Tej Beaulieu DO;  Location: [facility] OR     [email or web address] SYSTEM ENDOSCOPIC SINUS SURGERY Bilateral 7/24/2023    Procedure: SINUS SURGERY,  "ENDOSCOPIC, USING OPTICAL TRACKING SYSTEM - all sinuses;  Surgeon: Mario Gamboa MD;  Location: [facility] OR     SEPTOPLASTY, TURBINOPLASTY, COMBINED Bilateral 7/24/2023    Procedure: SEPTOPLASTY; bilateral inferior turbinate reduction;  Surgeon: Mario Gamboa MD;  Location: [facility] OR     VASECTOMY       No Known Allergies  Family History   Problem Relation Age of Onset     C.A.D. Father         MI, chf, first mi in 50s.     Coronary Artery Disease Father      Hyperlipidemia Father      Cancer Sister         brain     Other Cancer Sister         Brain Cancer     Lipids Mother      Hyperlipidemia Mother      Depression Mother      Social History     Socioeconomic History     Marital status:    Tobacco Use     Smoking status: Never     Passive exposure: Never     Smokeless tobacco: Never   Vaping Use     Vaping status: Never Used   Substance and Sexual Activity     Alcohol use: Yes     Comment: 2 drinks per week     Drug use: No     Sexual activity: Yes     Partners: Female     Birth control/protection: Male Surgical   Other Topics Concern     Parent/sibling w/ CABG, MI or angioplasty before 65F 55M? Yes     Social Determinants of Health     Interpersonal Safety: Low Risk  (4/23/2024)    Interpersonal Safety      Do you feel physically and emotionally safe where you currently live?: Yes      Within the past 12 months, have you been hit, slapped, kicked or otherwise physically hurt by someone?: No      Within the past 12 months, have you been humiliated or emotionally abused in other ways by your partner or ex-partner?: No         Physical Exam    /89 (BP Location: Right arm, Patient Position: Sitting, Cuff Size: Adult Regular)   Pulse 90   Temp 97.8  F (36.6  C) (Tympanic)   Resp 12   Ht 1.778 m (5' 10\")   Wt 90.7 kg (200 lb)   SpO2 96%   BMI 28.70 kg/m      Not examined this visit.  He no longer has findings or symptoms of infection in his right middle finger.        Lab Results    Recent Results " (from the past 240 hour(s))   CBC with platelets and differential    Collection Time: 05/13/24  3:27 PM   Result Value Ref Range    WBC Count 70.1 (HH) 4.0 - 11.0 10e3/uL    RBC Count 4.43 4.40 - 5.90 10e6/uL    Hemoglobin 13.5 13.3 - 17.7 g/dL    Hematocrit 39.7 (L) 40.0 - 53.0 %    MCV 90 78 - 100 fL    MCH 30.5 26.5 - 33.0 pg    MCHC 34.0 31.5 - 36.5 g/dL    RDW 13.5 10.0 - 15.0 %    Platelet Count 149 (L) 150 - 450 10e3/uL    NRBCs per 100 WBC 0 <1 /100    Absolute NRBCs 0.2 10e3/uL   Manual Differential    Collection Time: 05/13/24  3:27 PM   Result Value Ref Range    % Neutrophils 1 %    % Lymphocytes 96 %    % Monocytes 3 %    % Eosinophils 0 %    % Basophils 0 %    Absolute Neutrophils 0.7 (L) 1.6 - 8.3 10e3/uL    Absolute Lymphocytes 67.3 (H) 0.8 - 5.3 10e3/uL    Absolute Monocytes 2.1 (H) 0.0 - 1.3 10e3/uL    Absolute Eosinophils 0.0 0.0 - 0.7 10e3/uL    Absolute Basophils 0.0 0.0 - 0.2 10e3/uL    RBC Morphology Confirmed RBC Indices     Platelet Assessment  Automated Count Confirmed. Platelet morphology is normal.     Automated Count Confirmed. Platelet morphology is normal.    Reactive Lymphocytes Present (A) None Seen    Smudge Cells Present (A) None Seen       Imaging Results    PET Oncology (Eyes to Thighs)    Result Date: 5/3/2024  EXAM: PET ONCOLOGY (EYES TO THIGHS) LOCATION: [facility] DATE: 5/3/2024 INDICATION: Chronic lymphocytic leukemia of B-cell type not having achieved remission. Initial treatment strategy. COMPARISON: CT sinuses 5/15/2023 CONTRAST: None TECHNIQUE: Serum glucose level 91 mg/dL. One hour post intravenous administration of 12.13 mCi F-18 FDG, PET imaging was performed from the skull vertex to mid thighs, utilizing attenuation correction with concurrent axial CT and PET/CT image fusion. Dose reduction techniques were used. PET/CT FINDINGS: FDG avid bilateral cervical, axillary, upper abdominal, retroperitoneal, mesenteric and inguinal lymph nodes, for  reference a 2.8 x 1.5 cm periportal lymph node (SUV max 6.1) and a 2.1 x 1.0 cm mesenteric lymph node (SUV max 3.8), with elevated FDG uptake in the enlarged spleen (SUV max 5.2, 16.8 cm AP measurement) suspicious for sites of involvement by chronic lymphocytic leukemia. CT FINDINGS: No acute intracranial findings. Mucus retention polyp or cyst inferior bilateral maxillary sinuses. Bilateral gynecomastia. 4 mm solid pulmonary nodule in the right middle lobe which is below PET resolution. No appreciable coronary arterial calcification. Accessory splenule. Colonic diverticulosis. Fat-containing right inguinal hernia. Non-FDG avid sclerosis in the right humeral head. Minimal degenerative changes in the spine.     IMPRESSION: Multistation FDG avid lymphadenopathy above and below the diaphragm with elevated FDG uptake in the enlarged spleen suspicious for sites of involvement by chronic lymphocytic leukemia.    DX Hand Right 3+ Views    Result Date: 4/19/2024  EXAM: DX HAND RIGHT 3+ VIEWS FINDINGS: 3 images of the right hand are interpreted without comparison. There are no acute fractures or dislocations. No focal soft tissue swelling. No radiopaque foreign bodies.    No acute osseous abnormality of the right hand.    XR Finger Right G/E 2 Views    Result Date: 4/14/2024  EXAM: XR FINGER RIGHT G/E 2 VIEWS LOCATION: [facility] DATE: 04/14/2024 INDICATION: Swelling and pain of right middle finger. COMPARISON: None.     IMPRESSION: Anatomic alignment right long finger. No acute displaced long finger fracture. Long finger soft tissue swelling is more pronounced proximal. No significant long finger joint space narrowing. Chronic corticated ossicle is present along the volar base of the middle phalanx ring finger at the PIP joint, likely the sequelae of chronic trauma. No radiopaque soft tissue foreign body.      Goals and risks of treatment of chronic lymphocytic leukemia with obinutuzumab and  venetoclax were discussed with the patient.  The patient has agreed to proceed with treatment with obinutuzumab and venetoclax for chronic lymphocytic leukemia.    I spent 28 minutes on the patient's visit today.  This included preparation for the visit, face-to-face time with the patient and documentation following the visit.  It did not include teaching or procedure time.    Signed by: Peter E. Friedell, MD      Again, thank you for allowing me to participate in the care of your patient.        Sincerely,        Peter E. Friedell, MD

## 2024-05-13 NOTE — PROGRESS NOTES
[facility] Hematology and Oncology Consult Note    Patient: Emery Barkley  MRN: [id number]  Date of Service: May 13, 2024       Reason for Visit    I was consulted by     Madhu Mcfarland MD         ECOG Performance  ECOG = 0    Problem List Items Addressed This Visit       CLL (chronic lymphocytic leukemia) (H) - Primary    Relevant Medications    cefadroxil (DURICEF) 500 MG capsule    doxycycline hyclate (VIBRAMYCIN) 100 MG capsule    Other Relevant Orders    Flow Cytometry    FISH With Professional Interpretation    Lactate Dehydrogenase    Lab Blood Morphology Pathologist Review    CBC with Platelets & Differential    Uric acid    Comprehensive metabolic panel (BMP + Alb, Alk Phos, ALT, AST, Total. Bili, TP)    PET Oncology (Eyes to Thighs)     Patient returns for follow-up.  His white blood cell count is stable.  He would like to begin chemotherapy as soon as school is out around June 7, 2024.  Treatment plan is entered for obinutuzumab and venetoclax.  Allopurinol 300 mg/day is started.  Patient will return for hepatitis studies and chemo education.  Patient will need a chemotherapy port.      ______________________________________________________________________________    Staging History  Cancer Staging    Cancer Staging   CLL (chronic lymphocytic leukemia) (H)  Staging form: Chronic Lymphocytic Leukemia, AJCC 8th Edition  - Clinical stage from 5/7/2024: Modified Castro Stage II (Modified Castro risk: Intermediate, Lymphocytosis: Present, Adenopathy: Present, Organomegaly: Present, Anemia: Absent, Thrombocytopenia: Absent) - Signed by Friedell, Peter E, MD on 5/7/2024    .        History of Present Illness    Mr. Emery Barkley is a 53 year old man who has generally been in good health.  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  He noticed swelling in his right middle finger.  Over a day this progressed to swelling of his entire hand.  He was seen in urgent care and placed on antibiotics with  Duricef and Vibramycin.  A routine CBC was performed and no white count was found to be 124,000 absolute lymphocyte count was 88.5.  The remainder of the CBC was within normal limits.  While on antibiotics, the swelling of his right middle finger and hand has mostly resolved.    Otherwise patient feels well.  He is maintaining his weight.  His appetite and digestion are normal.  He does not have chills, fevers or sweats.  He does not have cough, chest pain or shortness of breath.  He has no change in bowel or bladder habit.  He can go about his daily activities without difficulty.    He does not smoke cigarettes or use alcohol to excess.  He is a .  He lives with his wife and 2 children.    Update 5/13/2024.  Patient returns for follow-up to review test reports including flow cytometry and PET scan.  PET scan shows lymphadenopathy above and below the diaphragm along with splenomegaly 16.8 cm.  Flow cytometry is consistent with mantle cell lymphoma however FISH studies for T(11:14) translocation is absent,  Confirming the diagnosis of CLL.    Medications:    Current Outpatient Medications   Medication Sig Dispense Refill    allopurinol (ZYLOPRIM) 300 MG tablet Take 1 tablet (300 mg) by mouth daily 60 tablet 1    atorvastatin (LIPITOR) 20 MG tablet Take 1 tablet (20 mg) by mouth daily Hold on file until needed. 90 tablet 3    cefadroxil (DURICEF) 500 MG capsule Take 1 capsule (500 mg) by mouth 2 times daily 20 capsule 1    doxycycline hyclate (VIBRAMYCIN) 100 MG capsule Take 1 capsule (100 mg) by mouth 2 times daily 20 capsule 1    fluorouracil (EFUDEX) 5 % external cream Apply small amount at bedtime to warts, if skin irritation stop using. (Patient not taking: Reported on 4/29/2024) 40 g 0    LORazepam (ATIVAN) 0.5 MG tablet Take 1 tablet (0.5 mg) by mouth Take 1 hour prior to procedure (Patient not taking: Reported on 5/6/2024)       No current facility-administered medications for this  visit.           Past History    Past Medical History:   Diagnosis Date    Hyperlipidemia      Past Surgical History:   Procedure Laterality Date    ABDOMEN SURGERY  2/7/19    umbilical hernia    HERNIORRHAPHY UMBILICAL N/A 2/7/2019    Procedure: HERNIORRHAPHY UMBILICAL;  Surgeon: Tej Beaulieu DO;  Location: [facility] OR    OPTICAL TRACKING SYSTEM ENDOSCOPIC SINUS SURGERY Bilateral 7/24/2023    Procedure: SINUS SURGERY, ENDOSCOPIC, USING OPTICAL TRACKING SYSTEM - all sinuses;  Surgeon: Mario Gamboa MD;  Location: [facility]    SEPTOPLASTY, TURBINOPLASTY, COMBINED Bilateral 7/24/2023    Procedure: SEPTOPLASTY; bilateral inferior turbinate reduction;  Surgeon: Mario Gamboa MD;  Location: [facility] OR    VASECTOMY       No Known Allergies  Family History   Problem Relation Age of Onset    C.A.D. Father         MI, chf, first mi in 50s.    Coronary Artery Disease Father     Hyperlipidemia Father     Cancer Sister         brain    Other Cancer Sister         Brain Cancer    Lipids Mother     Hyperlipidemia Mother     Depression Mother      Social History     Socioeconomic History    Marital status:    Tobacco Use    Smoking status: Never     Passive exposure: Never    Smokeless tobacco: Never   Vaping Use    Vaping status: Never Used   Substance and Sexual Activity    Alcohol use: Yes     Comment: 2 drinks per week    Drug use: No    Sexual activity: Yes     Partners: Female     Birth control/protection: Male Surgical   Other Topics Concern    Parent/sibling w/ CABG, MI or angioplasty before 65F 55M? Yes     Social Determinants of Health     Interpersonal Safety: Low Risk  (4/23/2024)    Interpersonal Safety     Do you feel physically and emotionally safe where you currently live?: Yes     Within the past 12 months, have you been hit, slapped, kicked or otherwise physically hurt by someone?: No     Within the past 12 months, have you been humiliated or emotionally abused in other ways by your partner or  "ex-partner?: No         Physical Exam    /89 (BP Location: Right arm, Patient Position: Sitting, Cuff Size: Adult Regular)   Pulse 90   Temp 97.8  F (36.6  C) (Tympanic)   Resp 12   Ht 1.778 m (5' 10\")   Wt 90.7 kg (200 lb)   SpO2 96%   BMI 28.70 kg/m      Not examined this visit.  He no longer has findings or symptoms of infection in his right middle finger.        Lab Results    Recent Results (from the past 240 hour(s))   CBC with platelets and differential    Collection Time: 05/13/24  3:27 PM   Result Value Ref Range    WBC Count 70.1 (HH) 4.0 - 11.0 10e3/uL    RBC Count 4.43 4.40 - 5.90 10e6/uL    Hemoglobin 13.5 13.3 - 17.7 g/dL    Hematocrit 39.7 (L) 40.0 - 53.0 %    MCV 90 78 - 100 fL    MCH 30.5 26.5 - 33.0 pg    MCHC 34.0 31.5 - 36.5 g/dL    RDW 13.5 10.0 - 15.0 %    Platelet Count 149 (L) 150 - 450 10e3/uL    NRBCs per 100 WBC 0 <1 /100    Absolute NRBCs 0.2 10e3/uL   Manual Differential    Collection Time: 05/13/24  3:27 PM   Result Value Ref Range    % Neutrophils 1 %    % Lymphocytes 96 %    % Monocytes 3 %    % Eosinophils 0 %    % Basophils 0 %    Absolute Neutrophils 0.7 (L) 1.6 - 8.3 10e3/uL    Absolute Lymphocytes 67.3 (H) 0.8 - 5.3 10e3/uL    Absolute Monocytes 2.1 (H) 0.0 - 1.3 10e3/uL    Absolute Eosinophils 0.0 0.0 - 0.7 10e3/uL    Absolute Basophils 0.0 0.0 - 0.2 10e3/uL    RBC Morphology Confirmed RBC Indices     Platelet Assessment  Automated Count Confirmed. Platelet morphology is normal.     Automated Count Confirmed. Platelet morphology is normal.    Reactive Lymphocytes Present (A) None Seen    Smudge Cells Present (A) None Seen       Imaging Results    PET Oncology (Eyes to Thighs)    Result Date: 5/3/2024  EXAM: PET ONCOLOGY (EYES TO THIGHS) LOCATION: [facility] DATE: 5/3/2024 INDICATION: Chronic lymphocytic leukemia of B-cell type not having achieved remission. Initial treatment strategy. COMPARISON: CT sinuses 5/15/2023 CONTRAST: None " TECHNIQUE: Serum glucose level 91 mg/dL. One hour post intravenous administration of 12.13 mCi F-18 FDG, PET imaging was performed from the skull vertex to mid thighs, utilizing attenuation correction with concurrent axial CT and PET/CT image fusion. Dose reduction techniques were used. PET/CT FINDINGS: FDG avid bilateral cervical, axillary, upper abdominal, retroperitoneal, mesenteric and inguinal lymph nodes, for reference a 2.8 x 1.5 cm periportal lymph node (SUV max 6.1) and a 2.1 x 1.0 cm mesenteric lymph node (SUV max 3.8), with elevated FDG uptake in the enlarged spleen (SUV max 5.2, 16.8 cm AP measurement) suspicious for sites of involvement by chronic lymphocytic leukemia. CT FINDINGS: No acute intracranial findings. Mucus retention polyp or cyst inferior bilateral maxillary sinuses. Bilateral gynecomastia. 4 mm solid pulmonary nodule in the right middle lobe which is below PET resolution. No appreciable coronary arterial calcification. Accessory splenule. Colonic diverticulosis. Fat-containing right inguinal hernia. Non-FDG avid sclerosis in the right humeral head. Minimal degenerative changes in the spine.     IMPRESSION: Multistation FDG avid lymphadenopathy above and below the diaphragm with elevated FDG uptake in the enlarged spleen suspicious for sites of involvement by chronic lymphocytic leukemia.    DX Hand Right 3+ Views    Result Date: 4/19/2024  EXAM: DX HAND RIGHT 3+ VIEWS FINDINGS: 3 images of the right hand are interpreted without comparison. There are no acute fractures or dislocations. No focal soft tissue swelling. No radiopaque foreign bodies.    No acute osseous abnormality of the right hand.    XR Finger Right G/E 2 Views    Result Date: 4/14/2024  EXAM: XR FINGER RIGHT G/E 2 VIEWS LOCATION: [facility] DATE: 04/14/2024 INDICATION: Swelling and pain of right middle finger. COMPARISON: None.     IMPRESSION: Anatomic alignment right long finger. No acute  displaced long finger fracture. Long finger soft tissue swelling is more pronounced proximal. No significant long finger joint space narrowing. Chronic corticated ossicle is present along the volar base of the middle phalanx ring finger at the PIP joint, likely the sequelae of chronic trauma. No radiopaque soft tissue foreign body.      Goals and risks of treatment of chronic lymphocytic leukemia with obinutuzumab and venetoclax were discussed with the patient.  The patient has agreed to proceed with treatment with obinutuzumab and venetoclax for chronic lymphocytic leukemia.    I spent 28 minutes on the patient's visit today.  This included preparation for the visit, face-to-face time with the patient and documentation following the visit.  It did not include teaching or procedure time.    Signed by: Peter E. Friedell, MD

## 2024-05-21 ENCOUNTER — MYC MEDICAL ADVICE (OUTPATIENT)
Dept: ONCOLOGY | Facility: CLINIC | Age: 54
End: 2024-05-21

## 2024-05-21 ENCOUNTER — OFFICE VISIT (OUTPATIENT)
Dept: SURGERY | Facility: CLINIC | Age: 54
End: 2024-05-21
Payer: COMMERCIAL

## 2024-05-21 VITALS
DIASTOLIC BLOOD PRESSURE: 87 MMHG | WEIGHT: 199.96 LBS | TEMPERATURE: 98.3 F | BODY MASS INDEX: 28.63 KG/M2 | HEART RATE: 91 BPM | SYSTOLIC BLOOD PRESSURE: 127 MMHG | HEIGHT: 70 IN

## 2024-05-21 DIAGNOSIS — C91.10 CLL (CHRONIC LYMPHOCYTIC LEUKEMIA) (H): ICD-10-CM

## 2024-05-21 DIAGNOSIS — Z45.2 ENCOUNTER FOR CARE RELATED TO VASCULAR ACCESS PORT: Primary | ICD-10-CM

## 2024-05-21 PROCEDURE — 99203 OFFICE O/P NEW LOW 30 MIN: CPT | Performed by: STUDENT IN AN ORGANIZED HEALTH CARE EDUCATION/TRAINING PROGRAM

## 2024-05-21 ASSESSMENT — PAIN SCALES - GENERAL: PAINLEVEL: NO PAIN (0)

## 2024-05-21 NOTE — NURSING NOTE
"Initial /87 (BP Location: Right arm, Patient Position: Sitting, Cuff Size: Adult Regular)   Pulse 91   Temp 98.3  F (36.8  C) (Tympanic)   Ht 1.778 m (5' 10\")   Wt 90.7 kg (199 lb 15.3 oz)   BMI 28.69 kg/m   Estimated body mass index is 28.69 kg/m  as calculated from the following:    Height as of this encounter: 1.778 m (5' 10\").    Weight as of this encounter: 90.7 kg (199 lb 15.3 oz). .  Missy Carrasco MA    "

## 2024-05-21 NOTE — PROGRESS NOTES
Surgical Consultation/History and Physical  [facility] Surgery    Emery is seen in consultation for port placement, at the request of Dr. Peter Friedell.    Chief Complaint:  chronic lymphocytic leukemia    HPI:  Emery Barkley presents in consultation today for evaluation of infusion port placement. He has a history of chronic lymphocytic leukemia. The patient is right-hand dominant, has never had central venous access, pneumothorax, lung surgery, history of dialysis or renal failure. He does go bird hunting in the fall.      Patient Active Problem List   Diagnosis    Hyperlipidemia LDL goal <100    Family history of coronary artery disease    External hemorrhoid    Common wart    Chronic pansinusitis    Nasal polyp    Nasal septal deviation    Nasal obstruction    Nasal turbinate hypertrophy    CLL (chronic lymphocytic leukemia) (H)       Past Medical History:   Diagnosis Date    Hyperlipidemia        Past Surgical History:   Procedure Laterality Date    ABDOMEN SURGERY  2/7/19    umbilical hernia    HERNIORRHAPHY UMBILICAL N/A 2/7/2019    Procedure: HERNIORRHAPHY UMBILICAL;  Surgeon: Tej Beaulieu DO;  Location: [facility] OR    OPTICAL TRACKING SYSTEM ENDOSCOPIC SINUS SURGERY Bilateral 7/24/2023    Procedure: SINUS SURGERY, ENDOSCOPIC, USING OPTICAL TRACKING SYSTEM - all sinuses;  Surgeon: Mario Gamboa MD;  Location: [facility]    SEPTOPLASTY, TURBINOPLASTY, COMBINED Bilateral 7/24/2023    Procedure: SEPTOPLASTY; bilateral inferior turbinate reduction;  Surgeon: Mario Gamboa MD;  Location: [facility] OR    VASECTOMY         Family History   Problem Relation Age of Onset    C.A.D. Father         MI, chf, first mi in 50s.    Coronary Artery Disease Father     Hyperlipidemia Father     Cancer Sister         brain    Other Cancer Sister         Brain Cancer    Lipids Mother     Hyperlipidemia Mother     Depression Mother        Social History     Tobacco Use    Smoking status: Never     Passive  "exposure: Never    Smokeless tobacco: Never   Substance Use Topics    Alcohol use: Yes     Comment: 2 drinks per week        History   Drug Use No       Current Outpatient Medications   Medication Sig Dispense Refill    allopurinol (ZYLOPRIM) 300 MG tablet Take 1 tablet (300 mg) by mouth daily 60 tablet 1    atorvastatin (LIPITOR) 20 MG tablet Take 1 tablet (20 mg) by mouth daily Hold on file until needed. 90 tablet 3    cefadroxil (DURICEF) 500 MG capsule Take 1 capsule (500 mg) by mouth 2 times daily 20 capsule 1    doxycycline hyclate (VIBRAMYCIN) 100 MG capsule Take 1 capsule (100 mg) by mouth 2 times daily 20 capsule 1    fluorouracil (EFUDEX) 5 % external cream Apply small amount at bedtime to warts, if skin irritation stop using. (Patient not taking: Reported on 4/29/2024) 40 g 0    LORazepam (ATIVAN) 0.5 MG tablet Take 1 tablet (0.5 mg) by mouth Take 1 hour prior to procedure (Patient not taking: Reported on 5/6/2024)         No Known Allergies    Review of Systems:   10 point ROS negative other than what was listed in HPI    Physical Exam:  /87 (BP Location: Right arm, Patient Position: Sitting, Cuff Size: Adult Regular)   Pulse 91   Temp 98.3  F (36.8  C) (Tympanic)   Ht 1.778 m (5' 10\")   Wt 90.7 kg (199 lb 15.3 oz)   BMI 28.69 kg/m      Constitutional- No acute distress, well nourished, non-toxic  Eyes: Anicteric, no injection.  PERRL  ENT:  Normocephalic, atraumatic, Nose midline, moist mucus membranes  Neck - supple, no LAD  Respiratory- nonlabored breathing on room air  Cardiovascular - extremities warm and well perfused  Neuro - No focal neuro deficits, Alert and oriented x 3  Psych: Appropriate mood and affect  Musculoskeletal: Normal gait, symmetric strength.  FROM upper and lower extremities.  Skin: Warm, Dry    LABS:     Lab Results   Component Value Date    WBC 70.1 (HH) 05/13/2024    HGB 13.5 05/13/2024    HCT 39.7 (L) 05/13/2024    MCV 90 05/13/2024     (L) 05/13/2024      No " "results found for: \"INR\", \"PROTIME\"       INFORMED CONSENT:     A discussion of the indications, risks, benefits and alternatives to surgery was held with the patient, and the risks discussed include beeding, infection, thrombosis, stenosis, port malfunction or malposition, air embolism, pneumothorax, hemothorax, or cardiac arrythmia . The patient understood the information given, questions addressed, and he wishes to proceed. He understands the need for maintenance of the infusion port at least once a month while in place.    ASSESSMENT AND PLAN:     Mr. Emery Barkley is in need of an infusion port for chemotherapy in the setting of CLL, and is being expedited to surgery to facilitate care. Anticipate placement in right internal jugular vein. He understood the information given, and wishes to proceed accordingly.    Óscar Craig MD on 5/21/2024 at 2:48 PM          "

## 2024-05-21 NOTE — LETTER
5/21/2024         RE: Emery Barkley  [id number] [facility] [id number]        Dear Colleague,    Thank you for referring your patient, Emery Barkley, to the [facility]. Please see a copy of my visit note below.    Surgical Consultation/History and Physical  [facility] Surgery    Emery is seen in consultation for port placement, at the request of Dr. Peter Friedell.    Chief Complaint:  chronic lymphocytic leukemia    HPI:  Emery Barkley presents in consultation today for evaluation of infusion port placement. He has a history of chronic lymphocytic leukemia. The patient is right-hand dominant, has never had central venous access, pneumothorax, lung surgery, history of dialysis or renal failure. He does go bird hunting in the fall.      Patient Active Problem List   Diagnosis     Hyperlipidemia LDL goal <100     Family history of coronary artery disease     External hemorrhoid     Common wart     Chronic pansinusitis     Nasal polyp     Nasal septal deviation     Nasal obstruction     Nasal turbinate hypertrophy     CLL (chronic lymphocytic leukemia) (H)       Past Medical History:   Diagnosis Date     Hyperlipidemia        Past Surgical History:   Procedure Laterality Date     ABDOMEN SURGERY  2/7/19    umbilical hernia     HERNIORRHAPHY UMBILICAL N/A 2/7/2019    Procedure: HERNIORRHAPHY UMBILICAL;  Surgeon: Tej Beaulieu DO;  Location: [facility] OR     OPTICAL TRACKING SYSTEM ENDOSCOPIC SINUS SURGERY Bilateral 7/24/2023    Procedure: SINUS SURGERY, ENDOSCOPIC, USING OPTICAL TRACKING SYSTEM - all sinuses;  Surgeon: Mario Gamboa MD;  Location:  OR     SEPTOPLASTY, TURBINOPLASTY, COMBINED Bilateral 7/24/2023    Procedure: SEPTOPLASTY; bilateral inferior turbinate reduction;  Surgeon: Mario Gamboa MD;  Location: [facility]     VASECTOMY         Family History   Problem Relation Age of Onset     C.A.D. Father         MI, chf, first mi in 50s.     Coronary  "Artery Disease Father      Hyperlipidemia Father      Cancer Sister         brain     Other Cancer Sister         Brain Cancer     Lipids Mother      Hyperlipidemia Mother      Depression Mother        Social History     Tobacco Use     Smoking status: Never     Passive exposure: Never     Smokeless tobacco: Never   Substance Use Topics     Alcohol use: Yes     Comment: 2 drinks per week        History   Drug Use No       Current Outpatient Medications   Medication Sig Dispense Refill     allopurinol (ZYLOPRIM) 300 MG tablet Take 1 tablet (300 mg) by mouth daily 60 tablet 1     atorvastatin (LIPITOR) 20 MG tablet Take 1 tablet (20 mg) by mouth daily Hold on file until needed. 90 tablet 3     cefadroxil (DURICEF) 500 MG capsule Take 1 capsule (500 mg) by mouth 2 times daily 20 capsule 1     doxycycline hyclate (VIBRAMYCIN) 100 MG capsule Take 1 capsule (100 mg) by mouth 2 times daily 20 capsule 1     fluorouracil (EFUDEX) 5 % external cream Apply small amount at bedtime to warts, if skin irritation stop using. (Patient not taking: Reported on 4/29/2024) 40 g 0     LORazepam (ATIVAN) 0.5 MG tablet Take 1 tablet (0.5 mg) by mouth Take 1 hour prior to procedure (Patient not taking: Reported on 5/6/2024)         No Known Allergies    Review of Systems:   10 point ROS negative other than what was listed in HPI    Physical Exam:  /87 (BP Location: Right arm, Patient Position: Sitting, Cuff Size: Adult Regular)   Pulse 91   Temp 98.3  F (36.8  C) (Tympanic)   Ht 1.778 m (5' 10\")   Wt 90.7 kg (199 lb 15.3 oz)   BMI 28.69 kg/m      Constitutional- No acute distress, well nourished, non-toxic  Eyes: Anicteric, no injection.  PERRL  ENT:  Normocephalic, atraumatic, Nose midline, moist mucus membranes  Neck - supple, no LAD  Respiratory- nonlabored breathing on room air  Cardiovascular - extremities warm and well perfused  Neuro - No focal neuro deficits, Alert and oriented x 3  Psych: Appropriate mood and " "affect  Musculoskeletal: Normal gait, symmetric strength.  FROM upper and lower extremities.  Skin: Warm, Dry    LABS:     Lab Results   Component Value Date    WBC 70.1 (HH) 05/13/2024    HGB 13.5 05/13/2024    HCT 39.7 (L) 05/13/2024    MCV 90 05/13/2024     (L) 05/13/2024      No results found for: \"INR\", \"PROTIME\"       INFORMED CONSENT:     A discussion of the indications, risks, benefits and alternatives to surgery was held with the patient, and the risks discussed include beeding, infection, thrombosis, stenosis, port malfunction or malposition, air embolism, pneumothorax, hemothorax, or cardiac arrythmia . The patient understood the information given, questions addressed, and he wishes to proceed. He understands the need for maintenance of the infusion port at least once a month while in place.    ASSESSMENT AND PLAN:     Mr. Emery Barkley is in need of an infusion port for chemotherapy in the setting of CLL, and is being expedited to surgery to facilitate care. Anticipate placement in right internal jugular vein. He understood the information given, and wishes to proceed accordingly.    Óscar Craig MD on 5/21/2024 at 2:48 PM            Again, thank you for allowing me to participate in the care of your patient.        Sincerely,        Óscar Craig MD  "

## 2024-05-22 ENCOUNTER — TELEPHONE (OUTPATIENT)
Dept: SURGERY | Facility: CLINIC | Age: 54
End: 2024-05-22

## 2024-05-22 ENCOUNTER — HOSPITAL ENCOUNTER (OUTPATIENT)
Facility: CLINIC | Age: 54
End: 2024-05-22
Attending: STUDENT IN AN ORGANIZED HEALTH CARE EDUCATION/TRAINING PROGRAM | Admitting: STUDENT IN AN ORGANIZED HEALTH CARE EDUCATION/TRAINING PROGRAM
Payer: COMMERCIAL

## 2024-05-22 NOTE — TELEPHONE ENCOUNTER
Surgery Scheduling left message for patient to call back to schedule surgery [phone]. LVM    Looking to add surgery to 05/29

## 2024-05-22 NOTE — TELEPHONE ENCOUNTER
Type of surgery: INSERTION, VASCULAR ACCESS PORT   Location of surgery: [place] [facility]  Date and time of surgery: 06/07/2024  Surgeon: ZENIA  Pre-Op Appt Date: 06/03/2024  Post-Op Appt Date: NONE   Packet sent out: Yes  Pre-cert/Authorization completed:  No  Date:

## 2024-05-23 ENCOUNTER — DOCUMENTATION ONLY (OUTPATIENT)
Dept: ONCOLOGY | Facility: CLINIC | Age: 54
End: 2024-05-23
Payer: COMMERCIAL

## 2024-05-23 ENCOUNTER — TELEPHONE (OUTPATIENT)
Dept: ONCOLOGY | Facility: CLINIC | Age: 54
End: 2024-05-23
Payer: COMMERCIAL

## 2024-05-23 NOTE — TELEPHONE ENCOUNTER
PA Initiation    Medication: VENCLEXTA STARTING PACK 10 & 50 & 100 MG PO TBPK  Insurance Company: [employer] - Phone [phone] Fax [phone]  Pharmacy Filling the Rx:    Filling Pharmacy Phone:    Filling Pharmacy Fax:    Start Date: 5/23/2024

## 2024-05-24 ENCOUNTER — TELEPHONE (OUTPATIENT)
Dept: ONCOLOGY | Facility: CLINIC | Age: 54
End: 2024-05-24
Payer: COMMERCIAL

## 2024-05-24 NOTE — TELEPHONE ENCOUNTER
Prior Authorization Approval    Medication: VENCLEXTA STARTING PACK 10 & 50 & 100 MG PO TBPK  Authorization Effective Date: 5/23/2024  Authorization Expiration Date: 5/23/2025  Approved Dose/Quantity: 42/28  Reference #: CMM [id number]   Insurance Company: [employer] - Phone [phone] Fax [phone]  Expected CoPay: $ 0  CoPay Card Available:      Financial Assistance Needed: No  Which Pharmacy is filling the prescription: [facility] - [place], [place] - [address]  Pharmacy Notified: Yes  Patient Notified: Yes

## 2024-05-24 NOTE — TELEPHONE ENCOUNTER
Oral Chemotherapy Monitoring Program     Placed call to patient in follow up of oral chemotherapy. Left message requesting call back. Attempting to contact patient regarding venetoclax. No drug names were mentioned.     Will update when response received.     Sheyla Velasco PharmD, MPH, BCPS  May 24, 2024        Addendum: Patient returned call and requested follow-up on 5/28/24. We will call him on this date to complete education for venetoclax.    Sheyla Velasco PharmD, MPH, BCPS  May 24, 2024

## 2024-05-29 ENCOUNTER — PATIENT OUTREACH (OUTPATIENT)
Dept: ONCOLOGY | Facility: CLINIC | Age: 54
End: 2024-05-29
Payer: COMMERCIAL

## 2024-05-29 DIAGNOSIS — C91.10 CLL (CHRONIC LYMPHOCYTIC LEUKEMIA) (H): Primary | ICD-10-CM

## 2024-06-27 ENCOUNTER — OFFICE VISIT (OUTPATIENT)
Dept: DERMATOLOGY | Facility: CLINIC | Age: 54
End: 2024-06-27
Payer: COMMERCIAL

## 2024-06-27 DIAGNOSIS — Z12.83 ENCOUNTER FOR SCREENING FOR MALIGNANT NEOPLASM OF SKIN: ICD-10-CM

## 2024-06-27 DIAGNOSIS — L82.1 SEBORRHEIC KERATOSES: ICD-10-CM

## 2024-06-27 DIAGNOSIS — C91.10 CLL (CHRONIC LYMPHOCYTIC LEUKEMIA) (H): ICD-10-CM

## 2024-06-27 DIAGNOSIS — D23.9 DERMATOFIBROMA: ICD-10-CM

## 2024-06-27 DIAGNOSIS — D22.9 MULTIPLE BENIGN NEVI: Primary | ICD-10-CM

## 2024-06-27 DIAGNOSIS — L81.4 LENTIGINES: ICD-10-CM

## 2024-06-27 DIAGNOSIS — D18.01 CHERRY ANGIOMA: ICD-10-CM

## 2024-06-27 PROCEDURE — 99213 OFFICE O/P EST LOW 20 MIN: CPT | Performed by: NURSE PRACTITIONER

## 2024-06-27 NOTE — LETTER
6/27/2024      Emery Barkley  [address] [place] [address]      Dear Colleague,    Thank you for referring your patient, Emery Barkley, to the [facility]. Please see a copy of my visit note below.    [facility] Dermatology Note  Encounter Date: Jun 27, 2024  Office Visit     Reviewed patients past medical history and pertinent chart review prior to patients visit today.     Dermatology Problem List:  Patient denies personal history of skin cancer or dysplastic nevi.    Patient denies family history of skin cancer or dysplastic nevi.      Has CLL, no treatment necessary yet    ____________________________________________    Assessment & Plan:       # Benign skin findings including: dermatofibroma, seborrheic keratoses, cherry angioma, lentigines and benign nevi.   - No further intervention required. Patient to report changes.   - Patient reassured of the benign nature of these lesions.    #Signs and Symptoms of non-melanoma skin cancer and ABCDEs of melanoma reviewed with patient. Patient encouraged to perform monthly self skin exams and educated on how to perform them. UV precautions reviewed with patient. Patient was asked about new or changing moles/lesions on body.     #Reviewed Sunscreen: Apply 20 minutes prior to going outdoors and reapply every two hours, when wet or sweating. We recommend using an SPF 30 or higher, and to use one that is water resistant.       Follow-up:  1 years for follow up full body skin exam, prn for new or changing lesions or new concerns    Negra Calhoun, ROBERTO CARLOS  Dermatology     ____________________________________________    CC: Skin Check (Lesion to left hip ( present for years with firmness) and cherry angiomas)    HPI:  Mr. Emery Barkley is a(n) 53 year old male who presents today as a return patient for a full body skin cancer screening. Patient has concerns today about a spot on his left hip that is firm but not painful and has not  been changing. He has had it awhile.     Patient is otherwise feeling well, without additional skin concerns.     Physical Exam:  Vitals: There were no vitals taken for this visit.  SKIN: Total skin excluding the genitalia areas was performed. The exam included the head/face, neck, both arms, chest, back, abdomen, both legs, digits, mons pubis, buttock and nails.   -left flank, grey/brown firm papules with stellate center on dermoscopy and positive dimples sign c/w dermatofibroma  -several 1-2mm red dome shaped symmetric papules scattered on the trunk  -multiple tan/brown flat round macules and raised papules scattered throughout trunk, extremities and head. No worrisome features for malignancy noted on examination.  -scattered tan, homogenous macules scattered on sun exposed areas of trunk, extremities and face.   -scattered waxy, stuck on tan/brown papules and patches on the trunk     - No other lesions of concern on areas examined.     Medications:  Current Outpatient Medications   Medication Sig Dispense Refill     allopurinol (ZYLOPRIM) 300 MG tablet Take 1 tablet (300 mg) by mouth daily 60 tablet 1     atorvastatin (LIPITOR) 20 MG tablet Take 1 tablet (20 mg) by mouth daily Hold on file until needed. 90 tablet 3     cefadroxil (DURICEF) 500 MG capsule Take 1 capsule (500 mg) by mouth 2 times daily 20 capsule 1     doxycycline hyclate (VIBRAMYCIN) 100 MG capsule Take 1 capsule (100 mg) by mouth 2 times daily 20 capsule 1     fluorouracil (EFUDEX) 5 % external cream Apply small amount at bedtime to warts, if skin irritation stop using. (Patient not taking: Reported on 4/29/2024) 40 g 0     LORazepam (ATIVAN) 0.5 MG tablet Take 1 tablet (0.5 mg) by mouth Take 1 hour prior to procedure (Patient not taking: Reported on 5/6/2024)       No current facility-administered medications for this visit.      Past Medical History:   Patient Active Problem List   Diagnosis     Hyperlipidemia LDL goal <100     Family history  of coronary artery disease     External hemorrhoid     Common wart     Chronic pansinusitis     Nasal polyp     Nasal septal deviation     Nasal obstruction     Nasal turbinate hypertrophy     CLL (chronic lymphocytic leukemia) (H)     Past Medical History:   Diagnosis Date     Hyperlipidemia        CC Peter E Friedell, MD  [address] [place] [address] on close of this encounter.      Again, thank you for allowing me to participate in the care of your patient.        Sincerely,        MICHAEL Humphrey CNP

## 2024-06-27 NOTE — PROGRESS NOTES
[facility] Dermatology Note  Encounter Date: Jun 27, 2024  Office Visit     Reviewed patients past medical history and pertinent chart review prior to patients visit today.     Dermatology Problem List:  Patient denies personal history of skin cancer or dysplastic nevi.    Patient denies family history of skin cancer or dysplastic nevi.      Has CLL, no treatment necessary yet    ____________________________________________    Assessment & Plan:       # Benign skin findings including: dermatofibroma, seborrheic keratoses, cherry angioma, lentigines and benign nevi.   - No further intervention required. Patient to report changes.   - Patient reassured of the benign nature of these lesions.    #Signs and Symptoms of non-melanoma skin cancer and ABCDEs of melanoma reviewed with patient. Patient encouraged to perform monthly self skin exams and educated on how to perform them. UV precautions reviewed with patient. Patient was asked about new or changing moles/lesions on body.     #Reviewed Sunscreen: Apply 20 minutes prior to going outdoors and reapply every two hours, when wet or sweating. We recommend using an SPF 30 or higher, and to use one that is water resistant.       Follow-up:  1 years for follow up full body skin exam, prn for new or changing lesions or new concerns    Negra Calhoun CNP  Dermatology     ____________________________________________    CC: Skin Check (Lesion to left hip ( present for years with firmness) and cherry angiomas)    HPI:  Mr. Emery Barkley is a(n) 53 year old male who presents today as a return patient for a full body skin cancer screening. Patient has concerns today about a spot on his left hip that is firm but not painful and has not been changing. He has had it awhile.     Patient is otherwise feeling well, without additional skin concerns.     Physical Exam:  Vitals: There were no vitals taken for this visit.  SKIN: Total skin excluding the genitalia areas was  performed. The exam included the head/face, neck, both arms, chest, back, abdomen, both legs, digits, mons pubis, buttock and nails.   -left flank, grey/brown firm papules with stellate center on dermoscopy and positive dimples sign c/w dermatofibroma  -several 1-2mm red dome shaped symmetric papules scattered on the trunk  -multiple tan/brown flat round macules and raised papules scattered throughout trunk, extremities and head. No worrisome features for malignancy noted on examination.  -scattered tan, homogenous macules scattered on sun exposed areas of trunk, extremities and face.   -scattered waxy, stuck on tan/brown papules and patches on the trunk     - No other lesions of concern on areas examined.     Medications:  Current Outpatient Medications   Medication Sig Dispense Refill    allopurinol (ZYLOPRIM) 300 MG tablet Take 1 tablet (300 mg) by mouth daily 60 tablet 1    atorvastatin (LIPITOR) 20 MG tablet Take 1 tablet (20 mg) by mouth daily Hold on file until needed. 90 tablet 3    cefadroxil (DURICEF) 500 MG capsule Take 1 capsule (500 mg) by mouth 2 times daily 20 capsule 1    doxycycline hyclate (VIBRAMYCIN) 100 MG capsule Take 1 capsule (100 mg) by mouth 2 times daily 20 capsule 1    fluorouracil (EFUDEX) 5 % external cream Apply small amount at bedtime to warts, if skin irritation stop using. (Patient not taking: Reported on 4/29/2024) 40 g 0    LORazepam (ATIVAN) 0.5 MG tablet Take 1 tablet (0.5 mg) by mouth Take 1 hour prior to procedure (Patient not taking: Reported on 5/6/2024)       No current facility-administered medications for this visit.      Past Medical History:   Patient Active Problem List   Diagnosis    Hyperlipidemia LDL goal <100    Family history of coronary artery disease    External hemorrhoid    Common wart    Chronic pansinusitis    Nasal polyp    Nasal septal deviation    Nasal obstruction    Nasal turbinate hypertrophy    CLL (chronic lymphocytic leukemia) (H)     Past Medical  History:   Diagnosis Date    Hyperlipidemia        CC Peter E Friedell, MD  [address] [place] [address] on close of this encounter.

## 2024-07-28 SDOH — HEALTH STABILITY: PHYSICAL HEALTH: ON AVERAGE, HOW MANY MINUTES DO YOU ENGAGE IN EXERCISE AT THIS LEVEL?: 60 MIN

## 2024-07-28 SDOH — HEALTH STABILITY: PHYSICAL HEALTH: ON AVERAGE, HOW MANY DAYS PER WEEK DO YOU ENGAGE IN MODERATE TO STRENUOUS EXERCISE (LIKE A BRISK WALK)?: 3 DAYS

## 2024-07-28 ASSESSMENT — SOCIAL DETERMINANTS OF HEALTH (SDOH): HOW OFTEN DO YOU GET TOGETHER WITH FRIENDS OR RELATIVES?: ONCE A WEEK

## 2024-08-02 ENCOUNTER — OFFICE VISIT (OUTPATIENT)
Dept: FAMILY MEDICINE | Facility: CLINIC | Age: 54
End: 2024-08-02
Payer: COMMERCIAL

## 2024-08-02 VITALS
RESPIRATION RATE: 20 BRPM | HEART RATE: 75 BPM | DIASTOLIC BLOOD PRESSURE: 88 MMHG | HEIGHT: 70 IN | BODY MASS INDEX: 28.06 KG/M2 | TEMPERATURE: 98.1 F | OXYGEN SATURATION: 95 % | SYSTOLIC BLOOD PRESSURE: 131 MMHG | WEIGHT: 196 LBS

## 2024-08-02 DIAGNOSIS — Z82.49 FAMILY HISTORY OF CORONARY ARTERY DISEASE: ICD-10-CM

## 2024-08-02 DIAGNOSIS — Z00.00 ROUTINE GENERAL MEDICAL EXAMINATION AT A HEALTH CARE FACILITY: Primary | ICD-10-CM

## 2024-08-02 DIAGNOSIS — E78.5 HYPERLIPIDEMIA LDL GOAL <100: ICD-10-CM

## 2024-08-02 DIAGNOSIS — Z13.1 SCREENING FOR DIABETES MELLITUS: ICD-10-CM

## 2024-08-02 DIAGNOSIS — Z12.5 ENCOUNTER FOR SCREENING FOR MALIGNANT NEOPLASM OF PROSTATE: ICD-10-CM

## 2024-08-02 DIAGNOSIS — C91.10 CLL (CHRONIC LYMPHOCYTIC LEUKEMIA) (H): ICD-10-CM

## 2024-08-02 LAB
CHOLEST SERPL-MCNC: 147 MG/DL
FASTING STATUS PATIENT QL REPORTED: YES
FASTING STATUS PATIENT QL REPORTED: YES
GLUCOSE SERPL-MCNC: 91 MG/DL (ref 70–99)
HDLC SERPL-MCNC: 26 MG/DL
LDLC SERPL CALC-MCNC: 95 MG/DL
NONHDLC SERPL-MCNC: 121 MG/DL
PSA SERPL DL<=0.01 NG/ML-MCNC: 0.9 NG/ML (ref 0–3.5)
TRIGL SERPL-MCNC: 128 MG/DL

## 2024-08-02 PROCEDURE — 99213 OFFICE O/P EST LOW 20 MIN: CPT | Mod: 25 | Performed by: FAMILY MEDICINE

## 2024-08-02 PROCEDURE — 36415 COLL VENOUS BLD VENIPUNCTURE: CPT | Performed by: FAMILY MEDICINE

## 2024-08-02 PROCEDURE — G0103 PSA SCREENING: HCPCS | Performed by: FAMILY MEDICINE

## 2024-08-02 PROCEDURE — 99396 PREV VISIT EST AGE 40-64: CPT | Performed by: FAMILY MEDICINE

## 2024-08-02 PROCEDURE — 82947 ASSAY GLUCOSE BLOOD QUANT: CPT | Performed by: FAMILY MEDICINE

## 2024-08-02 PROCEDURE — 80061 LIPID PANEL: CPT | Performed by: FAMILY MEDICINE

## 2024-08-02 RX ORDER — ATORVASTATIN CALCIUM 20 MG/1
20 TABLET, FILM COATED ORAL DAILY
Qty: 90 TABLET | Refills: 3 | Status: SHIPPED | OUTPATIENT
Start: 2024-08-02

## 2024-08-02 ASSESSMENT — PAIN SCALES - GENERAL: PAINLEVEL: NO PAIN (0)

## 2024-08-02 NOTE — PATIENT INSTRUCTIONS
Patient Education     Lab work today.   Refill of atorvastatin provided today.     Follow up with K[facility] for the lymph nodes.       Preventive Care Advice   This is general advice given by our system to help you stay healthy. However, your care team may have specific advice just for you. Please talk to your care team about your preventive care needs.  Nutrition  Eat 5 or more servings of fruits and vegetables each day.  Try wheat bread, brown rice and whole grain pasta (instead of white bread, rice, and pasta).  Get enough calcium and vitamin D. Check the label on foods and aim for 100% of the RDA (recommended daily allowance).  Lifestyle  Exercise at least 150 minutes each week  (30 minutes a day, 5 days a week).  Do muscle strengthening activities 2 days a week. These help control your weight and prevent disease.  No smoking.  Wear sunscreen to prevent skin cancer.  Have a dental exam and cleaning every 6 months.  Yearly exams  See your health care team every year to talk about:  Any changes in your health.  Any medicines your care team has prescribed.  Preventive care, family planning, and ways to prevent chronic diseases.  Shots (vaccines)   HPV shots (up to age 26), if you've never had them before.  Hepatitis B shots (up to age 59), if you've never had them before.  COVID-19 shot: Get this shot when it's due.  Flu shot: Get a flu shot every year.  Tetanus shot: Get a tetanus shot every 10 years.  Pneumococcal, hepatitis A, and RSV shots: Ask your care team if you need these based on your risk.  Shingles shot (for age 50 and up)  General health tests  Diabetes screening:  Starting at age 35, Get screened for diabetes at least every 3 years.  If you are younger than age 35, ask your care team if you should be screened for diabetes.  Cholesterol test: At age 39, start having a cholesterol test every 5 years, or more often if advised.  Bone density scan (DEXA): At age 50, ask your care team if you should have this  scan for osteoporosis (brittle bones).  Hepatitis C: Get tested at least once in your life.  STIs (sexually transmitted infections)  Before age 24: Ask your care team if you should be screened for STIs.  After age 24: Get screened for STIs if you're at risk. You are at risk for STIs (including HIV) if:  You are sexually active with more than one person.  You don't use condoms every time.  You or a partner was diagnosed with a sexually transmitted infection.  If you are at risk for HIV, ask about PrEP medicine to prevent HIV.  Get tested for HIV at least once in your life, whether you are at risk for HIV or not.  Cancer screening tests  Cervical cancer screening: If you have a cervix, begin getting regular cervical cancer screening tests starting at age 21.  Breast cancer scan (mammogram): If you've ever had breasts, begin having regular mammograms starting at age 40. This is a scan to check for breast cancer.  Colon cancer screening: It is important to start screening for colon cancer at age 45.  Have a colonoscopy test every 10 years (or more often if you're at risk) Or, ask your provider about stool tests like a FIT test every year or Cologuard test every 3 years.  To learn more about your testing options, visit:   .  For help making a decision, visit:   https://bit.ly/cm03400.  Prostate cancer screening test: If you have a prostate, ask your care team if a prostate cancer screening test (PSA) at age 55 is right for you.  Lung cancer screening: If you are a current or former smoker ages 50 to 80, ask your care team if ongoing lung cancer screenings are right for you.  For informational purposes only. Not to replace the advice of your health care provider. Copyright   2023 [employer]. All rights reserved. Clinically reviewed by the [facility] Transitions Program. [place] [id number] - REV 01/24.

## 2024-08-02 NOTE — PROGRESS NOTES
"Preventive Care Visit  [facility]  August Choi DO, Family Medicine  Aug 2, 2024      Assessment & Plan     Routine general medical examination at a health care facility  Cholesterol: atorvastatin 20 mg daily.   Diabetes: screen today   Colon Cancer: colonoscopy 12/17/2021. Repeat 10 years.   Prostate: screen today   Tobacco: non-user     Screening for diabetes mellitus  - Glucose    Encounter for screening for malignant neoplasm of prostate  - PSA, screen    Hyperlipidemia LDL goal <100  Family history of coronary artery disease  Stable. Check labs today. Refill provided.   - Lipid panel reflex to direct LDL Fasting  - atorvastatin (LIPITOR) 20 MG tablet  Dispense: 90 tablet; Refill: 3    CLL (chronic lymphocytic leukemia) (H)  Follows with [facility]. Has some lymph nodes on his exam today. Advised he follow up with his Oncology team at [facility].       Patient has been advised of split billing requirements and indicates understanding: Yes  By MA     The risks, benefits and treatment options of prescribed medications or other treatments have been discussed with the patient. The patient verbalized their understanding and should call or follow up if no improvement or if they develop further problems.      BMI  Estimated body mass index is 28.12 kg/m  as calculated from the following:    Height as of this encounter: 1.778 m (5' 10\").    Weight as of this encounter: 88.9 kg (196 lb).   Weight management plan: Discussed healthy diet and exercise guidelines    Counseling  Appropriate preventive services were addressed with this patient via screening, questionnaire, or discussion as appropriate for fall prevention, nutrition, physical activity, Tobacco-use cessation, weight loss and cognition.  Checklist reviewing preventive services available has been given to the patient.  Reviewed patient's diet, addressing concerns and/or questions.   He is at risk for lack of exercise and has been " provided with information to increase physical activity for the benefit of his well-being.         Subjective   Emery is a 53 year old, presenting for the following:  Establish Care, Physical, and Lipids        8/2/2024     7:39 AM   Additional Questions   Roomed by Tanika GREENE        Health Care Directive  Patient has a Health Care Directive on file  Discussed advance care planning with patient.    HPI      Cholesterol: atorvastatin 20 mg daily.   Diabetes: screen today   Colon Cancer: colonoscopy 12/17/2021. Repeat 10 years.   Prostate: screen today   Tobacco: non-user       CLL   Follows with [facility].   Monitoring at this time. No current treatment.   Overall doing well at this time.       Hyperlipidemia Follow-Up    Are you regularly taking any medication or supplement to lower your cholesterol?   Yes- Lipitor  Are you having muscle aches or other side effects that you think could be caused by your cholesterol lowering medication?  No        7/28/2024   General Health   How would you rate your overall physical health? Good   Feel stress (tense, anxious, or unable to sleep) Not at all            7/28/2024   Nutrition   Three or more servings of calcium each day? Yes   Diet: Regular (no restrictions)   How many servings of fruit and vegetables per day? (!) 0-1   How many sweetened beverages each day? 0-1            7/28/2024   Exercise   Days per week of moderate/strenous exercise 3 days   Average minutes spent exercising at this level 60 min            7/28/2024   Social Factors   Frequency of gathering with friends or relatives Once a week   Worry food won't last until get money to buy more No   Food not last or not have enough money for food? No   Do you have housing? (Housing is defined as stable permanent housing and does not include staying ouside in a car, in a tent, in an abandoned building, in an overnight shelter, or couch-surfing.) Yes   Are you worried about losing your housing? No   Lack of  transportation? No   Unable to get utilities (heat,electricity)? No            7/28/2024   Fall Risk   Fallen 2 or more times in the past year? No   Trouble with walking or balance? No             7/28/2024   Dental   Dentist two times every year? Yes            7/28/2024   TB Screening   Were you born outside of the [place]? No            Today's PHQ-2 Score:       8/1/2024    10:36 AM   PHQ-2 ( 1999 [employer])   Q1: Little interest or pleasure in doing things 0   Q2: Feeling down, depressed or hopeless 0   PHQ-2 Score 0   Q1: Little interest or pleasure in doing things Not at all   Q2: Feeling down, depressed or hopeless Not at all   PHQ-2 Score 0           7/28/2024   Substance Use   Alcohol more than 3/day or more than 7/wk No   Do you use any other substances recreationally? No        Social History     Tobacco Use    Smoking status: Never     Passive exposure: Never    Smokeless tobacco: Never   Vaping Use    Vaping status: Never Used   Substance Use Topics    Alcohol use: Yes     Comment: 2 drinks per week    Drug use: No             7/28/2024   One time HIV Screening   Previous HIV test? I don't know          7/28/2024   STI Screening   New sexual partner(s) since last STI/HIV test? No      ASCVD Risk   The 10-year ASCVD risk score (Wily MCCABE, et al., 2019) is: 4%    Values used to calculate the score:      Age: 53 years      Sex: Male      Is Non- : No      Diabetic: No      Tobacco smoker: No      Systolic Blood Pressure: 131 mmHg      Is BP treated: No      HDL Cholesterol: 51 mg/dL      Total Cholesterol: 189 mg/dL         Reviewed and updated as needed this visit by Provider   Tobacco  Allergies  Meds  Problems  Med Hx  Surg Hx  Fam Hx                  Review of Systems  Constitutional, HEENT, cardiovascular, pulmonary, gi and gu systems are negative, except as otherwise noted.     Objective    Exam  /88 (BP Location: Right arm, Patient Position: Chair, Cuff Size:  "Adult Regular)   Pulse 75   Temp 98.1  F (36.7  C) (Oral)   Resp 20   Ht 1.778 m (5' 10\")   Wt 88.9 kg (196 lb)   SpO2 95%   BMI 28.12 kg/m     Estimated body mass index is 28.12 kg/m  as calculated from the following:    Height as of this encounter: 1.778 m (5' 10\").    Weight as of this encounter: 88.9 kg (196 lb).    Physical Exam  GENERAL: alert and no distress  EYES: Eyes grossly normal to inspection, PERRL and conjunctivae and sclerae normal  HENT: ear canals and TM's normal, nose and mouth without ulcers or lesions  NECK: posterior auricular lymph nodes palpable bilaterally. Left posterior cervical lymph node palpable. Non-tender.   RESP: lungs clear to auscultation - no rales, rhonchi or wheezes  CV: regular rate and rhythm, normal S1 S2, no S3 or S4, no murmur, click or rub, no peripheral edema  ABDOMEN: soft, nontender, no hepatosplenomegaly, no masses and bowel sounds normal  MS: no gross musculoskeletal defects noted, no edema  SKIN: no suspicious lesions or rashes  NEURO: Normal strength and tone, mentation intact and speech normal  PSYCH: mentation appears normal, affect normal/bright        Signed Electronically by: August Choi DO    "

## 2024-08-08 ENCOUNTER — ALLIED HEALTH/NURSE VISIT (OUTPATIENT)
Dept: FAMILY MEDICINE | Facility: CLINIC | Age: 54
End: 2024-08-08
Payer: COMMERCIAL

## 2024-08-08 DIAGNOSIS — Z23 IMMUNIZATION DUE: Primary | ICD-10-CM

## 2024-08-08 PROCEDURE — 99207 PR NO CHARGE NURSE ONLY: CPT

## 2024-08-08 PROCEDURE — 90677 PCV20 VACCINE IM: CPT

## 2024-08-08 PROCEDURE — 90471 IMMUNIZATION ADMIN: CPT

## 2024-08-08 NOTE — PROGRESS NOTES
IMMUNIZATION GIVEN. PATIENT HAD NO FURTHER QUESTIONS. ADVISED PATIENT WAIT 15 MIN AFTER SHOT BEFORE LEAVING BUILDING.

## 2024-08-22 ENCOUNTER — OFFICE VISIT (OUTPATIENT)
Dept: OTOLARYNGOLOGY | Facility: CLINIC | Age: 54
End: 2024-08-22
Payer: COMMERCIAL

## 2024-08-22 VITALS — SYSTOLIC BLOOD PRESSURE: 131 MMHG | DIASTOLIC BLOOD PRESSURE: 81 MMHG | HEART RATE: 78 BPM | OXYGEN SATURATION: 98 %

## 2024-08-22 DIAGNOSIS — R22.0 INTRANASAL MASS: ICD-10-CM

## 2024-08-22 DIAGNOSIS — B07.8 COMMON WART: Primary | ICD-10-CM

## 2024-08-22 PROCEDURE — 99213 OFFICE O/P EST LOW 20 MIN: CPT | Performed by: OTOLARYNGOLOGY

## 2024-08-22 NOTE — PROGRESS NOTES
History of Present Illness - Emery Barkley is a 53 year old male here to see me in follow up from 3/29/2024, and I saw him for removal of a small intranasal lesion, likley a benign wart.     He is a patient of Dr Gabmoa, and is status post functional endoscopic sinus surgery with septo in 2023.  He was seen by Derm, and removal of the wart was not successful with medical management, and so they referred back to ENT for removal.    At the 3/29/24 visit things went very smoothly and I was able to use a shave biopsy curette to debulk and remove the wart.    Past medical history -   Patient Active Problem List   Diagnosis    Hyperlipidemia LDL goal <100    Family history of coronary artery disease    External hemorrhoid    Common wart    Chronic pansinusitis    Nasal polyp    Nasal septal deviation    Nasal obstruction    Nasal turbinate hypertrophy    CLL (chronic lymphocytic leukemia) (H)       /81 (BP Location: Right arm, Patient Position: Sitting, Cuff Size: Adult Regular)   Pulse 78   SpO2 98%       General - The patient is well nourished and well developed, and appears to have good nutritional status.  Alert and oriented to person and place, answers questions and cooperates with examination appropriately.   Head and Face - Normocephalic and atraumatic, with no gross asymmetry noted of the contour of the facial features.  The facial nerve is intact, with strong symmetric movements.  Eyes - Extraocular movements intact, and the pupils were reactive to light.  Sclera were not icteric or injected, conjunctiva were pink and moist.  Mouth - Examination of the oral cavity shows pink, healthy, moist mucosa.  No lesions or ulceration noted.  The dentition are in good repair.  The tongue is mobile and midline.  Nose - External contour is symmetric, no gross deflection or scars.  Nasal mucosa is pink and moist with no abnormal mucus.  The septum was midline and non-obstructive, turbinates of normal size and position.   No polyps, masses, or purulence noted on examination.      A/P - Emery Barkley  (R22.0) Intranasal mass  (primary encounter diagnosis)  (B07.8) Common wart    There is no trace of recurrent wart in the nostril.    I will see him in one year, sooner if it recurs

## 2024-08-22 NOTE — LETTER
8/22/2024      Emery Barkley  [address] [place] [address]      Dear Colleague,    Thank you for referring your patient, Emery Barkley, to the [facility]. Please see a copy of my visit note below.    History of Present Illness - Emery Barkley is a 53 year old male here to see me in follow up from 3/29/2024, and I saw him for removal of a small intranasal lesion, likley a benign wart.     He is a patient of Dr Gamboa, and is status post functional endoscopic sinus surgery with septo in 2023.  He was seen by Derm, and removal of the wart was not successful with medical management, and so they referred back to ENT for removal.    At the 3/29/24 visit things went very smoothly and I was able to use a shave biopsy curette to debulk and remove the wart.    Past medical history -   Patient Active Problem List   Diagnosis     Hyperlipidemia LDL goal <100     Family history of coronary artery disease     External hemorrhoid     Common wart     Chronic pansinusitis     Nasal polyp     Nasal septal deviation     Nasal obstruction     Nasal turbinate hypertrophy     CLL (chronic lymphocytic leukemia) (H)       /81 (BP Location: Right arm, Patient Position: Sitting, Cuff Size: Adult Regular)   Pulse 78   SpO2 98%       General - The patient is well nourished and well developed, and appears to have good nutritional status.  Alert and oriented to person and place, answers questions and cooperates with examination appropriately.   Head and Face - Normocephalic and atraumatic, with no gross asymmetry noted of the contour of the facial features.  The facial nerve is intact, with strong symmetric movements.  Eyes - Extraocular movements intact, and the pupils were reactive to light.  Sclera were not icteric or injected, conjunctiva were pink and moist.  Mouth - Examination of the oral cavity shows pink, healthy, moist mucosa.  No lesions or ulceration noted.  The dentition are in good repair.   The tongue is mobile and midline.  Nose - External contour is symmetric, no gross deflection or scars.  Nasal mucosa is pink and moist with no abnormal mucus.  The septum was midline and non-obstructive, turbinates of normal size and position.  No polyps, masses, or purulence noted on examination.      A/P - Emery Barkley  (R22.0) Intranasal mass  (primary encounter diagnosis)  (B07.8) Common wart    There is no trace of recurrent wart in the nostril.    I will see him in one year, sooner if it recurs      Again, thank you for allowing me to participate in the care of your patient.        Sincerely,        Nick Gomez MD

## 2024-12-12 ENCOUNTER — HOSPITAL ENCOUNTER (EMERGENCY)
Facility: CLINIC | Age: 54
Discharge: HOME | End: 2024-12-12
Attending: FAMILY MEDICINE
Payer: COMMERCIAL

## 2024-12-12 ENCOUNTER — APPOINTMENT (OUTPATIENT)
Dept: CT IMAGING | Facility: CLINIC | Age: 54
End: 2024-12-12
Attending: FAMILY MEDICINE
Payer: COMMERCIAL

## 2024-12-12 VITALS
OXYGEN SATURATION: 95 % | SYSTOLIC BLOOD PRESSURE: 139 MMHG | HEART RATE: 87 BPM | WEIGHT: 185 LBS | RESPIRATION RATE: 12 BRPM | TEMPERATURE: 98.1 F | HEIGHT: 70 IN | BODY MASS INDEX: 26.48 KG/M2 | DIASTOLIC BLOOD PRESSURE: 95 MMHG

## 2024-12-12 DIAGNOSIS — C91.10 CLL (CHRONIC LYMPHOCYTIC LEUKEMIA) (H): ICD-10-CM

## 2024-12-12 DIAGNOSIS — R16.1 SPLENOMEGALY: ICD-10-CM

## 2024-12-12 LAB
ALBUMIN SERPL BCG-MCNC: 4.1 G/DL (ref 3.5–5.2)
ALP SERPL-CCNC: 128 U/L (ref 40–150)
ALT SERPL W P-5'-P-CCNC: 13 U/L (ref 0–70)
ANION GAP SERPL CALCULATED.3IONS-SCNC: 10 MMOL/L (ref 7–15)
AST SERPL W P-5'-P-CCNC: 26 U/L (ref 0–45)
ATRIAL RATE - MUSE: 96 BPM
BASOPHILS # BLD MANUAL: 0 10E3/UL (ref 0–0.2)
BASOPHILS NFR BLD MANUAL: 0 %
BILIRUB SERPL-MCNC: 0.4 MG/DL
BUN SERPL-MCNC: 11.3 MG/DL (ref 6–20)
CALCIUM SERPL-MCNC: 9.2 MG/DL (ref 8.8–10.4)
CHLORIDE SERPL-SCNC: 104 MMOL/L (ref 98–107)
CREAT SERPL-MCNC: 1.06 MG/DL (ref 0.67–1.17)
D DIMER PPP FEU-MCNC: 1.06 UG/ML FEU (ref 0–0.5)
DIASTOLIC BLOOD PRESSURE - MUSE: NORMAL MMHG
EGFRCR SERPLBLD CKD-EPI 2021: 84 ML/MIN/1.73M2
EOSINOPHIL # BLD MANUAL: 0 10E3/UL (ref 0–0.7)
EOSINOPHIL NFR BLD MANUAL: 0 %
ERYTHROCYTE [DISTWIDTH] IN BLOOD BY AUTOMATED COUNT: 15.3 % (ref 10–15)
GLUCOSE SERPL-MCNC: 110 MG/DL (ref 70–99)
HCO3 SERPL-SCNC: 26 MMOL/L (ref 22–29)
HCT VFR BLD AUTO: 33.2 % (ref 40–53)
HGB BLD-MCNC: 11.2 G/DL (ref 13.3–17.7)
INTERPRETATION ECG - MUSE: NORMAL
LIPASE SERPL-CCNC: 24 U/L (ref 13–60)
LYMPHOCYTES # BLD MANUAL: 98.4 10E3/UL (ref 0.8–5.3)
LYMPHOCYTES NFR BLD MANUAL: 96 %
MCH RBC QN AUTO: 29.9 PG (ref 26.5–33)
MCHC RBC AUTO-ENTMCNC: 33.7 G/DL (ref 31.5–36.5)
MCV RBC AUTO: 89 FL (ref 78–100)
MONOCYTES # BLD MANUAL: 0 10E3/UL (ref 0–1.3)
MONOCYTES NFR BLD MANUAL: 0 %
NEUTROPHILS # BLD MANUAL: 4.1 10E3/UL (ref 1.6–8.3)
NEUTROPHILS NFR BLD MANUAL: 4 %
P AXIS - MUSE: 33 DEGREES
PLAT MORPH BLD: ABNORMAL
PLATELET # BLD AUTO: 144 10E3/UL (ref 150–450)
POTASSIUM SERPL-SCNC: 4.8 MMOL/L (ref 3.4–5.3)
PR INTERVAL - MUSE: 144 MS
PROT SERPL-MCNC: 6.9 G/DL (ref 6.4–8.3)
QRS DURATION - MUSE: 132 MS
QT - MUSE: 396 MS
QTC - MUSE: 500 MS
R AXIS - MUSE: 25 DEGREES
RBC # BLD AUTO: 3.75 10E6/UL (ref 4.4–5.9)
RBC MORPH BLD: ABNORMAL
SODIUM SERPL-SCNC: 140 MMOL/L (ref 135–145)
SYSTOLIC BLOOD PRESSURE - MUSE: NORMAL MMHG
T AXIS - MUSE: 10 DEGREES
TROPONIN T SERPL HS-MCNC: 6 NG/L
VENTRICULAR RATE- MUSE: 96 BPM
WBC # BLD AUTO: 102.5 10E3/UL (ref 4–11)

## 2024-12-12 PROCEDURE — 99284 EMERGENCY DEPT VISIT MOD MDM: CPT | Performed by: FAMILY MEDICINE

## 2024-12-12 PROCEDURE — 99285 EMERGENCY DEPT VISIT HI MDM: CPT | Mod: 25

## 2024-12-12 PROCEDURE — 250N000011 HC RX IP 250 OP 636: Performed by: FAMILY MEDICINE

## 2024-12-12 PROCEDURE — 85007 BL SMEAR W/DIFF WBC COUNT: CPT | Performed by: FAMILY MEDICINE

## 2024-12-12 PROCEDURE — 83690 ASSAY OF LIPASE: CPT | Performed by: FAMILY MEDICINE

## 2024-12-12 PROCEDURE — 84484 ASSAY OF TROPONIN QUANT: CPT | Performed by: FAMILY MEDICINE

## 2024-12-12 PROCEDURE — 250N000009 HC RX 250: Performed by: FAMILY MEDICINE

## 2024-12-12 PROCEDURE — 71275 CT ANGIOGRAPHY CHEST: CPT

## 2024-12-12 PROCEDURE — 36415 COLL VENOUS BLD VENIPUNCTURE: CPT | Performed by: FAMILY MEDICINE

## 2024-12-12 PROCEDURE — 85379 FIBRIN DEGRADATION QUANT: CPT | Performed by: FAMILY MEDICINE

## 2024-12-12 PROCEDURE — 74177 CT ABD & PELVIS W/CONTRAST: CPT

## 2024-12-12 PROCEDURE — 93010 ELECTROCARDIOGRAM REPORT: CPT | Performed by: FAMILY MEDICINE

## 2024-12-12 PROCEDURE — 93005 ELECTROCARDIOGRAM TRACING: CPT

## 2024-12-12 PROCEDURE — 85027 COMPLETE CBC AUTOMATED: CPT | Performed by: FAMILY MEDICINE

## 2024-12-12 PROCEDURE — 80053 COMPREHEN METABOLIC PANEL: CPT | Performed by: FAMILY MEDICINE

## 2024-12-12 RX ORDER — IOPAMIDOL 755 MG/ML
90 INJECTION, SOLUTION INTRAVASCULAR ONCE
Status: COMPLETED | OUTPATIENT
Start: 2024-12-12 | End: 2024-12-12

## 2024-12-12 RX ORDER — PREDNISONE 20 MG/1
60 TABLET ORAL DAILY
Qty: 15 TABLET | Refills: 0 | Status: SHIPPED | OUTPATIENT
Start: 2024-12-12 | End: 2024-12-17

## 2024-12-12 RX ADMIN — SODIUM CHLORIDE 62 ML: 9 INJECTION, SOLUTION INTRAVENOUS at 16:27

## 2024-12-12 RX ADMIN — IOPAMIDOL 90 ML: 755 INJECTION, SOLUTION INTRAVENOUS at 16:27

## 2024-12-12 ASSESSMENT — ENCOUNTER SYMPTOMS
WHEEZING: 0
FEVER: 0
SHORTNESS OF BREATH: 0
NAUSEA: 0
SORE THROAT: 0
FREQUENCY: 0
ABDOMINAL PAIN: 1
HEADACHES: 0
PALPITATIONS: 0
BLOOD IN STOOL: 0
CHILLS: 0
DIARRHEA: 0
SINUS PRESSURE: 0
CONSTIPATION: 0
VOMITING: 0
DYSURIA: 0
COUGH: 0
DIAPHORESIS: 0

## 2024-12-12 ASSESSMENT — ACTIVITIES OF DAILY LIVING (ADL)
ADLS_ACUITY_SCORE: 41

## 2024-12-12 NOTE — ED TRIAGE NOTES
Left upper abdominal pain started when waking up.  Pt reports increased pain with deep breaths or activity.   Pt called [facility] nurse and was told to go to ED.      Triage Assessment (Adult)       Row Name 12/12/24 1519          Triage Assessment    Airway WDL WDL        Respiratory WDL    Respiratory WDL WDL

## 2024-12-12 NOTE — ED PROVIDER NOTES
History     Chief Complaint   Patient presents with    Abdominal Pain     HPI  Emery Barkley is a 54 year old male who presents with CLL history of followed by [facility] here with pleuritic pain left side.  Fullness in the upper abdomen consistent with  splenomegaly but increased.  Sent to the emergency department for further evaluation possible splenic infarct.  Acute onset this morning.  No associated nausea vomiting no change in stools no dysuria urgency frequency hematuria no prior pulmonary embolism.  He is due to discuss overall management with the team at [facility] regarding his CLL.  Pain also radiates into his left shoulder. has been using ibuprofen for pain  Allergies:  No Known Allergies    Problem List:    Patient Active Problem List    Diagnosis Date Noted    CLL (chronic lymphocytic leukemia) (H) 04/29/2024     Priority: Medium    Chronic pansinusitis 06/09/2023     Priority: Medium     Added automatically from request for surgery [id number]      Nasal polyp 06/09/2023     Priority: Medium     Added automatically from request for surgery [id number]      Nasal septal deviation 06/09/2023     Priority: Medium     Added automatically from request for surgery [id number]      Nasal obstruction 06/09/2023     Priority: Medium     Added automatically from request for surgery [id number]      Nasal turbinate hypertrophy 06/09/2023     Priority: Medium     Added automatically from request for surgery [id number]      Common wart 07/10/2018     Priority: Medium    External hemorrhoid 11/07/2011     Priority: Medium    Hyperlipidemia LDL goal <100 10/26/2010     Priority: Medium    Family history of coronary artery disease 10/26/2010     Priority: Medium        Past Medical History:    Past Medical History:   Diagnosis Date    CLL (chronic lymphocytic leukemia) (H)     Hyperlipidemia        Past Surgical History:    Past Surgical History:   Procedure Laterality Date    ABDOMEN SURGERY  2/7/19    umbilical hernia    HERNIORRHAPHY  UMBILICAL N/A 2/7/2019    Procedure: HERNIORRHAPHY UMBILICAL;  Surgeon: Tej Beaulieu DO;  Location: [facility] OR    OPTICAL TRACKING SYSTEM ENDOSCOPIC SINUS SURGERY Bilateral 7/24/2023    Procedure: SINUS SURGERY, ENDOSCOPIC, USING OPTICAL TRACKING SYSTEM - all sinuses;  Surgeon: Mario Gamboa MD;  Location: [facility] OR    SEPTOPLASTY, TURBINOPLASTY, COMBINED Bilateral 7/24/2023    Procedure: SEPTOPLASTY; bilateral inferior turbinate reduction;  Surgeon: Mario Gamboa MD;  Location: [facility] OR    VASECTOMY         Family History:    Family History   Problem Relation Age of Onset    C.A.D. Father         MI, chf, first mi in 50s.    Coronary Artery Disease Father     Hyperlipidemia Father     Cancer Sister         brain    Other Cancer Sister         Brain Cancer    Lipids Mother     Hyperlipidemia Mother     Depression Mother        Social History:  Marital Status:   [2]  Social History     Tobacco Use    Smoking status: Never     Passive exposure: Never    Smokeless tobacco: Never   Vaping Use    Vaping status: Never Used   Substance Use Topics    Alcohol use: Yes     Comment: 2 drinks per week    Drug use: No        Medications:    atorvastatin (LIPITOR) 20 MG tablet          Review of Systems   Constitutional:  Negative for chills, diaphoresis and fever.   HENT:  Negative for ear pain, sinus pressure and sore throat.    Eyes:  Negative for visual disturbance.   Respiratory:  Negative for cough, shortness of breath and wheezing.    Cardiovascular:  Positive for chest pain. Negative for palpitations.   Gastrointestinal:  Positive for abdominal pain. Negative for blood in stool, constipation, diarrhea, nausea and vomiting.   Genitourinary:  Negative for dysuria, frequency and urgency.   Skin:  Negative for rash.   Neurological:  Negative for headaches.   All other systems reviewed and are negative.      Physical Exam   BP: 131/83  Pulse: 105  Temp: 98.1  F (36.7  C)  Resp: 18  Height: 177.8 cm (5'  "10\")  Weight: 83.9 kg (185 lb)  SpO2: 96 %      Physical Exam  Constitutional:       General: He is in acute distress.      Appearance: He is not diaphoretic.   HENT:      Head: Atraumatic.   Eyes:      Conjunctiva/sclera: Conjunctivae normal.   Cardiovascular:      Rate and Rhythm: Normal rate and regular rhythm.      Heart sounds: No murmur heard.  Pulmonary:      Effort: Pulmonary effort is normal. No respiratory distress.      Breath sounds: Normal breath sounds. No stridor. No wheezing.   Abdominal:      General: Abdomen is flat. There is no distension.      Palpations: Abdomen is soft. There is splenomegaly. There is no mass.      Tenderness: There is abdominal tenderness. There is no guarding.   Musculoskeletal:      Cervical back: Neck supple.      Right lower leg: No edema.      Left lower leg: No edema.   Skin:     Coloration: Skin is not pale.      Findings: No rash.   Neurological:      Mental Status: He is alert.         ED Course        Procedures             Critical Care time:  none                     EKG Interpretation:      Interpreted by Aakash Robins MD  EKG done at 1547 hrs. demonstrates a sinus rhythm at 96 bpm with normal axis.  There is no significant ST change or T wave changes.  There is a rapid R progression related to a right bundle branch block.  No ectopy.  Normal conduction.  Normal intervals with exception of a prolonged QTc at 500.  Impression right bundle branch block borderline QTc.      Results for orders placed or performed during the hospital encounter of 12/12/24 (from the past 24 hours)   CBC with platelets differential    Narrative    The following orders were created for panel order CBC with platelets differential.  Procedure                               Abnormality         Status                     ---------                               -----------         ------                     CBC with platelets and d...[[id number]]                      In process               "   Manual Differential[[id number]]                              In process                   Please view results for these tests on the individual orders.   Comprehensive metabolic panel   Result Value Ref Range    Sodium 140 135 - 145 mmol/L    Potassium 4.8 3.4 - 5.3 mmol/L    Carbon Dioxide (CO2) 26 22 - 29 mmol/L    Anion Gap 10 7 - 15 mmol/L    Urea Nitrogen 11.3 6.0 - 20.0 mg/dL    Creatinine 1.06 0.67 - 1.17 mg/dL    GFR Estimate 84 >60 mL/min/1.73m2    Calcium 9.2 8.8 - 10.4 mg/dL    Chloride 104 98 - 107 mmol/L    Glucose 110 (H) 70 - 99 mg/dL    Alkaline Phosphatase 128 40 - 150 U/L    AST 26 0 - 45 U/L    ALT 13 0 - 70 U/L    Protein Total 6.9 6.4 - 8.3 g/dL    Albumin 4.1 3.5 - 5.2 g/dL    Bilirubin Total 0.4 <=1.2 mg/dL   Troponin T, High Sensitivity   Result Value Ref Range    Troponin T, High Sensitivity 6 <=22 ng/L   D dimer quantitative   Result Value Ref Range    D-Dimer Quantitative 1.06 (H) 0.00 - 0.50 ug/mL FEU    Narrative    This D-dimer assay is intended for use in conjunction with a clinical pretest probability assessment model to exclude pulmonary embolism (PE) and deep venous thrombosis (DVT) in outpatients suspected of PE or DVT. The cut-off value is 0.50 ug/mL FEU.    For patients 50 years of age or older, the application of age-adjusted cut-off values for D-Dimer may increase the specificity without significant effect on sensitivity. The literature suggested calculation age adjusted cut-off in ug/L = age in years x 10 ug/L. The results in this laboratory are reported as ug/mL rather than ug/L. The calculation for age adjusted cut off in ug/mL= age in years x 0.01 ug/mL. For example, the cut off for a 76 year old male is 76 x 0.01 ug/mL = 0.76 ug/mL (760 ug/L).    M Vani et al. Age adjusted D-dimer cut-off levels to rule out pulmonary embolism: The ADJUST-PE Study. CAROLYN 2014;311:2275-5776.; HJ Jen et al. Diagnostic accuracy of conventional or age adjusted D-dimer cutoff values in  older patients with suspected venous thromboembolism. Systemic review and meta-analysis. BMJ 2013:346:f2492.   EKG 12 lead   Result Value Ref Range    Systolic Blood Pressure  mmHg    Diastolic Blood Pressure  mmHg    Ventricular Rate 96 BPM    Atrial Rate 96 BPM    ND Interval 144 ms    QRS Duration 132 ms     ms    QTc 500 ms    P Axis 33 degrees    R AXIS 25 degrees    T Axis 10 degrees    Interpretation ECG       Sinus rhythm  Right bundle branch block  Abnormal ECG  No previous ECGs available         Medications - No data to display    Assessments & Plan (with Medical Decision Making)     MDM: Emery Barkley is a 54 year old male presenting with left upper quadrant abdominal pain, pleuritic chest pain.  History of CLL.  Plan for D-dimer which is positive and pursue CT of the chest abdomen pelvis.    This demonstrated a very large spleen no PE.  There was no splenic infarct.  I did discuss with Dr. York at [facility].  Recommended prednisone for 5 days 60 mg daily.  Discussed with the patient he will discuss with his team at [facility].  Asked him to call in the next day to discuss this I did push the images to [facility].  Avoid any trauma to the abdomen.     I have reviewed the nursing notes.    I have reviewed the findings, diagnosis, plan and need for follow up with the patient.           Medical Decision Making  The patient's presentation was of moderate complexity (a chronic illness mild to moderate exacerbation, progression, or side effect of treatment).    The patient's evaluation involved:  ordering and/or review of 3+ test(s) in this encounter (see separate area of note for details)    The patient's management necessitated moderate risk (prescription drug management including medications given in the ED).        New Prescriptions    No medications on file       Final diagnoses:   Splenomegaly - take  prednisone for 5 days.  discuss further with oncpology.   this is CLL related. do not get injured!   CLL (chronic  lymphocytic leukemia) (H)       12/12/2024   [facility] EMERGENCY DEPT       Aakash Robins MD  12/12/24 2037

## 2024-12-13 NOTE — DISCHARGE INSTRUCTIONS
ICD-10-CM    1. Splenomegaly  R16.1     take  prednisone for 5 days.  discuss further with oncpology.   this is CLL related      2. CLL (chronic lymphocytic leukemia) (H)  C91.10

## 2025-01-06 ENCOUNTER — LAB (OUTPATIENT)
Dept: LAB | Facility: CLINIC | Age: 55
End: 2025-01-06
Payer: COMMERCIAL

## 2025-01-06 DIAGNOSIS — C91.10 CHRONIC LYMPHOCYTIC LEUKEMIA OF B-CELL TYPE NOT HAVING ACHIEVED REMISSION (H): Primary | ICD-10-CM

## 2025-01-06 DIAGNOSIS — C91.10 CHRONIC LYMPHOCYTIC LEUKEMIA OF B-CELL TYPE NOT HAVING ACHIEVED REMISSION (H): ICD-10-CM

## 2025-01-06 LAB
BASOPHILS # BLD MANUAL: 0 10E3/UL (ref 0–0.2)
BASOPHILS NFR BLD MANUAL: 0 %
EOSINOPHIL # BLD MANUAL: 0 10E3/UL (ref 0–0.7)
EOSINOPHIL NFR BLD MANUAL: 0 %
ERYTHROCYTE [DISTWIDTH] IN BLOOD BY AUTOMATED COUNT: 16 % (ref 10–15)
HCT VFR BLD AUTO: 32.5 % (ref 40–53)
HGB BLD-MCNC: 10.4 G/DL (ref 13.3–17.7)
LYMPHOCYTES # BLD MANUAL: 102.8 10E3/UL (ref 0.8–5.3)
LYMPHOCYTES NFR BLD MANUAL: 77 %
MCH RBC QN AUTO: 29.7 PG (ref 26.5–33)
MCHC RBC AUTO-ENTMCNC: 32 G/DL (ref 31.5–36.5)
MCV RBC AUTO: 93 FL (ref 78–100)
MONOCYTES # BLD MANUAL: 29.4 10E3/UL (ref 0–1.3)
MONOCYTES NFR BLD MANUAL: 22 %
NEUTROPHILS # BLD MANUAL: 1.3 10E3/UL (ref 1.6–8.3)
NEUTROPHILS NFR BLD MANUAL: 1 %
PLAT MORPH BLD: ABNORMAL
PLATELET # BLD AUTO: 213 10E3/UL (ref 150–450)
RBC # BLD AUTO: 3.5 10E6/UL (ref 4.4–5.9)
RBC MORPH BLD: ABNORMAL
VARIANT LYMPHS BLD QL SMEAR: PRESENT
WBC # BLD AUTO: 133.5 10E3/UL (ref 4–11)

## 2025-01-06 PROCEDURE — 85007 BL SMEAR W/DIFF WBC COUNT: CPT

## 2025-01-06 PROCEDURE — 85027 COMPLETE CBC AUTOMATED: CPT

## 2025-01-06 PROCEDURE — 36415 COLL VENOUS BLD VENIPUNCTURE: CPT

## 2025-01-16 ENCOUNTER — LAB (OUTPATIENT)
Dept: LAB | Facility: CLINIC | Age: 55
End: 2025-01-16
Payer: COMMERCIAL

## 2025-01-16 DIAGNOSIS — C91.10 CHRONIC LYMPHOCYTIC LEUKEMIA OF B-CELL TYPE NOT HAVING ACHIEVED REMISSION (H): Primary | ICD-10-CM

## 2025-01-16 LAB
BASOPHILS # BLD MANUAL: 0 10E3/UL (ref 0–0.2)
BASOPHILS NFR BLD MANUAL: 0 %
EOSINOPHIL # BLD MANUAL: 0 10E3/UL (ref 0–0.7)
EOSINOPHIL NFR BLD MANUAL: 0 %
ERYTHROCYTE [DISTWIDTH] IN BLOOD BY AUTOMATED COUNT: 15.4 % (ref 10–15)
HCT VFR BLD AUTO: 36 % (ref 40–53)
HGB BLD-MCNC: 9.9 G/DL (ref 13.3–17.7)
LYMPHOCYTES # BLD MANUAL: 248.4 10E3/UL (ref 0.8–5.3)
LYMPHOCYTES NFR BLD MANUAL: 94 %
MCH RBC QN AUTO: 27 PG (ref 26.5–33)
MCHC RBC AUTO-ENTMCNC: 27.5 G/DL (ref 31.5–36.5)
MCV RBC AUTO: 98 FL (ref 78–100)
MONOCYTES # BLD MANUAL: 10.6 10E3/UL (ref 0–1.3)
MONOCYTES NFR BLD MANUAL: 4 %
NEUTROPHILS # BLD MANUAL: 5.3 10E3/UL (ref 1.6–8.3)
NEUTROPHILS NFR BLD MANUAL: 2 %
PLAT MORPH BLD: ABNORMAL
PLATELET # BLD AUTO: 203 10E3/UL (ref 150–450)
RBC # BLD AUTO: 3.67 10E6/UL (ref 4.4–5.9)
RBC MORPH BLD: ABNORMAL
SMUDGE CELLS BLD QL SMEAR: PRESENT
VARIANT LYMPHS BLD QL SMEAR: PRESENT
WBC # BLD AUTO: 264.3 10E3/UL (ref 4–11)

## 2025-03-06 ENCOUNTER — LAB (OUTPATIENT)
Dept: LAB | Facility: CLINIC | Age: 55
End: 2025-03-06
Payer: COMMERCIAL

## 2025-03-06 DIAGNOSIS — C91.10 CHRONIC LYMPHOCYTIC LEUKEMIA OF B-CELL TYPE NOT HAVING ACHIEVED REMISSION (H): ICD-10-CM

## 2025-03-06 LAB
ALP SERPL-CCNC: 53 U/L (ref 40–150)
ALT SERPL W P-5'-P-CCNC: 20 U/L (ref 0–70)
AST SERPL W P-5'-P-CCNC: 18 U/L (ref 0–45)
BASOPHILS # BLD AUTO: 0 10E3/UL (ref 0–0.2)
BASOPHILS NFR BLD AUTO: 0 %
BILIRUB SERPL-MCNC: 0.4 MG/DL
CREAT SERPL-MCNC: 1.05 MG/DL (ref 0.67–1.17)
EGFRCR SERPLBLD CKD-EPI 2021: 85 ML/MIN/1.73M2
EOSINOPHIL # BLD AUTO: 0.1 10E3/UL (ref 0–0.7)
EOSINOPHIL NFR BLD AUTO: 0 %
ERYTHROCYTE [DISTWIDTH] IN BLOOD BY AUTOMATED COUNT: 14.1 % (ref 10–15)
HCT VFR BLD AUTO: 38.2 % (ref 40–53)
HGB BLD-MCNC: 12.5 G/DL (ref 13.3–17.7)
IMM GRANULOCYTES # BLD: 0 10E3/UL
IMM GRANULOCYTES NFR BLD: 0 %
LDH SERPL L TO P-CCNC: 128 U/L (ref 0–250)
LYMPHOCYTES # BLD AUTO: 13.3 10E3/UL (ref 0.8–5.3)
LYMPHOCYTES NFR BLD AUTO: 86 %
MCH RBC QN AUTO: 29.8 PG (ref 26.5–33)
MCHC RBC AUTO-ENTMCNC: 32.7 G/DL (ref 31.5–36.5)
MCV RBC AUTO: 91 FL (ref 78–100)
MONOCYTES # BLD AUTO: 0.1 10E3/UL (ref 0–1.3)
MONOCYTES NFR BLD AUTO: 1 %
NEUTROPHILS # BLD AUTO: 2 10E3/UL (ref 1.6–8.3)
NEUTROPHILS NFR BLD AUTO: 13 %
NRBC # BLD AUTO: 0 10E3/UL
NRBC BLD AUTO-RTO: 0 /100
PLAT MORPH BLD: NORMAL
PLATELET # BLD AUTO: 115 10E3/UL (ref 150–450)
POTASSIUM SERPL-SCNC: 4.1 MMOL/L (ref 3.4–5.3)
RBC # BLD AUTO: 4.2 10E6/UL (ref 4.4–5.9)
RBC MORPH BLD: NORMAL
SODIUM SERPL-SCNC: 142 MMOL/L (ref 135–145)
WBC # BLD AUTO: 15.5 10E3/UL (ref 4–11)

## 2025-03-29 ENCOUNTER — HEALTH MAINTENANCE LETTER (OUTPATIENT)
Age: 55
End: 2025-03-29

## 2025-05-27 ENCOUNTER — LAB (OUTPATIENT)
Dept: LAB | Facility: CLINIC | Age: 55
End: 2025-05-27
Payer: COMMERCIAL

## 2025-05-27 DIAGNOSIS — C91.10 CHRONIC LYMPHOCYTIC LEUKEMIA OF B-CELL TYPE NOT HAVING ACHIEVED REMISSION (H): ICD-10-CM

## 2025-05-27 DIAGNOSIS — C91.10 CHRONIC LYMPHOCYTIC LEUKEMIA OF B-CELL TYPE NOT HAVING ACHIEVED REMISSION (H): Primary | ICD-10-CM

## 2025-05-27 LAB
ALP SERPL-CCNC: 75 U/L (ref 40–150)
ALT SERPL W P-5'-P-CCNC: 18 U/L (ref 0–70)
AST SERPL W P-5'-P-CCNC: 16 U/L (ref 0–45)
BASOPHILS # BLD AUTO: 0 10E3/UL (ref 0–0.2)
BASOPHILS NFR BLD AUTO: 0 %
BILIRUB SERPL-MCNC: 0.6 MG/DL
CREAT SERPL-MCNC: 1.09 MG/DL (ref 0.67–1.17)
EGFRCR SERPLBLD CKD-EPI 2021: 81 ML/MIN/1.73M2
EOSINOPHIL # BLD AUTO: 0 10E3/UL (ref 0–0.7)
EOSINOPHIL NFR BLD AUTO: 0 %
ERYTHROCYTE [DISTWIDTH] IN BLOOD BY AUTOMATED COUNT: 12.7 % (ref 10–15)
HCT VFR BLD AUTO: 40.4 % (ref 40–53)
HGB BLD-MCNC: 14.3 G/DL (ref 13.3–17.7)
IMM GRANULOCYTES # BLD: 0 10E3/UL
IMM GRANULOCYTES NFR BLD: 0 %
LDH SERPL L TO P-CCNC: 171 U/L (ref 0–250)
LYMPHOCYTES # BLD AUTO: 1.4 10E3/UL (ref 0.8–5.3)
LYMPHOCYTES NFR BLD AUTO: 32 %
MCH RBC QN AUTO: 29.9 PG (ref 26.5–33)
MCHC RBC AUTO-ENTMCNC: 35.4 G/DL (ref 31.5–36.5)
MCV RBC AUTO: 84 FL (ref 78–100)
MONOCYTES # BLD AUTO: 0.4 10E3/UL (ref 0–1.3)
MONOCYTES NFR BLD AUTO: 9 %
NEUTROPHILS # BLD AUTO: 2.6 10E3/UL (ref 1.6–8.3)
NEUTROPHILS NFR BLD AUTO: 59 %
PLATELET # BLD AUTO: 136 10E3/UL (ref 150–450)
POTASSIUM SERPL-SCNC: 3.8 MMOL/L (ref 3.4–5.3)
RBC # BLD AUTO: 4.79 10E6/UL (ref 4.4–5.9)
SODIUM SERPL-SCNC: 140 MMOL/L (ref 135–145)
WBC # BLD AUTO: 4.4 10E3/UL (ref 4–11)

## 2025-05-27 PROCEDURE — 36415 COLL VENOUS BLD VENIPUNCTURE: CPT

## 2025-05-27 PROCEDURE — 84132 ASSAY OF SERUM POTASSIUM: CPT

## 2025-05-27 PROCEDURE — 84295 ASSAY OF SERUM SODIUM: CPT

## 2025-05-27 PROCEDURE — 82247 BILIRUBIN TOTAL: CPT

## 2025-05-27 PROCEDURE — 84460 ALANINE AMINO (ALT) (SGPT): CPT

## 2025-05-27 PROCEDURE — 84450 TRANSFERASE (AST) (SGOT): CPT

## 2025-05-27 PROCEDURE — 82565 ASSAY OF CREATININE: CPT

## 2025-05-27 PROCEDURE — 84075 ASSAY ALKALINE PHOSPHATASE: CPT

## 2025-05-27 PROCEDURE — 85025 COMPLETE CBC W/AUTO DIFF WBC: CPT

## 2025-05-27 PROCEDURE — 83615 LACTATE (LD) (LDH) ENZYME: CPT

## 2025-07-20 DIAGNOSIS — Z82.49 FAMILY HISTORY OF CORONARY ARTERY DISEASE: ICD-10-CM

## 2025-07-20 DIAGNOSIS — E78.5 HYPERLIPIDEMIA LDL GOAL <100: ICD-10-CM

## 2025-07-21 RX ORDER — ATORVASTATIN CALCIUM 20 MG/1
20 TABLET, FILM COATED ORAL DAILY
Qty: 90 TABLET | Refills: 0 | Status: SHIPPED | OUTPATIENT
Start: 2025-07-21

## 2025-07-23 ENCOUNTER — LAB (OUTPATIENT)
Dept: LAB | Facility: CLINIC | Age: 55
End: 2025-07-23
Payer: COMMERCIAL

## 2025-07-23 DIAGNOSIS — C91.10 CHRONIC LYMPHOCYTIC LEUKEMIA OF B-CELL TYPE NOT HAVING ACHIEVED REMISSION (H): Primary | ICD-10-CM

## 2025-07-23 LAB
BASOPHILS # BLD AUTO: 0 10E3/UL (ref 0–0.2)
BASOPHILS NFR BLD AUTO: 0 %
EOSINOPHIL # BLD AUTO: 0 10E3/UL (ref 0–0.7)
EOSINOPHIL NFR BLD AUTO: 0 %
ERYTHROCYTE [DISTWIDTH] IN BLOOD BY AUTOMATED COUNT: 13 % (ref 10–15)
HCT VFR BLD AUTO: 40.5 % (ref 40–53)
HGB BLD-MCNC: 14.5 G/DL (ref 13.3–17.7)
IMM GRANULOCYTES # BLD: 0 10E3/UL
IMM GRANULOCYTES NFR BLD: 0 %
LYMPHOCYTES # BLD AUTO: 1.7 10E3/UL (ref 0.8–5.3)
LYMPHOCYTES NFR BLD AUTO: 39 %
MCH RBC QN AUTO: 30.1 PG (ref 26.5–33)
MCHC RBC AUTO-ENTMCNC: 35.8 G/DL (ref 31.5–36.5)
MCV RBC AUTO: 84 FL (ref 78–100)
MONOCYTES # BLD AUTO: 0.4 10E3/UL (ref 0–1.3)
MONOCYTES NFR BLD AUTO: 9 %
NEUTROPHILS # BLD AUTO: 2.2 10E3/UL (ref 1.6–8.3)
NEUTROPHILS NFR BLD AUTO: 52 %
PLATELET # BLD AUTO: 149 10E3/UL (ref 150–450)
RBC # BLD AUTO: 4.81 10E6/UL (ref 4.4–5.9)
WBC # BLD AUTO: 4.3 10E3/UL (ref 4–11)

## 2025-07-23 PROCEDURE — 36415 COLL VENOUS BLD VENIPUNCTURE: CPT

## 2025-07-23 PROCEDURE — 85025 COMPLETE CBC W/AUTO DIFF WBC: CPT

## 2025-07-30 SDOH — HEALTH STABILITY: PHYSICAL HEALTH: ON AVERAGE, HOW MANY DAYS PER WEEK DO YOU ENGAGE IN MODERATE TO STRENUOUS EXERCISE (LIKE A BRISK WALK)?: 3 DAYS

## 2025-07-30 SDOH — HEALTH STABILITY: PHYSICAL HEALTH: ON AVERAGE, HOW MANY MINUTES DO YOU ENGAGE IN EXERCISE AT THIS LEVEL?: 20 MIN

## 2025-07-30 ASSESSMENT — SOCIAL DETERMINANTS OF HEALTH (SDOH): HOW OFTEN DO YOU GET TOGETHER WITH FRIENDS OR RELATIVES?: ONCE A WEEK

## 2025-08-04 ENCOUNTER — OFFICE VISIT (OUTPATIENT)
Dept: FAMILY MEDICINE | Facility: CLINIC | Age: 55
End: 2025-08-04
Attending: FAMILY MEDICINE
Payer: COMMERCIAL

## 2025-08-04 VITALS
HEART RATE: 68 BPM | WEIGHT: 202 LBS | TEMPERATURE: 98 F | BODY MASS INDEX: 28.92 KG/M2 | DIASTOLIC BLOOD PRESSURE: 88 MMHG | SYSTOLIC BLOOD PRESSURE: 128 MMHG | OXYGEN SATURATION: 97 % | RESPIRATION RATE: 18 BRPM | HEIGHT: 70 IN

## 2025-08-04 DIAGNOSIS — Z12.5 ENCOUNTER FOR SCREENING FOR MALIGNANT NEOPLASM OF PROSTATE: ICD-10-CM

## 2025-08-04 DIAGNOSIS — C91.10 CLL (CHRONIC LYMPHOCYTIC LEUKEMIA) (H): ICD-10-CM

## 2025-08-04 DIAGNOSIS — Z82.49 FAMILY HISTORY OF CORONARY ARTERY DISEASE: ICD-10-CM

## 2025-08-04 DIAGNOSIS — Z00.00 ROUTINE GENERAL MEDICAL EXAMINATION AT A HEALTH CARE FACILITY: Primary | ICD-10-CM

## 2025-08-04 DIAGNOSIS — E78.5 HYPERLIPIDEMIA LDL GOAL <100: ICD-10-CM

## 2025-08-04 DIAGNOSIS — Z13.1 SCREENING FOR DIABETES MELLITUS: ICD-10-CM

## 2025-08-04 PROCEDURE — 99396 PREV VISIT EST AGE 40-64: CPT | Performed by: FAMILY MEDICINE

## 2025-08-04 PROCEDURE — 99213 OFFICE O/P EST LOW 20 MIN: CPT | Mod: 25 | Performed by: FAMILY MEDICINE

## 2025-08-04 PROCEDURE — G2211 COMPLEX E/M VISIT ADD ON: HCPCS | Performed by: FAMILY MEDICINE

## 2025-08-04 RX ORDER — ATORVASTATIN CALCIUM 20 MG/1
20 TABLET, FILM COATED ORAL DAILY
Qty: 90 TABLET | Refills: 3 | Status: SHIPPED | OUTPATIENT
Start: 2025-08-04

## 2025-08-04 ASSESSMENT — PAIN SCALES - GENERAL: PAINLEVEL_OUTOF10: NO PAIN (0)

## 2025-08-19 ENCOUNTER — LAB (OUTPATIENT)
Dept: LAB | Facility: CLINIC | Age: 55
End: 2025-08-19
Payer: COMMERCIAL

## 2025-08-19 DIAGNOSIS — Z12.5 ENCOUNTER FOR SCREENING FOR MALIGNANT NEOPLASM OF PROSTATE: ICD-10-CM

## 2025-08-19 DIAGNOSIS — Z13.1 SCREENING FOR DIABETES MELLITUS: ICD-10-CM

## 2025-08-19 DIAGNOSIS — C91.10 CHRONIC LYMPHOCYTIC LEUKEMIA OF B-CELL TYPE NOT HAVING ACHIEVED REMISSION (H): ICD-10-CM

## 2025-08-19 DIAGNOSIS — E78.5 HYPERLIPIDEMIA LDL GOAL <100: ICD-10-CM

## 2025-08-19 LAB
BASOPHILS # BLD AUTO: <0.04 10E3/UL (ref 0–0.2)
BASOPHILS NFR BLD AUTO: 0.3 %
CHOLEST SERPL-MCNC: 153 MG/DL
EOSINOPHIL # BLD AUTO: <0.04 10E3/UL (ref 0–0.7)
EOSINOPHIL NFR BLD AUTO: 0 %
ERYTHROCYTE [DISTWIDTH] IN BLOOD BY AUTOMATED COUNT: 12.8 % (ref 10–15)
EST. AVERAGE GLUCOSE BLD GHB EST-MCNC: 97 MG/DL
FASTING STATUS PATIENT QL REPORTED: NO
HBA1C MFR BLD: 5 % (ref 0–5.6)
HCT VFR BLD AUTO: 41.4 % (ref 40–53)
HDLC SERPL-MCNC: 48 MG/DL
HGB BLD-MCNC: 14.8 G/DL (ref 13.3–17.7)
IMM GRANULOCYTES # BLD: <0.04 10E3/UL
IMM GRANULOCYTES NFR BLD: 0.3 %
LDLC SERPL CALC-MCNC: 79 MG/DL
LYMPHOCYTES # BLD AUTO: 1.39 10E3/UL (ref 0.8–5.3)
LYMPHOCYTES NFR BLD AUTO: 36.5 %
MCH RBC QN AUTO: 30.4 PG (ref 26.5–33)
MCHC RBC AUTO-ENTMCNC: 35.7 G/DL (ref 31.5–36.5)
MCV RBC AUTO: 85 FL (ref 78–100)
MONOCYTES # BLD AUTO: 0.33 10E3/UL (ref 0–1.3)
MONOCYTES NFR BLD AUTO: 8.7 %
NEUTROPHILS # BLD AUTO: 2.07 10E3/UL (ref 1.6–8.3)
NEUTROPHILS NFR BLD AUTO: 54.2 %
NONHDLC SERPL-MCNC: 105 MG/DL
PLATELET # BLD AUTO: 183 10E3/UL (ref 150–450)
PSA SERPL DL<=0.01 NG/ML-MCNC: 0.87 NG/ML (ref 0–3.5)
RBC # BLD AUTO: 4.87 10E6/UL (ref 4.4–5.9)
TRIGL SERPL-MCNC: 131 MG/DL
WBC # BLD AUTO: 3.81 10E3/UL (ref 4–11)

## 2025-08-19 PROCEDURE — 83036 HEMOGLOBIN GLYCOSYLATED A1C: CPT

## 2025-08-19 PROCEDURE — 36415 COLL VENOUS BLD VENIPUNCTURE: CPT

## 2025-08-19 PROCEDURE — 80061 LIPID PANEL: CPT

## 2025-08-19 PROCEDURE — G0103 PSA SCREENING: HCPCS

## 2025-08-19 PROCEDURE — 85025 COMPLETE CBC W/AUTO DIFF WBC: CPT

## (undated) DEVICE — SOL WATER IRRIG 1000ML BOTTLE 07139-09

## (undated) DEVICE — GLOVE PROTEXIS BLUE W/NEU-THERA 8.0  2D73EB80

## (undated) DEVICE — SOL NACL 0.9% IRRIG 1000ML BOTTLE 07138-09

## (undated) DEVICE — GLOVE PROTEXIS W/NEU-THERA 7.5  2D73TE75

## (undated) DEVICE — TRACKER ENT OTS PATIENT FUSION 9733534

## (undated) DEVICE — ENDO SHEATH STORZ SHARPSITE ENDOSCRUB 0DEG 4MM 1912000

## (undated) DEVICE — SINUS BALLOON EM SEEKER 6.0X17.0MM 70DEG 1830617FRT70

## (undated) DEVICE — NDL SPINAL 25GA 3.5" QUINCKE 405180

## (undated) DEVICE — SPONGE COTTONOID 1/2X3" 80-1407

## (undated) DEVICE — ADHESIVE SWIFTSET 0.8ML OCTYL SS6

## (undated) DEVICE — SU VICRYL 2-0 CT-3 27" J328H

## (undated) DEVICE — TUBING SUCTION 10'X3/16" N510

## (undated) DEVICE — TRACKER ENT OTS INSTRUMENT FUSION 9733533

## (undated) DEVICE — GLOVE BIOGEL PI ULTRATOUCH G SZ 7.5 42175

## (undated) DEVICE — SU VICRYL 3-0 SH 27" UND J416H

## (undated) DEVICE — DRSG NASOPORE FRAG FIRM 8CM 5400-020-008

## (undated) DEVICE — PREP CHLORAPREP 26ML TINTED ORANGE  260815

## (undated) DEVICE — SU ETHILON 3-0 FS-1 18" 669H

## (undated) DEVICE — NDL 22GA 1.5"

## (undated) DEVICE — Device

## (undated) DEVICE — ENDO SHEATH ENDOSCRUB 45DEG 4MM 1912015

## (undated) DEVICE — SU CHROMIC 5-0 P-3 18" 687G

## (undated) DEVICE — SOL NACL 0.9% INJ 1000ML BAG 07983-09

## (undated) DEVICE — PACK LAPAROTOMY CUSTOM LAKES

## (undated) DEVICE — SPECIMEN CONTAINER 4OZ

## (undated) DEVICE — PACK MINOR SBA15MIFSE

## (undated) DEVICE — PREP POVIDONE IODINE SWABS X3

## (undated) DEVICE — SU MONOCRYL 4-0 PS-2 18" UND Y496G

## (undated) DEVICE — NDL 19GA 1.5"

## (undated) DEVICE — ESU SUCTION CAUTERY 10FR FOOT CONTROL E3310

## (undated) DEVICE — MASK CONE SHAPED 00152

## (undated) DEVICE — SYR 50ML CATH TIP W/O NDL 309620

## (undated) DEVICE — DECANTER VIAL 2006S

## (undated) DEVICE — STOCKING SLEEVE COMPRESSION CALF MED

## (undated) DEVICE — BLADE INFERIOR TURBINATE 2.9MMX11CM 1882940HR

## (undated) DEVICE — GOWN XLG DISP 9545

## (undated) DEVICE — BLADE SINUS RAD12 CVD 4MM FUSION ROTATE W/TRACKING

## (undated) DEVICE — SYR 10ML FINGER CONTROL W/O NDL 309695

## (undated) DEVICE — SU CHROMIC 4-0 FS-2 27" 635H

## (undated) DEVICE — SU ETHIBOND 0 CT-2 CR 8X18" CX27D

## (undated) DEVICE — SYR EAR BULB 3OZ 0035830

## (undated) DEVICE — DECANTER TRANSFER DEVICE 2008S

## (undated) DEVICE — SU CHROMIC 4-0 P-3 18" 1654G

## (undated) DEVICE — DRAPE SPLIT EENT 76X124" 3X28" 9447

## (undated) DEVICE — KIT INFLATOR BALLOON 18INFKIT

## (undated) RX ORDER — PROPOFOL 10 MG/ML
INJECTION, EMULSION INTRAVENOUS
Status: DISPENSED
Start: 2023-07-24

## (undated) RX ORDER — PROPOFOL 10 MG/ML
INJECTION, EMULSION INTRAVENOUS
Status: DISPENSED
Start: 2019-02-07

## (undated) RX ORDER — COCAINE HYDROCHLORIDE 40 MG/ML
SOLUTION NASAL
Status: DISPENSED
Start: 2023-07-24

## (undated) RX ORDER — KETAMINE HCL IN 0.9 % NACL 50 MG/5 ML
SYRINGE (ML) INTRAVENOUS
Status: DISPENSED
Start: 2019-02-07

## (undated) RX ORDER — CEFAZOLIN SODIUM 1 G/3ML
INJECTION, POWDER, FOR SOLUTION INTRAMUSCULAR; INTRAVENOUS
Status: DISPENSED
Start: 2023-07-24

## (undated) RX ORDER — LIDOCAINE HYDROCHLORIDE 10 MG/ML
INJECTION, SOLUTION EPIDURAL; INFILTRATION; INTRACAUDAL; PERINEURAL
Status: DISPENSED
Start: 2023-07-24

## (undated) RX ORDER — ONDANSETRON 2 MG/ML
INJECTION INTRAMUSCULAR; INTRAVENOUS
Status: DISPENSED
Start: 2023-07-24

## (undated) RX ORDER — KETOROLAC TROMETHAMINE 30 MG/ML
INJECTION, SOLUTION INTRAMUSCULAR; INTRAVENOUS
Status: DISPENSED
Start: 2019-02-07

## (undated) RX ORDER — LIDOCAINE HYDROCHLORIDE 10 MG/ML
INJECTION, SOLUTION EPIDURAL; INFILTRATION; INTRACAUDAL; PERINEURAL
Status: DISPENSED
Start: 2019-02-07

## (undated) RX ORDER — GINSENG 100 MG
CAPSULE ORAL
Status: DISPENSED
Start: 2023-07-24

## (undated) RX ORDER — DEXAMETHASONE SODIUM PHOSPHATE 4 MG/ML
INJECTION, SOLUTION INTRA-ARTICULAR; INTRALESIONAL; INTRAMUSCULAR; INTRAVENOUS; SOFT TISSUE
Status: DISPENSED
Start: 2019-02-07

## (undated) RX ORDER — EPINEPHRINE 1 MG/ML
INJECTION, SOLUTION INTRAMUSCULAR; SUBCUTANEOUS
Status: DISPENSED
Start: 2023-07-24

## (undated) RX ORDER — ACETAMINOPHEN 500 MG
TABLET ORAL
Status: DISPENSED
Start: 2023-07-24

## (undated) RX ORDER — LIDOCAINE HYDROCHLORIDE 10 MG/ML
INJECTION, SOLUTION INFILTRATION; PERINEURAL
Status: DISPENSED
Start: 2019-02-07

## (undated) RX ORDER — FENTANYL CITRATE 50 UG/ML
INJECTION, SOLUTION INTRAMUSCULAR; INTRAVENOUS
Status: DISPENSED
Start: 2019-02-07

## (undated) RX ORDER — CEFAZOLIN SODIUM 2 G/100ML
INJECTION, SOLUTION INTRAVENOUS
Status: DISPENSED
Start: 2019-02-07

## (undated) RX ORDER — BUPIVACAINE HYDROCHLORIDE 2.5 MG/ML
INJECTION, SOLUTION INFILTRATION; PERINEURAL
Status: DISPENSED
Start: 2019-02-07

## (undated) RX ORDER — DEXAMETHASONE SODIUM PHOSPHATE 4 MG/ML
INJECTION, SOLUTION INTRA-ARTICULAR; INTRALESIONAL; INTRAMUSCULAR; INTRAVENOUS; SOFT TISSUE
Status: DISPENSED
Start: 2023-07-24

## (undated) RX ORDER — ONDANSETRON 2 MG/ML
INJECTION INTRAMUSCULAR; INTRAVENOUS
Status: DISPENSED
Start: 2019-02-07